# Patient Record
Sex: MALE | Race: ASIAN | Employment: OTHER | URBAN - METROPOLITAN AREA
[De-identification: names, ages, dates, MRNs, and addresses within clinical notes are randomized per-mention and may not be internally consistent; named-entity substitution may affect disease eponyms.]

---

## 2017-03-14 ENCOUNTER — HOSPITAL ENCOUNTER (OUTPATIENT)
Dept: GENERAL RADIOLOGY | Age: 72
Discharge: HOME | End: 2017-03-14
Attending: INTERNAL MEDICINE
Payer: MEDICARE

## 2017-03-14 ENCOUNTER — OFFICE VISIT (OUTPATIENT)
Dept: INTERNAL MEDICINE CLINIC | Facility: CLINIC | Age: 72
End: 2017-03-14

## 2017-03-14 VITALS
WEIGHT: 154 LBS | TEMPERATURE: 98 F | BODY MASS INDEX: 26.29 KG/M2 | OXYGEN SATURATION: 98 % | SYSTOLIC BLOOD PRESSURE: 140 MMHG | RESPIRATION RATE: 16 BRPM | HEIGHT: 64 IN | DIASTOLIC BLOOD PRESSURE: 78 MMHG | HEART RATE: 68 BPM

## 2017-03-14 DIAGNOSIS — R05.9 COUGH: Primary | ICD-10-CM

## 2017-03-14 DIAGNOSIS — R05.9 COUGH: ICD-10-CM

## 2017-03-14 PROCEDURE — 71020 XR CHEST PA + LAT CHEST (CPT=71020): CPT

## 2017-03-14 PROCEDURE — 99213 OFFICE O/P EST LOW 20 MIN: CPT | Performed by: INTERNAL MEDICINE

## 2017-03-14 RX ORDER — PREDNISONE 1 MG/1
TABLET ORAL
Qty: 70 TABLET | Refills: 0 | Status: SHIPPED | OUTPATIENT
Start: 2017-03-14 | End: 2017-03-21

## 2017-03-14 RX ORDER — ALBUTEROL SULFATE 90 UG/1
2 AEROSOL, METERED RESPIRATORY (INHALATION) EVERY 6 HOURS PRN
Qty: 2 INHALER | Refills: 0 | Status: SHIPPED | OUTPATIENT
Start: 2017-03-14 | End: 2017-04-13

## 2017-03-14 NOTE — PROGRESS NOTES
Josiha France  1945 is a 66year old male who presents for upper respiratory symptoms    Patient presents with:  Cough        HPI:   Pt reports  respiratory symptoms for 2 weeks. Did [place]   Patient was in [facility] for the last 2 m SYSTEMS:   GENERAL: feels well otherwise. Denies fever  SKIN: no rashes  EYES:denies eye pain,discharge   HEENT:neg  LUNGS: denies shortness of breath,chest pain,wheezing-nonproductive cough with wheezing noted  CARDIOVASCULAR: denies chest pain on exertion

## 2017-03-21 ENCOUNTER — OFFICE VISIT (OUTPATIENT)
Dept: INTERNAL MEDICINE CLINIC | Facility: CLINIC | Age: 72
End: 2017-03-21

## 2017-03-21 VITALS
WEIGHT: 151.5 LBS | RESPIRATION RATE: 17 BRPM | HEIGHT: 62.75 IN | SYSTOLIC BLOOD PRESSURE: 130 MMHG | TEMPERATURE: 98 F | DIASTOLIC BLOOD PRESSURE: 80 MMHG | BODY MASS INDEX: 27.18 KG/M2 | HEART RATE: 75 BPM | OXYGEN SATURATION: 98 %

## 2017-03-21 DIAGNOSIS — R05.9 COUGH: Primary | ICD-10-CM

## 2017-03-21 PROCEDURE — 99213 OFFICE O/P EST LOW 20 MIN: CPT | Performed by: INTERNAL MEDICINE

## 2017-03-21 RX ORDER — PREDNISONE 1 MG/1
TABLET ORAL
Qty: 70 TABLET | Refills: 0 | COMMUNITY
Start: 2017-03-21 | End: 2017-03-30

## 2017-03-21 NOTE — PROGRESS NOTES
Leatha Ott [facility] 1945 is a 66year old male who presents for upper respiratory symptoms    Patient presents with:   Follow - Up: cough better but still coughing        HPI:   60% better    Current Outpatient Prescriptions:  predniSONE 5 MG Oral Tab Co breath,chest pain,wheezing-nonproductive cough with wheezing noted  CARDIOVASCULAR: denies chest pain on exertion  GI: no nausea or abdominal pain  NEURO: denies headaches    EXAM:   /80 mmHg  Pulse 75  Temp(Src) 97.6 °F (36.4 °C) (Oral)  Resp 17  Ht

## 2017-03-30 ENCOUNTER — TELEPHONE (OUTPATIENT)
Dept: INTERNAL MEDICINE CLINIC | Facility: CLINIC | Age: 72
End: 2017-03-30

## 2017-03-30 ENCOUNTER — OFFICE VISIT (OUTPATIENT)
Dept: INTERNAL MEDICINE CLINIC | Facility: CLINIC | Age: 72
End: 2017-03-30

## 2017-03-30 VITALS
HEIGHT: 62.75 IN | TEMPERATURE: 98 F | BODY MASS INDEX: 27.18 KG/M2 | OXYGEN SATURATION: 98 % | SYSTOLIC BLOOD PRESSURE: 132 MMHG | DIASTOLIC BLOOD PRESSURE: 82 MMHG | HEART RATE: 71 BPM | WEIGHT: 151.5 LBS | RESPIRATION RATE: 17 BRPM

## 2017-03-30 DIAGNOSIS — E11.9 TYPE 2 DIABETES MELLITUS WITHOUT COMPLICATION, WITHOUT LONG-TERM CURRENT USE OF INSULIN (HCC): Chronic | ICD-10-CM

## 2017-03-30 DIAGNOSIS — I10 ESSENTIAL HYPERTENSION: Chronic | ICD-10-CM

## 2017-03-30 DIAGNOSIS — R05.9 COUGH: Primary | ICD-10-CM

## 2017-03-30 PROCEDURE — 99213 OFFICE O/P EST LOW 20 MIN: CPT | Performed by: INTERNAL MEDICINE

## 2017-03-30 RX ORDER — PREDNISONE 1 MG/1
TABLET ORAL
Qty: 70 TABLET | Refills: 0 | Status: SHIPPED | OUTPATIENT
Start: 2017-03-30 | End: 2017-03-30

## 2017-03-30 RX ORDER — PREDNISONE 1 MG/1
TABLET ORAL
Qty: 7 TABLET | Refills: 0 | COMMUNITY
Start: 2017-03-30 | End: 2017-06-29 | Stop reason: ALTCHOICE

## 2017-03-30 NOTE — PROGRESS NOTES
Marlen DanB 1945 is a 66year old male who presents for upper respiratory symptoms    Patient presents with:   Follow - Up        HPI:   Resolved cough     Current Outpatient Prescriptions:  predniSONE 5 MG Oral Tab Continue 5 mg daily -7 days Pasotra Suarez pain,wheezing-nonproductive cough with wheezing noted  CARDIOVASCULAR: denies chest pain on exertion  GI: no nausea or abdominal pain  NEURO: denies headaches    EXAM:   /82 mmHg  Pulse 71  Temp(Src) 97.8 °F (36.6 °C) (Oral)  Resp 17  Ht 62.75\"  Wt

## 2017-03-30 NOTE — TELEPHONE ENCOUNTER
RX for prednisone was sent for #70 tablets but sig states for pt to take for 7 more days. Ekaterina called asking if #70 tablets should be dispensed or #7. I informed Ekaterina that she can dispense #7 tablets. Ekaterina verbalized understanding.

## 2017-05-04 ENCOUNTER — APPOINTMENT (OUTPATIENT)
Dept: LAB | Age: 72
End: 2017-05-04
Attending: INTERNAL MEDICINE
Payer: MEDICARE

## 2017-05-04 DIAGNOSIS — E11.9 TYPE 2 DIABETES MELLITUS WITHOUT COMPLICATION, WITHOUT LONG-TERM CURRENT USE OF INSULIN (HCC): Chronic | ICD-10-CM

## 2017-05-04 DIAGNOSIS — I10 ESSENTIAL HYPERTENSION: Chronic | ICD-10-CM

## 2017-05-04 PROCEDURE — 36415 COLL VENOUS BLD VENIPUNCTURE: CPT | Performed by: INTERNAL MEDICINE

## 2017-06-26 ENCOUNTER — TELEPHONE (OUTPATIENT)
Dept: INTERNAL MEDICINE CLINIC | Facility: CLINIC | Age: 72
End: 2017-06-26

## 2017-06-26 NOTE — TELEPHONE ENCOUNTER
Received a call from [facility] stating that since pt is diabetic he would benefit from statin therapy. Pharmacist stated that she spoke with pt and they are agreeable with therapy. Would you like to add medication? Please advise.  TY

## 2017-06-26 NOTE — TELEPHONE ENCOUNTER
Pt notified and appointment scheduled.     Future Appointments  Date Time Provider Jesu Taveras   6/29/2017 12:30 PM Thu Fox MD [facility] [place]

## 2017-06-29 ENCOUNTER — OFFICE VISIT (OUTPATIENT)
Dept: INTERNAL MEDICINE CLINIC | Facility: CLINIC | Age: 72
End: 2017-06-29

## 2017-06-29 ENCOUNTER — LAB ENCOUNTER (OUTPATIENT)
Dept: LAB | Age: 72
End: 2017-06-29
Attending: INTERNAL MEDICINE
Payer: MEDICARE

## 2017-06-29 VITALS
HEART RATE: 81 BPM | HEIGHT: 62.75 IN | DIASTOLIC BLOOD PRESSURE: 68 MMHG | RESPIRATION RATE: 16 BRPM | WEIGHT: 163.5 LBS | BODY MASS INDEX: 29.33 KG/M2 | TEMPERATURE: 98 F | OXYGEN SATURATION: 99 % | SYSTOLIC BLOOD PRESSURE: 134 MMHG

## 2017-06-29 DIAGNOSIS — R19.7 DIARRHEA, UNSPECIFIED TYPE: ICD-10-CM

## 2017-06-29 DIAGNOSIS — R19.7 DIARRHEA, UNSPECIFIED TYPE: Primary | ICD-10-CM

## 2017-06-29 LAB
ALBUMIN SERPL-MCNC: 3.8 G/DL (ref 3.5–4.8)
ALP LIVER SERPL-CCNC: 59 U/L (ref 45–117)
ALT SERPL-CCNC: 23 U/L (ref 17–63)
AST SERPL-CCNC: 15 U/L (ref 15–41)
BASOPHILS # BLD AUTO: 0.04 X10(3) UL (ref 0–0.1)
BASOPHILS NFR BLD AUTO: 0.7 %
BILIRUB SERPL-MCNC: 0.6 MG/DL (ref 0.1–2)
BUN BLD-MCNC: 13 MG/DL (ref 8–20)
CALCIUM BLD-MCNC: 9.3 MG/DL (ref 8.3–10.3)
CHLORIDE: 106 MMOL/L (ref 101–111)
CO2: 29 MMOL/L (ref 22–32)
CREAT BLD-MCNC: 1.12 MG/DL (ref 0.7–1.3)
EOSINOPHIL # BLD AUTO: 0.26 X10(3) UL (ref 0–0.3)
EOSINOPHIL NFR BLD AUTO: 4.4 %
ERYTHROCYTE [DISTWIDTH] IN BLOOD BY AUTOMATED COUNT: 14.5 % (ref 11.5–16)
GLUCOSE BLD-MCNC: 166 MG/DL (ref 70–99)
HCT VFR BLD AUTO: 42.4 % (ref 37–53)
HGB BLD-MCNC: 14 G/DL (ref 13–17)
IMMATURE GRANULOCYTE COUNT: 0.02 X10(3) UL (ref 0–1)
IMMATURE GRANULOCYTE RATIO %: 0.3 %
LYMPHOCYTES # BLD AUTO: 2.68 X10(3) UL (ref 0.9–4)
LYMPHOCYTES NFR BLD AUTO: 44.9 %
M PROTEIN MFR SERPL ELPH: 7.3 G/DL (ref 6.1–8.3)
MCH RBC QN AUTO: 30.2 PG (ref 27–33.2)
MCHC RBC AUTO-ENTMCNC: 33 G/DL (ref 31–37)
MCV RBC AUTO: 91.6 FL (ref 80–99)
MONOCYTES # BLD AUTO: 0.54 X10(3) UL (ref 0.1–0.6)
MONOCYTES NFR BLD AUTO: 9 %
NEUTROPHIL ABS PRELIM: 2.43 X10 (3) UL (ref 1.3–6.7)
NEUTROPHILS # BLD AUTO: 2.43 X10(3) UL (ref 1.3–6.7)
NEUTROPHILS NFR BLD AUTO: 40.7 %
PLATELET # BLD AUTO: 208 10(3)UL (ref 150–450)
POTASSIUM SERPL-SCNC: 4.7 MMOL/L (ref 3.6–5.1)
RBC # BLD AUTO: 4.63 X10(6)UL (ref 3.8–5.8)
RED CELL DISTRIBUTION WIDTH-SD: 48.3 FL (ref 35.1–46.3)
SODIUM SERPL-SCNC: 141 MMOL/L (ref 136–144)
WBC # BLD AUTO: 6 X10(3) UL (ref 4–13)

## 2017-06-29 PROCEDURE — 99213 OFFICE O/P EST LOW 20 MIN: CPT | Performed by: INTERNAL MEDICINE

## 2017-06-29 PROCEDURE — 36415 COLL VENOUS BLD VENIPUNCTURE: CPT | Performed by: INTERNAL MEDICINE

## 2017-06-29 PROCEDURE — 85025 COMPLETE CBC W/AUTO DIFF WBC: CPT | Performed by: INTERNAL MEDICINE

## 2017-06-29 NOTE — PROGRESS NOTES
Adriana Ronquillo  1945 is a 66year old male. Patient presents with:  Medication Follow-Up  Patient has noted altered bowel movements the last few weeks. His last visit to [facility] in December.   Occasionally feels he has dark color stools no recoll **            2 kids    Retired        Diet: vegetarian    Exercise: nothing formal    Sleep: ok    Stress: low       REVIEW OF SYSTEMS:   Gastrointestinal:   Reflux no. Pain in stomach no. Loss of control of bowels no.  recent travel no. recent anti will await initial tests    The patient indicates understanding of these issues and agrees to the plan. The patient is asked to Return in about 4 weeks (around 7/27/2017) for after seeing consults, result discussion. .   -hold milk and vegetable for a few

## 2017-06-30 DIAGNOSIS — I10 ESSENTIAL HYPERTENSION: ICD-10-CM

## 2017-06-30 RX ORDER — PROPRANOLOL HYDROCHLORIDE 10 MG/1
TABLET ORAL
Qty: 180 TABLET | Refills: 3 | Status: SHIPPED | OUTPATIENT
Start: 2017-06-30 | End: 2017-06-30

## 2017-11-13 ENCOUNTER — OFFICE VISIT (OUTPATIENT)
Dept: INTERNAL MEDICINE CLINIC | Facility: CLINIC | Age: 72
End: 2017-11-13

## 2017-11-13 VITALS
SYSTOLIC BLOOD PRESSURE: 130 MMHG | HEIGHT: 62.75 IN | BODY MASS INDEX: 27.99 KG/M2 | TEMPERATURE: 98 F | HEART RATE: 80 BPM | OXYGEN SATURATION: 98 % | WEIGHT: 156 LBS | RESPIRATION RATE: 13 BRPM | DIASTOLIC BLOOD PRESSURE: 68 MMHG

## 2017-11-13 DIAGNOSIS — Z00.00 ROUTINE GENERAL MEDICAL EXAMINATION AT A HEALTH CARE FACILITY: Primary | ICD-10-CM

## 2017-11-13 DIAGNOSIS — I10 ESSENTIAL HYPERTENSION: Chronic | ICD-10-CM

## 2017-11-13 DIAGNOSIS — J38.00 VOCAL CORD PARALYSIS: Chronic | ICD-10-CM

## 2017-11-13 DIAGNOSIS — E11.9 TYPE 2 DIABETES MELLITUS WITHOUT COMPLICATION, WITHOUT LONG-TERM CURRENT USE OF INSULIN (HCC): Chronic | ICD-10-CM

## 2017-11-13 DIAGNOSIS — J32.9 CHRONIC SINUSITIS, UNSPECIFIED LOCATION: ICD-10-CM

## 2017-11-13 PROCEDURE — 96160 PT-FOCUSED HLTH RISK ASSMT: CPT | Performed by: INTERNAL MEDICINE

## 2017-11-13 PROCEDURE — 93000 ELECTROCARDIOGRAM COMPLETE: CPT | Performed by: INTERNAL MEDICINE

## 2017-11-13 PROCEDURE — 99213 OFFICE O/P EST LOW 20 MIN: CPT | Performed by: INTERNAL MEDICINE

## 2017-11-13 RX ORDER — AMOXICILLIN AND CLAVULANATE POTASSIUM 500; 125 MG/1; MG/1
1 TABLET, FILM COATED ORAL 2 TIMES DAILY
Qty: 20 TABLET | Refills: 0 | Status: SHIPPED | OUTPATIENT
Start: 2017-11-13 | End: 2017-11-20 | Stop reason: ALTCHOICE

## 2017-11-13 NOTE — PATIENT INSTRUCTIONS
Giuseppe Faustin's SCREENING SCHEDULE   Tests on this list are recommended by your physician but may not be covered, or covered at this frequency, by your insurer. Please check with your insurance carrier before scheduling to verify coverage.

## 2017-11-13 NOTE — PROGRESS NOTES
Yoselyn Klein is a 67year old male who presents for a Medicare Annual Wellness visit.    male     Patient Care Team: Patient Care Team:  Sheridan Pandey MD as PCP - General (Internal Medicine)  Sheridan Pandey MD (Internal Medicine)  Sb Dotson, 06/29/2017   ALT 23 05/04/2017   ALT 24 11/14/2016       Lab Results  Component Value Date   TSH 0.009 (L) 01/28/2016   TSH 0.769 03/05/2015   TSH 0.007 (L) 03/20/2014       Lab Results  Component Value Date   BUN 13 06/29/2017   BUN 14 05/04/2017   BUN 12 LAST 2 WEEKS   Little interest or pleasure in doing things (over the last two weeks)?: Not at all    Feeling down, depressed, or hopeless (over the last two weeks)?: Not at all    PHQ-2 SCORE: 0        Advance Directives     Do you have a healthcare power PSA due on 11/14/2018  Update Health Maintenance if applicable   Immunizations      Influenza No orders found for this or any previous visit. Update Immunization Activity if applicable    Pneumococcal No orders found for this or any previous visit.  Update MEDICATIONS:     Current Outpatient Prescriptions:  Amoxicillin-Pot Clavulanate 500-125 MG Oral Tab Take 1 tablet by mouth 2 (two) times daily.  Disp: 20 tablet Rfl: 0   Propranolol HCl 10 MG Oral Tab TAKE 1 TABLET BY MOUTH TWO TIMES A DAY Disp: 180 tablet HAVE EXPERIENCED ANY*      Comment: Procedure: COLONOSCOPY, POSSIBLE BIOPSY,                POSSIBLE POLYPECTOMY 04421;  Surgeon: Toñito Sahni MD;  Location: [address] [place] [address]  10/22/2012: PATIENT Diana Cb none. Orthopnea no. Palpitations none. PND (paroxsymal nocturnal dyspnea) none.    Gastrointestinal:   Patient denies abdominal pain, acid reflux, blood in stool, vomiting, nausea, heartburn denies any wt loss or gain no change in appetite noted no change i Both Eyes Chart Acuity: 20/40   Able To Tolerate Visual Acuity: Yes      GENERAL: well developed, well nourished, in no apparent distress  SKIN: no rashes, no suspicious lesions  HEENT: atraumatic, normocephalic, ears and throat are clear                He none.   Reflexes: normal.   Sensory: all sensory modalities normal.   LYMPHATICS:   Cervical: none. Groin: no adenopathy . Inguinal: no adenopathy. Supraclavicular: none.    DERMATOLOGY:   Rash: no.     ASSESSMENT AND OTHER RELEVANT CHRONIC CONDITIONS

## 2017-11-14 ENCOUNTER — LAB ENCOUNTER (OUTPATIENT)
Dept: LAB | Age: 72
End: 2017-11-14
Attending: INTERNAL MEDICINE
Payer: MEDICARE

## 2017-11-14 DIAGNOSIS — Z00.00 ROUTINE GENERAL MEDICAL EXAMINATION AT A HEALTH CARE FACILITY: ICD-10-CM

## 2017-11-14 PROCEDURE — 82570 ASSAY OF URINE CREATININE: CPT | Performed by: INTERNAL MEDICINE

## 2017-11-14 PROCEDURE — 80061 LIPID PANEL: CPT | Performed by: INTERNAL MEDICINE

## 2017-11-14 PROCEDURE — 36415 COLL VENOUS BLD VENIPUNCTURE: CPT | Performed by: INTERNAL MEDICINE

## 2017-11-14 PROCEDURE — 81001 URINALYSIS AUTO W/SCOPE: CPT | Performed by: INTERNAL MEDICINE

## 2017-11-14 PROCEDURE — 85025 COMPLETE CBC W/AUTO DIFF WBC: CPT | Performed by: INTERNAL MEDICINE

## 2017-11-14 PROCEDURE — 80053 COMPREHEN METABOLIC PANEL: CPT | Performed by: INTERNAL MEDICINE

## 2017-11-14 PROCEDURE — 83036 HEMOGLOBIN GLYCOSYLATED A1C: CPT | Performed by: INTERNAL MEDICINE

## 2017-11-14 PROCEDURE — 84436 ASSAY OF TOTAL THYROXINE: CPT | Performed by: INTERNAL MEDICINE

## 2017-11-14 PROCEDURE — 82043 UR ALBUMIN QUANTITATIVE: CPT | Performed by: INTERNAL MEDICINE

## 2017-11-20 ENCOUNTER — OFFICE VISIT (OUTPATIENT)
Dept: INTERNAL MEDICINE CLINIC | Facility: CLINIC | Age: 72
End: 2017-11-20

## 2017-11-20 VITALS
HEART RATE: 76 BPM | WEIGHT: 154 LBS | TEMPERATURE: 98 F | RESPIRATION RATE: 16 BRPM | OXYGEN SATURATION: 97 % | SYSTOLIC BLOOD PRESSURE: 138 MMHG | BODY MASS INDEX: 28 KG/M2 | DIASTOLIC BLOOD PRESSURE: 70 MMHG

## 2017-11-20 DIAGNOSIS — R31.21 ASYMPTOMATIC MICROSCOPIC HEMATURIA: ICD-10-CM

## 2017-11-20 DIAGNOSIS — R79.89 ABNORMAL THYROID BLOOD TEST: ICD-10-CM

## 2017-11-20 DIAGNOSIS — E11.9 TYPE 2 DIABETES MELLITUS WITHOUT COMPLICATION, WITHOUT LONG-TERM CURRENT USE OF INSULIN (HCC): Primary | Chronic | ICD-10-CM

## 2017-11-20 PROCEDURE — 99213 OFFICE O/P EST LOW 20 MIN: CPT | Performed by: INTERNAL MEDICINE

## 2017-11-20 RX ORDER — PROPRANOLOL HYDROCHLORIDE 10 MG/1
TABLET ORAL
Qty: 180 TABLET | Refills: 3 | Status: SHIPPED | OUTPATIENT
Start: 2017-11-20 | End: 2018-04-02

## 2017-11-20 RX ORDER — ENALAPRIL MALEATE 2.5 MG/1
2.5 TABLET ORAL DAILY
Qty: 90 TABLET | Refills: 3 | Status: SHIPPED | OUTPATIENT
Start: 2017-11-20 | End: 2017-12-23

## 2017-11-20 NOTE — PROGRESS NOTES
Wiley Mchugh [facility] 1945 is a 67year old male. Patient presents with:   Follow - Up: Labs      HPI:   DIABETES MELLITUS:   The diet that the patient has been following is low-carb diet  The frequency of the monitoring schedule is none    The results Alcohol use: No                 REVIEW OF SYSTEMS:     General/Constitutional:   Weight loss no. Ophthalmologic:   Change in vision none. Diminished vision no. Double vision no. Vision loss no.  Eye check scheduled   Endocrine:   Excessive repeat hemoglobin A1c in 3 months     The patient indicates understanding of these issues and agrees to the plan. The patient is asked to Return in about 3 months (around 2/20/2018).   Linda Pabon MD

## 2017-11-30 ENCOUNTER — APPOINTMENT (OUTPATIENT)
Dept: LAB | Age: 72
End: 2017-11-30
Attending: INTERNAL MEDICINE
Payer: MEDICARE

## 2017-11-30 PROCEDURE — 84439 ASSAY OF FREE THYROXINE: CPT | Performed by: INTERNAL MEDICINE

## 2017-11-30 PROCEDURE — 84481 FREE ASSAY (FT-3): CPT | Performed by: INTERNAL MEDICINE

## 2017-11-30 PROCEDURE — 36415 COLL VENOUS BLD VENIPUNCTURE: CPT | Performed by: INTERNAL MEDICINE

## 2017-11-30 PROCEDURE — 84443 ASSAY THYROID STIM HORMONE: CPT | Performed by: INTERNAL MEDICINE

## 2017-12-13 ENCOUNTER — MED REC SCAN ONLY (OUTPATIENT)
Dept: INTERNAL MEDICINE CLINIC | Facility: CLINIC | Age: 72
End: 2017-12-13

## 2017-12-23 ENCOUNTER — OFFICE VISIT (OUTPATIENT)
Dept: INTERNAL MEDICINE CLINIC | Facility: CLINIC | Age: 72
End: 2017-12-23

## 2017-12-23 ENCOUNTER — HOSPITAL ENCOUNTER (OUTPATIENT)
Dept: GENERAL RADIOLOGY | Age: 72
Discharge: HOME | End: 2017-12-23
Attending: INTERNAL MEDICINE
Payer: MEDICARE

## 2017-12-23 VITALS
DIASTOLIC BLOOD PRESSURE: 66 MMHG | BODY MASS INDEX: 26.74 KG/M2 | OXYGEN SATURATION: 97 % | HEIGHT: 62.75 IN | RESPIRATION RATE: 14 BRPM | HEART RATE: 78 BPM | TEMPERATURE: 99 F | WEIGHT: 149 LBS | SYSTOLIC BLOOD PRESSURE: 128 MMHG

## 2017-12-23 DIAGNOSIS — J38.00 VOCAL CORD PARALYSIS: Chronic | ICD-10-CM

## 2017-12-23 DIAGNOSIS — R13.10 DYSPHAGIA, UNSPECIFIED TYPE: ICD-10-CM

## 2017-12-23 DIAGNOSIS — T46.4X5A COUGH DUE TO ACE INHIBITOR: ICD-10-CM

## 2017-12-23 DIAGNOSIS — R05.8 COUGH DUE TO ACE INHIBITOR: Primary | ICD-10-CM

## 2017-12-23 DIAGNOSIS — T46.4X5A COUGH DUE TO ACE INHIBITOR: Primary | ICD-10-CM

## 2017-12-23 DIAGNOSIS — R05.8 COUGH DUE TO ACE INHIBITOR: ICD-10-CM

## 2017-12-23 DIAGNOSIS — J32.9 CHRONIC SINUSITIS, UNSPECIFIED LOCATION: ICD-10-CM

## 2017-12-23 PROCEDURE — 71020 XR CHEST PA + LAT CHEST (CPT=71020): CPT | Performed by: INTERNAL MEDICINE

## 2017-12-23 PROCEDURE — 99213 OFFICE O/P EST LOW 20 MIN: CPT | Performed by: INTERNAL MEDICINE

## 2017-12-23 RX ORDER — PANTOPRAZOLE SODIUM 40 MG/1
40 TABLET, DELAYED RELEASE ORAL
Qty: 30 TABLET | Refills: 11 | Status: SHIPPED | OUTPATIENT
Start: 2017-12-23 | End: 2018-06-14

## 2017-12-23 NOTE — PROGRESS NOTES
Marlen DanB 1945 is a 67year old male who presents for upper respiratory symptoms    Patient presents with:  Cough: Est Pt. cough        HPI:   Complains of tickle in the throat never saw the gastroenterologist as advised  Tingling in the throa cough   CARDIOVASCULAR: denies chest pain on exertion  GI: no nausea or abdominal pain  NEURO: denies headaches    EXAM:   /66   Pulse 78   Temp 98.7 °F (37.1 °C) (Oral)   Resp 14   Ht 62.75\"   Wt 149 lb   SpO2 97%   BMI 26.60 kg/m²   GENERAL: well

## 2017-12-29 ENCOUNTER — TELEPHONE (OUTPATIENT)
Dept: INTERNAL MEDICINE CLINIC | Facility: CLINIC | Age: 72
End: 2017-12-29

## 2017-12-30 ENCOUNTER — OFFICE VISIT (OUTPATIENT)
Dept: INTERNAL MEDICINE CLINIC | Facility: CLINIC | Age: 72
End: 2017-12-30

## 2017-12-30 VITALS
SYSTOLIC BLOOD PRESSURE: 124 MMHG | OXYGEN SATURATION: 97 % | TEMPERATURE: 98 F | HEART RATE: 83 BPM | BODY MASS INDEX: 26.78 KG/M2 | DIASTOLIC BLOOD PRESSURE: 64 MMHG | WEIGHT: 149.25 LBS | RESPIRATION RATE: 16 BRPM | HEIGHT: 62.75 IN

## 2017-12-30 DIAGNOSIS — R05.9 COUGH: Primary | ICD-10-CM

## 2017-12-30 PROCEDURE — 99213 OFFICE O/P EST LOW 20 MIN: CPT | Performed by: INTERNAL MEDICINE

## 2017-12-30 RX ORDER — ZOLPIDEM TARTRATE 5 MG/1
5 TABLET ORAL NIGHTLY PRN
Qty: 30 TABLET | Refills: 0 | Status: SHIPPED | OUTPATIENT
Start: 2017-12-30 | End: 2018-01-29

## 2017-12-30 NOTE — PROGRESS NOTES
Garcia Ennis  1945 is a 67year old male who presents for upper respiratory symptoms    Patient presents with:   Follow - Up: Est Pt. follow up CT scan        HPI:   Complains of tickle in the throat never saw the gastroenterologist as advised Alcohol use:  No                  REVIEW OF SYSTEMS:   No change  EXAM:   /64   Pulse 83   Temp 98.1 °F (36.7 °C) (Oral)   Resp 16   Ht 62.75\"   Wt 149 lb 4 oz   SpO2 97%   BMI 26.65 kg/m²   GENERAL: well developed, well nourished,in no apparent di

## 2018-01-02 ENCOUNTER — TELEPHONE (OUTPATIENT)
Dept: INTERNAL MEDICINE CLINIC | Facility: CLINIC | Age: 73
End: 2018-01-02

## 2018-01-02 ENCOUNTER — HOSPITAL ENCOUNTER (OUTPATIENT)
Dept: CT IMAGING | Age: 73
Discharge: HOME | End: 2018-01-02
Attending: INTERNAL MEDICINE
Payer: MEDICARE

## 2018-01-02 DIAGNOSIS — J32.9 CHRONIC SINUSITIS, UNSPECIFIED LOCATION: ICD-10-CM

## 2018-01-02 DIAGNOSIS — J32.9 CHRONIC SINUSITIS, UNSPECIFIED LOCATION: Primary | ICD-10-CM

## 2018-01-02 PROCEDURE — 70486 CT MAXILLOFACIAL W/O DYE: CPT | Performed by: INTERNAL MEDICINE

## 2018-01-13 ENCOUNTER — HOSPITAL ENCOUNTER (OUTPATIENT)
Dept: GENERAL RADIOLOGY | Facility: HOSPITAL | Age: 73
Discharge: HOME | End: 2018-01-13
Attending: INTERNAL MEDICINE
Payer: MEDICARE

## 2018-01-13 DIAGNOSIS — R13.10 ODYNOPHAGIA: ICD-10-CM

## 2018-01-13 DIAGNOSIS — R13.10 DYSPHAGIA, UNSPECIFIED TYPE: ICD-10-CM

## 2018-01-13 PROCEDURE — 74220 X-RAY XM ESOPHAGUS 1CNTRST: CPT | Performed by: INTERNAL MEDICINE

## 2018-01-23 ENCOUNTER — ANESTHESIA EVENT (OUTPATIENT)
Dept: ENDOSCOPY | Facility: HOSPITAL | Age: 73
End: 2018-01-23
Payer: MEDICARE

## 2018-01-23 ENCOUNTER — ANESTHESIA (OUTPATIENT)
Dept: ENDOSCOPY | Facility: HOSPITAL | Age: 73
End: 2018-01-23
Payer: MEDICARE

## 2018-01-23 ENCOUNTER — HOSPITAL ENCOUNTER (OUTPATIENT)
Facility: HOSPITAL | Age: 73
Setting detail: HOSPITAL OUTPATIENT SURGERY
Discharge: HOME | End: 2018-01-23
Attending: INTERNAL MEDICINE | Admitting: INTERNAL MEDICINE
Payer: MEDICARE

## 2018-01-23 ENCOUNTER — SURGERY (OUTPATIENT)
Age: 73
End: 2018-01-23

## 2018-01-23 VITALS
BODY MASS INDEX: 25.44 KG/M2 | OXYGEN SATURATION: 97 % | HEART RATE: 78 BPM | HEIGHT: 64 IN | TEMPERATURE: 98 F | WEIGHT: 149 LBS | RESPIRATION RATE: 16 BRPM | SYSTOLIC BLOOD PRESSURE: 136 MMHG | DIASTOLIC BLOOD PRESSURE: 65 MMHG

## 2018-01-23 DIAGNOSIS — R13.10 DYSPHAGIA, UNSPECIFIED TYPE: ICD-10-CM

## 2018-01-23 DIAGNOSIS — R13.10 ODYNOPHAGIA: ICD-10-CM

## 2018-01-23 DIAGNOSIS — R49.0 HOARSENESS OF VOICE: ICD-10-CM

## 2018-01-23 PROCEDURE — 0DB78ZX EXCISION OF STOMACH, PYLORUS, VIA NATURAL OR ARTIFICIAL OPENING ENDOSCOPIC, DIAGNOSTIC: ICD-10-PCS | Performed by: INTERNAL MEDICINE

## 2018-01-23 PROCEDURE — 0DB98ZX EXCISION OF DUODENUM, VIA NATURAL OR ARTIFICIAL OPENING ENDOSCOPIC, DIAGNOSTIC: ICD-10-PCS | Performed by: INTERNAL MEDICINE

## 2018-01-23 PROCEDURE — 0D738ZZ DILATION OF LOWER ESOPHAGUS, VIA NATURAL OR ARTIFICIAL OPENING ENDOSCOPIC: ICD-10-PCS | Performed by: INTERNAL MEDICINE

## 2018-01-23 PROCEDURE — 0DB58ZX EXCISION OF ESOPHAGUS, VIA NATURAL OR ARTIFICIAL OPENING ENDOSCOPIC, DIAGNOSTIC: ICD-10-PCS | Performed by: INTERNAL MEDICINE

## 2018-01-23 PROCEDURE — 88305 TISSUE EXAM BY PATHOLOGIST: CPT | Performed by: INTERNAL MEDICINE

## 2018-01-23 RX ORDER — SODIUM CHLORIDE, SODIUM LACTATE, POTASSIUM CHLORIDE, CALCIUM CHLORIDE 600; 310; 30; 20 MG/100ML; MG/100ML; MG/100ML; MG/100ML
INJECTION, SOLUTION INTRAVENOUS CONTINUOUS
Status: DISCONTINUED | OUTPATIENT
Start: 2018-01-23 | End: 2018-01-23

## 2018-01-23 RX ORDER — DEXTROSE MONOHYDRATE 25 G/50ML
50 INJECTION, SOLUTION INTRAVENOUS
Status: DISCONTINUED | OUTPATIENT
Start: 2018-01-23 | End: 2018-01-23

## 2018-01-23 RX ORDER — NALOXONE HYDROCHLORIDE 0.4 MG/ML
80 INJECTION, SOLUTION INTRAMUSCULAR; INTRAVENOUS; SUBCUTANEOUS AS NEEDED
Status: DISCONTINUED | OUTPATIENT
Start: 2018-01-23 | End: 2018-01-23

## 2018-01-23 NOTE — H&P
[address] Patient Status:  Hospital Outpatient Surgery    1945 MRN [id number]   [facility] ENDOSCOPY Attending Juan Sears MD   Hosp Day # 0 PCP Marc Paige MD     CC: Dysphagia    Histo 9/1/15: OTHER SURGICAL HISTORY      Comment: CYSTOSCOPY, TRANS URETHRAL RESECTION OF                PROSTATE  10/22/2012: PATIENT DOCUMENTED NOT TO HAVE EXPERIENCED ANY*      Comment: Procedure: COLONOSCOPY, POSSIBLE BIOPSY,                POSSIBLE POLYP

## 2018-01-23 NOTE — ANESTHESIA POSTPROCEDURE EVALUATION
[address] Patient Status:  Hospital Outpatient Surgery   Age/Gender 67year old male MRN [id number]   Location [facility]. Attending Garima Goodman MD   Hosp Day # 0 PCP Thad Beaver MD       Anesthesia Post-op Not

## 2018-01-24 ENCOUNTER — TELEPHONE (OUTPATIENT)
Dept: INTERNAL MEDICINE CLINIC | Facility: CLINIC | Age: 73
End: 2018-01-24

## 2018-01-24 NOTE — TELEPHONE ENCOUNTER
Cathy from Dr. Gauthier Prudent office ([address] [place] [address]) (T: [phone])    Seeking a recommendation for an ENT specialist, to refer the patient to.

## 2018-01-24 NOTE — TELEPHONE ENCOUNTER
Sejal Doe called back and was informed that we referred pt to see Dr Ayla Sharpe 1/2/18. Sejal Doe verbalized understanding.

## 2018-02-20 DIAGNOSIS — E11.9 TYPE 2 DIABETES MELLITUS WITHOUT COMPLICATION, WITHOUT LONG-TERM CURRENT USE OF INSULIN (HCC): Chronic | ICD-10-CM

## 2018-02-20 RX ORDER — LOSARTAN POTASSIUM 25 MG/1
TABLET ORAL
Qty: 90 TABLET | Refills: 2 | OUTPATIENT
Start: 2018-02-20

## 2018-02-21 ENCOUNTER — APPOINTMENT (OUTPATIENT)
Dept: LAB | Age: 73
End: 2018-02-21
Attending: INTERNAL MEDICINE
Payer: MEDICARE

## 2018-02-21 DIAGNOSIS — A04.8 H. PYLORI INFECTION: ICD-10-CM

## 2018-02-21 PROCEDURE — 87338 HPYLORI STOOL AG IA: CPT

## 2018-02-23 LAB — HELICOBACTER PYLORI AG, FECAL: NEGATIVE

## 2018-04-02 ENCOUNTER — OFFICE VISIT (OUTPATIENT)
Dept: INTERNAL MEDICINE CLINIC | Facility: CLINIC | Age: 73
End: 2018-04-02

## 2018-04-02 VITALS — DIASTOLIC BLOOD PRESSURE: 92 MMHG | HEART RATE: 80 BPM | TEMPERATURE: 99 F | SYSTOLIC BLOOD PRESSURE: 140 MMHG

## 2018-04-02 DIAGNOSIS — I10 ESSENTIAL HYPERTENSION: Chronic | ICD-10-CM

## 2018-04-02 DIAGNOSIS — K22.2 ESOPHAGEAL STRICTURE: ICD-10-CM

## 2018-04-02 DIAGNOSIS — J32.9 CHRONIC SINUSITIS, UNSPECIFIED LOCATION: Primary | ICD-10-CM

## 2018-04-02 PROCEDURE — 99213 OFFICE O/P EST LOW 20 MIN: CPT | Performed by: INTERNAL MEDICINE

## 2018-04-02 RX ORDER — PROPRANOLOL HYDROCHLORIDE 10 MG/1
TABLET ORAL
Qty: 180 TABLET | Refills: 3 | COMMUNITY
Start: 2018-04-02 | End: 2018-06-14

## 2018-04-02 NOTE — PROGRESS NOTES
Stefanie MEANS 1945 is a 67year old male who presents for upper respiratory symptoms    Patient presents with:  Melissa Frost        HPI:   Pt reports  respiratory symptoms for few days. Did see ENT without much relief.   Saw Dr. Marques Lewis now still h Quit date: 6/20/1984  Smokeless tobacco: Never Used                      Alcohol use: No                  REVIEW OF SYSTEMS:   GENERAL: feels well otherwise. Denies fever  SKIN: no rashes  EYES:denies eye pain,discharge   HEENT: Stuffy nose with collectio

## 2018-04-19 ENCOUNTER — HOSPITAL ENCOUNTER (OUTPATIENT)
Dept: CT IMAGING | Facility: HOSPITAL | Age: 73
Discharge: HOME | End: 2018-04-19
Attending: OTOLARYNGOLOGY
Payer: MEDICARE

## 2018-04-19 DIAGNOSIS — J38.01 UNILATERAL VOCAL CORD PARALYSIS: ICD-10-CM

## 2018-04-19 DIAGNOSIS — J32.4 CHRONIC PANSINUSITIS: ICD-10-CM

## 2018-04-19 PROCEDURE — 70486 CT MAXILLOFACIAL W/O DYE: CPT | Performed by: OTOLARYNGOLOGY

## 2018-04-19 PROCEDURE — 82565 ASSAY OF CREATININE: CPT

## 2018-04-19 PROCEDURE — 71260 CT THORAX DX C+: CPT | Performed by: OTOLARYNGOLOGY

## 2018-05-21 DIAGNOSIS — E11.9 TYPE 2 DIABETES MELLITUS WITHOUT COMPLICATION, WITHOUT LONG-TERM CURRENT USE OF INSULIN (HCC): Chronic | ICD-10-CM

## 2018-05-21 RX ORDER — LOSARTAN POTASSIUM 25 MG/1
TABLET ORAL
Qty: 90 TABLET | Refills: 2 | OUTPATIENT
Start: 2018-05-21

## 2018-05-21 NOTE — TELEPHONE ENCOUNTER
Losartan Potassium 25 MG Oral Tab (Discontinued) 90 tablet 3 11/25/2016 11/13/2017    Sig - Route:  Take 1 tablet (25 mg total) by mouth daily. - Oral      Patient not been taking this medication since 11/17

## 2018-06-14 ENCOUNTER — OFFICE VISIT (OUTPATIENT)
Dept: INTERNAL MEDICINE CLINIC | Facility: CLINIC | Age: 73
End: 2018-06-14

## 2018-06-14 VITALS
TEMPERATURE: 99 F | BODY MASS INDEX: 26 KG/M2 | WEIGHT: 150 LBS | DIASTOLIC BLOOD PRESSURE: 72 MMHG | RESPIRATION RATE: 16 BRPM | SYSTOLIC BLOOD PRESSURE: 140 MMHG | OXYGEN SATURATION: 95 % | HEART RATE: 84 BPM

## 2018-06-14 DIAGNOSIS — E11.9 TYPE 2 DIABETES MELLITUS WITHOUT COMPLICATION, WITHOUT LONG-TERM CURRENT USE OF INSULIN (HCC): Primary | Chronic | ICD-10-CM

## 2018-06-14 DIAGNOSIS — I10 ESSENTIAL HYPERTENSION: Chronic | ICD-10-CM

## 2018-06-14 DIAGNOSIS — R13.10 DYSPHAGIA, UNSPECIFIED TYPE: ICD-10-CM

## 2018-06-14 DIAGNOSIS — R79.89 ABNORMAL THYROID BLOOD TEST: ICD-10-CM

## 2018-06-14 DIAGNOSIS — Z12.11 SCREENING FOR COLON CANCER: ICD-10-CM

## 2018-06-14 PROBLEM — R31.21 ASYMPTOMATIC MICROSCOPIC HEMATURIA: Chronic | Status: ACTIVE | Noted: 2017-11-20

## 2018-06-14 PROBLEM — J32.9 CHRONIC SINUSITIS: Chronic | Status: ACTIVE | Noted: 2017-11-13

## 2018-06-14 PROCEDURE — 99213 OFFICE O/P EST LOW 20 MIN: CPT | Performed by: INTERNAL MEDICINE

## 2018-06-14 RX ORDER — ENALAPRIL MALEATE 2.5 MG/1
2.5 TABLET ORAL DAILY
COMMUNITY
End: 2018-06-14

## 2018-06-14 RX ORDER — PANTOPRAZOLE SODIUM 40 MG/1
40 TABLET, DELAYED RELEASE ORAL
Qty: 30 TABLET | Refills: 11 | Status: SHIPPED | OUTPATIENT
Start: 2018-06-14 | End: 2018-10-30 | Stop reason: ALTCHOICE

## 2018-06-14 RX ORDER — PROPRANOLOL HYDROCHLORIDE 10 MG/1
TABLET ORAL
Qty: 180 TABLET | Refills: 3 | Status: SHIPPED | OUTPATIENT
Start: 2018-06-14 | End: 2018-11-06 | Stop reason: ALTCHOICE

## 2018-06-14 RX ORDER — ENALAPRIL MALEATE 2.5 MG/1
2.5 TABLET ORAL DAILY
Qty: 90 TABLET | Refills: 0 | Status: SHIPPED | OUTPATIENT
Start: 2018-06-14 | End: 2019-02-05

## 2018-06-14 NOTE — PROGRESS NOTES
Wiley Mchugh [facility] 1945 is a 67year old male. Patient presents with: Follow - Up: Medication      HPI:   DIABETES MELLITUS:   The diet that the patient has been following is low-carb diet  The frequency of the monitoring schedule is nonely.      Jackie Orozco breath    • Sleep apnea     no cpap   • Sleep disturbance    • Sputum production    • Visual impairment     reading glasses   • Vocal cord paralysis 11/10/2016   • Wheezing       Social History:  Smoking status: Former Smoker Future  -     COMP METABOLIC PANEL (14);  Future    Screening for colon cancer  -     GASTRO - INTERNAL    Essential hypertension  -     Propranolol HCl 10 MG Oral Tab; TAKE 2 TABLET BY MOUTH TWO TIMES A DAY    Dysphagia, unspecified type  -     Pantoprazol

## 2018-06-15 ENCOUNTER — APPOINTMENT (OUTPATIENT)
Dept: LAB | Age: 73
End: 2018-06-15
Attending: INTERNAL MEDICINE
Payer: MEDICARE

## 2018-06-15 DIAGNOSIS — E11.9 TYPE 2 DIABETES MELLITUS WITHOUT COMPLICATION, WITHOUT LONG-TERM CURRENT USE OF INSULIN (HCC): Chronic | ICD-10-CM

## 2018-06-15 DIAGNOSIS — R79.89 ABNORMAL THYROID BLOOD TEST: ICD-10-CM

## 2018-06-15 PROCEDURE — 36415 COLL VENOUS BLD VENIPUNCTURE: CPT

## 2018-06-15 PROCEDURE — 84443 ASSAY THYROID STIM HORMONE: CPT

## 2018-06-15 PROCEDURE — 83036 HEMOGLOBIN GLYCOSYLATED A1C: CPT

## 2018-06-15 PROCEDURE — 80053 COMPREHEN METABOLIC PANEL: CPT

## 2018-06-15 PROCEDURE — 84436 ASSAY OF TOTAL THYROXINE: CPT

## 2018-06-22 ENCOUNTER — OFFICE VISIT (OUTPATIENT)
Dept: INTERNAL MEDICINE CLINIC | Facility: CLINIC | Age: 73
End: 2018-06-22

## 2018-06-22 VITALS
DIASTOLIC BLOOD PRESSURE: 60 MMHG | BODY MASS INDEX: 26 KG/M2 | RESPIRATION RATE: 16 BRPM | OXYGEN SATURATION: 98 % | SYSTOLIC BLOOD PRESSURE: 102 MMHG | WEIGHT: 150 LBS | HEART RATE: 72 BPM

## 2018-06-22 DIAGNOSIS — E11.9 TYPE 2 DIABETES MELLITUS WITHOUT COMPLICATION, WITHOUT LONG-TERM CURRENT USE OF INSULIN (HCC): Chronic | ICD-10-CM

## 2018-06-22 DIAGNOSIS — E05.90 HYPERTHYROIDISM: Primary | ICD-10-CM

## 2018-06-22 PROCEDURE — 99214 OFFICE O/P EST MOD 30 MIN: CPT | Performed by: INTERNAL MEDICINE

## 2018-06-22 RX ORDER — METHIMAZOLE 10 MG/1
10 TABLET ORAL DAILY
Qty: 90 TABLET | Refills: 3 | Status: SHIPPED | OUTPATIENT
Start: 2018-06-22 | End: 2019-03-04

## 2018-06-22 RX ORDER — ZOLPIDEM TARTRATE 5 MG/1
5 TABLET ORAL NIGHTLY PRN
Qty: 30 TABLET | Refills: 0 | Status: SHIPPED | OUTPATIENT
Start: 2018-06-22 | End: 2018-07-22

## 2018-06-22 NOTE — PROGRESS NOTES
Mikey Fabry  1945 is a 67year old male. Patient presents with:   Follow - Up: Labs  Complains of some insomnia    HPI:   DIABETES MELLITUS:   The diet that the patient has been following is low-carb diet  The frequency of the monitoring schedu (postoperative nausea and vomiting)    • Prediabetes     borderline   • Problems with swallowing     trouble with solid   • Shortness of breath    • Sleep apnea     no cpap   • Sleep disturbance    • Sputum production    • Visual impairment     reading gla (CPT=78014); Future    Type 2 diabetes mellitus without complication, without long-term current use of insulin (HCC)               Patient Instructions   Ck sugar # pre and post meals ,compliance with diet/exercise enforce.  Weight counseling discussed   Sp

## 2018-06-28 ENCOUNTER — HOSPITAL ENCOUNTER (OUTPATIENT)
Dept: NUCLEAR MEDICINE | Facility: HOSPITAL | Age: 73
Discharge: HOME | End: 2018-06-28
Attending: INTERNAL MEDICINE
Payer: MEDICARE

## 2018-06-28 DIAGNOSIS — E05.90 HYPERTHYROIDISM: ICD-10-CM

## 2018-06-29 ENCOUNTER — HOSPITAL ENCOUNTER (OUTPATIENT)
Dept: NUCLEAR MEDICINE | Facility: HOSPITAL | Age: 73
Discharge: HOME | End: 2018-06-29
Attending: INTERNAL MEDICINE
Payer: MEDICARE

## 2018-06-29 PROCEDURE — 78014 THYROID IMAGING W/BLOOD FLOW: CPT | Performed by: INTERNAL MEDICINE

## 2018-09-20 DIAGNOSIS — E11.9 TYPE 2 DIABETES MELLITUS WITHOUT COMPLICATION, WITHOUT LONG-TERM CURRENT USE OF INSULIN (HCC): Chronic | ICD-10-CM

## 2018-09-21 RX ORDER — LOSARTAN POTASSIUM 25 MG/1
TABLET ORAL
Qty: 90 TABLET | Refills: 2 | Status: SHIPPED | OUTPATIENT
Start: 2018-09-21 | End: 2018-10-30

## 2018-09-21 NOTE — TELEPHONE ENCOUNTER
Losartan 25 mg 1 tab daily filled 11/13/16 90 with 3 refills     medication is not on his active med list since 11/13/17    Labs 6/15/18    No upcoming apt on file     Please review.

## 2018-10-03 RX ORDER — ZOLPIDEM TARTRATE 5 MG/1
TABLET ORAL
Qty: 30 TABLET | Refills: 0 | Status: SHIPPED | OUTPATIENT
Start: 2018-10-03 | End: 2019-02-18 | Stop reason: ALTCHOICE

## 2018-10-30 ENCOUNTER — OFFICE VISIT (OUTPATIENT)
Dept: INTERNAL MEDICINE CLINIC | Facility: CLINIC | Age: 73
End: 2018-10-30

## 2018-10-30 ENCOUNTER — APPOINTMENT (OUTPATIENT)
Dept: LAB | Age: 73
End: 2018-10-30
Attending: INTERNAL MEDICINE
Payer: MEDICARE

## 2018-10-30 VITALS
WEIGHT: 161.75 LBS | RESPIRATION RATE: 20 BRPM | BODY MASS INDEX: 28 KG/M2 | DIASTOLIC BLOOD PRESSURE: 72 MMHG | SYSTOLIC BLOOD PRESSURE: 160 MMHG | HEART RATE: 76 BPM | OXYGEN SATURATION: 98 % | TEMPERATURE: 98 F

## 2018-10-30 DIAGNOSIS — E05.90 HYPERTHYROIDISM: ICD-10-CM

## 2018-10-30 DIAGNOSIS — E11.9 TYPE 2 DIABETES MELLITUS WITHOUT COMPLICATION, WITHOUT LONG-TERM CURRENT USE OF INSULIN (HCC): Primary | Chronic | ICD-10-CM

## 2018-10-30 PROCEDURE — 84443 ASSAY THYROID STIM HORMONE: CPT

## 2018-10-30 PROCEDURE — 36415 COLL VENOUS BLD VENIPUNCTURE: CPT

## 2018-10-30 PROCEDURE — 84436 ASSAY OF TOTAL THYROXINE: CPT

## 2018-10-30 PROCEDURE — 99213 OFFICE O/P EST LOW 20 MIN: CPT | Performed by: INTERNAL MEDICINE

## 2018-10-30 RX ORDER — METFORMIN HYDROCHLORIDE 750 MG/1
750 TABLET, EXTENDED RELEASE ORAL DAILY
Qty: 90 TABLET | Refills: 1 | Status: SHIPPED | OUTPATIENT
Start: 2018-10-30 | End: 2018-10-30

## 2018-10-30 RX ORDER — MEFLOQUINE HYDROCHLORIDE 250 MG/1
TABLET ORAL
Qty: 17 TABLET | Refills: 0 | Status: SHIPPED | OUTPATIENT
Start: 2018-10-30 | End: 2019-02-18 | Stop reason: ALTCHOICE

## 2018-10-30 RX ORDER — METFORMIN HYDROCHLORIDE 750 MG/1
750 TABLET, EXTENDED RELEASE ORAL DAILY
Qty: 90 TABLET | Refills: 1 | Status: SHIPPED | OUTPATIENT
Start: 2018-10-30 | End: 2019-02-18

## 2018-10-30 NOTE — PROGRESS NOTES
Cherrie Ferguson  1945 is a 75year old male.     Patient presents with:  Medication Follow-Up       HPI:   To [facility] wants  a refill on his medicines    Current Outpatient Medications:  MetFORMIN HCl  MG Oral Tablet 24 Hr Take 1 tablet (750 mg to Sleep disturbance    • Sputum production    • Visual impairment     reading glasses   • Vocal cord paralysis 11/10/2016   • Wheezing       Social History:  Social History    Tobacco Use      Smoking status: Former Smoker        Packs/day: 0.50        Years 250 MG Oral Tab; Start 1 week prior to departure and take 1 tablet every week and continue till 4 weeks on return    Patient has been taking his propranolol 10 mg twice a day.   Advised that he should be taking 20 mg twice a day    Patient Instructions   pl

## 2018-11-02 ENCOUNTER — TELEPHONE (OUTPATIENT)
Dept: INTERNAL MEDICINE CLINIC | Facility: CLINIC | Age: 73
End: 2018-11-02

## 2018-11-02 NOTE — TELEPHONE ENCOUNTER
Please call pt and schedule f/u to discuss lab results and pt need to bring all medications to apt.  If pt has questions tx to Rn     See result note 10/30/18        FW to  to schedule apt

## 2018-11-06 ENCOUNTER — OFFICE VISIT (OUTPATIENT)
Dept: INTERNAL MEDICINE CLINIC | Facility: CLINIC | Age: 73
End: 2018-11-06

## 2018-11-06 VITALS
BODY MASS INDEX: 27 KG/M2 | OXYGEN SATURATION: 95 % | WEIGHT: 159.5 LBS | TEMPERATURE: 98 F | DIASTOLIC BLOOD PRESSURE: 90 MMHG | SYSTOLIC BLOOD PRESSURE: 158 MMHG | RESPIRATION RATE: 20 BRPM | HEART RATE: 79 BPM

## 2018-11-06 DIAGNOSIS — E11.9 TYPE 2 DIABETES MELLITUS WITHOUT COMPLICATION, WITHOUT LONG-TERM CURRENT USE OF INSULIN (HCC): Chronic | ICD-10-CM

## 2018-11-06 DIAGNOSIS — E05.90 HYPERTHYROIDISM: Chronic | ICD-10-CM

## 2018-11-06 DIAGNOSIS — I10 ESSENTIAL HYPERTENSION: Primary | Chronic | ICD-10-CM

## 2018-11-06 PROCEDURE — 99213 OFFICE O/P EST LOW 20 MIN: CPT | Performed by: INTERNAL MEDICINE

## 2018-11-06 RX ORDER — METOPROLOL SUCCINATE 50 MG/1
50 TABLET, EXTENDED RELEASE ORAL DAILY
Qty: 90 TABLET | Refills: 0 | Status: SHIPPED | OUTPATIENT
Start: 2018-11-06 | End: 2019-02-04

## 2018-11-06 NOTE — PROGRESS NOTES
Betzy Poole [facility] 1945 is a 75year old male. Patient presents with:   Follow - Up       HPI:   No complaints here for thyroid test.    Current Outpatient Medications:  Metoprolol Succinate ER 50 MG Oral Tablet 24 Hr Take 1 tablet (50 mg total) by m cpap   • Sleep disturbance    • Sputum production    • Visual impairment     reading glasses   • Vocal cord paralysis 11/10/2016   • Wheezing       Social History:  Social History    Tobacco Use      Smoking status: Former Smoker        Packs/day: 0.50 daily.      BP meds adjusted  Patient Instructions   Will get his blood work done end of January       The patient indicates understanding of these issues and agrees to the plan. The patient is asked to Return in about 2 weeks (around 11/20/2018), or bp. Stevenson Avery

## 2018-12-25 ENCOUNTER — TELEPHONE (OUTPATIENT)
Dept: INTERNAL MEDICINE CLINIC | Facility: CLINIC | Age: 73
End: 2018-12-25

## 2018-12-25 DIAGNOSIS — R13.10 DYSPHAGIA, UNSPECIFIED TYPE: ICD-10-CM

## 2018-12-26 RX ORDER — PROPRANOLOL HYDROCHLORIDE 10 MG/1
TABLET ORAL
Qty: 180 TABLET | Refills: 2 | Status: SHIPPED | OUTPATIENT
Start: 2018-12-26 | End: 2019-02-05

## 2018-12-26 RX ORDER — ENALAPRIL MALEATE 2.5 MG/1
TABLET ORAL
Qty: 90 TABLET | Refills: 2 | Status: SHIPPED | OUTPATIENT
Start: 2018-12-26 | End: 2019-02-12

## 2018-12-26 NOTE — TELEPHONE ENCOUNTER
Refill requested:   Requested Prescriptions     Pending Prescriptions Disp Refills   • PROPRANOLOL HCL 10 MG Oral Tab [Pharmacy Med Name: Propranolol HCl Oral Tablet 10 MG] 180 tablet 2     Sig: TAKE 1 TABLET BY MOUTH TWO TIMES A DAY   • ENALAPRIL MALEATE

## 2018-12-27 RX ORDER — ASPIRIN 81 MG/1
TABLET, DELAYED RELEASE ORAL
Qty: 90 TABLET | Refills: 2 | Status: SHIPPED | OUTPATIENT
Start: 2018-12-27 | End: 2019-02-05

## 2018-12-27 RX ORDER — PANTOPRAZOLE SODIUM 40 MG/1
TABLET, DELAYED RELEASE ORAL
Qty: 30 TABLET | Refills: 10 | Status: SHIPPED | OUTPATIENT
Start: 2018-12-27 | End: 2019-02-05

## 2019-02-05 DIAGNOSIS — R13.10 DYSPHAGIA, UNSPECIFIED TYPE: ICD-10-CM

## 2019-02-05 RX ORDER — ENALAPRIL MALEATE 2.5 MG/1
2.5 TABLET ORAL DAILY
Qty: 90 TABLET | Refills: 2 | Status: SHIPPED | OUTPATIENT
Start: 2019-02-05 | End: 2019-07-15

## 2019-02-05 RX ORDER — PROPRANOLOL HYDROCHLORIDE 10 MG/1
TABLET ORAL
Qty: 180 TABLET | Refills: 2 | Status: SHIPPED | OUTPATIENT
Start: 2019-02-05 | End: 2019-02-12 | Stop reason: ALTCHOICE

## 2019-02-05 NOTE — TELEPHONE ENCOUNTER
Patient requesting all medications to be changed to mail order pharmacy:    [address], [address] [place] [address] [phone], [phone]    -PANTOPRAZOLE SODIUM 40 MG Oral Tab EC  -SM ASPIRIN ADULT LOW STRENGTH 81 MG Oral

## 2019-02-06 RX ORDER — ASPIRIN 81 MG/1
TABLET ORAL
Qty: 90 TABLET | Refills: 2 | Status: SHIPPED | OUTPATIENT
Start: 2019-02-06 | End: 2019-07-15

## 2019-02-06 RX ORDER — PANTOPRAZOLE SODIUM 40 MG/1
TABLET, DELAYED RELEASE ORAL
Qty: 90 TABLET | Refills: 3 | Status: SHIPPED | OUTPATIENT
Start: 2019-02-06 | End: 2019-02-18 | Stop reason: ALTCHOICE

## 2019-02-06 NOTE — TELEPHONE ENCOUNTER
Passed protocol - patient is now requesting Rx's to be sent to [facility] - previous refills sent to [facility].      Medication(s) to Refill:   Requested Prescriptions     Pending Prescriptions Disp Refills   • Pantoprazole Sodium 40 MG Oral Tab EC 30 tablet 10

## 2019-02-09 ENCOUNTER — TELEPHONE (OUTPATIENT)
Dept: INTERNAL MEDICINE CLINIC | Facility: CLINIC | Age: 74
End: 2019-02-09

## 2019-02-11 ENCOUNTER — APPOINTMENT (OUTPATIENT)
Dept: LAB | Age: 74
End: 2019-02-11
Attending: INTERNAL MEDICINE
Payer: MEDICARE

## 2019-02-11 DIAGNOSIS — E11.9 TYPE 2 DIABETES MELLITUS WITHOUT COMPLICATION, WITHOUT LONG-TERM CURRENT USE OF INSULIN (HCC): Chronic | ICD-10-CM

## 2019-02-11 DIAGNOSIS — E05.90 HYPERTHYROIDISM: Chronic | ICD-10-CM

## 2019-02-11 DIAGNOSIS — I10 ESSENTIAL HYPERTENSION: Chronic | ICD-10-CM

## 2019-02-11 LAB
ALBUMIN SERPL-MCNC: 3.9 G/DL (ref 3.1–4.5)
ALBUMIN/GLOB SERPL: 1 {RATIO} (ref 1–2)
ALP LIVER SERPL-CCNC: 75 U/L (ref 45–117)
ALT SERPL-CCNC: 23 U/L (ref 17–63)
ANION GAP SERPL CALC-SCNC: 6 MMOL/L (ref 0–18)
AST SERPL-CCNC: 23 U/L (ref 15–41)
BILIRUB SERPL-MCNC: 0.7 MG/DL (ref 0.1–2)
BUN BLD-MCNC: 16 MG/DL (ref 8–20)
BUN/CREAT SERPL: 13.7 (ref 10–20)
CALCIUM BLD-MCNC: 9.2 MG/DL (ref 8.3–10.3)
CHLORIDE SERPL-SCNC: 109 MMOL/L (ref 101–111)
CHOLEST SMN-MCNC: 146 MG/DL (ref ?–200)
CO2 SERPL-SCNC: 26 MMOL/L (ref 22–32)
CREAT BLD-MCNC: 1.17 MG/DL (ref 0.7–1.3)
EST. AVERAGE GLUCOSE BLD GHB EST-MCNC: 146 MG/DL (ref 68–126)
GLOBULIN PLAS-MCNC: 3.9 G/DL (ref 2.8–4.4)
GLUCOSE BLD-MCNC: 98 MG/DL (ref 70–99)
HBA1C MFR BLD HPLC: 6.7 % (ref ?–5.7)
HDLC SERPL-MCNC: 41 MG/DL (ref 40–59)
LDLC SERPL CALC-MCNC: 79 MG/DL (ref ?–100)
M PROTEIN MFR SERPL ELPH: 7.8 G/DL (ref 6.4–8.2)
NONHDLC SERPL-MCNC: 105 MG/DL (ref ?–130)
OSMOLALITY SERPL CALC.SUM OF ELEC: 293 MOSM/KG (ref 275–295)
POTASSIUM SERPL-SCNC: 4.5 MMOL/L (ref 3.6–5.1)
SODIUM SERPL-SCNC: 141 MMOL/L (ref 136–144)
T4 SERPL-MCNC: 9.7 UG/DL (ref 4.5–10.9)
TRIGL SERPL-MCNC: 129 MG/DL (ref 30–149)
TSI SER-ACNC: 4.5 MIU/ML (ref 0.35–5.5)
VLDLC SERPL CALC-MCNC: 26 MG/DL (ref 0–30)

## 2019-02-11 PROCEDURE — 36415 COLL VENOUS BLD VENIPUNCTURE: CPT

## 2019-02-11 PROCEDURE — 80061 LIPID PANEL: CPT

## 2019-02-11 PROCEDURE — 84436 ASSAY OF TOTAL THYROXINE: CPT

## 2019-02-11 PROCEDURE — 84443 ASSAY THYROID STIM HORMONE: CPT

## 2019-02-11 PROCEDURE — 83036 HEMOGLOBIN GLYCOSYLATED A1C: CPT

## 2019-02-11 PROCEDURE — 80053 COMPREHEN METABOLIC PANEL: CPT

## 2019-02-18 ENCOUNTER — LAB ENCOUNTER (OUTPATIENT)
Dept: LAB | Age: 74
End: 2019-02-18
Attending: INTERNAL MEDICINE
Payer: MEDICARE

## 2019-02-18 ENCOUNTER — OFFICE VISIT (OUTPATIENT)
Dept: INTERNAL MEDICINE CLINIC | Facility: CLINIC | Age: 74
End: 2019-02-18
Payer: MEDICARE

## 2019-02-18 VITALS
WEIGHT: 158 LBS | HEART RATE: 98 BPM | DIASTOLIC BLOOD PRESSURE: 68 MMHG | TEMPERATURE: 98 F | OXYGEN SATURATION: 97 % | BODY MASS INDEX: 26.98 KG/M2 | HEIGHT: 64 IN | RESPIRATION RATE: 16 BRPM | SYSTOLIC BLOOD PRESSURE: 122 MMHG

## 2019-02-18 DIAGNOSIS — E11.9 TYPE 2 DIABETES MELLITUS WITHOUT COMPLICATION, WITHOUT LONG-TERM CURRENT USE OF INSULIN (HCC): Primary | Chronic | ICD-10-CM

## 2019-02-18 DIAGNOSIS — Z00.00 LABORATORY EXAMINATION ORDERED AS PART OF A ROUTINE GENERAL MEDICAL EXAMINATION: ICD-10-CM

## 2019-02-18 DIAGNOSIS — Z12.11 SCREENING FOR COLON CANCER: ICD-10-CM

## 2019-02-18 DIAGNOSIS — R25.1 TREMOR: ICD-10-CM

## 2019-02-18 DIAGNOSIS — E11.9 TYPE 2 DIABETES MELLITUS WITHOUT COMPLICATION, WITHOUT LONG-TERM CURRENT USE OF INSULIN (HCC): Chronic | ICD-10-CM

## 2019-02-18 LAB
BASOPHILS # BLD AUTO: 0.04 X10(3) UL (ref 0–0.2)
BASOPHILS NFR BLD AUTO: 0.6 %
BILIRUB UR QL STRIP.AUTO: NEGATIVE
COLOR UR AUTO: YELLOW
CREAT UR-SCNC: 296 MG/DL
DEPRECATED RDW RBC AUTO: 50.3 FL (ref 35.1–46.3)
EOSINOPHIL # BLD AUTO: 0.16 X10(3) UL (ref 0–0.7)
EOSINOPHIL NFR BLD AUTO: 2.5 %
ERYTHROCYTE [DISTWIDTH] IN BLOOD BY AUTOMATED COUNT: 14.7 % (ref 11–15)
GLUCOSE UR STRIP.AUTO-MCNC: NEGATIVE MG/DL
HCT VFR BLD AUTO: 41.9 % (ref 39–53)
HGB BLD-MCNC: 13.6 G/DL (ref 13–17.5)
HYALINE CASTS #/AREA URNS AUTO: PRESENT /LPF
IMM GRANULOCYTES # BLD AUTO: 0.01 X10(3) UL (ref 0–1)
IMM GRANULOCYTES NFR BLD: 0.2 %
LEUKOCYTE ESTERASE UR QL STRIP.AUTO: NEGATIVE
LYMPHOCYTES # BLD AUTO: 2.18 X10(3) UL (ref 1–4)
LYMPHOCYTES NFR BLD AUTO: 34.6 %
MCH RBC QN AUTO: 30.3 PG (ref 26–34)
MCHC RBC AUTO-ENTMCNC: 32.5 G/DL (ref 31–37)
MCV RBC AUTO: 93.3 FL (ref 80–100)
MICROALBUMIN UR-MCNC: 17.8 MG/DL
MICROALBUMIN/CREAT 24H UR-RTO: 60.1 UG/MG (ref ?–30)
MONOCYTES # BLD AUTO: 0.52 X10(3) UL (ref 0.1–1)
MONOCYTES NFR BLD AUTO: 8.3 %
NEUTROPHILS # BLD AUTO: 3.39 X10 (3) UL (ref 1.5–7.7)
NEUTROPHILS # BLD AUTO: 3.39 X10(3) UL (ref 1.5–7.7)
NEUTROPHILS NFR BLD AUTO: 53.8 %
NITRITE UR QL STRIP.AUTO: NEGATIVE
PH UR STRIP.AUTO: 5 [PH] (ref 4.5–8)
PLATELET # BLD AUTO: 215 10(3)UL (ref 150–450)
PROT UR STRIP.AUTO-MCNC: 30 MG/DL
PSA SERPL-MCNC: 0.35 NG/ML (ref ?–4)
RBC # BLD AUTO: 4.49 X10(6)UL (ref 3.8–5.8)
RBC #/AREA URNS AUTO: >10 /HPF
SP GR UR STRIP.AUTO: 1.02 (ref 1–1.03)
UROBILINOGEN UR STRIP.AUTO-MCNC: 0.2 MG/DL
WBC # BLD AUTO: 6.3 X10(3) UL (ref 4–11)

## 2019-02-18 PROCEDURE — 84153 ASSAY OF PSA TOTAL: CPT

## 2019-02-18 PROCEDURE — 85025 COMPLETE CBC W/AUTO DIFF WBC: CPT

## 2019-02-18 PROCEDURE — 36415 COLL VENOUS BLD VENIPUNCTURE: CPT

## 2019-02-18 PROCEDURE — 82043 UR ALBUMIN QUANTITATIVE: CPT

## 2019-02-18 PROCEDURE — 82570 ASSAY OF URINE CREATININE: CPT

## 2019-02-18 PROCEDURE — 99213 OFFICE O/P EST LOW 20 MIN: CPT | Performed by: INTERNAL MEDICINE

## 2019-02-18 PROCEDURE — 81001 URINALYSIS AUTO W/SCOPE: CPT

## 2019-02-18 RX ORDER — METFORMIN HYDROCHLORIDE 750 MG/1
750 TABLET, EXTENDED RELEASE ORAL DAILY
Qty: 90 TABLET | Refills: 1 | COMMUNITY
Start: 2019-02-18 | End: 2019-07-15

## 2019-02-18 NOTE — PROGRESS NOTES
Rodney MEANS 1945 is a 75year old male. Patient presents with:  Lab Results      HPI:   For lab results.   Also wants referrals as listed below    Current Outpatient Medications:  MetFORMIN HCl  MG Oral Tablet 24 Hr Take 1 tablet (750 mg 1984        Years since quittin.6      Smokeless tobacco: Never Used    Alcohol use: No    Drug use: No       REVIEW OF SYSTEMS:   na      EXAM:   /68   Pulse 98   Temp 97.8 °F (36.6 °C) (Oral)   Resp 16   Ht 64\"   Wt 158 lb   SpO2 97%   B

## 2019-03-21 PROBLEM — K64.1 SECOND DEGREE HEMORRHOIDS: Status: ACTIVE | Noted: 2019-03-21

## 2019-03-21 PROBLEM — Z86.0100 PERSONAL HISTORY OF COLONIC POLYPS: Status: ACTIVE | Noted: 2019-03-21

## 2019-03-21 PROBLEM — Z86.010 PERSONAL HISTORY OF COLONIC POLYPS: Status: ACTIVE | Noted: 2019-03-21

## 2019-07-05 ENCOUNTER — TELEPHONE (OUTPATIENT)
Dept: INTERNAL MEDICINE CLINIC | Facility: CLINIC | Age: 74
End: 2019-07-05

## 2019-07-05 NOTE — TELEPHONE ENCOUNTER
Patient is scheduled with Dr. Sendy Sofia for his MAP Supervisit on 07/18/19. Patient came into the office to reschedule. He would like to be seen on 07/16 or 07/17. He stated that he is leaving for out of town. No available appointments.  Patient stated that i

## 2019-07-08 ENCOUNTER — OFFICE VISIT (OUTPATIENT)
Dept: INTERNAL MEDICINE CLINIC | Facility: CLINIC | Age: 74
End: 2019-07-08
Payer: COMMERCIAL

## 2019-07-08 VITALS
HEIGHT: 62.75 IN | OXYGEN SATURATION: 96 % | HEART RATE: 69 BPM | SYSTOLIC BLOOD PRESSURE: 154 MMHG | WEIGHT: 160.5 LBS | RESPIRATION RATE: 16 BRPM | TEMPERATURE: 98 F | DIASTOLIC BLOOD PRESSURE: 78 MMHG | BODY MASS INDEX: 28.8 KG/M2

## 2019-07-08 DIAGNOSIS — J38.00 VOCAL CORD PARALYSIS: Chronic | ICD-10-CM

## 2019-07-08 DIAGNOSIS — R31.21 ASYMPTOMATIC MICROSCOPIC HEMATURIA: Chronic | ICD-10-CM

## 2019-07-08 DIAGNOSIS — E11.9 TYPE 2 DIABETES MELLITUS WITHOUT COMPLICATION, WITHOUT LONG-TERM CURRENT USE OF INSULIN (HCC): Chronic | ICD-10-CM

## 2019-07-08 DIAGNOSIS — E05.90 HYPERTHYROIDISM: Chronic | ICD-10-CM

## 2019-07-08 DIAGNOSIS — I10 ESSENTIAL HYPERTENSION: Chronic | ICD-10-CM

## 2019-07-08 DIAGNOSIS — Z00.00 ROUTINE GENERAL MEDICAL EXAMINATION AT A HEALTH CARE FACILITY: Primary | ICD-10-CM

## 2019-07-08 DIAGNOSIS — R25.1 TREMOR: ICD-10-CM

## 2019-07-08 PROBLEM — Z86.010 PERSONAL HISTORY OF COLONIC POLYPS: Status: RESOLVED | Noted: 2019-03-21 | Resolved: 2019-07-08

## 2019-07-08 PROBLEM — K64.1 SECOND DEGREE HEMORRHOIDS: Status: RESOLVED | Noted: 2019-03-21 | Resolved: 2019-07-08

## 2019-07-08 PROBLEM — J32.9 CHRONIC SINUSITIS: Chronic | Status: RESOLVED | Noted: 2017-11-13 | Resolved: 2019-07-08

## 2019-07-08 PROBLEM — K22.2 ESOPHAGEAL STRICTURE: Chronic | Status: RESOLVED | Noted: 2018-04-02 | Resolved: 2019-07-08

## 2019-07-08 PROBLEM — Z86.0100 PERSONAL HISTORY OF COLONIC POLYPS: Status: RESOLVED | Noted: 2019-03-21 | Resolved: 2019-07-08

## 2019-07-08 PROCEDURE — G0439 PPPS, SUBSEQ VISIT: HCPCS | Performed by: INTERNAL MEDICINE

## 2019-07-08 PROCEDURE — 93000 ELECTROCARDIOGRAM COMPLETE: CPT | Performed by: INTERNAL MEDICINE

## 2019-07-08 PROCEDURE — 99397 PER PM REEVAL EST PAT 65+ YR: CPT | Performed by: INTERNAL MEDICINE

## 2019-07-08 PROCEDURE — 96160 PT-FOCUSED HLTH RISK ASSMT: CPT | Performed by: INTERNAL MEDICINE

## 2019-07-08 RX ORDER — PROPRANOLOL HYDROCHLORIDE 80 MG/1
80 CAPSULE, EXTENDED RELEASE ORAL DAILY
Qty: 90 CAPSULE | Refills: 0 | Status: SHIPPED | OUTPATIENT
Start: 2019-07-08 | End: 2019-07-15

## 2019-07-08 RX ORDER — PRIMIDONE 50 MG/1
50 TABLET ORAL EVERY 6 HOURS SCHEDULED
Qty: 90 TABLET | Refills: 0 | Status: SHIPPED | OUTPATIENT
Start: 2019-07-08 | End: 2019-07-15

## 2019-07-08 NOTE — TELEPHONE ENCOUNTER
Per VM, he can see pt today. Pts wife contacted and notified of appointment time.      Future Appointments   Date Time Provider Jesu Darcy   7/8/2019  1:30 PM Tanna Jeans, MD [facility] [place]   7/18/2019 12:00 PM Tanna Jeans, MD [facility] [address]

## 2019-07-08 NOTE — PROGRESS NOTES
REASON FOR VISIT:    Suma Bowers is a 75year old male who presents for a Medicare Annual Wellness visit.     Male      Patient Care Team: Patient Care Team:  Juan Gleason MD as PCP - General (Internal Medicine)  Juan Gleason MD (Internal Medicine) 154(H) 147 150 171(H)   HDL 40 - 59 mg/dL 41 36(L) 41(L) 39(L) 31(L) 36(L)   LDL <100 mg/dL 79 45 75 92 91 114(H)     BUN and Cr Latest Ref Rng & Units 2/11/2019 6/15/2018 11/14/2017 6/29/2017 5/4/2017 11/22/2016 11/14/2016   BUN 8 - 20 mg/dL 16 15 13 13 1 medications?: No  Hearing Problems?: No     Functional Status     Hearing Problems?: No  Vision Problems? : No  Difficulty walking?: No  Difficulty dressing or bathing?: No  Problems with daily activities? : No  Memory Problems?: No      Fall/Risk Assessme Immunizations     Zoster (Not covered by Medicare Part B) No orders found for this or any previous visit.      SPECIFIC DISEASE MONITORING Internal Lab or Procedure     Annual Monitoring of Persistent Medications  (ACE/ARB, Digoxin, Diuretics)        Pota • Painful swallowing    • Personal history of colonic polyps 3/21/2019   • PONV (postoperative nausea and vomiting)    • Prediabetes     borderline   • Problems with swallowing     trouble with solid   • Second degree hemorrhoids 3/21/2019   • Shortness Types: Cigarettes        Quit date: 1984        Years since quittin.0      Smokeless tobacco: Never Used    Alcohol use: No    Drug use: No       REVIEW OF SYSTEMS:     General/Constitutional:   General able to do usual activities, good exercise surgery   Musculoskeletal:   Patient denies arthritis , back pain, muscle weakness . Joint pain bilateral knee pain stable. Joint stiffness none. Peripheral Vascular:   General no varicosities, no claudication. Dermatologic:   Rash none.    Neurologic: Tenderness: absent . GENITOURINARY - MALE:   External genitals: unremarkable. YAW: normal .   Penis: unremarkable. Prostate: unremarkable. Testicles: unremarkable. EXTREMITIES:   Clubbing: none. Cyanosis: absent . Edema: none.    Pulses: pre mouth every 6 (six) hours. Asymptomatic microscopic hematuria stable    Hyperthyroidism  -     T4(THYROXINE TOTAL);  Future  -     ASSAY, THYROID STIM HORMONE; Future  EKG shows the patient have a heart rate of 64 HI interval 122 ms rest of the EKG is no

## 2019-07-09 ENCOUNTER — LAB ENCOUNTER (OUTPATIENT)
Dept: LAB | Age: 74
End: 2019-07-09
Attending: INTERNAL MEDICINE
Payer: MEDICARE

## 2019-07-09 DIAGNOSIS — Z00.00 ROUTINE GENERAL MEDICAL EXAMINATION AT A HEALTH CARE FACILITY: ICD-10-CM

## 2019-07-09 DIAGNOSIS — E05.90 HYPERTHYROIDISM: Chronic | ICD-10-CM

## 2019-07-09 DIAGNOSIS — E11.9 TYPE 2 DIABETES MELLITUS WITHOUT COMPLICATION, WITHOUT LONG-TERM CURRENT USE OF INSULIN (HCC): Chronic | ICD-10-CM

## 2019-07-09 LAB
ALBUMIN SERPL-MCNC: 3.5 G/DL (ref 3.4–5)
ALBUMIN/GLOB SERPL: 1 {RATIO} (ref 1–2)
ALP LIVER SERPL-CCNC: 77 U/L (ref 45–117)
ALT SERPL-CCNC: 24 U/L (ref 16–61)
ANION GAP SERPL CALC-SCNC: 4 MMOL/L (ref 0–18)
AST SERPL-CCNC: 17 U/L (ref 15–37)
BASOPHILS # BLD AUTO: 0.03 X10(3) UL (ref 0–0.2)
BASOPHILS NFR BLD AUTO: 0.6 %
BILIRUB SERPL-MCNC: 0.7 MG/DL (ref 0.1–2)
BILIRUB UR QL STRIP.AUTO: NEGATIVE
BUN BLD-MCNC: 10 MG/DL (ref 7–18)
BUN/CREAT SERPL: 9.6 (ref 10–20)
CALCIUM BLD-MCNC: 9.2 MG/DL (ref 8.5–10.1)
CHLORIDE SERPL-SCNC: 108 MMOL/L (ref 98–112)
CHOLEST SMN-MCNC: 128 MG/DL (ref ?–200)
CLARITY UR REFRACT.AUTO: CLEAR
CO2 SERPL-SCNC: 29 MMOL/L (ref 21–32)
COLOR UR AUTO: YELLOW
COMPLEXED PSA SERPL-MCNC: 0.43 NG/ML (ref ?–4)
CREAT BLD-MCNC: 1.04 MG/DL (ref 0.7–1.3)
CREAT UR-SCNC: 191 MG/DL
DEPRECATED RDW RBC AUTO: 46.4 FL (ref 35.1–46.3)
EOSINOPHIL # BLD AUTO: 0.18 X10(3) UL (ref 0–0.7)
EOSINOPHIL NFR BLD AUTO: 3.4 %
ERYTHROCYTE [DISTWIDTH] IN BLOOD BY AUTOMATED COUNT: 13.9 % (ref 11–15)
EST. AVERAGE GLUCOSE BLD GHB EST-MCNC: 171 MG/DL (ref 68–126)
GLOBULIN PLAS-MCNC: 3.5 G/DL (ref 2.8–4.4)
GLUCOSE BLD-MCNC: 122 MG/DL (ref 70–99)
GLUCOSE UR STRIP.AUTO-MCNC: NEGATIVE MG/DL
HBA1C MFR BLD HPLC: 7.6 % (ref ?–5.7)
HCT VFR BLD AUTO: 42.7 % (ref 39–53)
HDLC SERPL-MCNC: 38 MG/DL (ref 40–59)
HGB BLD-MCNC: 13.9 G/DL (ref 13–17.5)
IMM GRANULOCYTES # BLD AUTO: 0.01 X10(3) UL (ref 0–1)
IMM GRANULOCYTES NFR BLD: 0.2 %
KETONES UR STRIP.AUTO-MCNC: NEGATIVE MG/DL
LDLC SERPL CALC-MCNC: 69 MG/DL (ref ?–100)
LEUKOCYTE ESTERASE UR QL STRIP.AUTO: NEGATIVE
LYMPHOCYTES # BLD AUTO: 2.22 X10(3) UL (ref 1–4)
LYMPHOCYTES NFR BLD AUTO: 42.4 %
M PROTEIN MFR SERPL ELPH: 7 G/DL (ref 6.4–8.2)
MCH RBC QN AUTO: 29.6 PG (ref 26–34)
MCHC RBC AUTO-ENTMCNC: 32.6 G/DL (ref 31–37)
MCV RBC AUTO: 90.9 FL (ref 80–100)
MICROALBUMIN UR-MCNC: 5.84 MG/DL
MICROALBUMIN/CREAT 24H UR-RTO: 30.6 UG/MG (ref ?–30)
MONOCYTES # BLD AUTO: 0.47 X10(3) UL (ref 0.1–1)
MONOCYTES NFR BLD AUTO: 9 %
NEUTROPHILS # BLD AUTO: 2.32 X10 (3) UL (ref 1.5–7.7)
NEUTROPHILS # BLD AUTO: 2.32 X10(3) UL (ref 1.5–7.7)
NEUTROPHILS NFR BLD AUTO: 44.4 %
NITRITE UR QL STRIP.AUTO: NEGATIVE
NONHDLC SERPL-MCNC: 90 MG/DL (ref ?–130)
OSMOLALITY SERPL CALC.SUM OF ELEC: 292 MOSM/KG (ref 275–295)
PH UR STRIP.AUTO: 6 [PH] (ref 4.5–8)
PLATELET # BLD AUTO: 195 10(3)UL (ref 150–450)
POTASSIUM SERPL-SCNC: 4.4 MMOL/L (ref 3.5–5.1)
PROT UR STRIP.AUTO-MCNC: NEGATIVE MG/DL
RBC # BLD AUTO: 4.7 X10(6)UL (ref 3.8–5.8)
RBC UR QL AUTO: NEGATIVE
SODIUM SERPL-SCNC: 141 MMOL/L (ref 136–145)
SP GR UR STRIP.AUTO: 1.02 (ref 1–1.03)
T4 SERPL-MCNC: 9.7 UG/DL (ref 4.5–12.1)
TRIGL SERPL-MCNC: 105 MG/DL (ref 30–149)
TSI SER-ACNC: 0.01 MIU/ML (ref 0.36–3.74)
UROBILINOGEN UR STRIP.AUTO-MCNC: <2 MG/DL
VLDLC SERPL CALC-MCNC: 21 MG/DL (ref 0–30)
WBC # BLD AUTO: 5.2 X10(3) UL (ref 4–11)

## 2019-07-09 PROCEDURE — 82570 ASSAY OF URINE CREATININE: CPT

## 2019-07-09 PROCEDURE — 36415 COLL VENOUS BLD VENIPUNCTURE: CPT

## 2019-07-09 PROCEDURE — 81003 URINALYSIS AUTO W/O SCOPE: CPT

## 2019-07-09 PROCEDURE — 80053 COMPREHEN METABOLIC PANEL: CPT

## 2019-07-09 PROCEDURE — 83036 HEMOGLOBIN GLYCOSYLATED A1C: CPT

## 2019-07-09 PROCEDURE — 84436 ASSAY OF TOTAL THYROXINE: CPT

## 2019-07-09 PROCEDURE — 84443 ASSAY THYROID STIM HORMONE: CPT

## 2019-07-09 PROCEDURE — 80061 LIPID PANEL: CPT

## 2019-07-09 PROCEDURE — 85025 COMPLETE CBC W/AUTO DIFF WBC: CPT

## 2019-07-09 PROCEDURE — 82043 UR ALBUMIN QUANTITATIVE: CPT

## 2019-07-15 ENCOUNTER — OFFICE VISIT (OUTPATIENT)
Dept: INTERNAL MEDICINE CLINIC | Facility: CLINIC | Age: 74
End: 2019-07-15
Payer: COMMERCIAL

## 2019-07-15 ENCOUNTER — APPOINTMENT (OUTPATIENT)
Dept: LAB | Age: 74
End: 2019-07-15
Attending: INTERNAL MEDICINE
Payer: MEDICARE

## 2019-07-15 DIAGNOSIS — E05.90 HYPERTHYROIDISM: Primary | Chronic | ICD-10-CM

## 2019-07-15 DIAGNOSIS — R25.1 TREMOR: ICD-10-CM

## 2019-07-15 DIAGNOSIS — E11.65 TYPE 2 DIABETES MELLITUS WITH HYPERGLYCEMIA, WITHOUT LONG-TERM CURRENT USE OF INSULIN (HCC): Chronic | ICD-10-CM

## 2019-07-15 DIAGNOSIS — E05.90 HYPERTHYROIDISM: Chronic | ICD-10-CM

## 2019-07-15 DIAGNOSIS — I10 ESSENTIAL HYPERTENSION: Chronic | ICD-10-CM

## 2019-07-15 LAB
T3FREE SERPL-MCNC: 3.34 PG/ML (ref 2.4–4.2)
T4 FREE SERPL-MCNC: 1.4 NG/DL (ref 0.8–1.7)

## 2019-07-15 PROCEDURE — 84481 FREE ASSAY (FT-3): CPT

## 2019-07-15 PROCEDURE — 99213 OFFICE O/P EST LOW 20 MIN: CPT | Performed by: INTERNAL MEDICINE

## 2019-07-15 PROCEDURE — 36415 COLL VENOUS BLD VENIPUNCTURE: CPT

## 2019-07-15 PROCEDURE — 84439 ASSAY OF FREE THYROXINE: CPT

## 2019-07-15 RX ORDER — ENALAPRIL MALEATE 10 MG/1
10 TABLET ORAL DAILY
Qty: 90 TABLET | Refills: 3 | Status: SHIPPED | OUTPATIENT
Start: 2019-07-15 | End: 2020-02-24

## 2019-07-15 RX ORDER — ASPIRIN 81 MG/1
TABLET ORAL
Qty: 90 TABLET | Refills: 2 | Status: SHIPPED | OUTPATIENT
Start: 2019-07-15 | End: 2020-02-24

## 2019-07-15 RX ORDER — PROPRANOLOL HYDROCHLORIDE 80 MG/1
80 CAPSULE, EXTENDED RELEASE ORAL DAILY
Qty: 90 CAPSULE | Refills: 2 | Status: SHIPPED | OUTPATIENT
Start: 2019-07-15 | End: 2020-02-24

## 2019-07-15 RX ORDER — ENALAPRIL MALEATE 10 MG/1
10 TABLET ORAL DAILY
Qty: 90 TABLET | Refills: 3 | Status: SHIPPED | OUTPATIENT
Start: 2019-07-15 | End: 2019-07-15

## 2019-07-15 RX ORDER — METFORMIN HYDROCHLORIDE 750 MG/1
750 TABLET, EXTENDED RELEASE ORAL 2 TIMES DAILY WITH MEALS
Qty: 180 TABLET | Refills: 3 | Status: SHIPPED | OUTPATIENT
Start: 2019-07-15 | End: 2019-07-15

## 2019-07-15 RX ORDER — METFORMIN HYDROCHLORIDE 750 MG/1
750 TABLET, EXTENDED RELEASE ORAL 2 TIMES DAILY WITH MEALS
Qty: 180 TABLET | Refills: 3 | Status: SHIPPED | OUTPATIENT
Start: 2019-07-15 | End: 2019-11-18

## 2019-07-15 RX ORDER — PRIMIDONE 50 MG/1
50 TABLET ORAL EVERY 6 HOURS SCHEDULED
Qty: 180 TABLET | Refills: 1 | Status: SHIPPED | OUTPATIENT
Start: 2019-07-15 | End: 2019-09-03

## 2019-07-15 NOTE — PROGRESS NOTES
Josesito Haywood [facility] 1945 is a 75year old male. No chief complaint on file. HPI:   DIABETES MELLITUS:   The diet that the patient has been following is low-carb diet  The frequency of the monitoring schedule is none.      The results of the last (hypertension)    • Osteoarthrosis, localized, primary, knee, left 6/7/2015   • Osteoarthrosis, localized, primary, knee, right 6/7/2015   • Osteoarthrosis, unspecified whether generalized or localized, unspecified site    • Pain in joints    • Painful swa EXAM BILATERAL  INSPECTION normal  CIRCULATION normal  MONOFILAMENT normal           ASSESSMENT AND PLAN:   Diagnoses and all orders for this visit:    Hyperthyroidism  -     FREE T3 (TRIIODOTHYRONINE); Future  -     FREE T4 (FREE THYROXINE);  Future    Typ

## 2019-07-15 NOTE — PATIENT INSTRUCTIONS
To send sugar numbers.   Pending thyroid blood test to determine status of hyperthyroidism  Further recommendations once blood work is available

## 2019-09-03 DIAGNOSIS — R25.1 TREMOR: ICD-10-CM

## 2019-09-03 RX ORDER — PRIMIDONE 50 MG/1
TABLET ORAL
Qty: 360 TABLET | Refills: 0 | Status: SHIPPED | OUTPATIENT
Start: 2019-09-03 | End: 2019-09-28

## 2019-09-03 NOTE — TELEPHONE ENCOUNTER
No protocol    Requesting primidone 50 MG Oral Tab  LOV: 7/15/19  RTC: 3 months  Last Relevant Labs: 7/15/19  Filled: 7/15/19 #180 with 1 refills    No future appointments.

## 2019-09-16 ENCOUNTER — TELEPHONE (OUTPATIENT)
Dept: INTERNAL MEDICINE CLINIC | Facility: CLINIC | Age: 74
End: 2019-09-16

## 2019-09-16 NOTE — TELEPHONE ENCOUNTER
Patient walked in stating he needs a refill on all his 4 medications that he's currently taking.  He didn't know the names of them and he would like them sent to his [facility] mailorder

## 2019-09-16 NOTE — TELEPHONE ENCOUNTER
4 medications sent in on 7/15/19 were for 90 day supply (should have enough to last until Oct 15th) and there are refills on file. Notified pt of that and he should contact pharmacy for refill. Pt verbalized understanding.

## 2019-09-23 ENCOUNTER — TELEPHONE (OUTPATIENT)
Dept: INTERNAL MEDICINE CLINIC | Facility: CLINIC | Age: 74
End: 2019-09-23

## 2019-09-28 ENCOUNTER — OFFICE VISIT (OUTPATIENT)
Dept: INTERNAL MEDICINE CLINIC | Facility: CLINIC | Age: 74
End: 2019-09-28
Payer: COMMERCIAL

## 2019-09-28 VITALS
SYSTOLIC BLOOD PRESSURE: 124 MMHG | HEIGHT: 62.75 IN | DIASTOLIC BLOOD PRESSURE: 60 MMHG | TEMPERATURE: 98 F | RESPIRATION RATE: 20 BRPM | HEART RATE: 72 BPM | BODY MASS INDEX: 26.96 KG/M2 | WEIGHT: 150.25 LBS

## 2019-09-28 DIAGNOSIS — K22.2 ESOPHAGEAL STRICTURE: Primary | Chronic | ICD-10-CM

## 2019-09-28 PROCEDURE — 99214 OFFICE O/P EST MOD 30 MIN: CPT | Performed by: INTERNAL MEDICINE

## 2019-09-28 RX ORDER — PRIMIDONE 50 MG/1
50 TABLET ORAL 2 TIMES DAILY
COMMUNITY
End: 2020-05-12

## 2019-09-28 RX ORDER — PANTOPRAZOLE SODIUM 40 MG/1
40 TABLET, DELAYED RELEASE ORAL
Qty: 90 TABLET | Refills: 3 | Status: SHIPPED | OUTPATIENT
Start: 2019-09-28 | End: 2020-02-24

## 2019-09-28 RX ORDER — PANTOPRAZOLE SODIUM 40 MG/1
40 TABLET, DELAYED RELEASE ORAL
Qty: 30 TABLET | Refills: 11 | Status: SHIPPED | OUTPATIENT
Start: 2019-09-28 | End: 2019-09-28

## 2019-09-28 NOTE — PROGRESS NOTES
Boyd  1945 is a 75year old male. Patient presents with:  Difficulty Swallowing  Stomach Pain      HPI:   has difficulty in swallowing foods has a history of esophageal stricture was dilated in 2018.   Patient never followed up s borderline   • Problems with swallowing     trouble with solid   • Second degree hemorrhoids 3/21/2019   • Shortness of breath    • Sleep apnea     no cpap   • Sleep disturbance    • Sputum production    • Visual impairment     reading glasses   • Vocal Hernia: absent. Kidneys: normal.   Liver, Spleen: no hepatosplenomegaly (HSM). Rebound tenderness: absent. ASSESSMENT AND PLAN:   Lucretia Skinner was seen today for difficulty swallowing and stomach pain.     Diagnoses and all orders for this visit:

## 2019-10-04 ENCOUNTER — TELEPHONE (OUTPATIENT)
Dept: INTERNAL MEDICINE CLINIC | Facility: CLINIC | Age: 74
End: 2019-10-04

## 2019-10-04 DIAGNOSIS — K22.2 ESOPHAGEAL STRICTURE: Primary | Chronic | ICD-10-CM

## 2019-10-04 NOTE — TELEPHONE ENCOUNTER
Patient walked in stating he needs a new referral for a GI doctor as the one he was previously referred to did not accept his insurance. Patient called his [facility] insurance to receive name of a new doctor within network.  Patient is requesting a new referra

## 2019-11-18 ENCOUNTER — LAB ENCOUNTER (OUTPATIENT)
Dept: LAB | Age: 74
End: 2019-11-18
Attending: INTERNAL MEDICINE
Payer: MEDICARE

## 2019-11-18 ENCOUNTER — OFFICE VISIT (OUTPATIENT)
Dept: INTERNAL MEDICINE CLINIC | Facility: CLINIC | Age: 74
End: 2019-11-18
Payer: COMMERCIAL

## 2019-11-18 VITALS
TEMPERATURE: 98 F | HEART RATE: 67 BPM | DIASTOLIC BLOOD PRESSURE: 70 MMHG | RESPIRATION RATE: 18 BRPM | OXYGEN SATURATION: 99 % | BODY MASS INDEX: 28.35 KG/M2 | WEIGHT: 158 LBS | HEIGHT: 62.75 IN | SYSTOLIC BLOOD PRESSURE: 140 MMHG

## 2019-11-18 DIAGNOSIS — E11.65 TYPE 2 DIABETES MELLITUS WITH HYPERGLYCEMIA, WITHOUT LONG-TERM CURRENT USE OF INSULIN (HCC): Primary | Chronic | ICD-10-CM

## 2019-11-18 DIAGNOSIS — L30.9 ECZEMA, UNSPECIFIED TYPE: ICD-10-CM

## 2019-11-18 DIAGNOSIS — E11.65 TYPE 2 DIABETES MELLITUS WITH HYPERGLYCEMIA, WITHOUT LONG-TERM CURRENT USE OF INSULIN (HCC): Chronic | ICD-10-CM

## 2019-11-18 PROCEDURE — 80048 BASIC METABOLIC PNL TOTAL CA: CPT

## 2019-11-18 PROCEDURE — 99213 OFFICE O/P EST LOW 20 MIN: CPT | Performed by: INTERNAL MEDICINE

## 2019-11-18 PROCEDURE — G0009 ADMIN PNEUMOCOCCAL VACCINE: HCPCS | Performed by: INTERNAL MEDICINE

## 2019-11-18 PROCEDURE — 83036 HEMOGLOBIN GLYCOSYLATED A1C: CPT

## 2019-11-18 PROCEDURE — 36415 COLL VENOUS BLD VENIPUNCTURE: CPT

## 2019-11-18 PROCEDURE — 90670 PCV13 VACCINE IM: CPT | Performed by: INTERNAL MEDICINE

## 2019-11-18 RX ORDER — MEFLOQUINE HYDROCHLORIDE 250 MG/1
TABLET ORAL
Qty: 9 TABLET | Refills: 0 | Status: SHIPPED | OUTPATIENT
Start: 2019-11-18 | End: 2020-02-24

## 2019-11-18 RX ORDER — MEFLOQUINE HYDROCHLORIDE 250 MG/1
250 TABLET ORAL
Qty: 18 TABLET | Refills: 0 | Status: SHIPPED | OUTPATIENT
Start: 2019-11-18 | End: 2019-11-18

## 2019-11-18 RX ORDER — MOMETASONE FUROATE 1 MG/G
1 CREAM TOPICAL 2 TIMES DAILY PRN
Qty: 50 G | Refills: 3 | Status: SHIPPED | OUTPATIENT
Start: 2019-11-18 | End: 2019-11-19

## 2019-11-18 NOTE — PROGRESS NOTES
Josiah France  1945 is a 75year old male. Patient presents with:  Test Results      HPI:   .  Stanley Rodríguez his diabetes medicines 6 weeks ago saying his numbers were good  Wants  malaria pills.   Does complain of itchy skin interscapular area  Glenn nausea and vomiting)    • Prediabetes     borderline   • Problems with swallowing     trouble with solid   • Second degree hemorrhoids 3/21/2019   • Shortness of breath    • Sleep apnea     no cpap   • Sleep disturbance    • Sputum production    • Visual i

## 2019-11-19 ENCOUNTER — TELEPHONE (OUTPATIENT)
Dept: INTERNAL MEDICINE CLINIC | Facility: CLINIC | Age: 74
End: 2019-11-19

## 2019-11-19 DIAGNOSIS — L30.9 ECZEMA, UNSPECIFIED TYPE: ICD-10-CM

## 2019-11-19 RX ORDER — MOMETASONE FUROATE 1 MG/G
1 CREAM TOPICAL 2 TIMES DAILY PRN
Qty: 50 G | Refills: 3 | Status: SHIPPED | OUTPATIENT
Start: 2019-11-19 | End: 2019-12-19

## 2019-11-19 NOTE — TELEPHONE ENCOUNTER
Mometasone Furoate 0.1 % External Cream    [facility] on [address]/verified  did not receive yesterday; patient walked into office today; can we call in/resend now?  Patient is going over this morning to

## 2019-11-19 NOTE — PROGRESS NOTES
Normal-  Potassium and creatinine  Hemoglobin A1c is within acceptable limits at 6.8 would like it closer to 6.5

## 2020-02-24 ENCOUNTER — OFFICE VISIT (OUTPATIENT)
Dept: INTERNAL MEDICINE CLINIC | Facility: CLINIC | Age: 75
End: 2020-02-24
Payer: MEDICARE

## 2020-02-24 ENCOUNTER — LAB ENCOUNTER (OUTPATIENT)
Dept: LAB | Age: 75
End: 2020-02-24
Attending: INTERNAL MEDICINE
Payer: MEDICARE

## 2020-02-24 VITALS
HEIGHT: 62.75 IN | TEMPERATURE: 98 F | DIASTOLIC BLOOD PRESSURE: 70 MMHG | RESPIRATION RATE: 16 BRPM | HEART RATE: 57 BPM | WEIGHT: 152.5 LBS | OXYGEN SATURATION: 98 % | BODY MASS INDEX: 27.36 KG/M2 | SYSTOLIC BLOOD PRESSURE: 134 MMHG

## 2020-02-24 DIAGNOSIS — K22.2 ESOPHAGEAL STRICTURE: Chronic | ICD-10-CM

## 2020-02-24 DIAGNOSIS — R53.83 FATIGUE, UNSPECIFIED TYPE: Primary | ICD-10-CM

## 2020-02-24 DIAGNOSIS — I10 ESSENTIAL HYPERTENSION: Chronic | ICD-10-CM

## 2020-02-24 DIAGNOSIS — R53.83 FATIGUE, UNSPECIFIED TYPE: ICD-10-CM

## 2020-02-24 LAB
ALBUMIN SERPL-MCNC: 3.5 G/DL (ref 3.4–5)
ALBUMIN/GLOB SERPL: 0.9 {RATIO} (ref 1–2)
ALP LIVER SERPL-CCNC: 73 U/L (ref 45–117)
ALT SERPL-CCNC: 26 U/L (ref 16–61)
ANION GAP SERPL CALC-SCNC: 1 MMOL/L (ref 0–18)
AST SERPL-CCNC: 22 U/L (ref 15–37)
BASOPHILS # BLD AUTO: 0.05 X10(3) UL (ref 0–0.2)
BASOPHILS NFR BLD AUTO: 0.7 %
BILIRUB SERPL-MCNC: 0.4 MG/DL (ref 0.1–2)
BUN BLD-MCNC: 15 MG/DL (ref 7–18)
BUN/CREAT SERPL: 13 (ref 10–20)
CALCIUM BLD-MCNC: 9.4 MG/DL (ref 8.5–10.1)
CHLORIDE SERPL-SCNC: 106 MMOL/L (ref 98–112)
CO2 SERPL-SCNC: 31 MMOL/L (ref 21–32)
CREAT BLD-MCNC: 1.15 MG/DL (ref 0.7–1.3)
CRP SERPL-MCNC: <0.29 MG/DL (ref ?–0.3)
DEPRECATED RDW RBC AUTO: 45.4 FL (ref 35.1–46.3)
EOSINOPHIL # BLD AUTO: 0.15 X10(3) UL (ref 0–0.7)
EOSINOPHIL NFR BLD AUTO: 2.2 %
ERYTHROCYTE [DISTWIDTH] IN BLOOD BY AUTOMATED COUNT: 13.7 % (ref 11–15)
GLOBULIN PLAS-MCNC: 4.1 G/DL (ref 2.8–4.4)
GLUCOSE BLD-MCNC: 112 MG/DL (ref 70–99)
HCT VFR BLD AUTO: 44.7 % (ref 39–53)
HGB BLD-MCNC: 14.7 G/DL (ref 13–17.5)
IMM GRANULOCYTES # BLD AUTO: 0.02 X10(3) UL (ref 0–1)
IMM GRANULOCYTES NFR BLD: 0.3 %
LYMPHOCYTES # BLD AUTO: 2.6 X10(3) UL (ref 1–4)
LYMPHOCYTES NFR BLD AUTO: 37.6 %
M PROTEIN MFR SERPL ELPH: 7.6 G/DL (ref 6.4–8.2)
MCH RBC QN AUTO: 29.7 PG (ref 26–34)
MCHC RBC AUTO-ENTMCNC: 32.9 G/DL (ref 31–37)
MCV RBC AUTO: 90.3 FL (ref 80–100)
MONOCYTES # BLD AUTO: 0.51 X10(3) UL (ref 0.1–1)
MONOCYTES NFR BLD AUTO: 7.4 %
NEUTROPHILS # BLD AUTO: 3.58 X10 (3) UL (ref 1.5–7.7)
NEUTROPHILS # BLD AUTO: 3.58 X10(3) UL (ref 1.5–7.7)
NEUTROPHILS NFR BLD AUTO: 51.8 %
OSMOLALITY SERPL CALC.SUM OF ELEC: 288 MOSM/KG (ref 275–295)
PATIENT FASTING Y/N/NP: NO
PLATELET # BLD AUTO: 265 10(3)UL (ref 150–450)
POTASSIUM SERPL-SCNC: 4.5 MMOL/L (ref 3.5–5.1)
RBC # BLD AUTO: 4.95 X10(6)UL (ref 3.8–5.8)
SED RATE-ML: 10 MM/HR (ref 0–12)
SODIUM SERPL-SCNC: 138 MMOL/L (ref 136–145)
T4 SERPL-MCNC: 11.5 UG/DL (ref 4.5–12.1)
WBC # BLD AUTO: 6.9 X10(3) UL (ref 4–11)

## 2020-02-24 PROCEDURE — 85652 RBC SED RATE AUTOMATED: CPT

## 2020-02-24 PROCEDURE — 36415 COLL VENOUS BLD VENIPUNCTURE: CPT

## 2020-02-24 PROCEDURE — 85025 COMPLETE CBC W/AUTO DIFF WBC: CPT

## 2020-02-24 PROCEDURE — 86140 C-REACTIVE PROTEIN: CPT

## 2020-02-24 PROCEDURE — 80053 COMPREHEN METABOLIC PANEL: CPT

## 2020-02-24 PROCEDURE — 99213 OFFICE O/P EST LOW 20 MIN: CPT | Performed by: INTERNAL MEDICINE

## 2020-02-24 PROCEDURE — 84436 ASSAY OF TOTAL THYROXINE: CPT

## 2020-02-24 RX ORDER — ENALAPRIL MALEATE 10 MG/1
10 TABLET ORAL DAILY
Qty: 90 TABLET | Refills: 3 | Status: SHIPPED | OUTPATIENT
Start: 2020-02-24 | End: 2020-05-13

## 2020-02-24 RX ORDER — TEMAZEPAM 7.5 MG/1
7.5 CAPSULE ORAL NIGHTLY PRN
Qty: 30 CAPSULE | Refills: 0 | Status: SHIPPED | OUTPATIENT
Start: 2020-02-24 | End: 2020-08-10

## 2020-02-24 RX ORDER — PANTOPRAZOLE SODIUM 40 MG/1
40 TABLET, DELAYED RELEASE ORAL
Qty: 90 TABLET | Refills: 3 | Status: SHIPPED | OUTPATIENT
Start: 2020-02-24 | End: 2020-05-13

## 2020-02-24 RX ORDER — PROPRANOLOL HYDROCHLORIDE 80 MG/1
80 CAPSULE, EXTENDED RELEASE ORAL DAILY
Qty: 90 CAPSULE | Refills: 3 | Status: SHIPPED | OUTPATIENT
Start: 2020-02-24 | End: 2020-05-13

## 2020-02-24 NOTE — PROGRESS NOTES
Wiley Mchugh [facility] 1945 is a 75year old male. Patient presents with:  Hypertension  Here for routine check. Patient had 1 bout of vomiting with explosive diarrhea on  while in [facility].   Has been feeling tired since then though his GI sym • Visual impairment     reading glasses   • Vocal cord paralysis 11/10/2016   • Wheezing      Social History    Socioeconomic History      Marital status:       Spouse name: Not on file      Number of children: Not on file      Years of education: Hazards: Not Asked        Sleep Concern: Not Asked        Back Care: Not Asked        Bike Helmet: Not Asked        Self-Exams: Not Asked    Social History Narrative      ** Merged History Encounter **                   2 kids      Retired stricture  -     Pantoprazole Sodium 40 MG Oral Tab EC; Take 1 tablet (40 mg total) by mouth every morning before breakfast.    Essential hypertension  -     Propranolol HCl ER 80 MG Oral Capsule SR 24 Hr; Take 1 capsule (80 mg total) by mouth daily.     Ot

## 2020-05-12 DIAGNOSIS — K22.2 ESOPHAGEAL STRICTURE: Chronic | ICD-10-CM

## 2020-05-12 DIAGNOSIS — I10 ESSENTIAL HYPERTENSION: Chronic | ICD-10-CM

## 2020-05-12 NOTE — TELEPHONE ENCOUNTER
Patient walked in today requesting a refill for Enalapril,pantoprazole,primidone,propranolol sent to [facility] mail order

## 2020-05-13 RX ORDER — PROPRANOLOL HYDROCHLORIDE 80 MG/1
80 CAPSULE, EXTENDED RELEASE ORAL DAILY
Qty: 90 CAPSULE | Refills: 3 | Status: ON HOLD | OUTPATIENT
Start: 2020-05-13 | End: 2020-09-08

## 2020-05-13 RX ORDER — PANTOPRAZOLE SODIUM 40 MG/1
40 TABLET, DELAYED RELEASE ORAL
Qty: 90 TABLET | Refills: 3 | Status: ON HOLD | OUTPATIENT
Start: 2020-05-13 | End: 2020-09-08

## 2020-05-13 RX ORDER — PRIMIDONE 50 MG/1
50 TABLET ORAL 2 TIMES DAILY
Qty: 90 TABLET | Refills: 0 | Status: SHIPPED | OUTPATIENT
Start: 2020-05-13 | End: 2020-06-15

## 2020-05-13 RX ORDER — ENALAPRIL MALEATE 10 MG/1
10 TABLET ORAL DAILY
Qty: 90 TABLET | Refills: 3 | Status: SHIPPED | OUTPATIENT
Start: 2020-05-13 | End: 2020-08-10

## 2020-05-13 NOTE — TELEPHONE ENCOUNTER
Pantoprazole Sodium 40 MG Oral Tab EC    Enalapril Maleate 10 MG Oral Tab  Protocol Passed   Propranolol HCl ER 80 MG Oral Capsule SR 24 Hr  Protocol Passed   primidone 50 MG Oral Tab  Filled: 9/3/19 #360, 0 refills     LOV: 2/24/2020  RTC: 3 months   FOV:

## 2020-06-15 RX ORDER — PRIMIDONE 50 MG/1
50 TABLET ORAL 2 TIMES DAILY
Qty: 90 TABLET | Refills: 0 | Status: SHIPPED | OUTPATIENT
Start: 2020-06-15 | End: 2020-08-10

## 2020-06-15 NOTE — TELEPHONE ENCOUNTER
Primidone 50 mg 1 tab bid filled 5-13-20 90 with 0 refills     LOV 2-24-20   Return in about 3 months (around 5/24/2020). .  No upcoming apt on file   Labs 2-24-20     LvM to call and set up an apt for further refills.

## 2020-07-06 ENCOUNTER — TELEPHONE (OUTPATIENT)
Dept: INTERNAL MEDICINE CLINIC | Facility: CLINIC | Age: 75
End: 2020-07-06

## 2020-08-10 ENCOUNTER — OFFICE VISIT (OUTPATIENT)
Dept: INTERNAL MEDICINE CLINIC | Facility: CLINIC | Age: 75
End: 2020-08-10
Payer: MEDICARE

## 2020-08-10 VITALS
TEMPERATURE: 98 F | DIASTOLIC BLOOD PRESSURE: 74 MMHG | HEIGHT: 62.75 IN | HEART RATE: 73 BPM | RESPIRATION RATE: 18 BRPM | OXYGEN SATURATION: 97 % | WEIGHT: 158.38 LBS | BODY MASS INDEX: 28.42 KG/M2 | SYSTOLIC BLOOD PRESSURE: 156 MMHG

## 2020-08-10 DIAGNOSIS — I10 ESSENTIAL HYPERTENSION: Primary | ICD-10-CM

## 2020-08-10 DIAGNOSIS — K22.2 ESOPHAGEAL STRICTURE: Chronic | ICD-10-CM

## 2020-08-10 DIAGNOSIS — Z00.00 LABORATORY EXAMINATION ORDERED AS PART OF A ROUTINE GENERAL MEDICAL EXAMINATION: ICD-10-CM

## 2020-08-10 DIAGNOSIS — E11.65 TYPE 2 DIABETES MELLITUS WITH HYPERGLYCEMIA, WITHOUT LONG-TERM CURRENT USE OF INSULIN (HCC): ICD-10-CM

## 2020-08-10 PROCEDURE — 3008F BODY MASS INDEX DOCD: CPT | Performed by: INTERNAL MEDICINE

## 2020-08-10 PROCEDURE — 3077F SYST BP >= 140 MM HG: CPT | Performed by: INTERNAL MEDICINE

## 2020-08-10 PROCEDURE — 3078F DIAST BP <80 MM HG: CPT | Performed by: INTERNAL MEDICINE

## 2020-08-10 PROCEDURE — 99213 OFFICE O/P EST LOW 20 MIN: CPT | Performed by: INTERNAL MEDICINE

## 2020-08-10 RX ORDER — PRIMIDONE 50 MG/1
50 TABLET ORAL 2 TIMES DAILY
Qty: 90 TABLET | Refills: 3 | Status: SHIPPED | OUTPATIENT
Start: 2020-08-10 | End: 2021-02-23

## 2020-08-10 RX ORDER — ENALAPRIL MALEATE 20 MG/1
20 TABLET ORAL DAILY
Qty: 90 TABLET | Refills: 3 | Status: SHIPPED | OUTPATIENT
Start: 2020-08-10 | End: 2020-12-21 | Stop reason: DRUGHIGH

## 2020-08-10 NOTE — PROGRESS NOTES
Sadie Gerardo [facility] 1945 is a 75year old male. Patient presents with:   Follow - Up       HPI:   BP check  Current Outpatient Medications   Medication Sig Dispense Refill   • Enalapril Maleate 20 MG Oral Tab Take 1 tablet (20 mg total) by mouth daily Years: 20.00        Pack years: 10        Types: Cigarettes        Quit date: 1984        Years since quittin.1      Smokeless tobacco: Never Used    Alcohol use: No    Drug use: No       REVIEW OF SYSTEMS:   Cardiovascular:   Syncope none.  Rapid stricture  -     GASTRO - INTERNAL    Other orders  -     primidone 50 MG Oral Tab; Take 1 tablet (50 mg total) by mouth 2 (two) times daily.         Patient Instructions   Set up CPX  BP meds adjusted       The patient indicates understanding of these issu

## 2020-08-25 ENCOUNTER — LAB ENCOUNTER (OUTPATIENT)
Dept: LAB | Age: 75
End: 2020-08-25
Attending: INTERNAL MEDICINE
Payer: MEDICARE

## 2020-08-25 DIAGNOSIS — Z00.00 LABORATORY EXAMINATION ORDERED AS PART OF A ROUTINE GENERAL MEDICAL EXAMINATION: ICD-10-CM

## 2020-08-25 LAB
ALBUMIN SERPL-MCNC: 3.5 G/DL (ref 3.4–5)
ALBUMIN/GLOB SERPL: 0.9 {RATIO} (ref 1–2)
ALP LIVER SERPL-CCNC: 66 U/L (ref 45–117)
ALT SERPL-CCNC: 20 U/L (ref 16–61)
ANION GAP SERPL CALC-SCNC: 3 MMOL/L (ref 0–18)
AST SERPL-CCNC: 16 U/L (ref 15–37)
BASOPHILS # BLD AUTO: 0.04 X10(3) UL (ref 0–0.2)
BASOPHILS NFR BLD AUTO: 0.6 %
BILIRUB SERPL-MCNC: 0.7 MG/DL (ref 0.1–2)
BILIRUB UR QL STRIP.AUTO: NEGATIVE
BUN BLD-MCNC: 12 MG/DL (ref 7–18)
BUN/CREAT SERPL: 14 (ref 10–20)
CALCIUM BLD-MCNC: 9.6 MG/DL (ref 8.5–10.1)
CHLORIDE SERPL-SCNC: 108 MMOL/L (ref 98–112)
CHOLEST SMN-MCNC: 128 MG/DL (ref ?–200)
CLARITY UR REFRACT.AUTO: CLEAR
CO2 SERPL-SCNC: 28 MMOL/L (ref 21–32)
CREAT BLD-MCNC: 0.86 MG/DL (ref 0.7–1.3)
CREAT UR-SCNC: 41.8 MG/DL
DEPRECATED RDW RBC AUTO: 41.6 FL (ref 35.1–46.3)
EOSINOPHIL # BLD AUTO: 0.16 X10(3) UL (ref 0–0.7)
EOSINOPHIL NFR BLD AUTO: 2.6 %
ERYTHROCYTE [DISTWIDTH] IN BLOOD BY AUTOMATED COUNT: 12.7 % (ref 11–15)
EST. AVERAGE GLUCOSE BLD GHB EST-MCNC: 151 MG/DL (ref 68–126)
GLOBULIN PLAS-MCNC: 3.8 G/DL (ref 2.8–4.4)
GLUCOSE BLD-MCNC: 107 MG/DL (ref 70–99)
GLUCOSE UR STRIP.AUTO-MCNC: NEGATIVE MG/DL
HBA1C MFR BLD HPLC: 6.9 % (ref ?–5.7)
HCT VFR BLD AUTO: 40.2 % (ref 39–53)
HDLC SERPL-MCNC: 38 MG/DL (ref 40–59)
HGB BLD-MCNC: 13.2 G/DL (ref 13–17.5)
IMM GRANULOCYTES # BLD AUTO: 0.01 X10(3) UL (ref 0–1)
IMM GRANULOCYTES NFR BLD: 0.2 %
KETONES UR STRIP.AUTO-MCNC: NEGATIVE MG/DL
LDLC SERPL CALC-MCNC: 71 MG/DL (ref ?–100)
LEUKOCYTE ESTERASE UR QL STRIP.AUTO: NEGATIVE
LYMPHOCYTES # BLD AUTO: 2.64 X10(3) UL (ref 1–4)
LYMPHOCYTES NFR BLD AUTO: 42.1 %
M PROTEIN MFR SERPL ELPH: 7.3 G/DL (ref 6.4–8.2)
MCH RBC QN AUTO: 29.5 PG (ref 26–34)
MCHC RBC AUTO-ENTMCNC: 32.8 G/DL (ref 31–37)
MCV RBC AUTO: 89.7 FL (ref 80–100)
MICROALBUMIN UR-MCNC: 1.17 MG/DL
MICROALBUMIN/CREAT 24H UR-RTO: 28 UG/MG (ref ?–30)
MONOCYTES # BLD AUTO: 0.58 X10(3) UL (ref 0.1–1)
MONOCYTES NFR BLD AUTO: 9.3 %
NEUTROPHILS # BLD AUTO: 2.84 X10 (3) UL (ref 1.5–7.7)
NEUTROPHILS # BLD AUTO: 2.84 X10(3) UL (ref 1.5–7.7)
NEUTROPHILS NFR BLD AUTO: 45.2 %
NITRITE UR QL STRIP.AUTO: NEGATIVE
NONHDLC SERPL-MCNC: 90 MG/DL (ref ?–130)
OSMOLALITY SERPL CALC.SUM OF ELEC: 288 MOSM/KG (ref 275–295)
PATIENT FASTING Y/N/NP: YES
PATIENT FASTING Y/N/NP: YES
PH UR STRIP.AUTO: 7 [PH] (ref 4.5–8)
PLATELET # BLD AUTO: 223 10(3)UL (ref 150–450)
POTASSIUM SERPL-SCNC: 4.2 MMOL/L (ref 3.5–5.1)
PROT UR STRIP.AUTO-MCNC: NEGATIVE MG/DL
PSA SERPL-MCNC: 0.36 NG/ML (ref ?–4)
RBC # BLD AUTO: 4.48 X10(6)UL (ref 3.8–5.8)
RBC UR QL AUTO: NEGATIVE
SODIUM SERPL-SCNC: 139 MMOL/L (ref 136–145)
SP GR UR STRIP.AUTO: 1.01 (ref 1–1.03)
T4 SERPL-MCNC: 15.1 UG/DL (ref 4.5–12.1)
TRIGL SERPL-MCNC: 95 MG/DL (ref 30–149)
TSI SER-ACNC: <0.005 MIU/ML (ref 0.36–3.74)
UROBILINOGEN UR STRIP.AUTO-MCNC: <2 MG/DL
VLDLC SERPL CALC-MCNC: 19 MG/DL (ref 0–30)
WBC # BLD AUTO: 6.3 X10(3) UL (ref 4–11)

## 2020-08-25 PROCEDURE — 84436 ASSAY OF TOTAL THYROXINE: CPT

## 2020-08-25 PROCEDURE — 82570 ASSAY OF URINE CREATININE: CPT

## 2020-08-25 PROCEDURE — 81003 URINALYSIS AUTO W/O SCOPE: CPT

## 2020-08-25 PROCEDURE — 36415 COLL VENOUS BLD VENIPUNCTURE: CPT

## 2020-08-25 PROCEDURE — 82043 UR ALBUMIN QUANTITATIVE: CPT

## 2020-08-25 PROCEDURE — 84443 ASSAY THYROID STIM HORMONE: CPT

## 2020-08-25 PROCEDURE — 83036 HEMOGLOBIN GLYCOSYLATED A1C: CPT

## 2020-08-25 PROCEDURE — 80053 COMPREHEN METABOLIC PANEL: CPT

## 2020-08-25 PROCEDURE — 84153 ASSAY OF PSA TOTAL: CPT

## 2020-08-25 PROCEDURE — 85025 COMPLETE CBC W/AUTO DIFF WBC: CPT

## 2020-08-25 PROCEDURE — 80061 LIPID PANEL: CPT

## 2020-09-05 ENCOUNTER — APPOINTMENT (OUTPATIENT)
Dept: LAB | Age: 75
End: 2020-09-05
Attending: INTERNAL MEDICINE
Payer: MEDICARE

## 2020-09-05 DIAGNOSIS — Z01.818 PRE-OP TESTING: ICD-10-CM

## 2020-09-07 LAB — SARS-COV-2 RNA RESP QL NAA+PROBE: NOT DETECTED

## 2020-09-08 ENCOUNTER — ANESTHESIA (OUTPATIENT)
Dept: ENDOSCOPY | Facility: HOSPITAL | Age: 75
End: 2020-09-08
Payer: MEDICARE

## 2020-09-08 ENCOUNTER — HOSPITAL ENCOUNTER (OUTPATIENT)
Facility: HOSPITAL | Age: 75
Setting detail: HOSPITAL OUTPATIENT SURGERY
Discharge: HOME | End: 2020-09-08
Attending: INTERNAL MEDICINE | Admitting: INTERNAL MEDICINE
Payer: MEDICARE

## 2020-09-08 ENCOUNTER — ANESTHESIA EVENT (OUTPATIENT)
Dept: ENDOSCOPY | Facility: HOSPITAL | Age: 75
End: 2020-09-08
Payer: MEDICARE

## 2020-09-08 VITALS
HEIGHT: 63 IN | OXYGEN SATURATION: 98 % | TEMPERATURE: 98 F | SYSTOLIC BLOOD PRESSURE: 104 MMHG | BODY MASS INDEX: 28.17 KG/M2 | WEIGHT: 159 LBS | DIASTOLIC BLOOD PRESSURE: 75 MMHG | RESPIRATION RATE: 18 BRPM | HEART RATE: 88 BPM

## 2020-09-08 DIAGNOSIS — Z01.818 PRE-OP TESTING: Primary | ICD-10-CM

## 2020-09-08 DIAGNOSIS — R13.19 ESOPHAGEAL DYSPHAGIA: ICD-10-CM

## 2020-09-08 PROCEDURE — 0DB58ZX EXCISION OF ESOPHAGUS, VIA NATURAL OR ARTIFICIAL OPENING ENDOSCOPIC, DIAGNOSTIC: ICD-10-PCS | Performed by: INTERNAL MEDICINE

## 2020-09-08 PROCEDURE — 0D738ZZ DILATION OF LOWER ESOPHAGUS, VIA NATURAL OR ARTIFICIAL OPENING ENDOSCOPIC: ICD-10-PCS | Performed by: INTERNAL MEDICINE

## 2020-09-08 PROCEDURE — 88305 TISSUE EXAM BY PATHOLOGIST: CPT | Performed by: INTERNAL MEDICINE

## 2020-09-08 PROCEDURE — 0DB98ZX EXCISION OF DUODENUM, VIA NATURAL OR ARTIFICIAL OPENING ENDOSCOPIC, DIAGNOSTIC: ICD-10-PCS | Performed by: INTERNAL MEDICINE

## 2020-09-08 PROCEDURE — 88342 IMHCHEM/IMCYTCHM 1ST ANTB: CPT | Performed by: INTERNAL MEDICINE

## 2020-09-08 PROCEDURE — 0DB68ZX EXCISION OF STOMACH, VIA NATURAL OR ARTIFICIAL OPENING ENDOSCOPIC, DIAGNOSTIC: ICD-10-PCS | Performed by: INTERNAL MEDICINE

## 2020-09-08 RX ORDER — LIDOCAINE HYDROCHLORIDE 10 MG/ML
INJECTION, SOLUTION EPIDURAL; INFILTRATION; INTRACAUDAL; PERINEURAL AS NEEDED
Status: DISCONTINUED | OUTPATIENT
Start: 2020-09-08 | End: 2020-09-08 | Stop reason: SURG

## 2020-09-08 RX ORDER — DEXTROSE MONOHYDRATE 25 G/50ML
50 INJECTION, SOLUTION INTRAVENOUS
Status: DISCONTINUED | OUTPATIENT
Start: 2020-09-08 | End: 2020-09-08

## 2020-09-08 RX ORDER — SODIUM CHLORIDE, SODIUM LACTATE, POTASSIUM CHLORIDE, CALCIUM CHLORIDE 600; 310; 30; 20 MG/100ML; MG/100ML; MG/100ML; MG/100ML
INJECTION, SOLUTION INTRAVENOUS CONTINUOUS
Status: DISCONTINUED | OUTPATIENT
Start: 2020-09-08 | End: 2020-09-08

## 2020-09-08 RX ORDER — NALOXONE HYDROCHLORIDE 0.4 MG/ML
80 INJECTION, SOLUTION INTRAMUSCULAR; INTRAVENOUS; SUBCUTANEOUS AS NEEDED
Status: DISCONTINUED | OUTPATIENT
Start: 2020-09-08 | End: 2020-09-08

## 2020-09-08 RX ORDER — PANTOPRAZOLE SODIUM 40 MG/1
TABLET, DELAYED RELEASE ORAL
Qty: 90 TABLET | Refills: 3 | Status: SHIPPED | OUTPATIENT
Start: 2020-09-08 | End: 2021-09-28

## 2020-09-08 RX ADMIN — LIDOCAINE HYDROCHLORIDE 30 MG: 10 INJECTION, SOLUTION EPIDURAL; INFILTRATION; INTRACAUDAL; PERINEURAL at 09:37:00

## 2020-09-08 RX ADMIN — SODIUM CHLORIDE, SODIUM LACTATE, POTASSIUM CHLORIDE, CALCIUM CHLORIDE: 600; 310; 30; 20 INJECTION, SOLUTION INTRAVENOUS at 09:52:00

## 2020-09-08 NOTE — ANESTHESIA POSTPROCEDURE EVALUATION
[address] Patient Status:  Hospital Outpatient Surgery   Age/Gender 67year old male MRN [id number]   Location [address] [place] [address]. Attending Kathy Terrell MD   Hosp Day # 0 PCP Bill Blankenship MD       Anesthesia Post-op Not

## 2020-09-08 NOTE — OPERATIVE REPORT
Sheryl Simms Patient Status:  Hospital Outpatient Surgery    1945 MRN [id number]   [facility] ENDOSCOPY Attending Tameka Clayton MD   Date 2020 PCP Santana Tejeda MD     PREOPERATIVE DIAGNOSIS/INDICATION: Dysphagia  POSTOPER Gloria  9/8/2020  9:43 AM

## 2020-09-08 NOTE — ANESTHESIA PREPROCEDURE EVALUATION
PRE-OP EVALUATION    Patient Name: Livier Santana    Pre-op Diagnosis: Esophageal dysphagia [R13.10]    Procedure(s):  ESOPHAGOGASTRODUODENOSCOPY with  possible Dilation    Surgeon(s) and Role:     Dolores Melendrez MD - Primary    Pre-op vitals reviewed CYSTOSCOPY TRANSURETHRAL RESECTION PROSTATE N/A 9/1/2015    Performed by Tristen Taylor MD at [facility] MAIN OR   • Yoly  9-1-15    TURP   • ENDOSCOPIC RETROGRADE CHOLANGIOPANCREATOGRAPHY (ERCP) N/A 9/8/2015    Performed by Jerri Aguirre Post-procedure pain management plan discussed with surgeon and patient. Comment: Options, risks and benefits of anesthesia as outlined in the anesthesia consent were reviewed with the patient. The consent was signed without further questions.      Gm

## 2020-09-08 NOTE — H&P
[address] Patient Status:  Hospital Outpatient Surgery    1945 MRN [id number]   [facility] ENDOSCOPY Attending Clinton Parnell MD   Date 2020 PCP Lucita Barber MD     CC: Dysphagia      Hi by Omaira Hernandez MD at [facility] MAIN OR   • ENDOSCOPIC RETROGRADE CHOLANGIOPANCREATOGRAPHY (ERCP) N/A 7/16/2015    Performed by Omaira Hernandez MD at [facility]   • ESOPHAGOGASTRODUODENOSCOPY (EGD) N/A 1/23/2018    Performed by Omaira Hernandez MD at  Gloria  9/8/2020  9:42 AM

## 2020-10-05 ENCOUNTER — APPOINTMENT (OUTPATIENT)
Dept: LAB | Age: 75
End: 2020-10-05
Attending: INTERNAL MEDICINE
Payer: MEDICARE

## 2020-10-05 DIAGNOSIS — K22.2 ESOPHAGEAL STRICTURE: Chronic | ICD-10-CM

## 2020-10-08 ENCOUNTER — ANESTHESIA (OUTPATIENT)
Dept: ENDOSCOPY | Facility: HOSPITAL | Age: 75
End: 2020-10-08
Payer: MEDICARE

## 2020-10-08 ENCOUNTER — HOSPITAL ENCOUNTER (OUTPATIENT)
Facility: HOSPITAL | Age: 75
Setting detail: HOSPITAL OUTPATIENT SURGERY
Discharge: HOME | End: 2020-10-08
Attending: INTERNAL MEDICINE | Admitting: INTERNAL MEDICINE
Payer: MEDICARE

## 2020-10-08 ENCOUNTER — ANESTHESIA EVENT (OUTPATIENT)
Dept: ENDOSCOPY | Facility: HOSPITAL | Age: 75
End: 2020-10-08
Payer: MEDICARE

## 2020-10-08 VITALS
HEIGHT: 63 IN | HEART RATE: 59 BPM | SYSTOLIC BLOOD PRESSURE: 163 MMHG | WEIGHT: 158 LBS | OXYGEN SATURATION: 100 % | RESPIRATION RATE: 16 BRPM | DIASTOLIC BLOOD PRESSURE: 76 MMHG | BODY MASS INDEX: 28 KG/M2

## 2020-10-08 DIAGNOSIS — K22.2 ESOPHAGEAL STRICTURE: Primary | Chronic | ICD-10-CM

## 2020-10-08 DIAGNOSIS — K44.9 HIATAL HERNIA: ICD-10-CM

## 2020-10-08 PROCEDURE — 0D738ZZ DILATION OF LOWER ESOPHAGUS, VIA NATURAL OR ARTIFICIAL OPENING ENDOSCOPIC: ICD-10-PCS | Performed by: INTERNAL MEDICINE

## 2020-10-08 RX ORDER — SODIUM CHLORIDE, SODIUM LACTATE, POTASSIUM CHLORIDE, CALCIUM CHLORIDE 600; 310; 30; 20 MG/100ML; MG/100ML; MG/100ML; MG/100ML
INJECTION, SOLUTION INTRAVENOUS CONTINUOUS
Status: DISCONTINUED | OUTPATIENT
Start: 2020-10-08 | End: 2020-10-08

## 2020-10-08 RX ORDER — DEXTROSE MONOHYDRATE 25 G/50ML
50 INJECTION, SOLUTION INTRAVENOUS
Status: DISCONTINUED | OUTPATIENT
Start: 2020-10-08 | End: 2020-10-08

## 2020-10-08 RX ORDER — NALOXONE HYDROCHLORIDE 0.4 MG/ML
80 INJECTION, SOLUTION INTRAMUSCULAR; INTRAVENOUS; SUBCUTANEOUS AS NEEDED
Status: DISCONTINUED | OUTPATIENT
Start: 2020-10-08 | End: 2020-10-08

## 2020-10-08 RX ORDER — LIDOCAINE HYDROCHLORIDE 10 MG/ML
INJECTION, SOLUTION EPIDURAL; INFILTRATION; INTRACAUDAL; PERINEURAL AS NEEDED
Status: DISCONTINUED | OUTPATIENT
Start: 2020-10-08 | End: 2020-10-08 | Stop reason: SURG

## 2020-10-08 RX ADMIN — LIDOCAINE HYDROCHLORIDE 100 MG: 10 INJECTION, SOLUTION EPIDURAL; INFILTRATION; INTRACAUDAL; PERINEURAL at 14:52:00

## 2020-10-08 NOTE — H&P
[address] Patient Status:  Hospital Outpatient Surgery    1945 MRN [id number]   [facility] ENDOSCOPY Attending Cuong Parkinson MD   Date 10/8/2020 PCP Wendi Dunlap MD     CC: Dysphagia    [facility]   • ENDOSCOPIC RETROGRADE CHOLANGIOPANCREATOGRAPHY (ERCP) N/A 7/16/2015    [facility]formed by Noam Bell [facility] [address]   • ESOPHAGOGASTRODUODENOSCOPY (EGD) N/A 9/8/2020    Performed by Noam Bell MD at [facility] EN mariah Moody  10/8/2020  3:00 PM

## 2020-10-08 NOTE — OPERATIVE REPORT
Layla Obregon Patient Status:  Hospital Outpatient Surgery    1945 MRN [id number]   [facility] ENDOSCOPY Attending Juan Sears MD   Date 10/8/2020 PCP Marc Paige MD     PREOPERATIVE DIAGNOSIS/INDICATION: Dysphagia  POSTOPE PM

## 2020-10-08 NOTE — ANESTHESIA PREPROCEDURE EVALUATION
PRE-OP EVALUATION    Patient Name: Leatha Ott    Pre-op Diagnosis: Esophageal dysphagia [R13.10]    Procedure(s):  ESOPHAGOGASTRODUODENOSCOPY with  possible Dilation    Surgeon(s) and Role:     Keesha Jefferson MD - Primary    Pre-op vitals reviewed Performed by Gemma Roman MD at [facility] ENDOSCOPY   • ESOPHAGOGASTRODUODENOSCOPY (EGD) N/A 1/23/2018    Performed by Gemma Roman MD at [facility] 7/10/2015    Performed by Sarai Okeefe MD at [address] [place] [address] Admission:  **None**

## 2020-10-08 NOTE — ANESTHESIA POSTPROCEDURE EVALUATION
[address] Patient Status:  Hospital Outpatient Surgery   Age/Gender 67year old male MRN [id number]   Location [address] [facility]. Attending Mario Gauthier MD   Hosp Day # 0 PCP Kaden Kumar MD       Anesthesia Post-op Not

## 2020-11-06 ENCOUNTER — OFFICE VISIT (OUTPATIENT)
Dept: INTERNAL MEDICINE CLINIC | Facility: CLINIC | Age: 75
End: 2020-11-06
Payer: MEDICARE

## 2020-11-06 VITALS
DIASTOLIC BLOOD PRESSURE: 62 MMHG | TEMPERATURE: 98 F | RESPIRATION RATE: 16 BRPM | HEART RATE: 70 BPM | HEIGHT: 63 IN | BODY MASS INDEX: 27.29 KG/M2 | SYSTOLIC BLOOD PRESSURE: 144 MMHG | WEIGHT: 154 LBS | OXYGEN SATURATION: 98 %

## 2020-11-06 DIAGNOSIS — R79.89 ABNORMAL THYROID BLOOD TEST: Primary | ICD-10-CM

## 2020-11-06 DIAGNOSIS — E11.65 TYPE 2 DIABETES MELLITUS WITH HYPERGLYCEMIA, WITHOUT LONG-TERM CURRENT USE OF INSULIN (HCC): Chronic | ICD-10-CM

## 2020-11-06 PROCEDURE — 3008F BODY MASS INDEX DOCD: CPT | Performed by: INTERNAL MEDICINE

## 2020-11-06 PROCEDURE — 3078F DIAST BP <80 MM HG: CPT | Performed by: INTERNAL MEDICINE

## 2020-11-06 PROCEDURE — 99213 OFFICE O/P EST LOW 20 MIN: CPT | Performed by: INTERNAL MEDICINE

## 2020-11-06 PROCEDURE — 3077F SYST BP >= 140 MM HG: CPT | Performed by: INTERNAL MEDICINE

## 2020-11-06 NOTE — PROGRESS NOTES
Adriana Ronquillo  1945 is a 67year old male. Patient presents with: Follow - Up      HPI:   DIABETES MELLITUS:   The diet that the patient has been following is none  The frequency of the monitoring schedule is occasionally.      The results of th hemorrhoids 3/21/2019   • Shortness of breath    • Sleep apnea     no cpap   • Sleep disturbance    • Sputum production    • Visual impairment     reading glasses   • Vocal cord paralysis 11/10/2016   • Wheezing       Social History:  Social History    Tob long-term current use of insulin (HCC)  -     OPHTHALMOLOGY - INTERNAL       Labs discussed with patient. Will get a fresh set of thyroid studies prior to any medicine recommendations        There are no Patient Instructions on file for this visit.    The

## 2020-11-07 ENCOUNTER — LAB ENCOUNTER (OUTPATIENT)
Dept: LAB | Age: 75
End: 2020-11-07
Attending: INTERNAL MEDICINE
Payer: MEDICARE

## 2020-11-07 DIAGNOSIS — K22.2 ESOPHAGEAL STRICTURE: Chronic | ICD-10-CM

## 2020-11-07 DIAGNOSIS — R79.89 ABNORMAL THYROID BLOOD TEST: ICD-10-CM

## 2020-11-07 PROCEDURE — 84481 FREE ASSAY (FT-3): CPT

## 2020-11-07 PROCEDURE — 84443 ASSAY THYROID STIM HORMONE: CPT

## 2020-11-07 PROCEDURE — 36415 COLL VENOUS BLD VENIPUNCTURE: CPT

## 2020-11-07 PROCEDURE — 84439 ASSAY OF FREE THYROXINE: CPT

## 2020-11-10 ENCOUNTER — ANESTHESIA (OUTPATIENT)
Dept: ENDOSCOPY | Facility: HOSPITAL | Age: 75
End: 2020-11-10
Payer: MEDICARE

## 2020-11-10 ENCOUNTER — HOSPITAL ENCOUNTER (OUTPATIENT)
Facility: HOSPITAL | Age: 75
Setting detail: HOSPITAL OUTPATIENT SURGERY
Discharge: HOME | End: 2020-11-10
Attending: INTERNAL MEDICINE | Admitting: INTERNAL MEDICINE
Payer: MEDICARE

## 2020-11-10 ENCOUNTER — ANESTHESIA EVENT (OUTPATIENT)
Dept: ENDOSCOPY | Facility: HOSPITAL | Age: 75
End: 2020-11-10
Payer: MEDICARE

## 2020-11-10 VITALS
DIASTOLIC BLOOD PRESSURE: 75 MMHG | OXYGEN SATURATION: 99 % | SYSTOLIC BLOOD PRESSURE: 125 MMHG | BODY MASS INDEX: 28.35 KG/M2 | HEIGHT: 63 IN | TEMPERATURE: 98 F | WEIGHT: 160 LBS | RESPIRATION RATE: 20 BRPM | HEART RATE: 64 BPM

## 2020-11-10 DIAGNOSIS — K22.2 ESOPHAGEAL STRICTURE: Primary | Chronic | ICD-10-CM

## 2020-11-10 DIAGNOSIS — R13.10 PROBLEMS WITH SWALLOWING AND MASTICATION: ICD-10-CM

## 2020-11-10 PROCEDURE — 0D758ZZ DILATION OF ESOPHAGUS, VIA NATURAL OR ARTIFICIAL OPENING ENDOSCOPIC: ICD-10-PCS | Performed by: INTERNAL MEDICINE

## 2020-11-10 RX ORDER — LIDOCAINE HYDROCHLORIDE 10 MG/ML
INJECTION, SOLUTION EPIDURAL; INFILTRATION; INTRACAUDAL; PERINEURAL AS NEEDED
Status: DISCONTINUED | OUTPATIENT
Start: 2020-11-10 | End: 2020-11-10 | Stop reason: SURG

## 2020-11-10 RX ORDER — NALOXONE HYDROCHLORIDE 0.4 MG/ML
80 INJECTION, SOLUTION INTRAMUSCULAR; INTRAVENOUS; SUBCUTANEOUS AS NEEDED
Status: DISCONTINUED | OUTPATIENT
Start: 2020-11-10 | End: 2020-11-10

## 2020-11-10 RX ORDER — SODIUM CHLORIDE, SODIUM LACTATE, POTASSIUM CHLORIDE, CALCIUM CHLORIDE 600; 310; 30; 20 MG/100ML; MG/100ML; MG/100ML; MG/100ML
INJECTION, SOLUTION INTRAVENOUS CONTINUOUS
Status: DISCONTINUED | OUTPATIENT
Start: 2020-11-10 | End: 2020-11-10

## 2020-11-10 RX ORDER — DEXTROSE MONOHYDRATE 25 G/50ML
50 INJECTION, SOLUTION INTRAVENOUS
Status: DISCONTINUED | OUTPATIENT
Start: 2020-11-10 | End: 2020-11-10

## 2020-11-10 RX ADMIN — LIDOCAINE HYDROCHLORIDE 100 MG: 10 INJECTION, SOLUTION EPIDURAL; INFILTRATION; INTRACAUDAL; PERINEURAL at 11:56:00

## 2020-11-10 NOTE — OPERATIVE REPORT
Sloan Lovelace Patient Status:  Hospital Outpatient Surgery    1945 MRN [id number]   [facility] ENDOSCOPY Attending Diandra Beaver MD   Date 11/10/2020 PCP Jagruti Kelsey MD     PREOPERATIVE DIAGNOSIS/INDICATION: Dysphagia PM

## 2020-11-10 NOTE — H&P
[address] [facility] Patient Status:  Hospital Outpatient Surgery    1945 MRN [id number]   [facility] ENDOSCOPY Attending Garima Goodman MD   Date 11/10/2020 PCP Thad Beaver MD     CC: Esophagea 9/8/2015    Performed by Mario Gauthier MD at [facility] MAIN OR   • ENDOSCOPIC RETROGRADE CHOLANGIOPANCREATOGRAPHY (ERCP) N/A 7/16/2015    Performed by Mario Gauthier MD at [facility] MAIN OR   • ESOPHAGOGASTRODUODENOSCOPY (EGD) N/A 10/8/2020    Performed by Delmy Lazcano

## 2020-11-10 NOTE — ANESTHESIA PREPROCEDURE EVALUATION
PRE-OP EVALUATION    Patient Name: Kat Marie    Pre-op Diagnosis: Esophageal dysphagia [R13.10]    Procedure(s):  ESOPHAGOGASTRODUODENOSCOPY with  possible Dilation    Surgeon(s) and Role:     Juice Collier MD - Primary    Pre-op vitals re 10/8/2020    Performed by Cuong Parkinson MD at [facility] ENDOSCOPY   • ESOPHAGOGASTRODUODENOSCOPY (EGD) N/A 9/8/2020    Performed by Cuong Parkinson MD at [facility] ENDOSCOPY   • ESOPHAGOGASTRODUODENOSCOPY (EGD) N/A 1/23/2018    Performed by Cuong Parkinson MD on Admission:  **None**

## 2020-11-10 NOTE — ANESTHESIA POSTPROCEDURE EVALUATION
[address] [facility] Patient Status:  Hospital Outpatient Surgery   Age/Gender 67year old male MRN [id number]   Location [address] [place] [address]. Attending Nabeel Pang MD   Hosp Day # 0 PCP Kristel Moran MD       Anesthesia Post-

## 2020-12-02 DIAGNOSIS — I10 ESSENTIAL HYPERTENSION: Chronic | ICD-10-CM

## 2020-12-02 NOTE — TELEPHONE ENCOUNTER
Refill to [facility] [employer]    Propranolol HCl ER 80 MG Oral Capsule SR 24 Hr (Discontinued)

## 2020-12-02 NOTE — TELEPHONE ENCOUNTER
Medication(s) to Refill:   Requested Prescriptions     Pending Prescriptions Disp Refills   • Propranolol HCl ER 80 MG Oral Capsule SR 24 Hr 90 capsule 3     Sig: Take 1 capsule (80 mg total) by mouth daily.        LOV: 11-6-2020    RTC: 3 months    Last Re

## 2020-12-03 RX ORDER — PROPRANOLOL HYDROCHLORIDE 80 MG/1
80 CAPSULE, EXTENDED RELEASE ORAL DAILY
Qty: 90 CAPSULE | Refills: 3 | Status: SHIPPED | OUTPATIENT
Start: 2020-12-03 | End: 2021-08-13

## 2020-12-08 ENCOUNTER — APPOINTMENT (OUTPATIENT)
Dept: CT IMAGING | Facility: HOSPITAL | Age: 75
End: 2020-12-08
Attending: EMERGENCY MEDICINE
Payer: MEDICARE

## 2020-12-08 ENCOUNTER — HOSPITAL ENCOUNTER (EMERGENCY)
Facility: HOSPITAL | Age: 75
Discharge: HOME | End: 2020-12-08
Attending: EMERGENCY MEDICINE
Payer: MEDICARE

## 2020-12-08 VITALS
SYSTOLIC BLOOD PRESSURE: 147 MMHG | TEMPERATURE: 97 F | WEIGHT: 150 LBS | DIASTOLIC BLOOD PRESSURE: 80 MMHG | HEART RATE: 50 BPM | OXYGEN SATURATION: 100 % | HEIGHT: 63 IN | BODY MASS INDEX: 26.58 KG/M2 | RESPIRATION RATE: 20 BRPM

## 2020-12-08 DIAGNOSIS — H81.10 BENIGN PAROXYSMAL POSITIONAL VERTIGO, UNSPECIFIED LATERALITY: Primary | ICD-10-CM

## 2020-12-08 PROCEDURE — 93005 ELECTROCARDIOGRAM TRACING: CPT

## 2020-12-08 PROCEDURE — 99285 EMERGENCY DEPT VISIT HI MDM: CPT

## 2020-12-08 PROCEDURE — 85025 COMPLETE CBC W/AUTO DIFF WBC: CPT | Performed by: EMERGENCY MEDICINE

## 2020-12-08 PROCEDURE — 99284 EMERGENCY DEPT VISIT MOD MDM: CPT

## 2020-12-08 PROCEDURE — 70450 CT HEAD/BRAIN W/O DYE: CPT | Performed by: EMERGENCY MEDICINE

## 2020-12-08 PROCEDURE — 80053 COMPREHEN METABOLIC PANEL: CPT | Performed by: EMERGENCY MEDICINE

## 2020-12-08 PROCEDURE — 93010 ELECTROCARDIOGRAM REPORT: CPT

## 2020-12-08 PROCEDURE — 36415 COLL VENOUS BLD VENIPUNCTURE: CPT

## 2020-12-08 RX ORDER — MECLIZINE HYDROCHLORIDE 25 MG/1
25 TABLET ORAL 4 TIMES DAILY PRN
Qty: 30 TABLET | Refills: 0 | Status: SHIPPED | OUTPATIENT
Start: 2020-12-08 | End: 2021-02-24

## 2020-12-08 RX ORDER — MECLIZINE HYDROCHLORIDE 25 MG/1
25 TABLET ORAL ONCE
Status: COMPLETED | OUTPATIENT
Start: 2020-12-08 | End: 2020-12-08

## 2020-12-08 NOTE — ED NOTES
Pt aox4. Rn discussed dc instructions, medications, safety instructions, and follow up with pcp with patient. Pt verbalized understanding of information. Pt to ed exit per wheelchair. Pts daughter in law driving patient home.

## 2020-12-08 NOTE — ED PROVIDER NOTES
Patient Seen in: [facility] Emergency Department      History   Patient presents with:  Dizziness  Hypertension    Stated Complaint: dizziness since 3am, elevated blood pressure    HPI    58-year-old male with a history of hypertension presents with Procedure Laterality Date   • CHOLECYSTECTOMY  07/2015   • COLONOSCOPY  10/2012    3 mm adenoma.  repeat 5 yrs, Dr Niraj Villarreal   • COLONOSCOPY, POSSIBLE BIOPSY, POSSIBLE POLYPECTOMY 11630 N/A 10/22/2012    Performed by Kym Benedict MD at [address] Pulse 53   Resp 16   Temp 97.4 °F (36.3 °C)   Temp src Temporal   SpO2 100 %   O2 Device None (Room air)       Current:/80   Pulse 50   Temp 97.4 °F (36.3 °C) (Temporal)   Resp 20   Ht 160 cm (5' 3\")   Wt 68 kg   SpO2 100%   BMI 26.57 kg/m² RAINBOW DRAW GOLD   UA HOLD   CBC W/ DIFFERENTIAL     EKG    Rate, intervals and axes as noted on EKG Report. Rate: 53  Rhythm: Sinus Rhythm  Reading: No ST segment or T wave changes. No old available for comparison.               Ct Brain Or Head (4269 Labs, CT brain ordered and will try meclizine. Update at 12:30 PM.  Labs, CT unremarkable. Patient states he is feeling better after meclizine and is comfortable going home.                   Disposition and Plan     Clinical Impression:  Benign paro

## 2020-12-08 NOTE — ED INITIAL ASSESSMENT (HPI)
Pt aox4. Pt presents to ed from home accompanied by family. Pt states woke up at 0200 am today and had spinning with movement of head. Pt denies nvd.  Pt states blood pressure was high 181/94 at 0200 am.

## 2020-12-14 ENCOUNTER — TELEMEDICINE (OUTPATIENT)
Dept: INTERNAL MEDICINE CLINIC | Facility: CLINIC | Age: 75
End: 2020-12-14
Payer: MEDICARE

## 2020-12-14 DIAGNOSIS — I10 ESSENTIAL HYPERTENSION: Primary | Chronic | ICD-10-CM

## 2020-12-14 PROCEDURE — 99213 OFFICE O/P EST LOW 20 MIN: CPT | Performed by: INTERNAL MEDICINE

## 2020-12-14 NOTE — PROGRESS NOTES
Virtual Telephone Check-In    Leslie Fu verbally consents to a Virtual/Telephone Check-In visit on 12/14/20. Patient has been referred to the [facility] website at [email or web address] to review the yearly Consent to Treat document.     Patient und

## 2020-12-21 ENCOUNTER — OFFICE VISIT (OUTPATIENT)
Dept: INTERNAL MEDICINE CLINIC | Facility: CLINIC | Age: 75
End: 2020-12-21
Payer: MEDICARE

## 2020-12-21 VITALS
SYSTOLIC BLOOD PRESSURE: 150 MMHG | HEIGHT: 63 IN | WEIGHT: 157.38 LBS | HEART RATE: 74 BPM | BODY MASS INDEX: 27.89 KG/M2 | TEMPERATURE: 98 F | DIASTOLIC BLOOD PRESSURE: 74 MMHG | RESPIRATION RATE: 18 BRPM | OXYGEN SATURATION: 97 %

## 2020-12-21 DIAGNOSIS — Z00.00 LABORATORY EXAMINATION ORDERED AS PART OF A ROUTINE GENERAL MEDICAL EXAMINATION: ICD-10-CM

## 2020-12-21 DIAGNOSIS — I10 ESSENTIAL HYPERTENSION: Primary | ICD-10-CM

## 2020-12-21 PROCEDURE — 3077F SYST BP >= 140 MM HG: CPT | Performed by: INTERNAL MEDICINE

## 2020-12-21 PROCEDURE — 99213 OFFICE O/P EST LOW 20 MIN: CPT | Performed by: INTERNAL MEDICINE

## 2020-12-21 PROCEDURE — 3008F BODY MASS INDEX DOCD: CPT | Performed by: INTERNAL MEDICINE

## 2020-12-21 PROCEDURE — 3078F DIAST BP <80 MM HG: CPT | Performed by: INTERNAL MEDICINE

## 2020-12-21 RX ORDER — AMLODIPINE BESYLATE AND BENAZEPRIL HYDROCHLORIDE 5; 20 MG/1; MG/1
1 CAPSULE ORAL DAILY
Qty: 30 CAPSULE | Refills: 2 | Status: SHIPPED | OUTPATIENT
Start: 2020-12-21 | End: 2021-03-21

## 2020-12-21 NOTE — PROGRESS NOTES
Giuseppe Nunez  1945 is a 67year old male. Patient presents with:   Follow - Up       HPI:   BP check  Current Outpatient Medications   Medication Sig Dispense Refill   • amLODIPine Besy-Benazepril HCl (LOTREL) 5-20 MG Oral Cap Take 1 capsu 11/10/2016   • Wheezing       Social History:  Social History    Tobacco Use      Smoking status: Former Smoker        Packs/day: 0.50        Years: 20.00        Pack years: 10        Types: Cigarettes        Quit date: 6/20/1984        Years since [facility] DIAGNOSTIC; Future  -     VITAMIN D, 25-HYDROXY; Future        Patient Instructions   BP technique was less than optimum cuff is a little too large       The patient indicates understanding of these issues and agrees to the plan.   The patient is asked to R

## 2021-01-27 NOTE — ANESTHESIA PREPROCEDURE EVALUATION
01/26/21 9:01 PM     Thank you for your request  Your request has been received, reviewed, and the patient chart updated  The PCP has successfully been removed with a patient attribution note  This message will now be completed      Thank you  Nancy Wagner PRE-OP EVALUATION    Patient Name: Wiley Mchugh    Pre-op Diagnosis: Hoarseness of voice [R49.0]  Odynophagia [R13.10]  Dysphagia, unspecified type [R13.10]    Procedure(s):  ESOPHAGOGASTRODUODENOSCOPY WITH POSSIBLE DILATION    Surgeon(s) and Role:     * Procedure: COLONOSCOPY, POSSIBLE BIOPSY,                POSSIBLE POLYPECTOMY 32658;  Surgeon: Nilo Gupta MD;  Location: [address] [place] [address]  9-1-15: CYSTOSCOPY,INSERT URETERAL STENT      Comment: TURP  7/10/2015: Cardiovascular    Cardiovascular exam normal.  Rhythm: regular  Rate: normal     Dental    No notable dental history. Pulmonary    Pulmonary exam normal.  Breath sounds clear to auscultation bilaterally.                Other findings            ASA:

## 2021-02-01 ENCOUNTER — LAB ENCOUNTER (OUTPATIENT)
Dept: LAB | Age: 76
End: 2021-02-01
Attending: INTERNAL MEDICINE
Payer: MEDICARE

## 2021-02-01 DIAGNOSIS — Z00.00 LABORATORY EXAMINATION ORDERED AS PART OF A ROUTINE GENERAL MEDICAL EXAMINATION: ICD-10-CM

## 2021-02-01 LAB
ALBUMIN SERPL-MCNC: 3.8 G/DL (ref 3.4–5)
ALBUMIN/GLOB SERPL: 1 {RATIO} (ref 1–2)
ALP LIVER SERPL-CCNC: 87 U/L
ALT SERPL-CCNC: 21 U/L
ANION GAP SERPL CALC-SCNC: 2 MMOL/L (ref 0–18)
AST SERPL-CCNC: 16 U/L (ref 15–37)
BASOPHILS # BLD AUTO: 0.04 X10(3) UL (ref 0–0.2)
BASOPHILS NFR BLD AUTO: 0.5 %
BILIRUB SERPL-MCNC: 0.6 MG/DL (ref 0.1–2)
BILIRUB UR QL STRIP.AUTO: NEGATIVE
BUN BLD-MCNC: 17 MG/DL (ref 7–18)
BUN/CREAT SERPL: 11.9 (ref 10–20)
CALCIUM BLD-MCNC: 9.5 MG/DL (ref 8.5–10.1)
CHLORIDE SERPL-SCNC: 104 MMOL/L (ref 98–112)
CHOLEST SMN-MCNC: 187 MG/DL (ref ?–200)
CO2 SERPL-SCNC: 29 MMOL/L (ref 21–32)
COLOR UR AUTO: YELLOW
CREAT BLD-MCNC: 1.43 MG/DL
CREAT UR-SCNC: 190 MG/DL
DEPRECATED RDW RBC AUTO: 46.9 FL (ref 35.1–46.3)
EOSINOPHIL # BLD AUTO: 0.24 X10(3) UL (ref 0–0.7)
EOSINOPHIL NFR BLD AUTO: 3.2 %
ERYTHROCYTE [DISTWIDTH] IN BLOOD BY AUTOMATED COUNT: 13.8 % (ref 11–15)
EST. AVERAGE GLUCOSE BLD GHB EST-MCNC: 197 MG/DL (ref 68–126)
GLOBULIN PLAS-MCNC: 4 G/DL (ref 2.8–4.4)
GLUCOSE BLD-MCNC: 192 MG/DL (ref 70–99)
GLUCOSE UR STRIP.AUTO-MCNC: >=500 MG/DL
HBA1C MFR BLD HPLC: 8.5 % (ref ?–5.7)
HCT VFR BLD AUTO: 43.4 %
HDLC SERPL-MCNC: 42 MG/DL (ref 40–59)
HGB BLD-MCNC: 14.1 G/DL
HYALINE CASTS #/AREA URNS AUTO: PRESENT /LPF
IMM GRANULOCYTES # BLD AUTO: 0.02 X10(3) UL (ref 0–1)
IMM GRANULOCYTES NFR BLD: 0.3 %
KETONES UR STRIP.AUTO-MCNC: NEGATIVE MG/DL
LDLC SERPL CALC-MCNC: 116 MG/DL (ref ?–100)
LEUKOCYTE ESTERASE UR QL STRIP.AUTO: NEGATIVE
LYMPHOCYTES # BLD AUTO: 3.2 X10(3) UL (ref 1–4)
LYMPHOCYTES NFR BLD AUTO: 42.6 %
M PROTEIN MFR SERPL ELPH: 7.8 G/DL (ref 6.4–8.2)
MCH RBC QN AUTO: 29.9 PG (ref 26–34)
MCHC RBC AUTO-ENTMCNC: 32.5 G/DL (ref 31–37)
MCV RBC AUTO: 91.9 FL
MICROALBUMIN UR-MCNC: 8.3 MG/DL
MICROALBUMIN/CREAT 24H UR-RTO: 43.7 UG/MG (ref ?–30)
MONOCYTES # BLD AUTO: 0.58 X10(3) UL (ref 0.1–1)
MONOCYTES NFR BLD AUTO: 7.7 %
NEUTROPHILS # BLD AUTO: 3.43 X10 (3) UL (ref 1.5–7.7)
NEUTROPHILS # BLD AUTO: 3.43 X10(3) UL (ref 1.5–7.7)
NEUTROPHILS NFR BLD AUTO: 45.7 %
NITRITE UR QL STRIP.AUTO: NEGATIVE
NONHDLC SERPL-MCNC: 145 MG/DL (ref ?–130)
OSMOLALITY SERPL CALC.SUM OF ELEC: 287 MOSM/KG (ref 275–295)
PATIENT FASTING Y/N/NP: YES
PATIENT FASTING Y/N/NP: YES
PH UR STRIP.AUTO: 5 [PH] (ref 4.5–8)
PLATELET # BLD AUTO: 227 10(3)UL (ref 150–450)
POTASSIUM SERPL-SCNC: 5 MMOL/L (ref 3.5–5.1)
PROT UR STRIP.AUTO-MCNC: 30 MG/DL
PSA SERPL-MCNC: 0.3 NG/ML (ref ?–4)
RBC # BLD AUTO: 4.72 X10(6)UL
RBC UR QL AUTO: NEGATIVE
SODIUM SERPL-SCNC: 135 MMOL/L (ref 136–145)
SP GR UR STRIP.AUTO: 1.02 (ref 1–1.03)
T4 SERPL-MCNC: 7.4 UG/DL
TRIGL SERPL-MCNC: 143 MG/DL (ref 30–149)
TSI SER-ACNC: 0.52 MIU/ML (ref 0.36–3.74)
UROBILINOGEN UR STRIP.AUTO-MCNC: <2 MG/DL
VIT D+METAB SERPL-MCNC: 47.8 NG/ML (ref 30–100)
VLDLC SERPL CALC-MCNC: 29 MG/DL (ref 0–30)
WBC # BLD AUTO: 7.5 X10(3) UL (ref 4–11)

## 2021-02-01 PROCEDURE — 84153 ASSAY OF PSA TOTAL: CPT

## 2021-02-01 PROCEDURE — 83036 HEMOGLOBIN GLYCOSYLATED A1C: CPT

## 2021-02-01 PROCEDURE — 84443 ASSAY THYROID STIM HORMONE: CPT

## 2021-02-01 PROCEDURE — 80053 COMPREHEN METABOLIC PANEL: CPT

## 2021-02-01 PROCEDURE — 82306 VITAMIN D 25 HYDROXY: CPT

## 2021-02-01 PROCEDURE — 85025 COMPLETE CBC W/AUTO DIFF WBC: CPT

## 2021-02-01 PROCEDURE — 80061 LIPID PANEL: CPT

## 2021-02-01 PROCEDURE — 84436 ASSAY OF TOTAL THYROXINE: CPT

## 2021-02-01 PROCEDURE — 81001 URINALYSIS AUTO W/SCOPE: CPT

## 2021-02-01 PROCEDURE — 82570 ASSAY OF URINE CREATININE: CPT

## 2021-02-01 PROCEDURE — 82043 UR ALBUMIN QUANTITATIVE: CPT

## 2021-02-01 PROCEDURE — 36415 COLL VENOUS BLD VENIPUNCTURE: CPT

## 2021-02-13 DIAGNOSIS — Z23 NEED FOR VACCINATION: ICD-10-CM

## 2021-02-15 ENCOUNTER — LAB ENCOUNTER (OUTPATIENT)
Dept: LAB | Age: 76
End: 2021-02-15
Attending: INTERNAL MEDICINE
Payer: MEDICARE

## 2021-02-15 ENCOUNTER — OFFICE VISIT (OUTPATIENT)
Dept: INTERNAL MEDICINE CLINIC | Facility: CLINIC | Age: 76
End: 2021-02-15
Payer: MEDICARE

## 2021-02-15 VITALS
HEART RATE: 66 BPM | DIASTOLIC BLOOD PRESSURE: 68 MMHG | HEIGHT: 62.5 IN | TEMPERATURE: 98 F | OXYGEN SATURATION: 99 % | RESPIRATION RATE: 20 BRPM | BODY MASS INDEX: 28.74 KG/M2 | SYSTOLIC BLOOD PRESSURE: 134 MMHG | WEIGHT: 160.19 LBS

## 2021-02-15 DIAGNOSIS — I10 ESSENTIAL HYPERTENSION: Chronic | ICD-10-CM

## 2021-02-15 DIAGNOSIS — E05.90 HYPERTHYROIDISM: Chronic | ICD-10-CM

## 2021-02-15 DIAGNOSIS — E11.65 TYPE 2 DIABETES MELLITUS WITH HYPERGLYCEMIA, WITHOUT LONG-TERM CURRENT USE OF INSULIN (HCC): Chronic | ICD-10-CM

## 2021-02-15 DIAGNOSIS — Z00.00 ROUTINE GENERAL MEDICAL EXAMINATION AT A HEALTH CARE FACILITY: Primary | ICD-10-CM

## 2021-02-15 DIAGNOSIS — J38.00 VOCAL CORD PARALYSIS: Chronic | ICD-10-CM

## 2021-02-15 DIAGNOSIS — R31.21 ASYMPTOMATIC MICROSCOPIC HEMATURIA: Chronic | ICD-10-CM

## 2021-02-15 DIAGNOSIS — R25.1 TREMOR: Chronic | ICD-10-CM

## 2021-02-15 DIAGNOSIS — K22.2 ESOPHAGEAL STRICTURE: Chronic | ICD-10-CM

## 2021-02-15 LAB
ANION GAP SERPL CALC-SCNC: 4 MMOL/L (ref 0–18)
BUN BLD-MCNC: 16 MG/DL (ref 7–18)
BUN/CREAT SERPL: 15.1 (ref 10–20)
CALCIUM BLD-MCNC: 9.9 MG/DL (ref 8.5–10.1)
CHLORIDE SERPL-SCNC: 106 MMOL/L (ref 98–112)
CO2 SERPL-SCNC: 29 MMOL/L (ref 21–32)
CREAT BLD-MCNC: 1.06 MG/DL
GLUCOSE BLD-MCNC: 135 MG/DL (ref 70–99)
OSMOLALITY SERPL CALC.SUM OF ELEC: 291 MOSM/KG (ref 275–295)
PATIENT FASTING Y/N/NP: NO
POTASSIUM SERPL-SCNC: 4.8 MMOL/L (ref 3.5–5.1)
SODIUM SERPL-SCNC: 139 MMOL/L (ref 136–145)

## 2021-02-15 PROCEDURE — G0439 PPPS, SUBSEQ VISIT: HCPCS | Performed by: INTERNAL MEDICINE

## 2021-02-15 PROCEDURE — 3075F SYST BP GE 130 - 139MM HG: CPT | Performed by: INTERNAL MEDICINE

## 2021-02-15 PROCEDURE — 36415 COLL VENOUS BLD VENIPUNCTURE: CPT

## 2021-02-15 PROCEDURE — 99397 PER PM REEVAL EST PAT 65+ YR: CPT | Performed by: INTERNAL MEDICINE

## 2021-02-15 PROCEDURE — 3078F DIAST BP <80 MM HG: CPT | Performed by: INTERNAL MEDICINE

## 2021-02-15 PROCEDURE — 96160 PT-FOCUSED HLTH RISK ASSMT: CPT | Performed by: INTERNAL MEDICINE

## 2021-02-15 PROCEDURE — 80048 BASIC METABOLIC PNL TOTAL CA: CPT

## 2021-02-15 PROCEDURE — 3008F BODY MASS INDEX DOCD: CPT | Performed by: INTERNAL MEDICINE

## 2021-02-15 RX ORDER — METHIMAZOLE 5 MG/1
5 TABLET ORAL 3 TIMES DAILY
COMMUNITY
End: 2021-02-15

## 2021-02-15 RX ORDER — ATORVASTATIN CALCIUM 10 MG/1
10 TABLET, FILM COATED ORAL NIGHTLY
Qty: 90 TABLET | Refills: 3 | Status: SHIPPED | OUTPATIENT
Start: 2021-02-15 | End: 2021-08-13

## 2021-02-15 RX ORDER — METHIMAZOLE 5 MG/1
5 TABLET ORAL 3 TIMES DAILY
Qty: 270 TABLET | Refills: 2 | Status: SHIPPED | OUTPATIENT
Start: 2021-02-15 | End: 2021-08-11

## 2021-02-15 RX ORDER — GLIPIZIDE 2.5 MG/1
2.5 TABLET, EXTENDED RELEASE ORAL
Qty: 90 TABLET | Refills: 3 | Status: SHIPPED | OUTPATIENT
Start: 2021-02-15 | End: 2021-08-13

## 2021-02-15 NOTE — PROGRESS NOTES
REASON FOR VISIT:    Magnolia Abebe is a 67year old male who presents for a Medicare Annual Wellness visit.    male     Patient Care Team: Patient Care Team:  Julee Castellanos MD as PCP - General (Internal Medicine)  Julee Castellanos MD (Internal Medic Latest Ref Rng & Units 2/1/2021 8/25/2020 7/9/2019 2/11/2019 11/14/2017 11/14/2016 3/5/2015   Total Cholesterol <200 mg/dL 187 128 128 146 102 147 160   Triglycerides 30 - 149 mg/dL 143 95 105 129 105 154(H) 147   HDL 40 - 59 mg/dL 42 38(L) 38(L) 41 36(L) 09/17/2012 114 (H)     LDL Cholesterol (mg/dL)   Date Value   02/01/2021 116 (H)     LDL CHOLESTROL (mg/dL)   Date Value   03/20/2014 91       EKG - w/ Initial Preventative Physical Exam only, or if medically necessary yes   Colorectal Cancer Screening Osteoarthrosis, unspecified whether generalized or localized, unspecified site    • Pain in joints    • Painful swallowing    • Personal history of colonic polyps 3/21/2019   • PONV (postoperative nausea and vomiting)    • Prediabetes     borderline   • Pr Father    • Hypertension Father      Immunization History      Immunization History  Administered            Date(s) Administered    FLUAD High Dose 72 yr and older (51324)                          10/30/2018      Influenza             09/27/2015  10/16/20 movement noted. No current GI complaints- dysphagia none. Hematology:   Patient denies abnormal bleeding, easy bruising. Enlarged lymph nodes none.    Men Only:   Patient denies difficulty with erection, penile discharge, testicular pain    Genitourinary Rhythm: regular. LUNGS:   Auscultation: clear . Chest Shape: normal .   Percussion: normal.   Rales: no .   Respiratory effort: normal .   Rhonchi: no.   Wheezes: no. ABDOMEN:   Bowel sounds: normal.   General: normal.   Hernia: absent.    Liver, Sp long-term current use of insulin (HCC)  -     atorvastatin 10 MG Oral Tab; Take 1 tablet (10 mg total) by mouth nightly.  -     glipiZIDE ER 2.5 MG Oral Tablet 24 Hr;  Take 1 tablet (2.5 mg total) by mouth daily with breakfast.  -     CARD TREADMILL STRESS,

## 2021-02-17 NOTE — TELEPHONE ENCOUNTER
Received a fax from [facility] requesting clarification on Propranolol and Metoprolol.     Propranolol was last rx'd on 2/5/2019 and Metoprolol Succinate ER 50 mg on 11/6/2018 - However this medication is no longer on pts current med list - it is in history (bu Face Mask

## 2021-02-19 ENCOUNTER — TELEPHONE (OUTPATIENT)
Dept: INTERNAL MEDICINE CLINIC | Facility: CLINIC | Age: 76
End: 2021-02-19

## 2021-02-23 RX ORDER — PRIMIDONE 50 MG/1
TABLET ORAL
Qty: 180 TABLET | Refills: 0 | Status: SHIPPED | OUTPATIENT
Start: 2021-02-23 | End: 2021-05-04

## 2021-02-23 NOTE — TELEPHONE ENCOUNTER
No protocol   Medication(s) to Refill:   Requested Prescriptions     Pending Prescriptions Disp Refills   • PRIMIDONE 50 MG Oral Tab [Pharmacy Med Name: PRIMIDONE 50 MG Tablet] 180 tablet 0     Sig: TAKE 1 TABLET TWICE DAILY       LOV: 2/15/2021   RTC: 1 w

## 2021-02-23 NOTE — TELEPHONE ENCOUNTER
Pt's daughter Harshal Glover is requesting a refill for pt's vertigo, for Meclizine.      [address] Patrick Brown, [address] [facility]  [address] [facility] [address] [phone], [phone]   Larry S[id number]   Phone: [phone] Fax: [phone]

## 2021-02-24 ENCOUNTER — OFFICE VISIT (OUTPATIENT)
Dept: INTERNAL MEDICINE CLINIC | Facility: CLINIC | Age: 76
End: 2021-02-24
Payer: MEDICARE

## 2021-02-24 ENCOUNTER — TELEPHONE (OUTPATIENT)
Dept: INTERNAL MEDICINE CLINIC | Facility: CLINIC | Age: 76
End: 2021-02-24

## 2021-02-24 VITALS
BODY MASS INDEX: 28.64 KG/M2 | WEIGHT: 159.63 LBS | HEIGHT: 62.5 IN | OXYGEN SATURATION: 98 % | HEART RATE: 69 BPM | SYSTOLIC BLOOD PRESSURE: 112 MMHG | DIASTOLIC BLOOD PRESSURE: 50 MMHG | TEMPERATURE: 98 F

## 2021-02-24 DIAGNOSIS — H81.13 BENIGN PAROXYSMAL POSITIONAL VERTIGO DUE TO BILATERAL VESTIBULAR DISORDER: Primary | ICD-10-CM

## 2021-02-24 PROCEDURE — 3008F BODY MASS INDEX DOCD: CPT | Performed by: INTERNAL MEDICINE

## 2021-02-24 PROCEDURE — 99213 OFFICE O/P EST LOW 20 MIN: CPT | Performed by: INTERNAL MEDICINE

## 2021-02-24 PROCEDURE — 3078F DIAST BP <80 MM HG: CPT | Performed by: INTERNAL MEDICINE

## 2021-02-24 PROCEDURE — 3074F SYST BP LT 130 MM HG: CPT | Performed by: INTERNAL MEDICINE

## 2021-02-24 RX ORDER — DIAZEPAM 2 MG/1
2 TABLET ORAL 2 TIMES DAILY PRN
Qty: 10 TABLET | Refills: 0 | Status: SHIPPED | OUTPATIENT
Start: 2021-02-24 | End: 2021-08-03 | Stop reason: ALTCHOICE

## 2021-02-24 RX ORDER — MECLIZINE HYDROCHLORIDE 25 MG/1
25 TABLET ORAL 4 TIMES DAILY PRN
Qty: 30 TABLET | Refills: 0 | Status: SHIPPED | OUTPATIENT
Start: 2021-02-24 | End: 2021-08-03 | Stop reason: ALTCHOICE

## 2021-02-24 NOTE — TELEPHONE ENCOUNTER
Pts wife came in to the office checking on the status of her husbands refill request for Meclizine. Patient only has 2 pills left and needs it refilled by today.      [employer],  [address]   Phone: [phone]  Fax: [phone]

## 2021-02-24 NOTE — TELEPHONE ENCOUNTER
Future Appointments   Date Time Provider Jesu Taveras   2/24/2021  3:15 PM Aaron Balderrama MD [facility] [place]   5/3/2021  1:30 PM Rosio Hearn MD [address] [place] [address]

## 2021-02-24 NOTE — TELEPHONE ENCOUNTER
Patient scheduled an appointment with Dr. Falguni Jay on 03/02 at 5:00pm for vertigo. Patient would like to get in sooner. Please ask Dr. Falguni Jay if he would be willing to overbook to get patient in sooner.

## 2021-02-24 NOTE — PROGRESS NOTES
Mervat Salmon is a 67year old male. HPI:   Patient presents with:  Dizziness    Patient presents with complaint of recurrent vertigo. Going on for past two days.   He had similar symptoms in December - was evaluated at the emergency department at HCl ER 80 MG Oral Capsule SR 24 Hr, Take 1 capsule (80 mg total) by mouth daily. , Disp: 90 capsule, Rfl: 3  •  Pantoprazole Sodium 40 MG Oral Tab EC, Take one tablet (40 mg total) by mouth once daily, 30 minutes prior to breakfast., Disp: 90 tablet, Rfl: 3 father. Social:  reports that he quit smoking about 36 years ago. His smoking use included cigarettes. He has a 10.00 pack-year smoking history. He has never used smokeless tobacco. He reports that he does not drink alcohol or use drugs.   Wt Readings from Medicine)  Jaime Casiano MD (Internal Medicine)  Bernice Zepeda MD as Surgeon ([facility])  Shae Slater MD ([facility] [place])  Jesika Bennett MD ([facility])  Jaya Mukherjee MD as Consulting Physician (NEUROLOGY)  The patient indicate

## 2021-02-24 NOTE — PATIENT INSTRUCTIONS
- Start new medication, diazepam, for your vertigo. Take 1 tablet twice daily. This medication can make you sleepy, so do not drive after taking it.   - If diazepam does not help your symptoms, you can go back to the old medication (meclizine 4 times drea

## 2021-02-26 ENCOUNTER — TELEPHONE (OUTPATIENT)
Dept: PHYSICAL THERAPY | Facility: HOSPITAL | Age: 76
End: 2021-02-26

## 2021-03-01 ENCOUNTER — OFFICE VISIT (OUTPATIENT)
Dept: PHYSICAL THERAPY | Facility: HOSPITAL | Age: 76
End: 2021-03-01
Attending: INTERNAL MEDICINE
Payer: MEDICARE

## 2021-03-01 PROCEDURE — 95992 CANALITH REPOSITIONING PROC: CPT

## 2021-03-01 PROCEDURE — 97162 PT EVAL MOD COMPLEX 30 MIN: CPT

## 2021-03-01 NOTE — PROGRESS NOTES
VESTIBULAR EVALUATION:   Referring Physician: Dr. Nellie Lennon  Diagnosis: Benign paroxysmal positional vertigo due to bilateral vestibular disorder     Date of Service: 3/1/2021     PATIENT SUMMARY   Giuseppe Grewal is a 67year old male who presents to family, drives when he is feeling well and is a community ambulator limited only when feeling poorly due to dizziness  Giuseppe describes prior level of function as IND in household and community level ambulation without dizziness.    Pt goals include to resol bed, and bending over. Signs and symptoms are consistent with diagnosis of L horizontal canal BPPV.  Performed BBQ Roll L x 1 and upon reassessment, nystagmus appeared to be resolved, patient then denied dizziness, but did still report feeling of heavy hea issue with balance with normal movement in session, will assess in future as needed    Functional Mobility:   Gait: Generally unremarkable  Functional Gait Assessment (FGA): NT this date to treat apparent L horizontal canal BPPV, patient did not demonstrat over a 90 day period. Treatment will include: home exercise program development and instruction, patient/family education, balance training, canalith repositioning maneuver, eye/head coordination exercises and sensory organization training.      Education o

## 2021-03-09 ENCOUNTER — TELEPHONE (OUTPATIENT)
Dept: PHYSICAL THERAPY | Facility: HOSPITAL | Age: 76
End: 2021-03-09

## 2021-03-09 NOTE — TELEPHONE ENCOUNTER
Called to f/u with patient at [facility] to PT today. He was confused and thought that his appointment was moved to Thursday from today. Will come on Thursday. Verified for 2pm on Thurs.

## 2021-03-11 ENCOUNTER — OFFICE VISIT (OUTPATIENT)
Dept: PHYSICAL THERAPY | Facility: HOSPITAL | Age: 76
End: 2021-03-11
Attending: INTERNAL MEDICINE
Payer: MEDICARE

## 2021-03-11 PROCEDURE — 97112 NEUROMUSCULAR REEDUCATION: CPT

## 2021-03-11 PROCEDURE — 95992 CANALITH REPOSITIONING PROC: CPT

## 2021-03-11 NOTE — PROGRESS NOTES
Dx: Benign paroxysmal positional vertigo due to bilateral vestibular [employer] (Authorized # of Visits):  4 approved until 4/12           Authorizing Physician: Dr. Tracy Cunningham  Next MD visit: none scheduled  Fall Risk: standard         Pre TX#: 5/ Date:    Tx#: 6/   CRM  - BBQ Roll L x 2  - Semont L x 1       NR  - discussion/education clinical findings, POC, and HEP  - performance of HEP as listed below with cueing as needed       -       -       HEP: no sleeping on L side for next 1

## 2021-03-15 ENCOUNTER — TELEPHONE (OUTPATIENT)
Dept: PHYSICAL THERAPY | Facility: HOSPITAL | Age: 76
End: 2021-03-15

## 2021-03-15 ENCOUNTER — APPOINTMENT (OUTPATIENT)
Dept: PHYSICAL THERAPY | Facility: HOSPITAL | Age: 76
End: 2021-03-15
Attending: INTERNAL MEDICINE
Payer: MEDICARE

## 2021-03-16 NOTE — TELEPHONE ENCOUNTER
Called to f/u with patient to ensure that he had meant to cancel his PT appt via [address] He confirms that yes, he did cancel and will be present for next session on Monday 3/22.

## 2021-03-17 ENCOUNTER — APPOINTMENT (OUTPATIENT)
Dept: PHYSICAL THERAPY | Facility: HOSPITAL | Age: 76
End: 2021-03-17
Attending: INTERNAL MEDICINE
Payer: MEDICARE

## 2021-03-22 ENCOUNTER — APPOINTMENT (OUTPATIENT)
Dept: PHYSICAL THERAPY | Facility: HOSPITAL | Age: 76
End: 2021-03-22
Attending: INTERNAL MEDICINE
Payer: MEDICARE

## 2021-03-22 PROCEDURE — 97112 NEUROMUSCULAR REEDUCATION: CPT

## 2021-03-22 PROCEDURE — 95992 CANALITH REPOSITIONING PROC: CPT

## 2021-03-22 NOTE — PROGRESS NOTES
Discharge Summary  Pt has attended 3 visits in Physical Therapy.      Dx: Benign paroxysmal positional vertigo due to bilateral vestibular [employer] (Authorized # of Visits):  4 approved until 4/12           Authorizing Physician: Dr. Basil Carlos to D/C. Will proceed per plan as listed below. Goals: (To be met in 6 visits)  1. Patient will report 0/10 dizziness with normal daily activity x at least 1 week. - MET   2. Patient will demonstrate ability to perform supine<>sit without dizziness. -

## 2021-03-24 ENCOUNTER — APPOINTMENT (OUTPATIENT)
Dept: PHYSICAL THERAPY | Facility: HOSPITAL | Age: 76
End: 2021-03-24
Attending: INTERNAL MEDICINE
Payer: MEDICARE

## 2021-03-29 ENCOUNTER — APPOINTMENT (OUTPATIENT)
Dept: PHYSICAL THERAPY | Facility: HOSPITAL | Age: 76
End: 2021-03-29
Attending: INTERNAL MEDICINE
Payer: MEDICARE

## 2021-03-31 ENCOUNTER — APPOINTMENT (OUTPATIENT)
Dept: PHYSICAL THERAPY | Facility: HOSPITAL | Age: 76
End: 2021-03-31
Attending: INTERNAL MEDICINE
Payer: MEDICARE

## 2021-04-06 ENCOUNTER — APPOINTMENT (OUTPATIENT)
Dept: PHYSICAL THERAPY | Facility: HOSPITAL | Age: 76
End: 2021-04-06
Attending: INTERNAL MEDICINE
Payer: MEDICARE

## 2021-04-08 ENCOUNTER — APPOINTMENT (OUTPATIENT)
Dept: PHYSICAL THERAPY | Facility: HOSPITAL | Age: 76
End: 2021-04-08
Attending: INTERNAL MEDICINE
Payer: MEDICARE

## 2021-04-12 ENCOUNTER — APPOINTMENT (OUTPATIENT)
Dept: PHYSICAL THERAPY | Facility: HOSPITAL | Age: 76
End: 2021-04-12
Attending: INTERNAL MEDICINE
Payer: MEDICARE

## 2021-05-04 RX ORDER — PRIMIDONE 50 MG/1
TABLET ORAL
Qty: 180 TABLET | Refills: 0 | Status: SHIPPED | OUTPATIENT
Start: 2021-05-04 | End: 2021-08-13

## 2021-05-04 NOTE — TELEPHONE ENCOUNTER
Medication(s) to Refill:   Requested Prescriptions     Pending Prescriptions Disp Refills   • PRIMIDONE 50 MG Oral Tab [Pharmacy Med Name: PRIMIDONE 50 MG Tablet] 180 tablet 0     Sig: TAKE 1 TABLET TWICE DAILY       LOV: 2-    RTC:   3-6 months

## 2021-08-03 ENCOUNTER — OFFICE VISIT (OUTPATIENT)
Dept: INTERNAL MEDICINE CLINIC | Facility: CLINIC | Age: 76
End: 2021-08-03
Payer: MEDICARE

## 2021-08-03 VITALS
SYSTOLIC BLOOD PRESSURE: 128 MMHG | HEIGHT: 62.5 IN | HEART RATE: 70 BPM | RESPIRATION RATE: 16 BRPM | BODY MASS INDEX: 29.96 KG/M2 | DIASTOLIC BLOOD PRESSURE: 70 MMHG | TEMPERATURE: 98 F | OXYGEN SATURATION: 99 % | WEIGHT: 167 LBS

## 2021-08-03 DIAGNOSIS — I10 ESSENTIAL HYPERTENSION: Primary | ICD-10-CM

## 2021-08-03 DIAGNOSIS — E11.65 TYPE 2 DIABETES MELLITUS WITH HYPERGLYCEMIA, WITHOUT LONG-TERM CURRENT USE OF INSULIN (HCC): ICD-10-CM

## 2021-08-03 DIAGNOSIS — E05.90 HYPERTHYROIDISM: ICD-10-CM

## 2021-08-03 PROCEDURE — 3078F DIAST BP <80 MM HG: CPT | Performed by: INTERNAL MEDICINE

## 2021-08-03 PROCEDURE — 3074F SYST BP LT 130 MM HG: CPT | Performed by: INTERNAL MEDICINE

## 2021-08-03 PROCEDURE — 99213 OFFICE O/P EST LOW 20 MIN: CPT | Performed by: INTERNAL MEDICINE

## 2021-08-03 PROCEDURE — 3008F BODY MASS INDEX DOCD: CPT | Performed by: INTERNAL MEDICINE

## 2021-08-03 RX ORDER — ENALAPRIL MALEATE 20 MG/1
20 TABLET ORAL DAILY
COMMUNITY
End: 2021-08-13

## 2021-08-03 NOTE — PROGRESS NOTES
Giuseppe Jeffrey  1945 is a 67year old male. Patient presents with: Follow - Up       HPI:   Follow-up.  Patient never followed up with the endocrinologist.  Current Outpatient Medications   Medication Sig Dispense Refill   • Enalapril [place] hemorrhoids 3/21/2019   • Shortness of breath    • Sleep apnea     no cpap   • Sleep disturbance    • Sputum production    • Visual impairment     reading glasses   • Vocal cord paralysis 11/10/2016   • Wheezing       Social History:  Social History    Tob ([facility]  -     HEMOGLOBIN A1C; Future  -     LIPID PANEL; Future        Patient Instructions   Recommendations once blood work is available       The patient indicates understanding of these issues and agrees to the plan.   The patient is asked to Return in a

## 2021-08-05 ENCOUNTER — LAB ENCOUNTER (OUTPATIENT)
Dept: LAB | Age: 76
End: 2021-08-05
Attending: INTERNAL MEDICINE
Payer: MEDICARE

## 2021-08-05 DIAGNOSIS — I10 ESSENTIAL HYPERTENSION: ICD-10-CM

## 2021-08-05 DIAGNOSIS — E05.90 HYPERTHYROIDISM: ICD-10-CM

## 2021-08-05 DIAGNOSIS — E11.65 TYPE 2 DIABETES MELLITUS WITH HYPERGLYCEMIA, WITHOUT LONG-TERM CURRENT USE OF INSULIN (HCC): ICD-10-CM

## 2021-08-05 LAB
ALBUMIN SERPL-MCNC: 3.6 G/DL (ref 3.4–5)
ALBUMIN/GLOB SERPL: 0.9 {RATIO} (ref 1–2)
ALP LIVER SERPL-CCNC: 76 U/L
ALT SERPL-CCNC: 22 U/L
ANION GAP SERPL CALC-SCNC: 3 MMOL/L (ref 0–18)
AST SERPL-CCNC: 20 U/L (ref 15–37)
BASOPHILS # BLD AUTO: 0.03 X10(3) UL (ref 0–0.2)
BASOPHILS NFR BLD AUTO: 0.5 %
BILIRUB SERPL-MCNC: 0.7 MG/DL (ref 0.1–2)
BUN BLD-MCNC: 13 MG/DL (ref 7–18)
CALCIUM BLD-MCNC: 8.8 MG/DL (ref 8.5–10.1)
CHLORIDE SERPL-SCNC: 104 MMOL/L (ref 98–112)
CHOLEST SMN-MCNC: 110 MG/DL (ref ?–200)
CO2 SERPL-SCNC: 29 MMOL/L (ref 21–32)
CREAT BLD-MCNC: 1.4 MG/DL
EOSINOPHIL # BLD AUTO: 0.28 X10(3) UL (ref 0–0.7)
EOSINOPHIL NFR BLD AUTO: 4.4 %
ERYTHROCYTE [DISTWIDTH] IN BLOOD BY AUTOMATED COUNT: 14.5 %
EST. AVERAGE GLUCOSE BLD GHB EST-MCNC: 220 MG/DL (ref 68–126)
GLOBULIN PLAS-MCNC: 4.1 G/DL (ref 2.8–4.4)
GLUCOSE BLD-MCNC: 149 MG/DL (ref 70–99)
HBA1C MFR BLD HPLC: 9.3 % (ref ?–5.7)
HCT VFR BLD AUTO: 41.4 %
HDLC SERPL-MCNC: 41 MG/DL (ref 40–59)
HGB BLD-MCNC: 13.5 G/DL
IMM GRANULOCYTES # BLD AUTO: 0.02 X10(3) UL (ref 0–1)
IMM GRANULOCYTES NFR BLD: 0.3 %
LDLC SERPL CALC-MCNC: 48 MG/DL (ref ?–100)
LYMPHOCYTES # BLD AUTO: 2.7 X10(3) UL (ref 1–4)
LYMPHOCYTES NFR BLD AUTO: 42.3 %
M PROTEIN MFR SERPL ELPH: 7.7 G/DL (ref 6.4–8.2)
MCH RBC QN AUTO: 29.7 PG (ref 26–34)
MCHC RBC AUTO-ENTMCNC: 32.6 G/DL (ref 31–37)
MCV RBC AUTO: 91.2 FL
MONOCYTES # BLD AUTO: 0.55 X10(3) UL (ref 0.1–1)
MONOCYTES NFR BLD AUTO: 8.6 %
NEUTROPHILS # BLD AUTO: 2.8 X10 (3) UL (ref 1.5–7.7)
NEUTROPHILS # BLD AUTO: 2.8 X10(3) UL (ref 1.5–7.7)
NEUTROPHILS NFR BLD AUTO: 43.9 %
NONHDLC SERPL-MCNC: 69 MG/DL (ref ?–130)
OSMOLALITY SERPL CALC.SUM OF ELEC: 285 MOSM/KG (ref 275–295)
PLATELET # BLD AUTO: 201 10(3)UL (ref 150–450)
POTASSIUM SERPL-SCNC: 4.6 MMOL/L (ref 3.5–5.1)
RBC # BLD AUTO: 4.54 X10(6)UL
SODIUM SERPL-SCNC: 136 MMOL/L (ref 136–145)
T4 SERPL-MCNC: 4.7 UG/DL
TRIGL SERPL-MCNC: 113 MG/DL (ref 30–149)
TSI SER-ACNC: 50.5 MIU/ML (ref 0.36–3.74)
VLDLC SERPL CALC-MCNC: 16 MG/DL (ref 0–30)
WBC # BLD AUTO: 6.4 X10(3) UL (ref 4–11)

## 2021-08-05 PROCEDURE — 84443 ASSAY THYROID STIM HORMONE: CPT

## 2021-08-05 PROCEDURE — 83036 HEMOGLOBIN GLYCOSYLATED A1C: CPT

## 2021-08-05 PROCEDURE — 85025 COMPLETE CBC W/AUTO DIFF WBC: CPT

## 2021-08-05 PROCEDURE — 80053 COMPREHEN METABOLIC PANEL: CPT

## 2021-08-05 PROCEDURE — 84436 ASSAY OF TOTAL THYROXINE: CPT

## 2021-08-05 PROCEDURE — 80061 LIPID PANEL: CPT

## 2021-08-05 PROCEDURE — 36415 COLL VENOUS BLD VENIPUNCTURE: CPT

## 2021-08-11 ENCOUNTER — TELEPHONE (OUTPATIENT)
Dept: INTERNAL MEDICINE CLINIC | Facility: CLINIC | Age: 76
End: 2021-08-11

## 2021-08-13 ENCOUNTER — OFFICE VISIT (OUTPATIENT)
Dept: INTERNAL MEDICINE CLINIC | Facility: CLINIC | Age: 76
End: 2021-08-13
Payer: MEDICARE

## 2021-08-13 VITALS
TEMPERATURE: 99 F | BODY MASS INDEX: 30 KG/M2 | SYSTOLIC BLOOD PRESSURE: 134 MMHG | RESPIRATION RATE: 16 BRPM | HEART RATE: 80 BPM | DIASTOLIC BLOOD PRESSURE: 72 MMHG | WEIGHT: 169 LBS | OXYGEN SATURATION: 98 %

## 2021-08-13 DIAGNOSIS — E11.65 TYPE 2 DIABETES MELLITUS WITH HYPERGLYCEMIA, WITHOUT LONG-TERM CURRENT USE OF INSULIN (HCC): Primary | ICD-10-CM

## 2021-08-13 DIAGNOSIS — I10 ESSENTIAL HYPERTENSION: Chronic | ICD-10-CM

## 2021-08-13 DIAGNOSIS — E05.90 HYPERTHYROIDISM: ICD-10-CM

## 2021-08-13 PROCEDURE — 3075F SYST BP GE 130 - 139MM HG: CPT | Performed by: INTERNAL MEDICINE

## 2021-08-13 PROCEDURE — 99213 OFFICE O/P EST LOW 20 MIN: CPT | Performed by: INTERNAL MEDICINE

## 2021-08-13 PROCEDURE — 3078F DIAST BP <80 MM HG: CPT | Performed by: INTERNAL MEDICINE

## 2021-08-13 RX ORDER — ENALAPRIL MALEATE 20 MG/1
20 TABLET ORAL DAILY
Qty: 90 TABLET | Refills: 3 | Status: SHIPPED | OUTPATIENT
Start: 2021-08-13 | End: 2021-11-11

## 2021-08-13 RX ORDER — GLIPIZIDE 2.5 MG/1
2.5 TABLET, EXTENDED RELEASE ORAL
Qty: 90 TABLET | Refills: 3 | Status: SHIPPED | OUTPATIENT
Start: 2021-08-13 | End: 2022-08-13

## 2021-08-13 RX ORDER — PROPRANOLOL HYDROCHLORIDE 80 MG/1
80 CAPSULE, EXTENDED RELEASE ORAL DAILY
Qty: 90 CAPSULE | Refills: 3 | Status: SHIPPED | OUTPATIENT
Start: 2021-08-13 | End: 2021-09-16

## 2021-08-13 RX ORDER — PRIMIDONE 50 MG/1
50 TABLET ORAL 2 TIMES DAILY
Qty: 180 TABLET | Refills: 0 | Status: SHIPPED | OUTPATIENT
Start: 2021-08-13 | End: 2021-11-12

## 2021-08-13 RX ORDER — ATORVASTATIN CALCIUM 10 MG/1
10 TABLET, FILM COATED ORAL NIGHTLY
Qty: 90 TABLET | Refills: 3 | Status: SHIPPED | OUTPATIENT
Start: 2021-08-13 | End: 2022-08-13

## 2021-09-15 DIAGNOSIS — I10 ESSENTIAL HYPERTENSION: Chronic | ICD-10-CM

## 2021-09-15 NOTE — TELEPHONE ENCOUNTER
Pt walked into the office and requested for Dr Saturnino Muñoz to refill an rx given from Dr Belkys Peralta.      Please advise     Pantoprazole Sodium 40 MG Oral Tab [id number] - [place], [place] - [address] [phone], [phone]

## 2021-09-16 RX ORDER — PROPRANOLOL HYDROCHLORIDE 80 MG/1
80 CAPSULE, EXTENDED RELEASE ORAL DAILY
Qty: 90 CAPSULE | Refills: 3 | Status: SHIPPED | OUTPATIENT
Start: 2021-09-16 | End: 2022-06-13

## 2021-09-16 NOTE — TELEPHONE ENCOUNTER
Protocol passed      Requesting: propanolol 80mg     LOV:8/13/21   RTC: 6 months   Filled: 8/13/21 #90 3 refills   Recent Labs: 8/5/21     Upcoming OV: none scheduled

## 2021-09-27 ENCOUNTER — LAB ENCOUNTER (OUTPATIENT)
Dept: LAB | Age: 76
End: 2021-09-27
Attending: INTERNAL MEDICINE
Payer: MEDICARE

## 2021-09-27 DIAGNOSIS — E11.65 TYPE 2 DIABETES MELLITUS WITH HYPERGLYCEMIA, WITHOUT LONG-TERM CURRENT USE OF INSULIN (HCC): ICD-10-CM

## 2021-09-27 DIAGNOSIS — E05.90 HYPERTHYROIDISM: ICD-10-CM

## 2021-09-27 LAB
ANION GAP SERPL CALC-SCNC: 6 MMOL/L (ref 0–18)
BUN BLD-MCNC: 15 MG/DL (ref 7–18)
CALCIUM BLD-MCNC: 9.8 MG/DL (ref 8.5–10.1)
CHLORIDE SERPL-SCNC: 106 MMOL/L (ref 98–112)
CO2 SERPL-SCNC: 26 MMOL/L (ref 21–32)
CREAT BLD-MCNC: 1.04 MG/DL
GLUCOSE BLD-MCNC: 216 MG/DL (ref 70–99)
OSMOLALITY SERPL CALC.SUM OF ELEC: 293 MOSM/KG (ref 275–295)
POTASSIUM SERPL-SCNC: 4.8 MMOL/L (ref 3.5–5.1)
SODIUM SERPL-SCNC: 138 MMOL/L (ref 136–145)
T4 FREE SERPL-MCNC: 1.6 NG/DL (ref 0.8–1.7)
TSI SER-ACNC: 0.01 MIU/ML (ref 0.36–3.74)

## 2021-09-27 PROCEDURE — 36415 COLL VENOUS BLD VENIPUNCTURE: CPT

## 2021-09-27 PROCEDURE — 80048 BASIC METABOLIC PNL TOTAL CA: CPT

## 2021-09-27 PROCEDURE — 84439 ASSAY OF FREE THYROXINE: CPT

## 2021-09-27 PROCEDURE — 84443 ASSAY THYROID STIM HORMONE: CPT

## 2021-10-01 ENCOUNTER — TELEPHONE (OUTPATIENT)
Dept: INTERNAL MEDICINE CLINIC | Facility: CLINIC | Age: 76
End: 2021-10-01

## 2021-10-01 DIAGNOSIS — E05.90 HYPERTHYROIDISM: Primary | ICD-10-CM

## 2021-10-01 NOTE — TELEPHONE ENCOUNTER
----- Message from Thad Beaver MD sent at 9/28/2021 12:38 PM CDT -----  Reviewed results   Thyroid numbers are within acceptable limits. Would like to repeat free T4 and TSH in 3 months. BMP is unremarkable except for sugar being high.

## 2021-10-18 ENCOUNTER — TELEMEDICINE (OUTPATIENT)
Dept: INTERNAL MEDICINE CLINIC | Facility: CLINIC | Age: 76
End: 2021-10-18
Payer: MEDICARE

## 2021-10-18 DIAGNOSIS — R05.9 COUGH: Primary | ICD-10-CM

## 2021-10-18 PROCEDURE — 99213 OFFICE O/P EST LOW 20 MIN: CPT | Performed by: INTERNAL MEDICINE

## 2021-10-18 RX ORDER — CODEINE PHOSPHATE AND GUAIFENESIN 10; 100 MG/5ML; MG/5ML
5 SOLUTION ORAL EVERY 6 HOURS PRN
Qty: 240 ML | Refills: 0 | Status: SHIPPED | OUTPATIENT
Start: 2021-10-18 | End: 2021-10-28

## 2021-10-18 RX ORDER — AMOXICILLIN AND CLAVULANATE POTASSIUM 500; 125 MG/1; MG/1
1 TABLET, FILM COATED ORAL 2 TIMES DAILY
Qty: 20 TABLET | Refills: 0 | Status: SHIPPED | OUTPATIENT
Start: 2021-10-18 | End: 2021-10-28

## 2021-10-18 NOTE — PROGRESS NOTES
Virtual Telephone Check-In    Tung Martin verbally consents to a Virtual/Telephone Check-In visit on 10/18/21. Patient has been referred to the [facility] website at [email or web address] to review the yearly Consent to Treat document.     Patient und

## 2021-10-20 ENCOUNTER — HOSPITAL ENCOUNTER (OUTPATIENT)
Dept: GENERAL RADIOLOGY | Age: 76
Discharge: HOME | End: 2021-10-20
Attending: INTERNAL MEDICINE
Payer: MEDICARE

## 2021-10-20 DIAGNOSIS — R05.9 COUGH: ICD-10-CM

## 2021-10-20 PROCEDURE — 71046 X-RAY EXAM CHEST 2 VIEWS: CPT | Performed by: INTERNAL MEDICINE

## 2021-11-12 RX ORDER — PRIMIDONE 50 MG/1
TABLET ORAL
Qty: 180 TABLET | Refills: 0 | Status: SHIPPED | OUTPATIENT
Start: 2021-11-12

## 2021-11-12 NOTE — TELEPHONE ENCOUNTER
No Protocol     Requesting: primidone 50mg     LOV: 8/13/21   RTC: 6 months   Filled: 8/13/21   Recent Labs: 9/27/21     Upcoming OV: none scheduled

## 2021-11-15 ENCOUNTER — TELEPHONE (OUTPATIENT)
Dept: INTERNAL MEDICINE CLINIC | Facility: CLINIC | Age: 76
End: 2021-11-15

## 2021-11-15 NOTE — TELEPHONE ENCOUNTER
Please call patient to schedule OV or VV for form completion for [address] Form.     Form placed in  in basket

## 2021-11-15 NOTE — TELEPHONE ENCOUNTER
Future Appointments   Date Time Provider Jesu Taveras   11/22/2021  9:30 AM Diana Kumar MD [facility] [place]

## 2021-11-22 ENCOUNTER — OFFICE VISIT (OUTPATIENT)
Dept: INTERNAL MEDICINE CLINIC | Facility: CLINIC | Age: 76
End: 2021-11-22
Payer: MEDICARE

## 2021-11-22 VITALS
BODY MASS INDEX: 28 KG/M2 | HEART RATE: 64 BPM | RESPIRATION RATE: 18 BRPM | DIASTOLIC BLOOD PRESSURE: 72 MMHG | SYSTOLIC BLOOD PRESSURE: 144 MMHG | WEIGHT: 156 LBS | TEMPERATURE: 99 F | OXYGEN SATURATION: 99 %

## 2021-11-22 DIAGNOSIS — E11.65 TYPE 2 DIABETES MELLITUS WITH HYPERGLYCEMIA, WITHOUT LONG-TERM CURRENT USE OF INSULIN (HCC): Primary | ICD-10-CM

## 2021-11-22 DIAGNOSIS — E05.90 HYPERTHYROIDISM: ICD-10-CM

## 2021-11-22 PROCEDURE — 3077F SYST BP >= 140 MM HG: CPT | Performed by: INTERNAL MEDICINE

## 2021-11-22 PROCEDURE — 99213 OFFICE O/P EST LOW 20 MIN: CPT | Performed by: INTERNAL MEDICINE

## 2021-11-22 PROCEDURE — 3078F DIAST BP <80 MM HG: CPT | Performed by: INTERNAL MEDICINE

## 2021-11-22 RX ORDER — LANCETS
1 EACH MISCELLANEOUS DAILY
Qty: 300 EACH | Refills: 1 | Status: SHIPPED | OUTPATIENT
Start: 2021-11-22 | End: 2022-11-22

## 2021-11-22 RX ORDER — PANTOPRAZOLE SODIUM 40 MG/1
TABLET, DELAYED RELEASE ORAL
Qty: 90 TABLET | Refills: 3 | Status: SHIPPED | OUTPATIENT
Start: 2021-11-22 | End: 2021-11-30

## 2021-11-22 RX ORDER — BLOOD-GLUCOSE METER
1 EACH MISCELLANEOUS DAILY
Qty: 1 KIT | Refills: 1 | Status: SHIPPED | OUTPATIENT
Start: 2021-11-22 | End: 2022-11-22

## 2021-11-22 RX ORDER — BLOOD SUGAR DIAGNOSTIC
STRIP MISCELLANEOUS
Qty: 300 STRIP | Refills: 1 | Status: SHIPPED | OUTPATIENT
Start: 2021-11-22 | End: 2022-11-22

## 2021-11-22 NOTE — PROGRESS NOTES
Giuseppe Dee  1945 is a 67year old male. Patient presents with:  Forms Completion       HPI:   For diabetic shoes. Patient confesses that there was a solicitation for him to get his shoes the patient has no symptoms.   Patient did not go unspecified whether generalized or localized, unspecified site    • Pain in joints    • Painful swallowing    • Personal history of colonic polyps 3/21/2019   • PONV (postoperative nausea and vomiting)    • Prediabetes     borderline   • Problems with swal diabetes mellitus with hyperglycemia, without long-term current use of insulin (HCC)  -     HEMOGLOBIN A1C; Future  -     pantoprazole 40 MG Oral Tab EC; TAKE ONE TABLET ONCE DAILY 30 MINUTES PRIOR TO BREAKFAST.   -     Blood Glucose Monitoring Suppl (ACCU-

## 2021-11-29 ENCOUNTER — LAB ENCOUNTER (OUTPATIENT)
Dept: LAB | Age: 76
End: 2021-11-29
Attending: INTERNAL MEDICINE
Payer: MEDICARE

## 2021-11-29 DIAGNOSIS — E05.90 HYPERTHYROIDISM: ICD-10-CM

## 2021-11-29 DIAGNOSIS — E11.65 TYPE 2 DIABETES MELLITUS WITH HYPERGLYCEMIA, WITHOUT LONG-TERM CURRENT USE OF INSULIN (HCC): ICD-10-CM

## 2021-11-29 PROCEDURE — 84443 ASSAY THYROID STIM HORMONE: CPT

## 2021-11-29 PROCEDURE — 84439 ASSAY OF FREE THYROXINE: CPT

## 2021-11-29 PROCEDURE — 36415 COLL VENOUS BLD VENIPUNCTURE: CPT

## 2021-11-29 PROCEDURE — 83036 HEMOGLOBIN GLYCOSYLATED A1C: CPT

## 2021-11-30 ENCOUNTER — TELEPHONE (OUTPATIENT)
Dept: INTERNAL MEDICINE CLINIC | Facility: CLINIC | Age: 76
End: 2021-11-30

## 2021-11-30 DIAGNOSIS — E05.90 HYPERTHYROIDISM: Primary | ICD-10-CM

## 2021-11-30 DIAGNOSIS — E11.65 TYPE 2 DIABETES MELLITUS WITH HYPERGLYCEMIA, WITHOUT LONG-TERM CURRENT USE OF INSULIN (HCC): ICD-10-CM

## 2021-11-30 DIAGNOSIS — I10 ESSENTIAL HYPERTENSION: Chronic | ICD-10-CM

## 2021-11-30 RX ORDER — METHIMAZOLE 5 MG/1
5 TABLET ORAL 2 TIMES DAILY
Qty: 90 TABLET | Refills: 0 | Status: SHIPPED | OUTPATIENT
Start: 2021-11-30

## 2021-11-30 RX ORDER — PANTOPRAZOLE SODIUM 40 MG/1
TABLET, DELAYED RELEASE ORAL
Qty: 90 TABLET | Refills: 3 | Status: SHIPPED | OUTPATIENT
Start: 2021-11-30 | End: 2022-03-10

## 2021-11-30 NOTE — TELEPHONE ENCOUNTER
----- Message from Kristel Moran MD sent at 11/30/2021 10:54 AM CST -----  Reviewed results patient sugar numbers are high at 7.4 better than earlier 9.3. If the patient is agreeable to make changes he can see me for a follow-up.    As Far as his thyroid

## 2021-12-08 ENCOUNTER — TELEPHONE (OUTPATIENT)
Dept: INTERNAL MEDICINE CLINIC | Facility: CLINIC | Age: 76
End: 2021-12-08

## 2021-12-08 NOTE — TELEPHONE ENCOUNTER
Incoming prescription for therapeutic footwear. Prescription request form placed in  in basket for completion.

## 2021-12-16 ENCOUNTER — OFFICE VISIT (OUTPATIENT)
Dept: INTERNAL MEDICINE CLINIC | Facility: CLINIC | Age: 76
End: 2021-12-16
Payer: MEDICARE

## 2021-12-16 VITALS
RESPIRATION RATE: 16 BRPM | DIASTOLIC BLOOD PRESSURE: 56 MMHG | SYSTOLIC BLOOD PRESSURE: 110 MMHG | BODY MASS INDEX: 27.67 KG/M2 | TEMPERATURE: 98 F | OXYGEN SATURATION: 99 % | WEIGHT: 154.19 LBS | HEIGHT: 62.5 IN | HEART RATE: 68 BPM

## 2021-12-16 DIAGNOSIS — E11.65 TYPE 2 DIABETES MELLITUS WITH HYPERGLYCEMIA, WITHOUT LONG-TERM CURRENT USE OF INSULIN (HCC): Primary | ICD-10-CM

## 2021-12-16 DIAGNOSIS — Z78.9 H/O FOREIGN TRAVEL: ICD-10-CM

## 2021-12-16 PROCEDURE — 3074F SYST BP LT 130 MM HG: CPT | Performed by: INTERNAL MEDICINE

## 2021-12-16 PROCEDURE — 99214 OFFICE O/P EST MOD 30 MIN: CPT | Performed by: INTERNAL MEDICINE

## 2021-12-16 PROCEDURE — 3078F DIAST BP <80 MM HG: CPT | Performed by: INTERNAL MEDICINE

## 2021-12-16 PROCEDURE — 3008F BODY MASS INDEX DOCD: CPT | Performed by: INTERNAL MEDICINE

## 2021-12-16 RX ORDER — METFORMIN HYDROCHLORIDE 750 MG/1
750 TABLET, EXTENDED RELEASE ORAL DAILY
Qty: 90 TABLET | Refills: 1 | Status: SHIPPED | OUTPATIENT
Start: 2021-12-16 | End: 2022-03-16

## 2021-12-16 RX ORDER — METFORMIN HYDROCHLORIDE 750 MG/1
750 TABLET, EXTENDED RELEASE ORAL DAILY
Qty: 30 TABLET | Refills: 0 | Status: SHIPPED | OUTPATIENT
Start: 2021-12-16 | End: 2021-12-16

## 2021-12-16 NOTE — PROGRESS NOTES
Giuseppe Nunez  1945 is a 67year old male. Patient presents with:  Lab Results       HPI:   For lab results. Does confess to no dietary teaching about diabetes. Eats empirically and as much as he wants.   Patient attempted to reach the en prostatic hypertrophy with urinary retention 7/21/2015   • Chronic cough    • Chronic sinusitis 11/13/2017   • Esophageal reflux    • Essential and other specified forms of tremor 7/18/2016   • Gall stones 5/26/2015   • High blood pressure    • Hoarseness, midline. Pharynx: normal.   Sinuses: non-tender. HEART:   Clicks: no.   Distal Pulses Palpable: yes. Edema: none visible . Gallop: no .   Heart sounds: normal S1S2. Murmurs: none. Rhythm: regular. LUNGS:   Airflow: normal air movement.    [facility]

## 2021-12-16 NOTE — PATIENT INSTRUCTIONS
Ck sugar # pre and post meals ,compliance with diet/exercise enforce.  Weight counseling discussed       Spacing of meals -varying exercises discussed with patient   Reasoning of checking sugars pre and 2 hours  PP discussed with pt     Diabetic foot care i

## 2021-12-29 ENCOUNTER — LAB ENCOUNTER (OUTPATIENT)
Dept: LAB | Age: 76
End: 2021-12-29
Attending: INTERNAL MEDICINE
Payer: MEDICARE

## 2021-12-29 DIAGNOSIS — Z78.9 H/O FOREIGN TRAVEL: ICD-10-CM

## 2021-12-31 LAB — SARS-COV-2 RNA RESP QL NAA+PROBE: NOT DETECTED

## 2022-03-03 RX ORDER — METFORMIN HYDROCHLORIDE 750 MG/1
TABLET, EXTENDED RELEASE ORAL
Qty: 30 TABLET | Refills: 0 | Status: SHIPPED | OUTPATIENT
Start: 2022-03-03

## 2022-03-03 RX ORDER — METHIMAZOLE 5 MG/1
TABLET ORAL
Qty: 270 TABLET | Refills: 0 | Status: SHIPPED | OUTPATIENT
Start: 2022-03-03 | End: 2022-03-31

## 2022-03-03 RX ORDER — BLOOD-GLUCOSE METER
EACH MISCELLANEOUS
Qty: 1 KIT | Refills: 1 | Status: SHIPPED | OUTPATIENT
Start: 2022-03-03

## 2022-03-03 RX ORDER — PRIMIDONE 50 MG/1
TABLET ORAL
Qty: 180 TABLET | Refills: 0 | Status: SHIPPED | OUTPATIENT
Start: 2022-03-03 | End: 2022-03-10

## 2022-03-10 ENCOUNTER — OFFICE VISIT (OUTPATIENT)
Dept: INTERNAL MEDICINE CLINIC | Facility: CLINIC | Age: 77
End: 2022-03-10
Payer: MEDICARE

## 2022-03-10 VITALS
OXYGEN SATURATION: 99 % | SYSTOLIC BLOOD PRESSURE: 130 MMHG | BODY MASS INDEX: 28.06 KG/M2 | WEIGHT: 156.38 LBS | DIASTOLIC BLOOD PRESSURE: 74 MMHG | RESPIRATION RATE: 16 BRPM | HEART RATE: 63 BPM | TEMPERATURE: 98 F | HEIGHT: 62.5 IN

## 2022-03-10 DIAGNOSIS — Z00.00 LABORATORY EXAMINATION ORDERED AS PART OF A ROUTINE GENERAL MEDICAL EXAMINATION: ICD-10-CM

## 2022-03-10 DIAGNOSIS — E05.90 HYPERTHYROIDISM: ICD-10-CM

## 2022-03-10 DIAGNOSIS — I10 ESSENTIAL HYPERTENSION: Chronic | ICD-10-CM

## 2022-03-10 DIAGNOSIS — E11.65 TYPE 2 DIABETES MELLITUS WITH HYPERGLYCEMIA, WITHOUT LONG-TERM CURRENT USE OF INSULIN (HCC): ICD-10-CM

## 2022-03-10 DIAGNOSIS — M17.0 PRIMARY OSTEOARTHRITIS OF BOTH KNEES: Primary | ICD-10-CM

## 2022-03-10 PROCEDURE — 3008F BODY MASS INDEX DOCD: CPT | Performed by: INTERNAL MEDICINE

## 2022-03-10 PROCEDURE — 3075F SYST BP GE 130 - 139MM HG: CPT | Performed by: INTERNAL MEDICINE

## 2022-03-10 PROCEDURE — 3078F DIAST BP <80 MM HG: CPT | Performed by: INTERNAL MEDICINE

## 2022-03-10 PROCEDURE — 99213 OFFICE O/P EST LOW 20 MIN: CPT | Performed by: INTERNAL MEDICINE

## 2022-03-10 RX ORDER — ATORVASTATIN CALCIUM 10 MG/1
10 TABLET, FILM COATED ORAL NIGHTLY
Qty: 90 TABLET | Refills: 3 | Status: SHIPPED | OUTPATIENT
Start: 2022-03-10 | End: 2023-03-10

## 2022-03-10 RX ORDER — GLIPIZIDE 2.5 MG/1
2.5 TABLET, EXTENDED RELEASE ORAL
Qty: 90 TABLET | Refills: 3 | Status: SHIPPED | OUTPATIENT
Start: 2022-03-10 | End: 2023-03-10

## 2022-03-10 RX ORDER — MELOXICAM 7.5 MG/1
7.5 TABLET ORAL DAILY
Qty: 30 TABLET | Refills: 1 | Status: SHIPPED | OUTPATIENT
Start: 2022-03-10

## 2022-03-10 RX ORDER — PANTOPRAZOLE SODIUM 40 MG/1
TABLET, DELAYED RELEASE ORAL
Qty: 90 TABLET | Refills: 3 | Status: SHIPPED | OUTPATIENT
Start: 2022-03-10

## 2022-03-10 RX ORDER — PROPRANOLOL HYDROCHLORIDE 80 MG/1
80 CAPSULE, EXTENDED RELEASE ORAL DAILY
Qty: 90 CAPSULE | Refills: 3 | Status: SHIPPED | OUTPATIENT
Start: 2022-03-10 | End: 2022-12-05

## 2022-03-10 RX ORDER — BLOOD SUGAR DIAGNOSTIC
STRIP MISCELLANEOUS
Qty: 300 STRIP | Refills: 1 | Status: SHIPPED | OUTPATIENT
Start: 2022-03-10 | End: 2023-03-10

## 2022-03-10 RX ORDER — PRIMIDONE 50 MG/1
50 TABLET ORAL 2 TIMES DAILY
Qty: 180 TABLET | Refills: 0 | Status: SHIPPED | OUTPATIENT
Start: 2022-03-10

## 2022-03-23 ENCOUNTER — TELEPHONE (OUTPATIENT)
Dept: INTERNAL MEDICINE CLINIC | Facility: CLINIC | Age: 77
End: 2022-03-23

## 2022-03-23 LAB
ABSOLUTE BASOPHILS: 28 CELLS/UL (ref 0–200)
ABSOLUTE EOSINOPHILS: 138 CELLS/UL (ref 15–500)
ABSOLUTE LYMPHOCYTES: 2228 CELLS/UL (ref 850–3900)
ABSOLUTE MONOCYTES: 457 CELLS/UL (ref 200–950)
ABSOLUTE NEUTROPHILS: 2651 CELLS/UL (ref 1500–7800)
ALBUMIN/GLOBULIN RATIO: 1.2 (CALC) (ref 1–2.5)
ALBUMIN: 3.9 G/DL (ref 3.6–5.1)
ALKALINE PHOSPHATASE: 67 U/L (ref 35–144)
ALT: 13 U/L (ref 9–46)
APPEARANCE: CLEAR
AST: 18 U/L (ref 10–35)
BASOPHILS: 0.5 %
BILIRUBIN, TOTAL: 0.6 MG/DL (ref 0.2–1.2)
BILIRUBIN: NEGATIVE
BUN: 18 MG/DL (ref 7–25)
CALCIUM: 9.6 MG/DL (ref 8.6–10.3)
CARBON DIOXIDE: 28 MMOL/L (ref 20–32)
CHLORIDE: 104 MMOL/L (ref 98–110)
CHOL/HDLC RATIO: 2.5 (CALC)
CHOLESTEROL, TOTAL: 114 MG/DL
COLOR: YELLOW
CREATININE, RANDOM URINE: 66 MG/DL (ref 20–320)
CREATININE: 1.03 MG/DL (ref 0.7–1.18)
EGFR IF AFRICN AM: 81 ML/MIN/1.73M2
EGFR IF NONAFRICN AM: 70 ML/MIN/1.73M2
EOSINOPHILS: 2.5 %
GLOBULIN: 3.3 G/DL (CALC) (ref 1.9–3.7)
GLUCOSE: 91 MG/DL (ref 65–99)
HDL CHOLESTEROL: 46 MG/DL
HEMATOCRIT: 40.6 % (ref 38.5–50)
HEMOGLOBIN A1C: 6.8 % OF TOTAL HGB
HEMOGLOBIN: 13.3 G/DL (ref 13.2–17.1)
KETONES: NEGATIVE
LDL-CHOLESTEROL: 51 MG/DL (CALC)
LYMPHOCYTES: 40.5 %
MCH: 29.2 PG (ref 27–33)
MCHC: 32.8 G/DL (ref 32–36)
MCV: 89 FL (ref 80–100)
MICROALBUMIN/CREATININE RATIO, RANDOM URINE: 8 MCG/MG CREAT
MICROALBUMIN: 0.5 MG/DL
MONOCYTES: 8.3 %
MPV: 9.5 FL (ref 7.5–12.5)
NEUTROPHILS: 48.2 %
NITRITE: NEGATIVE
NON-HDL CHOLESTEROL: 68 MG/DL (CALC)
OCCULT BLOOD: NEGATIVE
PH: 6.5 (ref 5–8)
PLATELET COUNT: 210 THOUSAND/UL (ref 140–400)
POTASSIUM: 4.7 MMOL/L (ref 3.5–5.3)
PROTEIN, TOTAL: 7.2 G/DL (ref 6.1–8.1)
PROTEIN: NEGATIVE
RDW: 13 % (ref 11–15)
RED BLOOD CELL COUNT: 4.56 MILLION/UL (ref 4.2–5.8)
SODIUM: 139 MMOL/L (ref 135–146)
SPECIFIC GRAVITY: 1.01 (ref 1–1.03)
T4 (THYROXINE), TOTAL: 12 MCG/DL (ref 4.9–10.5)
TRIGLYCERIDES: 89 MG/DL
TSH: 0.01 MIU/L (ref 0.4–4.5)
URIC ACID: 4.4 MG/DL (ref 4–8)
WHITE BLOOD CELL COUNT: 5.5 THOUSAND/UL (ref 3.8–10.8)

## 2022-03-23 NOTE — TELEPHONE ENCOUNTER
Martin Macias from [employer] called stating yesterday she spoke with nurse Justo Maravilla regarding a PSA order for the pt. She states they were holding onto the sample for 7 days and it has been discarded. She wanted to notify the nurses and she will be faxing over the report that states it has been discarded.

## 2022-03-23 NOTE — TELEPHONE ENCOUNTER
I am not sure what is going on.   The patient refused the PSA test based on what is being told to me so I am not where I come in this picture and I am wondering why this is being sent to me

## 2022-03-25 ENCOUNTER — TELEPHONE (OUTPATIENT)
Dept: INTERNAL MEDICINE CLINIC | Facility: CLINIC | Age: 77
End: 2022-03-25

## 2022-03-31 ENCOUNTER — OFFICE VISIT (OUTPATIENT)
Dept: INTERNAL MEDICINE CLINIC | Facility: CLINIC | Age: 77
End: 2022-03-31
Payer: MEDICARE

## 2022-03-31 VITALS
SYSTOLIC BLOOD PRESSURE: 136 MMHG | RESPIRATION RATE: 16 BRPM | OXYGEN SATURATION: 99 % | TEMPERATURE: 98 F | BODY MASS INDEX: 28.64 KG/M2 | HEART RATE: 58 BPM | DIASTOLIC BLOOD PRESSURE: 80 MMHG | HEIGHT: 62.5 IN | WEIGHT: 159.63 LBS

## 2022-03-31 DIAGNOSIS — Z00.00 ROUTINE GENERAL MEDICAL EXAMINATION AT A HEALTH CARE FACILITY: Primary | ICD-10-CM

## 2022-03-31 DIAGNOSIS — E05.90 HYPERTHYROIDISM: Chronic | ICD-10-CM

## 2022-03-31 DIAGNOSIS — R25.1 TREMOR: Chronic | ICD-10-CM

## 2022-03-31 DIAGNOSIS — R31.21 ASYMPTOMATIC MICROSCOPIC HEMATURIA: Chronic | ICD-10-CM

## 2022-03-31 DIAGNOSIS — I10 PRIMARY HYPERTENSION: Chronic | ICD-10-CM

## 2022-03-31 DIAGNOSIS — E11.65 TYPE 2 DIABETES MELLITUS WITH HYPERGLYCEMIA, WITHOUT LONG-TERM CURRENT USE OF INSULIN (HCC): Chronic | ICD-10-CM

## 2022-03-31 DIAGNOSIS — K22.2 ESOPHAGEAL STRICTURE: Chronic | ICD-10-CM

## 2022-03-31 DIAGNOSIS — J38.00 VOCAL CORD PARALYSIS: Chronic | ICD-10-CM

## 2022-03-31 PROBLEM — J42 CHRONIC BRONCHITIS, UNSPECIFIED CHRONIC BRONCHITIS TYPE (HCC): Status: ACTIVE | Noted: 2022-03-31

## 2022-03-31 PROCEDURE — 3008F BODY MASS INDEX DOCD: CPT | Performed by: INTERNAL MEDICINE

## 2022-03-31 PROCEDURE — 3075F SYST BP GE 130 - 139MM HG: CPT | Performed by: INTERNAL MEDICINE

## 2022-03-31 PROCEDURE — 93000 ELECTROCARDIOGRAM COMPLETE: CPT | Performed by: INTERNAL MEDICINE

## 2022-03-31 PROCEDURE — G0439 PPPS, SUBSEQ VISIT: HCPCS | Performed by: INTERNAL MEDICINE

## 2022-03-31 PROCEDURE — 3079F DIAST BP 80-89 MM HG: CPT | Performed by: INTERNAL MEDICINE

## 2022-03-31 PROCEDURE — 99397 PER PM REEVAL EST PAT 65+ YR: CPT | Performed by: INTERNAL MEDICINE

## 2022-03-31 PROCEDURE — 96160 PT-FOCUSED HLTH RISK ASSMT: CPT | Performed by: INTERNAL MEDICINE

## 2022-03-31 RX ORDER — METHIMAZOLE 5 MG/1
10 TABLET ORAL 2 TIMES DAILY
Qty: 270 TABLET | Refills: 0 | COMMUNITY
Start: 2022-03-31

## 2022-03-31 RX ORDER — ENALAPRIL MALEATE 20 MG/1
20 TABLET ORAL DAILY
COMMUNITY
Start: 2022-03-01

## 2022-04-04 PROBLEM — J42 CHRONIC BRONCHITIS, UNSPECIFIED CHRONIC BRONCHITIS TYPE (HCC): Status: RESOLVED | Noted: 2022-03-31 | Resolved: 2022-04-04

## 2022-04-27 ENCOUNTER — LAB ENCOUNTER (OUTPATIENT)
Dept: LAB | Age: 77
End: 2022-04-27
Attending: INTERNAL MEDICINE
Payer: MEDICARE

## 2022-04-28 RX ORDER — METHIMAZOLE 5 MG/1
TABLET ORAL
Qty: 270 TABLET | Refills: 0 | OUTPATIENT
Start: 2022-04-28

## 2022-04-28 RX ORDER — METFORMIN HYDROCHLORIDE 750 MG/1
TABLET, EXTENDED RELEASE ORAL
Qty: 30 TABLET | Refills: 0 | Status: SHIPPED | OUTPATIENT
Start: 2022-04-28

## 2022-04-28 RX ORDER — MELOXICAM 7.5 MG/1
TABLET ORAL
Qty: 60 TABLET | Refills: 0 | Status: SHIPPED | OUTPATIENT
Start: 2022-04-28

## 2022-04-28 RX ORDER — PRIMIDONE 50 MG/1
TABLET ORAL
Qty: 180 TABLET | Refills: 0 | Status: SHIPPED | OUTPATIENT
Start: 2022-04-28

## 2022-04-28 NOTE — TELEPHONE ENCOUNTER
Protocol passed  Metformin 750mg filled 3/3/22 #30 0 refills     No Protocol   Meloxicam 7.5mg filled 3/10/22 #30 1 refills   Primidone 50mg filled 3/10/22 #180 0 refills     Protocol Failed   methimazole 5mg     Refill not appropriate     rx was changed at visit     LOV: 3/31/22   RTC: 6 months   Recent Labs: 3/15/22     Upcoming OV: none scheduled

## 2022-09-09 ENCOUNTER — OFFICE VISIT (OUTPATIENT)
Dept: INTERNAL MEDICINE CLINIC | Facility: CLINIC | Age: 77
End: 2022-09-09
Payer: MEDICARE

## 2022-09-09 VITALS
DIASTOLIC BLOOD PRESSURE: 78 MMHG | TEMPERATURE: 98 F | WEIGHT: 166.38 LBS | SYSTOLIC BLOOD PRESSURE: 140 MMHG | HEART RATE: 68 BPM | OXYGEN SATURATION: 98 % | BODY MASS INDEX: 29.85 KG/M2 | HEIGHT: 62.5 IN

## 2022-09-09 DIAGNOSIS — E05.90 HYPERTHYROIDISM: ICD-10-CM

## 2022-09-09 DIAGNOSIS — E11.65 TYPE 2 DIABETES MELLITUS WITH HYPERGLYCEMIA, WITHOUT LONG-TERM CURRENT USE OF INSULIN (HCC): ICD-10-CM

## 2022-09-09 DIAGNOSIS — I10 ESSENTIAL HYPERTENSION: Chronic | ICD-10-CM

## 2022-09-09 DIAGNOSIS — R35.1 NOCTURIA ASSOCIATED WITH BENIGN PROSTATIC HYPERPLASIA: ICD-10-CM

## 2022-09-09 DIAGNOSIS — I10 PRIMARY HYPERTENSION: Primary | ICD-10-CM

## 2022-09-09 DIAGNOSIS — N40.1 NOCTURIA ASSOCIATED WITH BENIGN PROSTATIC HYPERPLASIA: ICD-10-CM

## 2022-09-09 PROCEDURE — 3008F BODY MASS INDEX DOCD: CPT | Performed by: INTERNAL MEDICINE

## 2022-09-09 PROCEDURE — 3078F DIAST BP <80 MM HG: CPT | Performed by: INTERNAL MEDICINE

## 2022-09-09 PROCEDURE — 3077F SYST BP >= 140 MM HG: CPT | Performed by: INTERNAL MEDICINE

## 2022-09-09 PROCEDURE — 99213 OFFICE O/P EST LOW 20 MIN: CPT | Performed by: INTERNAL MEDICINE

## 2022-09-09 RX ORDER — NAPROXEN 500 MG/1
500 TABLET ORAL 2 TIMES DAILY WITH MEALS
Qty: 60 TABLET | Refills: 1 | Status: SHIPPED | OUTPATIENT
Start: 2022-09-09 | End: 2022-10-09

## 2022-09-09 RX ORDER — METFORMIN HYDROCHLORIDE 750 MG/1
750 TABLET, EXTENDED RELEASE ORAL DAILY
Qty: 90 TABLET | Refills: 0 | Status: SHIPPED | OUTPATIENT
Start: 2022-09-09

## 2022-09-09 RX ORDER — BLOOD SUGAR DIAGNOSTIC
STRIP MISCELLANEOUS
Qty: 300 STRIP | Refills: 1 | Status: SHIPPED | OUTPATIENT
Start: 2022-09-09 | End: 2023-09-09

## 2022-09-09 RX ORDER — ENALAPRIL MALEATE 20 MG/1
20 TABLET ORAL DAILY
Qty: 90 TABLET | Refills: 0 | Status: SHIPPED | OUTPATIENT
Start: 2022-09-09

## 2022-09-09 RX ORDER — METHIMAZOLE 5 MG/1
10 TABLET ORAL 2 TIMES DAILY
Qty: 270 TABLET | Refills: 0 | Status: SHIPPED | OUTPATIENT
Start: 2022-09-09

## 2022-09-09 RX ORDER — LANCETS
1 EACH MISCELLANEOUS DAILY
Qty: 300 EACH | Refills: 1 | Status: SHIPPED | OUTPATIENT
Start: 2022-09-09 | End: 2023-09-09

## 2022-09-09 RX ORDER — ATORVASTATIN CALCIUM 10 MG/1
10 TABLET, FILM COATED ORAL NIGHTLY
Qty: 90 TABLET | Refills: 3 | Status: SHIPPED | OUTPATIENT
Start: 2022-09-09 | End: 2023-09-09

## 2022-09-09 RX ORDER — PANTOPRAZOLE SODIUM 40 MG/1
TABLET, DELAYED RELEASE ORAL
Qty: 90 TABLET | Refills: 3 | Status: SHIPPED | OUTPATIENT
Start: 2022-09-09

## 2022-09-09 RX ORDER — GLIPIZIDE 2.5 MG/1
2.5 TABLET, EXTENDED RELEASE ORAL
Qty: 90 TABLET | Refills: 3 | Status: SHIPPED | OUTPATIENT
Start: 2022-09-09 | End: 2023-09-09

## 2022-09-09 RX ORDER — PRIMIDONE 50 MG/1
50 TABLET ORAL 2 TIMES DAILY
Qty: 180 TABLET | Refills: 0 | Status: SHIPPED | OUTPATIENT
Start: 2022-09-09

## 2022-09-09 RX ORDER — PROPRANOLOL HYDROCHLORIDE 80 MG/1
80 CAPSULE, EXTENDED RELEASE ORAL DAILY
Qty: 90 CAPSULE | Refills: 3 | Status: SHIPPED | OUTPATIENT
Start: 2022-09-09 | End: 2023-06-06

## 2022-09-09 RX ORDER — BLOOD-GLUCOSE METER
1 EACH MISCELLANEOUS AS DIRECTED
Qty: 1 KIT | Refills: 1 | Status: SHIPPED | OUTPATIENT
Start: 2022-09-09

## 2022-09-11 LAB
ABSOLUTE BASOPHILS: 30 CELLS/UL (ref 0–200)
ABSOLUTE EOSINOPHILS: 180 CELLS/UL (ref 15–500)
ABSOLUTE LYMPHOCYTES: 2508 CELLS/UL (ref 850–3900)
ABSOLUTE MONOCYTES: 552 CELLS/UL (ref 200–950)
ABSOLUTE NEUTROPHILS: 2730 CELLS/UL (ref 1500–7800)
ALBUMIN/GLOBULIN RATIO: 1.5 (CALC) (ref 1–2.5)
ALBUMIN: 4.1 G/DL (ref 3.6–5.1)
ALKALINE PHOSPHATASE: 65 U/L (ref 35–144)
ALT: 12 U/L (ref 9–46)
AST: 18 U/L (ref 10–35)
BASOPHILS: 0.5 %
BILIRUBIN, TOTAL: 0.5 MG/DL (ref 0.2–1.2)
BUN: 18 MG/DL (ref 7–25)
CALCIUM: 9.2 MG/DL (ref 8.6–10.3)
CARBON DIOXIDE: 28 MMOL/L (ref 20–32)
CHLORIDE: 105 MMOL/L (ref 98–110)
CHOL/HDLC RATIO: 2.7 (CALC)
CHOLESTEROL, TOTAL: 118 MG/DL
CREATININE: 1.21 MG/DL (ref 0.7–1.28)
EGFR: 61 ML/MIN/1.73M2
EOSINOPHILS: 3 %
GLOBULIN: 2.8 G/DL (CALC) (ref 1.9–3.7)
GLUCOSE: 122 MG/DL (ref 65–99)
HDL CHOLESTEROL: 44 MG/DL
HEMATOCRIT: 39.3 % (ref 38.5–50)
HEMOGLOBIN A1C: 6.9 % OF TOTAL HGB
HEMOGLOBIN: 13.3 G/DL (ref 13.2–17.1)
LDL-CHOLESTEROL: 56 MG/DL (CALC)
LYMPHOCYTES: 41.8 %
MCH: 31.1 PG (ref 27–33)
MCHC: 33.8 G/DL (ref 32–36)
MCV: 92 FL (ref 80–100)
MONOCYTES: 9.2 %
MPV: 9.9 FL (ref 7.5–12.5)
NEUTROPHILS: 45.5 %
NON-HDL CHOLESTEROL: 74 MG/DL (CALC)
PLATELET COUNT: 180 THOUSAND/UL (ref 140–400)
POTASSIUM: 4.8 MMOL/L (ref 3.5–5.3)
PROTEIN, TOTAL: 6.9 G/DL (ref 6.1–8.1)
PSA, TOTAL: 0.31 NG/ML
RDW: 13.8 % (ref 11–15)
RED BLOOD CELL COUNT: 4.27 MILLION/UL (ref 4.2–5.8)
SODIUM: 140 MMOL/L (ref 135–146)
TRIGLYCERIDES: 92 MG/DL
TSH: 3.57 MIU/L (ref 0.4–4.5)
WHITE BLOOD CELL COUNT: 6 THOUSAND/UL (ref 3.8–10.8)

## 2022-09-14 ENCOUNTER — TELEPHONE (OUTPATIENT)
Dept: INTERNAL MEDICINE CLINIC | Facility: CLINIC | Age: 77
End: 2022-09-14

## 2022-11-29 ENCOUNTER — OFFICE VISIT (OUTPATIENT)
Dept: INTERNAL MEDICINE CLINIC | Facility: CLINIC | Age: 77
End: 2022-11-29
Payer: MEDICARE

## 2022-11-29 VITALS
RESPIRATION RATE: 16 BRPM | WEIGHT: 167.81 LBS | TEMPERATURE: 98 F | HEART RATE: 69 BPM | BODY MASS INDEX: 30.11 KG/M2 | SYSTOLIC BLOOD PRESSURE: 120 MMHG | OXYGEN SATURATION: 100 % | DIASTOLIC BLOOD PRESSURE: 70 MMHG | HEIGHT: 62.5 IN

## 2022-11-29 DIAGNOSIS — M17.0 PRIMARY OSTEOARTHRITIS OF BOTH KNEES: Primary | ICD-10-CM

## 2022-11-29 PROCEDURE — 3008F BODY MASS INDEX DOCD: CPT | Performed by: INTERNAL MEDICINE

## 2022-11-29 PROCEDURE — 99213 OFFICE O/P EST LOW 20 MIN: CPT | Performed by: INTERNAL MEDICINE

## 2022-11-29 PROCEDURE — 3074F SYST BP LT 130 MM HG: CPT | Performed by: INTERNAL MEDICINE

## 2022-11-29 PROCEDURE — 3078F DIAST BP <80 MM HG: CPT | Performed by: INTERNAL MEDICINE

## 2022-11-29 RX ORDER — METFORMIN HYDROCHLORIDE 750 MG/1
750 TABLET, EXTENDED RELEASE ORAL DAILY
Qty: 90 TABLET | Refills: 0 | Status: SHIPPED | OUTPATIENT
Start: 2022-11-29

## 2022-11-29 RX ORDER — NAPROXEN 500 MG/1
500 TABLET ORAL 2 TIMES DAILY WITH MEALS
Qty: 60 TABLET | Refills: 2 | Status: SHIPPED | OUTPATIENT
Start: 2022-11-29 | End: 2022-12-29

## 2022-11-29 RX ORDER — NAPROXEN 500 MG/1
500 TABLET ORAL 2 TIMES DAILY WITH MEALS
COMMUNITY
End: 2022-11-29

## 2022-11-29 RX ORDER — ENALAPRIL MALEATE 20 MG/1
20 TABLET ORAL DAILY
Qty: 90 TABLET | Refills: 0 | Status: SHIPPED | OUTPATIENT
Start: 2022-11-29

## 2022-11-29 RX ORDER — ZOLPIDEM TARTRATE 5 MG/1
5 TABLET ORAL NIGHTLY PRN
Qty: 30 TABLET | Refills: 0 | Status: SHIPPED | OUTPATIENT
Start: 2022-11-29 | End: 2022-12-29

## 2022-11-29 RX ORDER — PRIMIDONE 50 MG/1
50 TABLET ORAL 2 TIMES DAILY
Qty: 180 TABLET | Refills: 0 | Status: SHIPPED | OUTPATIENT
Start: 2022-11-29

## 2022-11-30 ENCOUNTER — HOSPITAL ENCOUNTER (OUTPATIENT)
Dept: GENERAL RADIOLOGY | Age: 77
Discharge: HOME | End: 2022-11-30
Attending: INTERNAL MEDICINE
Payer: MEDICARE

## 2022-11-30 DIAGNOSIS — M17.0 PRIMARY OSTEOARTHRITIS OF BOTH KNEES: ICD-10-CM

## 2022-11-30 DIAGNOSIS — Z00.6 ENCOUNTER FOR EXAMINATION FOR NORMAL COMPARISON AND CONTROL IN CLINICAL RESEARCH PROGRAM: ICD-10-CM

## 2022-11-30 PROCEDURE — 73562 X-RAY EXAM OF KNEE 3: CPT | Performed by: INTERNAL MEDICINE

## 2022-11-30 PROCEDURE — 73564 X-RAY EXAM KNEE 4 OR MORE: CPT | Performed by: INTERNAL MEDICINE

## 2022-12-09 ENCOUNTER — TELEPHONE (OUTPATIENT)
Dept: INTERNAL MEDICINE CLINIC | Facility: CLINIC | Age: 77
End: 2022-12-09

## 2022-12-09 NOTE — TELEPHONE ENCOUNTER
Mine Rodriguez with [address] wanted to get clarification regarding drug interaction. Per Mine Rodriguez interaction between zolpidem and primidone may cause respiratory depression or sedation.  Mine Rodriguez wanted to confirm if okay to continue to dispense    Confirmed [phone] as call back

## 2022-12-09 NOTE — TELEPHONE ENCOUNTER
Consulted in person with Dr. Ovi Woods - he does want pt to have the new prescription of Zolpidem 5 mg. [email or web address] [phone] spoke to Yadiel Owens who took the initial information. Spoke to Kavita Cronin, documented additional information to push prescription through pt's insurance.

## 2022-12-27 DIAGNOSIS — E05.90 HYPERTHYROIDISM: ICD-10-CM

## 2022-12-27 DIAGNOSIS — E11.65 TYPE 2 DIABETES MELLITUS WITH HYPERGLYCEMIA, WITHOUT LONG-TERM CURRENT USE OF INSULIN (HCC): ICD-10-CM

## 2022-12-27 DIAGNOSIS — I10 ESSENTIAL HYPERTENSION: Chronic | ICD-10-CM

## 2022-12-27 RX ORDER — ATORVASTATIN CALCIUM 10 MG/1
10 TABLET, FILM COATED ORAL NIGHTLY
Qty: 90 TABLET | Refills: 3 | Status: SHIPPED | OUTPATIENT
Start: 2022-12-27 | End: 2023-12-27

## 2022-12-27 RX ORDER — METHIMAZOLE 5 MG/1
10 TABLET ORAL 2 TIMES DAILY
Qty: 270 TABLET | Refills: 0 | Status: SHIPPED | OUTPATIENT
Start: 2022-12-27

## 2022-12-27 RX ORDER — PANTOPRAZOLE SODIUM 40 MG/1
TABLET, DELAYED RELEASE ORAL
Qty: 90 TABLET | Refills: 3 | Status: SHIPPED | OUTPATIENT
Start: 2022-12-27

## 2022-12-27 RX ORDER — PROPRANOLOL HYDROCHLORIDE 80 MG/1
80 CAPSULE, EXTENDED RELEASE ORAL DAILY
Qty: 90 CAPSULE | Refills: 3 | Status: SHIPPED | OUTPATIENT
Start: 2022-12-27 | End: 2023-09-23

## 2022-12-27 RX ORDER — GLIPIZIDE 2.5 MG/1
2.5 TABLET, EXTENDED RELEASE ORAL
Qty: 90 TABLET | Refills: 3 | Status: SHIPPED | OUTPATIENT
Start: 2022-12-27 | End: 2023-12-27

## 2022-12-27 NOTE — TELEPHONE ENCOUNTER
Refill request for the following:    -atorvastatin 10 MG Oral Tab.  -glipiZIDE ER 2.5 MG Oral Tablet 24 Hr.  -methIMAzole 5 MG Oral Tab.  -Propranolol HCl ER 80 MG Oral Capsule SR 24 Hr.  -pantoprazole 40 MG Oral Tab EC.       [address] [facility] [place]SE [place], [address] [place] [address] [phone], [address] [place] [address]   Phone: [phone] Fax: [phone]

## 2023-01-09 ENCOUNTER — OFFICE VISIT (OUTPATIENT)
Dept: ORTHOPEDICS CLINIC | Facility: CLINIC | Age: 78
End: 2023-01-09
Payer: MEDICARE

## 2023-01-09 VITALS
WEIGHT: 167 LBS | HEART RATE: 68 BPM | BODY MASS INDEX: 30.73 KG/M2 | HEIGHT: 62 IN | OXYGEN SATURATION: 98 % | RESPIRATION RATE: 18 BRPM

## 2023-01-09 DIAGNOSIS — M17.0 PRIMARY OSTEOARTHRITIS OF BOTH KNEES: Primary | ICD-10-CM

## 2023-01-09 PROCEDURE — 3008F BODY MASS INDEX DOCD: CPT | Performed by: PHYSICIAN ASSISTANT

## 2023-01-09 PROCEDURE — 99203 OFFICE O/P NEW LOW 30 MIN: CPT | Performed by: PHYSICIAN ASSISTANT

## 2023-01-30 RX ORDER — MECLIZINE HYDROCHLORIDE 25 MG/1
25 TABLET ORAL 4 TIMES DAILY PRN
Qty: 30 TABLET | Refills: 0 | Status: SHIPPED | OUTPATIENT
Start: 2023-01-30

## 2023-02-15 ENCOUNTER — OFFICE VISIT (OUTPATIENT)
Dept: INTERNAL MEDICINE CLINIC | Facility: CLINIC | Age: 78
End: 2023-02-15
Payer: MEDICARE

## 2023-02-15 VITALS
WEIGHT: 172.63 LBS | RESPIRATION RATE: 15 BRPM | DIASTOLIC BLOOD PRESSURE: 64 MMHG | HEART RATE: 64 BPM | OXYGEN SATURATION: 100 % | TEMPERATURE: 98 F | SYSTOLIC BLOOD PRESSURE: 128 MMHG | BODY MASS INDEX: 32 KG/M2

## 2023-02-15 DIAGNOSIS — H65.92 LEFT NON-SUPPURATIVE OTITIS MEDIA: Primary | ICD-10-CM

## 2023-02-15 PROCEDURE — 3078F DIAST BP <80 MM HG: CPT | Performed by: INTERNAL MEDICINE

## 2023-02-15 PROCEDURE — 3074F SYST BP LT 130 MM HG: CPT | Performed by: INTERNAL MEDICINE

## 2023-02-15 PROCEDURE — 99213 OFFICE O/P EST LOW 20 MIN: CPT | Performed by: INTERNAL MEDICINE

## 2023-02-15 RX ORDER — AMOXICILLIN AND CLAVULANATE POTASSIUM 875; 125 MG/1; MG/1
1 TABLET, FILM COATED ORAL 2 TIMES DAILY
Qty: 20 TABLET | Refills: 0 | Status: SHIPPED | OUTPATIENT
Start: 2023-02-15 | End: 2023-02-25

## 2023-02-25 ENCOUNTER — OFFICE VISIT (OUTPATIENT)
Dept: INTERNAL MEDICINE CLINIC | Facility: CLINIC | Age: 78
End: 2023-02-25
Payer: MEDICARE

## 2023-02-25 VITALS
HEART RATE: 71 BPM | SYSTOLIC BLOOD PRESSURE: 140 MMHG | BODY MASS INDEX: 32 KG/M2 | TEMPERATURE: 98 F | OXYGEN SATURATION: 99 % | WEIGHT: 174.38 LBS | DIASTOLIC BLOOD PRESSURE: 66 MMHG

## 2023-02-25 DIAGNOSIS — H92.02 LEFT EAR PAIN: ICD-10-CM

## 2023-02-25 DIAGNOSIS — H66.90 EAR INFECTION: Primary | ICD-10-CM

## 2023-02-25 PROCEDURE — 99214 OFFICE O/P EST MOD 30 MIN: CPT | Performed by: INTERNAL MEDICINE

## 2023-02-25 PROCEDURE — 3078F DIAST BP <80 MM HG: CPT | Performed by: INTERNAL MEDICINE

## 2023-02-25 PROCEDURE — 3077F SYST BP >= 140 MM HG: CPT | Performed by: INTERNAL MEDICINE

## 2023-02-25 RX ORDER — CIPROFLOXACIN 500 MG/1
500 TABLET, FILM COATED ORAL 2 TIMES DAILY
Qty: 20 TABLET | Refills: 0 | Status: SHIPPED | OUTPATIENT
Start: 2023-02-25 | End: 2023-03-07

## 2023-02-25 RX ORDER — NEOMYCIN SULFATE, POLYMYXIN B SULFATE AND HYDROCORTISONE 10; 3.5; 1 MG/ML; MG/ML; [USP'U]/ML
3 SUSPENSION/ DROPS AURICULAR (OTIC) 3 TIMES DAILY
Qty: 10 ML | Refills: 0 | Status: SHIPPED | OUTPATIENT
Start: 2023-02-25 | End: 2023-02-26

## 2023-02-25 RX ORDER — CIPROFLOXACIN AND DEXAMETHASONE 3; 1 MG/ML; MG/ML
4 SUSPENSION/ DROPS AURICULAR (OTIC) 2 TIMES DAILY
Qty: 7.5 ML | Refills: 0 | Status: SHIPPED | OUTPATIENT
Start: 2023-02-25 | End: 2023-02-25

## 2023-02-26 RX ORDER — NEOMYCIN SULFATE, POLYMYXIN B SULFATE AND HYDROCORTISONE 10; 3.5; 1 MG/ML; MG/ML; [USP'U]/ML
4 SUSPENSION/ DROPS AURICULAR (OTIC) 4 TIMES DAILY
Qty: 10 ML | Refills: 0 | COMMUNITY
Start: 2023-02-26

## 2023-03-01 ENCOUNTER — OFFICE VISIT (OUTPATIENT)
Dept: INTERNAL MEDICINE CLINIC | Facility: CLINIC | Age: 78
End: 2023-03-01
Payer: MEDICARE

## 2023-03-01 ENCOUNTER — HOSPITAL ENCOUNTER (OUTPATIENT)
Dept: GENERAL RADIOLOGY | Age: 78
Discharge: HOME | End: 2023-03-01
Attending: INTERNAL MEDICINE
Payer: MEDICARE

## 2023-03-01 ENCOUNTER — TELEPHONE (OUTPATIENT)
Dept: INTERNAL MEDICINE CLINIC | Facility: CLINIC | Age: 78
End: 2023-03-01

## 2023-03-01 VITALS
HEART RATE: 62 BPM | OXYGEN SATURATION: 100 % | DIASTOLIC BLOOD PRESSURE: 66 MMHG | RESPIRATION RATE: 15 BRPM | TEMPERATURE: 98 F | BODY MASS INDEX: 32 KG/M2 | SYSTOLIC BLOOD PRESSURE: 124 MMHG | WEIGHT: 173.81 LBS

## 2023-03-01 DIAGNOSIS — I10 ESSENTIAL HYPERTENSION: Chronic | ICD-10-CM

## 2023-03-01 DIAGNOSIS — E11.65 TYPE 2 DIABETES MELLITUS WITH HYPERGLYCEMIA, WITHOUT LONG-TERM CURRENT USE OF INSULIN (HCC): ICD-10-CM

## 2023-03-01 DIAGNOSIS — R05.3 CHRONIC COUGH: ICD-10-CM

## 2023-03-01 DIAGNOSIS — H66.90 EAR INFECTION: ICD-10-CM

## 2023-03-01 DIAGNOSIS — R06.09 DOE (DYSPNEA ON EXERTION): ICD-10-CM

## 2023-03-01 DIAGNOSIS — H91.92 DECREASED HEARING OF LEFT EAR: Primary | ICD-10-CM

## 2023-03-01 PROCEDURE — 3078F DIAST BP <80 MM HG: CPT | Performed by: INTERNAL MEDICINE

## 2023-03-01 PROCEDURE — 71046 X-RAY EXAM CHEST 2 VIEWS: CPT | Performed by: INTERNAL MEDICINE

## 2023-03-01 PROCEDURE — 99214 OFFICE O/P EST MOD 30 MIN: CPT | Performed by: INTERNAL MEDICINE

## 2023-03-01 PROCEDURE — 3074F SYST BP LT 130 MM HG: CPT | Performed by: INTERNAL MEDICINE

## 2023-03-01 RX ORDER — ATORVASTATIN CALCIUM 10 MG/1
10 TABLET, FILM COATED ORAL NIGHTLY
Qty: 90 TABLET | Refills: 0 | Status: SHIPPED | OUTPATIENT
Start: 2023-03-01 | End: 2024-02-29

## 2023-03-01 RX ORDER — PROPRANOLOL HYDROCHLORIDE 80 MG/1
80 CAPSULE, EXTENDED RELEASE ORAL DAILY
Qty: 90 CAPSULE | Refills: 3 | Status: SHIPPED | OUTPATIENT
Start: 2023-03-01 | End: 2023-11-26

## 2023-03-01 RX ORDER — GLIPIZIDE 2.5 MG/1
2.5 TABLET, EXTENDED RELEASE ORAL
Qty: 90 TABLET | Refills: 0 | Status: SHIPPED | OUTPATIENT
Start: 2023-03-01 | End: 2024-02-29

## 2023-03-01 RX ORDER — PRIMIDONE 50 MG/1
50 TABLET ORAL 2 TIMES DAILY
Qty: 180 TABLET | Refills: 0 | Status: SHIPPED | OUTPATIENT
Start: 2023-03-01

## 2023-03-01 RX ORDER — METFORMIN HYDROCHLORIDE 750 MG/1
750 TABLET, EXTENDED RELEASE ORAL DAILY
Qty: 90 TABLET | Refills: 0 | Status: SHIPPED | OUTPATIENT
Start: 2023-03-01

## 2023-03-01 RX ORDER — PANTOPRAZOLE SODIUM 40 MG/1
TABLET, DELAYED RELEASE ORAL
Qty: 90 TABLET | Refills: 0 | Status: SHIPPED | OUTPATIENT
Start: 2023-03-01

## 2023-03-01 RX ORDER — ENALAPRIL MALEATE 20 MG/1
20 TABLET ORAL DAILY
Qty: 90 TABLET | Refills: 0 | Status: SHIPPED | OUTPATIENT
Start: 2023-03-01

## 2023-03-01 NOTE — TELEPHONE ENCOUNTER
DV-    Spoke with Car at Dr. Madrigal [facility] office. States 3/23 is the soonest available appointment with any provider.

## 2023-03-01 NOTE — TELEPHONE ENCOUNTER
Received the following message from DV:    \"please see if you can get pt Yun Fraire [id number] in sooner with ENT- scheduled for 3/23 with Dr. Giovani Chopra but would appreciate sooner appt since his hearing is not improving . pain is improved with abx, but hearing is not. \"    [place] office at [phone], office is closed. Will try calling later today.

## 2023-03-02 NOTE — TELEPHONE ENCOUNTER
Spoke with pt and a female (wife?) in regards to chest xray result note from Dr. Brian Cohen. Informed them that ENT states they are not able to get pt in any sooner.

## 2023-03-10 ENCOUNTER — HOSPITAL ENCOUNTER (OUTPATIENT)
Dept: CV DIAGNOSTICS | Facility: HOSPITAL | Age: 78
Discharge: HOME | End: 2023-03-10
Attending: INTERNAL MEDICINE
Payer: MEDICARE

## 2023-03-10 DIAGNOSIS — R06.09 DOE (DYSPNEA ON EXERTION): ICD-10-CM

## 2023-03-10 PROCEDURE — 93350 STRESS TTE ONLY: CPT | Performed by: INTERNAL MEDICINE

## 2023-03-10 PROCEDURE — 93017 CV STRESS TEST TRACING ONLY: CPT | Performed by: INTERNAL MEDICINE

## 2023-03-10 PROCEDURE — 93018 CV STRESS TEST I&R ONLY: CPT | Performed by: INTERNAL MEDICINE

## 2023-03-10 NOTE — IMAGING NOTE
Patient walked for 7:04 on a Manual Chandrakant protocol, achieving 78% of his APMHR. Test was terminated due to patient's request, fatigue, severe SOB/ and wheezing. (Patient was even severely SOB after walking back to stress echo testing room and starting wheezing trying to take off shirt for exam). Symptoms resolved in recovery. THR was not achieved, likely due to beta-blocker and severe dyspnea. Would not recommend stress echo in the future due to failure to reach THR. Consider vamsi.

## 2023-03-13 DIAGNOSIS — R06.09 DOE (DYSPNEA ON EXERTION): Primary | ICD-10-CM

## 2023-03-13 DIAGNOSIS — R94.39 EQUIVOCAL STRESS TEST: ICD-10-CM

## 2023-03-15 ENCOUNTER — TELEPHONE (OUTPATIENT)
Dept: INTERNAL MEDICINE CLINIC | Facility: CLINIC | Age: 78
End: 2023-03-15

## 2023-03-15 NOTE — TELEPHONE ENCOUNTER
Received notification from referral department that cardiology referral is now authorized. Sent [facility] to the pt with all of the contact information.

## 2023-03-16 NOTE — TELEPHONE ENCOUNTER
Pt has not read the [facility] sent yesterday, but this writer spoke to the pt on the phone for a result note. Pt was given the cardiologist information at that time.

## 2023-03-23 ENCOUNTER — OFFICE VISIT (OUTPATIENT)
Facility: LOCATION | Age: 78
End: 2023-03-23
Payer: MEDICARE

## 2023-03-23 ENCOUNTER — TELEPHONE (OUTPATIENT)
Dept: INTERNAL MEDICINE CLINIC | Facility: CLINIC | Age: 78
End: 2023-03-23

## 2023-03-23 DIAGNOSIS — H65.02 NON-RECURRENT ACUTE SEROUS OTITIS MEDIA OF LEFT EAR: Primary | ICD-10-CM

## 2023-03-23 DIAGNOSIS — R42 DIZZY: ICD-10-CM

## 2023-03-23 DIAGNOSIS — H90.A32 MIXED CONDUCTIVE AND SENSORINEURAL HEARING LOSS OF LEFT EAR WITH RESTRICTED HEARING OF RIGHT EAR: Primary | ICD-10-CM

## 2023-03-23 DIAGNOSIS — T78.40XA ALLERGY, INITIAL ENCOUNTER: Primary | ICD-10-CM

## 2023-03-23 PROCEDURE — 99204 OFFICE O/P NEW MOD 45 MIN: CPT | Performed by: OTOLARYNGOLOGY

## 2023-03-23 PROCEDURE — 92567 TYMPANOMETRY: CPT | Performed by: AUDIOLOGIST

## 2023-03-23 PROCEDURE — 92557 COMPREHENSIVE HEARING TEST: CPT | Performed by: AUDIOLOGIST

## 2023-03-23 RX ORDER — METHYLPREDNISOLONE 4 MG/1
TABLET ORAL
Qty: 21 TABLET | Refills: 0 | Status: SHIPPED | OUTPATIENT
Start: 2023-03-23

## 2023-03-23 RX ORDER — CEFUROXIME AXETIL 250 MG/1
250 TABLET ORAL 2 TIMES DAILY
Qty: 20 TABLET | Refills: 1 | Status: SHIPPED | OUTPATIENT
Start: 2023-03-23

## 2023-03-23 NOTE — PROGRESS NOTES
Giuseppe Garcia was seen for an audiometric evaluation and tympanogram today. Referred back to physician.     Aarti Roy

## 2023-03-23 NOTE — TELEPHONE ENCOUNTER
[address] [facility] [place]  All[facility] Professional  Ph: [phone]  Fax: [phone]    Pt needing referral for Allergist Dr. Ghassan Floyd to \"rule out allergies. Pt is at the doctors office with daughter and the daughter is refusing to leave the office because she says he doesn't need a referral but the office insists the patient does. Pt is still being seen without a referral, will have to pay out of pocket unless the referral can be backdated?

## 2023-03-23 NOTE — TELEPHONE ENCOUNTER
Spoke to Yusuf, [address] name is Annette Dale MD, full office address is:    Oswaldo MARQUEZ/ Amrik Will. [facility], [place] [address] the referral will be placed today, no guarantee what effective date will be once [facility] acts on it. Referral placed, per protocol, is in open status. Will need to be faxed when authorized.

## 2023-04-10 ENCOUNTER — OFFICE VISIT (OUTPATIENT)
Dept: INTERNAL MEDICINE CLINIC | Facility: CLINIC | Age: 78
End: 2023-04-10
Payer: MEDICARE

## 2023-04-10 VITALS
WEIGHT: 171 LBS | OXYGEN SATURATION: 98 % | HEIGHT: 63 IN | TEMPERATURE: 98 F | RESPIRATION RATE: 16 BRPM | DIASTOLIC BLOOD PRESSURE: 64 MMHG | HEART RATE: 62 BPM | SYSTOLIC BLOOD PRESSURE: 130 MMHG | BODY MASS INDEX: 30.3 KG/M2

## 2023-04-10 DIAGNOSIS — R05.3 CHRONIC COUGH: Primary | ICD-10-CM

## 2023-04-10 DIAGNOSIS — E11.65 TYPE 2 DIABETES MELLITUS WITH HYPERGLYCEMIA, WITHOUT LONG-TERM CURRENT USE OF INSULIN (HCC): Chronic | ICD-10-CM

## 2023-04-10 PROCEDURE — 3075F SYST BP GE 130 - 139MM HG: CPT | Performed by: INTERNAL MEDICINE

## 2023-04-10 PROCEDURE — 99213 OFFICE O/P EST LOW 20 MIN: CPT | Performed by: INTERNAL MEDICINE

## 2023-04-10 PROCEDURE — 3078F DIAST BP <80 MM HG: CPT | Performed by: INTERNAL MEDICINE

## 2023-04-10 PROCEDURE — 3008F BODY MASS INDEX DOCD: CPT | Performed by: INTERNAL MEDICINE

## 2023-04-13 ENCOUNTER — OFFICE VISIT (OUTPATIENT)
Facility: LOCATION | Age: 78
End: 2023-04-13
Payer: MEDICARE

## 2023-04-13 DIAGNOSIS — H65.02 NON-RECURRENT ACUTE SEROUS OTITIS MEDIA OF LEFT EAR: Primary | ICD-10-CM

## 2023-04-13 DIAGNOSIS — H90.A32 MIXED CONDUCTIVE AND SENSORINEURAL HEARING LOSS OF LEFT EAR WITH RESTRICTED HEARING OF RIGHT EAR: Primary | ICD-10-CM

## 2023-04-13 DIAGNOSIS — R13.10 DYSPHAGIA, UNSPECIFIED TYPE: ICD-10-CM

## 2023-04-13 PROCEDURE — 99213 OFFICE O/P EST LOW 20 MIN: CPT | Performed by: OTOLARYNGOLOGY

## 2023-04-13 PROCEDURE — 92567 TYMPANOMETRY: CPT | Performed by: AUDIOLOGIST

## 2023-04-13 PROCEDURE — 92553 AUDIOMETRY AIR & BONE: CPT | Performed by: AUDIOLOGIST

## 2023-04-18 ENCOUNTER — OFFICE VISIT (OUTPATIENT)
Facility: CLINIC | Age: 78
End: 2023-04-18
Payer: MEDICARE

## 2023-04-18 VITALS
HEART RATE: 71 BPM | HEIGHT: 63 IN | OXYGEN SATURATION: 96 % | RESPIRATION RATE: 16 BRPM | SYSTOLIC BLOOD PRESSURE: 122 MMHG | BODY MASS INDEX: 30.3 KG/M2 | WEIGHT: 171 LBS | DIASTOLIC BLOOD PRESSURE: 60 MMHG

## 2023-04-18 DIAGNOSIS — R05.3 CHRONIC COUGH: Primary | ICD-10-CM

## 2023-04-18 PROCEDURE — 3074F SYST BP LT 130 MM HG: CPT | Performed by: INTERNAL MEDICINE

## 2023-04-18 PROCEDURE — 3078F DIAST BP <80 MM HG: CPT | Performed by: INTERNAL MEDICINE

## 2023-04-18 PROCEDURE — 3008F BODY MASS INDEX DOCD: CPT | Performed by: INTERNAL MEDICINE

## 2023-04-18 PROCEDURE — 99204 OFFICE O/P NEW MOD 45 MIN: CPT | Performed by: INTERNAL MEDICINE

## 2023-04-18 RX ORDER — PREDNISONE 10 MG/1
TABLET ORAL
Qty: 30 TABLET | Refills: 0 | Status: SHIPPED | OUTPATIENT
Start: 2023-04-18

## 2023-04-18 RX ORDER — FLUTICASONE FUROATE, UMECLIDINIUM BROMIDE AND VILANTEROL TRIFENATATE 200; 62.5; 25 UG/1; UG/1; UG/1
1 POWDER RESPIRATORY (INHALATION) DAILY
Qty: 1 EACH | Refills: 5 | Status: SHIPPED | OUTPATIENT
Start: 2023-04-18

## 2023-04-18 RX ORDER — MONTELUKAST SODIUM 10 MG/1
10 TABLET ORAL NIGHTLY
Qty: 30 TABLET | Refills: 3 | Status: SHIPPED | OUTPATIENT
Start: 2023-04-18

## 2023-04-18 NOTE — PATIENT INSTRUCTIONS
Plan   -to begin trelegy one puff every morning-   - to begin singular every night - stop if dreams   - to begin prednsione   See me in 4 weeks   -     Ben Mane MD  Pulmonary Medicine  4/18/2023

## 2023-05-23 ENCOUNTER — OFFICE VISIT (OUTPATIENT)
Facility: CLINIC | Age: 78
End: 2023-05-23
Payer: MEDICARE

## 2023-05-23 VITALS
WEIGHT: 171 LBS | SYSTOLIC BLOOD PRESSURE: 122 MMHG | BODY MASS INDEX: 30.3 KG/M2 | DIASTOLIC BLOOD PRESSURE: 70 MMHG | OXYGEN SATURATION: 99 % | HEART RATE: 60 BPM | HEIGHT: 63 IN | RESPIRATION RATE: 16 BRPM

## 2023-05-23 DIAGNOSIS — R05.3 CHRONIC COUGH: Primary | ICD-10-CM

## 2023-05-23 PROCEDURE — 3074F SYST BP LT 130 MM HG: CPT | Performed by: INTERNAL MEDICINE

## 2023-05-23 PROCEDURE — 3008F BODY MASS INDEX DOCD: CPT | Performed by: INTERNAL MEDICINE

## 2023-05-23 PROCEDURE — 99214 OFFICE O/P EST MOD 30 MIN: CPT | Performed by: INTERNAL MEDICINE

## 2023-05-23 PROCEDURE — 3078F DIAST BP <80 MM HG: CPT | Performed by: INTERNAL MEDICINE

## 2023-05-23 PROCEDURE — 1159F MED LIST DOCD IN RCRD: CPT | Performed by: INTERNAL MEDICINE

## 2023-05-23 PROCEDURE — 1160F RVW MEDS BY RX/DR IN RCRD: CPT | Performed by: INTERNAL MEDICINE

## 2023-05-23 RX ORDER — FLUTICASONE FUROATE AND VILANTEROL 100; 25 UG/1; UG/1
1 POWDER RESPIRATORY (INHALATION) DAILY
Qty: 1 EACH | Refills: 5 | Status: SHIPPED | OUTPATIENT
Start: 2023-05-23

## 2023-05-23 NOTE — PATIENT INSTRUCTIONS
Plan   -- make appointment to see Dr Hien Mauro for follow up and taste issues   - to begin Breo one puff every morning -for 3 months   - see me in 3 months     -    Dwight El MD  Pulmonary Medicine  5/23/2023

## 2023-06-13 ENCOUNTER — OFFICE VISIT (OUTPATIENT)
Dept: INTERNAL MEDICINE CLINIC | Facility: CLINIC | Age: 78
End: 2023-06-13
Payer: MEDICARE

## 2023-06-13 VITALS
OXYGEN SATURATION: 98 % | WEIGHT: 173.38 LBS | TEMPERATURE: 97 F | SYSTOLIC BLOOD PRESSURE: 142 MMHG | HEIGHT: 63 IN | RESPIRATION RATE: 16 BRPM | DIASTOLIC BLOOD PRESSURE: 90 MMHG | HEART RATE: 78 BPM | BODY MASS INDEX: 30.72 KG/M2

## 2023-06-13 DIAGNOSIS — K22.2 ESOPHAGEAL STRICTURE: Chronic | ICD-10-CM

## 2023-06-13 DIAGNOSIS — J38.00 VOCAL CORD PARALYSIS: Chronic | ICD-10-CM

## 2023-06-13 DIAGNOSIS — R31.21 ASYMPTOMATIC MICROSCOPIC HEMATURIA: Chronic | ICD-10-CM

## 2023-06-13 DIAGNOSIS — R25.1 TREMOR: Chronic | ICD-10-CM

## 2023-06-13 DIAGNOSIS — I10 ESSENTIAL HYPERTENSION: Chronic | ICD-10-CM

## 2023-06-13 DIAGNOSIS — E05.90 HYPERTHYROIDISM: ICD-10-CM

## 2023-06-13 DIAGNOSIS — E11.65 TYPE 2 DIABETES MELLITUS WITH HYPERGLYCEMIA, WITHOUT LONG-TERM CURRENT USE OF INSULIN (HCC): ICD-10-CM

## 2023-06-13 DIAGNOSIS — Z00.00 ROUTINE GENERAL MEDICAL EXAMINATION AT A HEALTH CARE FACILITY: Primary | ICD-10-CM

## 2023-06-13 RX ORDER — ATORVASTATIN CALCIUM 10 MG/1
10 TABLET, FILM COATED ORAL NIGHTLY
Qty: 90 TABLET | Refills: 0 | Status: SHIPPED | OUTPATIENT
Start: 2023-06-13 | End: 2024-06-12

## 2023-06-13 RX ORDER — METFORMIN HYDROCHLORIDE 750 MG/1
750 TABLET, EXTENDED RELEASE ORAL DAILY
Qty: 90 TABLET | Refills: 0 | Status: SHIPPED | OUTPATIENT
Start: 2023-06-13

## 2023-06-13 RX ORDER — PANTOPRAZOLE SODIUM 40 MG/1
TABLET, DELAYED RELEASE ORAL
Qty: 90 TABLET | Refills: 0 | Status: SHIPPED | OUTPATIENT
Start: 2023-06-13

## 2023-06-13 RX ORDER — GLIPIZIDE 2.5 MG/1
2.5 TABLET, EXTENDED RELEASE ORAL
Qty: 90 TABLET | Refills: 0 | Status: SHIPPED | OUTPATIENT
Start: 2023-06-13 | End: 2024-06-12

## 2023-06-13 RX ORDER — ENALAPRIL MALEATE 20 MG/1
20 TABLET ORAL DAILY
Qty: 90 TABLET | Refills: 0 | Status: SHIPPED | OUTPATIENT
Start: 2023-06-13

## 2023-06-13 RX ORDER — METHIMAZOLE 5 MG/1
10 TABLET ORAL 2 TIMES DAILY
Qty: 270 TABLET | Refills: 0 | Status: SHIPPED | OUTPATIENT
Start: 2023-06-13

## 2023-06-13 RX ORDER — PRIMIDONE 50 MG/1
50 TABLET ORAL 2 TIMES DAILY
Qty: 180 TABLET | Refills: 0 | Status: SHIPPED | OUTPATIENT
Start: 2023-06-13

## 2023-06-13 RX ORDER — PROPRANOLOL HYDROCHLORIDE 80 MG/1
80 CAPSULE, EXTENDED RELEASE ORAL DAILY
Qty: 90 CAPSULE | Refills: 3 | Status: SHIPPED | OUTPATIENT
Start: 2023-06-13 | End: 2024-03-09

## 2023-06-14 ENCOUNTER — LAB ENCOUNTER (OUTPATIENT)
Dept: LAB | Age: 78
End: 2023-06-14
Attending: INTERNAL MEDICINE
Payer: MEDICARE

## 2023-06-14 LAB
ALBUMIN SERPL-MCNC: 3.6 G/DL (ref 3.4–5)
ALBUMIN/GLOB SERPL: 1 {RATIO} (ref 1–2)
ALP LIVER SERPL-CCNC: 62 U/L
ALT SERPL-CCNC: 25 U/L
ANION GAP SERPL CALC-SCNC: 6 MMOL/L (ref 0–18)
AST SERPL-CCNC: 22 U/L (ref 15–37)
BASOPHILS # BLD AUTO: 0.05 X10(3) UL (ref 0–0.2)
BASOPHILS NFR BLD AUTO: 0.8 %
BILIRUB SERPL-MCNC: 0.7 MG/DL (ref 0.1–2)
BILIRUB UR QL STRIP.AUTO: NEGATIVE
BUN BLD-MCNC: 12 MG/DL (ref 7–18)
CALCIUM BLD-MCNC: 9 MG/DL (ref 8.5–10.1)
CHLORIDE SERPL-SCNC: 107 MMOL/L (ref 98–112)
CHOLEST SERPL-MCNC: 94 MG/DL (ref ?–200)
CLARITY UR REFRACT.AUTO: CLEAR
CO2 SERPL-SCNC: 25 MMOL/L (ref 21–32)
COLOR UR AUTO: YELLOW
CREAT BLD-MCNC: 1.19 MG/DL
CREAT UR-SCNC: 87.2 MG/DL
EOSINOPHIL # BLD AUTO: 0.18 X10(3) UL (ref 0–0.7)
EOSINOPHIL NFR BLD AUTO: 2.7 %
ERYTHROCYTE [DISTWIDTH] IN BLOOD BY AUTOMATED COUNT: 14.7 %
EST. AVERAGE GLUCOSE BLD GHB EST-MCNC: 171 MG/DL (ref 68–126)
FASTING PATIENT LIPID ANSWER: YES
FASTING STATUS PATIENT QL REPORTED: YES
GFR SERPLBLD BASED ON 1.73 SQ M-ARVRAT: 63 ML/MIN/1.73M2 (ref 60–?)
GLOBULIN PLAS-MCNC: 3.6 G/DL (ref 2.8–4.4)
GLUCOSE BLD-MCNC: 111 MG/DL (ref 70–99)
GLUCOSE UR STRIP.AUTO-MCNC: NEGATIVE MG/DL
HBA1C MFR BLD: 7.6 % (ref ?–5.7)
HCT VFR BLD AUTO: 38.6 %
HDLC SERPL-MCNC: 45 MG/DL (ref 40–59)
HGB BLD-MCNC: 12.4 G/DL
IMM GRANULOCYTES # BLD AUTO: 0.02 X10(3) UL (ref 0–1)
IMM GRANULOCYTES NFR BLD: 0.3 %
KETONES UR STRIP.AUTO-MCNC: NEGATIVE MG/DL
LDLC SERPL CALC-MCNC: 31 MG/DL (ref ?–100)
LEUKOCYTE ESTERASE UR QL STRIP.AUTO: NEGATIVE
LYMPHOCYTES # BLD AUTO: 2.43 X10(3) UL (ref 1–4)
LYMPHOCYTES NFR BLD AUTO: 37 %
MCH RBC QN AUTO: 30.6 PG (ref 26–34)
MCHC RBC AUTO-ENTMCNC: 32.1 G/DL (ref 31–37)
MCV RBC AUTO: 95.3 FL
MICROALBUMIN UR-MCNC: 1.47 MG/DL
MICROALBUMIN/CREAT 24H UR-RTO: 16.9 UG/MG (ref ?–30)
MONOCYTES # BLD AUTO: 0.59 X10(3) UL (ref 0.1–1)
MONOCYTES NFR BLD AUTO: 9 %
NEUTROPHILS # BLD AUTO: 3.3 X10 (3) UL (ref 1.5–7.7)
NEUTROPHILS # BLD AUTO: 3.3 X10(3) UL (ref 1.5–7.7)
NEUTROPHILS NFR BLD AUTO: 50.2 %
NITRITE UR QL STRIP.AUTO: NEGATIVE
NONHDLC SERPL-MCNC: 49 MG/DL (ref ?–130)
OSMOLALITY SERPL CALC.SUM OF ELEC: 286 MOSM/KG (ref 275–295)
PH UR STRIP.AUTO: 6 [PH] (ref 5–8)
PLATELET # BLD AUTO: 209 10(3)UL (ref 150–450)
POTASSIUM SERPL-SCNC: 4.5 MMOL/L (ref 3.5–5.1)
PROT SERPL-MCNC: 7.2 G/DL (ref 6.4–8.2)
PROT UR STRIP.AUTO-MCNC: NEGATIVE MG/DL
PSA SERPL-MCNC: 0.39 NG/ML (ref ?–4)
RBC # BLD AUTO: 4.05 X10(6)UL
RBC UR QL AUTO: NEGATIVE
SODIUM SERPL-SCNC: 138 MMOL/L (ref 136–145)
SP GR UR STRIP.AUTO: 1.01 (ref 1–1.03)
T4 SERPL-MCNC: 9.7 UG/DL
TRIGL SERPL-MCNC: 93 MG/DL (ref 30–149)
TSI SER-ACNC: 11.9 MIU/ML (ref 0.36–3.74)
UROBILINOGEN UR STRIP.AUTO-MCNC: <2 MG/DL
VLDLC SERPL CALC-MCNC: 12 MG/DL (ref 0–30)
WBC # BLD AUTO: 6.6 X10(3) UL (ref 4–11)

## 2023-06-14 PROCEDURE — 84153 ASSAY OF PSA TOTAL: CPT | Performed by: INTERNAL MEDICINE

## 2023-06-14 PROCEDURE — 84436 ASSAY OF TOTAL THYROXINE: CPT | Performed by: INTERNAL MEDICINE

## 2023-06-14 PROCEDURE — 84443 ASSAY THYROID STIM HORMONE: CPT | Performed by: INTERNAL MEDICINE

## 2023-06-14 PROCEDURE — 80053 COMPREHEN METABOLIC PANEL: CPT | Performed by: INTERNAL MEDICINE

## 2023-06-14 PROCEDURE — 83036 HEMOGLOBIN GLYCOSYLATED A1C: CPT | Performed by: INTERNAL MEDICINE

## 2023-06-14 PROCEDURE — 85025 COMPLETE CBC W/AUTO DIFF WBC: CPT | Performed by: INTERNAL MEDICINE

## 2023-06-14 PROCEDURE — 80061 LIPID PANEL: CPT | Performed by: INTERNAL MEDICINE

## 2023-06-14 PROCEDURE — 82043 UR ALBUMIN QUANTITATIVE: CPT | Performed by: INTERNAL MEDICINE

## 2023-06-14 PROCEDURE — 36415 COLL VENOUS BLD VENIPUNCTURE: CPT | Performed by: INTERNAL MEDICINE

## 2023-06-14 PROCEDURE — 81003 URINALYSIS AUTO W/O SCOPE: CPT | Performed by: INTERNAL MEDICINE

## 2023-06-14 PROCEDURE — 82570 ASSAY OF URINE CREATININE: CPT | Performed by: INTERNAL MEDICINE

## 2023-07-10 ENCOUNTER — OFFICE VISIT (OUTPATIENT)
Dept: INTERNAL MEDICINE CLINIC | Facility: CLINIC | Age: 78
End: 2023-07-10
Payer: MEDICARE

## 2023-07-10 VITALS
WEIGHT: 170.81 LBS | HEART RATE: 68 BPM | BODY MASS INDEX: 30.27 KG/M2 | SYSTOLIC BLOOD PRESSURE: 120 MMHG | RESPIRATION RATE: 16 BRPM | DIASTOLIC BLOOD PRESSURE: 60 MMHG | OXYGEN SATURATION: 95 % | HEIGHT: 63 IN | TEMPERATURE: 97 F

## 2023-07-10 DIAGNOSIS — E05.90 HYPERTHYROIDISM: ICD-10-CM

## 2023-07-10 DIAGNOSIS — E11.65 TYPE 2 DIABETES MELLITUS WITH HYPERGLYCEMIA, WITHOUT LONG-TERM CURRENT USE OF INSULIN (HCC): ICD-10-CM

## 2023-07-10 PROBLEM — J44.89 ASTHMA WITH COPD (CHRONIC OBSTRUCTIVE PULMONARY DISEASE): Chronic | Status: ACTIVE | Noted: 2023-07-10

## 2023-07-10 PROBLEM — J44.89 ASTHMA WITH COPD (CHRONIC OBSTRUCTIVE PULMONARY DISEASE) (HCC): Chronic | Status: ACTIVE | Noted: 2023-07-10

## 2023-07-10 PROBLEM — J44.9 ASTHMA WITH COPD (CHRONIC OBSTRUCTIVE PULMONARY DISEASE) (HCC): Chronic | Status: ACTIVE | Noted: 2023-07-10

## 2023-07-10 PROCEDURE — 3008F BODY MASS INDEX DOCD: CPT | Performed by: INTERNAL MEDICINE

## 2023-07-10 PROCEDURE — 3074F SYST BP LT 130 MM HG: CPT | Performed by: INTERNAL MEDICINE

## 2023-07-10 PROCEDURE — 99212 OFFICE O/P EST SF 10 MIN: CPT | Performed by: INTERNAL MEDICINE

## 2023-07-10 PROCEDURE — 3078F DIAST BP <80 MM HG: CPT | Performed by: INTERNAL MEDICINE

## 2023-07-10 RX ORDER — METHIMAZOLE 5 MG/1
5 TABLET ORAL 3 TIMES DAILY
Qty: 270 TABLET | Refills: 0 | COMMUNITY
Start: 2023-07-10

## 2023-07-10 RX ORDER — METFORMIN HYDROCHLORIDE 750 MG/1
750 TABLET, EXTENDED RELEASE ORAL 2 TIMES DAILY WITH MEALS
Qty: 90 TABLET | Refills: 0 | COMMUNITY
Start: 2023-07-10

## 2023-07-10 RX ORDER — MONTELUKAST SODIUM 10 MG/1
10 TABLET ORAL NIGHTLY
COMMUNITY
Start: 2023-06-16 | End: 2023-07-10

## 2023-07-10 RX ORDER — FLUTICASONE FUROATE AND VILANTEROL TRIFENATATE 100; 25 UG/1; UG/1
1 POWDER RESPIRATORY (INHALATION) DAILY
COMMUNITY
Start: 2023-06-15

## 2023-07-13 ENCOUNTER — OFFICE VISIT (OUTPATIENT)
Facility: LOCATION | Age: 78
End: 2023-07-13
Payer: MEDICARE

## 2023-07-13 DIAGNOSIS — H65.02 NON-RECURRENT ACUTE SEROUS OTITIS MEDIA OF LEFT EAR: Primary | ICD-10-CM

## 2023-07-13 DIAGNOSIS — K11.23 CHRONIC SIALOADENITIS: ICD-10-CM

## 2023-07-13 PROCEDURE — 99214 OFFICE O/P EST MOD 30 MIN: CPT | Performed by: OTOLARYNGOLOGY

## 2023-07-13 PROCEDURE — 1160F RVW MEDS BY RX/DR IN RCRD: CPT | Performed by: OTOLARYNGOLOGY

## 2023-07-13 PROCEDURE — 1159F MED LIST DOCD IN RCRD: CPT | Performed by: OTOLARYNGOLOGY

## 2023-07-13 NOTE — PROGRESS NOTES
Josh Monroe is a 65year old male. Patient presents with: Infection: ear    HPI:   Patient is here for recheck of his ear as well and has complains of altered taste. He feels that he can taste hot or spicy items but nothing that is bland. He denies fevers chills nausea or vomiting. I also question whether he notices swelling within his right cheek and really he does not. Patiently complains of dry mouth. Current Outpatient Medications   Medication Sig Dispense Refill    BREO ELLIPTA 100-25 MCG/ACT Inhalation Aerosol Powder, Breath Activated Inhale 1 puff into the lungs daily. methIMAzole 5 MG Oral Tab Take 1 tablet (5 mg total) by mouth 3 (three) times daily. 270 tablet 0    metFORMIN  MG Oral Tablet 24 Hr Take 1 tablet (750 mg total) by mouth 2 (two) times daily with meals. 90 tablet 0    atorvastatin 10 MG Oral Tab Take 1 tablet (10 mg total) by mouth nightly. 90 tablet 0    enalapril 20 MG Oral Tab Take 1 tablet (20 mg total) by mouth daily. 90 tablet 0    glipiZIDE ER 2.5 MG Oral Tablet 24 Hr Take 1 tablet (2.5 mg total) by mouth daily with breakfast. 90 tablet 0    pantoprazole 40 MG Oral Tab EC TAKE ONE TABLET ONCE DAILY 30 MINUTES PRIOR TO BREAKFAST. 90 tablet 0    primidone 50 MG Oral Tab Take 1 tablet (50 mg total) by mouth 2 (two) times daily. 180 tablet 0    Propranolol HCl ER 80 MG Oral Capsule SR 24 Hr Take 1 capsule (80 mg total) by mouth daily. 90 capsule 3    Glucose Blood (ACCU-CHEK KIRBY PLUS) In Vitro Strip Use as directed. 300 strip 1    Accu-Chek FastClix Lancets Does not apply Misc 1 lancet by Finger stick route daily. Use as directed. 300 each 1    Multiple Vitamins-Minerals (TAB-A-JUSTA MAXIMUM) Oral Tab Take 1 tablet by mouth daily.         Past Medical History:   Diagnosis Date    Adenomatous colon polyp 10/31/2012    Arthritis     Benign prostatic hypertrophy with urinary retention 7/21/2015    Chronic cough     Chronic sinusitis 11/13/2017    Esophageal reflux Essential and other specified forms of tremor 2016    Gall stones 2015    High blood pressure     Hoarseness, chronic     HTN (hypertension)     Osteoarthrosis, localized, primary, knee, left 2015    Osteoarthrosis, localized, primary, knee, right 2015    Osteoarthrosis, unspecified whether generalized or localized, unspecified site     Pain in joints     Painful swallowing     Personal history of colonic polyps 3/21/2019    PONV (postoperative nausea and vomiting)     Prediabetes     borderline    Problems with swallowing     trouble with solid    Second degree hemorrhoids 3/21/2019    Shortness of breath     Sleep apnea     no cpap    Sleep disturbance     Sputum production     Visual impairment     reading glasses    Vocal cord paralysis 11/10/2016    Wheezing       Social History:  Social History     Socioeconomic History    Marital status:    Tobacco Use    Smoking status: Former     Packs/day: 0.50     Years: 20.00     Pack years: 10.00     Types: Cigarettes     Quit date: 1984     Years since quittin.0    Smokeless tobacco: Never   Vaping Use    Vaping Use: Never used   Substance and Sexual Activity    Alcohol use: No    Drug use: No   Other Topics Concern    Caffeine Concern Yes     Comment: 1 tea daily    Exercise Yes     Comment: daily gym   Social History Narrative    ** Merged History Encounter **             2 kids    Retired        Diet: vegetarian    Exercise: nothing formal    Sleep: ok    Stress: low      Past Surgical History:   Procedure Laterality Date    CHOLECYSTECTOMY  2015    COLONOSCOPY  10/2012    3 mm adenoma.  repeat 5 yrs, Dr Salinas Due  10/22/2012    Procedure: COLONOSCOPY, POSSIBLE BIOPSY, POSSIBLE POLYPECTOMY 54796;  Surgeon: John Rivero MD;  Location: [address] [place] [address] URETERAL STENT  9-1-15    TURP    LAPAROSCOPIC CHOLECYSTECTOMY N/A 7/10/2015    Procedure: LAPAROSCOPIC CHOLECYSTECTOMY; Surgeon: Bismark Velazquez MD;  Location: [facility] MAIN OR    OTHER SURGICAL HISTORY  9/1/15    CYSTOSCOPY, TRANS URETHRAL RESECTION OF PROSTATE    PATIENT DOCUMENTED NOT TO HAVE EXPERIENCED ANY OF THE FOLLOWING EVENTS  10/22/2012    Procedure: COLONOSCOPY, POSSIBLE BIOPSY, POSSIBLE POLYPECTOMY 84659;  Surgeon: Meghan Cotto MD;  Location: [address] [place] [address]    [facility] ANTIBIOTIC SURGICAL SITE INFECTION PROPHYLAXIS. 10/22/2012    Procedure: COLONOSCOPY, POSSIBLE BIOPSY, POSSIBLE POLYPECTOMY 82902;  Surgeon: Meghan Cotto MD;  Location: [place] [address] GI ENDOSCOPY,EXAM  09/2020         REVIEW OF SYSTEMS:   GENERAL HEALTH: feels well otherwise  GENERAL : denies fever, chills, sweats, weight loss, weight gain  SKIN: denies any unusual skin lesions or rashes  RESPIRATORY: denies shortness of breath with exertion  NEURO: denies headaches    EXAM:   There were no vitals taken for this visit. System Findings Details   Skin Normal Inspection - Normal.   Constitutional Normal Overall appearance - Normal.   Head/Face Normal Facial features - Normal. Eyebrows - Normal. Skull - Normal.   Oral/Oropharynx Normal Lips - Normal, Tonsils - Normal, Tongue - Normal    Nasal Normal External nose - Normal. Nasal septum - Normal, Turbinates - Normal   Neurological Normal Memory - Normal. Cranial nerves - Cranial nerves II through XII grossly intact. Neck Exam Normal Inspection - Normal. Palpation - Normal. Parotid gland -right side is significant more prominent than left without the distinct nodule. No stone was palpable. Thyroid gland - Normal.     Psychiatric Normal Orientation - Oriented to time, place, person & situation. Appropriate mood and affect. Lymph Detail Normal Submental. Submandibular. Anterior cervical. Posterior cervical. Supraclavicular.    Eyes Normal Conjunctiva - Right: Normal, Left: Normal. Pupil - Right: Normal, Left: Normal.    Ears Normal Inspection - Right: Normal, Left: Normal. Canal - Left: Normal. TM - Right: Normal, Left: Dry perforation without infection. ASSESSMENT AND PLAN:   1. Non-recurrent acute serous otitis media of left ear  Perforation appears dry at this point. Would not intervene with anything else at this point. 2. Chronic sialoadenitis  As there appears to be is some asymmetry as well as the disorder of taste, he will undergo lab work as well as CT of neck. - CT SOFT TISSUE OF NECK(CONTRAST ONLY) (CPT=70491); Future  - SALINA BY IFA WITH REFLEX  - AMYLASE; Future  Further recommendations after the above. The patient indicates understanding of these issues and agrees to the plan.       Shahbaz Soto MD  7/13/2023  12:03 PM

## 2023-08-29 ENCOUNTER — LAB ENCOUNTER (OUTPATIENT)
Dept: LAB | Age: 78
End: 2023-08-29
Attending: INTERNAL MEDICINE
Payer: MEDICARE

## 2023-08-29 ENCOUNTER — TELEPHONE (OUTPATIENT)
Dept: INTERNAL MEDICINE CLINIC | Facility: CLINIC | Age: 78
End: 2023-08-29

## 2023-08-29 DIAGNOSIS — Z13.0 SCREENING FOR ENDOCRINE, NUTRITIONAL, METABOLIC AND IMMUNITY DISORDER: Primary | ICD-10-CM

## 2023-08-29 DIAGNOSIS — Z12.5 ENCOUNTER FOR SCREENING FOR MALIGNANT NEOPLASM OF PROSTATE: ICD-10-CM

## 2023-08-29 DIAGNOSIS — E55.9 VITAMIN D DEFICIENCY: ICD-10-CM

## 2023-08-29 DIAGNOSIS — Z13.21 SCREENING FOR ENDOCRINE, NUTRITIONAL, METABOLIC AND IMMUNITY DISORDER: Primary | ICD-10-CM

## 2023-08-29 DIAGNOSIS — Z13.228 SCREENING FOR ENDOCRINE, NUTRITIONAL, METABOLIC AND IMMUNITY DISORDER: Primary | ICD-10-CM

## 2023-08-29 DIAGNOSIS — Z13.29 SCREENING FOR ENDOCRINE, NUTRITIONAL, METABOLIC AND IMMUNITY DISORDER: Primary | ICD-10-CM

## 2023-08-29 LAB
ALBUMIN SERPL-MCNC: 3.6 G/DL (ref 3.4–5)
ALBUMIN/GLOB SERPL: 1 {RATIO} (ref 1–2)
ALP LIVER SERPL-CCNC: 63 U/L
ALT SERPL-CCNC: 20 U/L
ANION GAP SERPL CALC-SCNC: 4 MMOL/L (ref 0–18)
AST SERPL-CCNC: 21 U/L (ref 15–37)
BILIRUB SERPL-MCNC: 0.3 MG/DL (ref 0.1–2)
BUN BLD-MCNC: 12 MG/DL (ref 7–18)
CALCIUM BLD-MCNC: 8.7 MG/DL (ref 8.5–10.1)
CHLORIDE SERPL-SCNC: 108 MMOL/L (ref 98–112)
CHOLEST SERPL-MCNC: 86 MG/DL (ref ?–200)
CO2 SERPL-SCNC: 26 MMOL/L (ref 21–32)
COMPLEXED PSA SERPL-MCNC: 0.33 NG/ML (ref ?–4)
CREAT BLD-MCNC: 1.38 MG/DL
EGFRCR SERPLBLD CKD-EPI 2021: 52 ML/MIN/1.73M2 (ref 60–?)
ERYTHROCYTE [DISTWIDTH] IN BLOOD BY AUTOMATED COUNT: 13.2 %
EST. AVERAGE GLUCOSE BLD GHB EST-MCNC: 169 MG/DL (ref 68–126)
FASTING PATIENT LIPID ANSWER: NO
FASTING STATUS PATIENT QL REPORTED: NO
GLOBULIN PLAS-MCNC: 3.5 G/DL (ref 2.8–4.4)
GLUCOSE BLD-MCNC: 223 MG/DL (ref 70–99)
HBA1C MFR BLD: 7.5 % (ref ?–5.7)
HCT VFR BLD AUTO: 35.1 %
HDLC SERPL-MCNC: 39 MG/DL (ref 40–59)
HGB BLD-MCNC: 11.6 G/DL
LDLC SERPL CALC-MCNC: 24 MG/DL (ref ?–100)
MCH RBC QN AUTO: 30.5 PG (ref 26–34)
MCHC RBC AUTO-ENTMCNC: 33 G/DL (ref 31–37)
MCV RBC AUTO: 92.4 FL
NONHDLC SERPL-MCNC: 47 MG/DL (ref ?–130)
OSMOLALITY SERPL CALC.SUM OF ELEC: 293 MOSM/KG (ref 275–295)
PLATELET # BLD AUTO: 213 10(3)UL (ref 150–450)
POTASSIUM SERPL-SCNC: 4.7 MMOL/L (ref 3.5–5.1)
PROT SERPL-MCNC: 7.1 G/DL (ref 6.4–8.2)
RBC # BLD AUTO: 3.8 X10(6)UL
SODIUM SERPL-SCNC: 138 MMOL/L (ref 136–145)
T4 FREE SERPL-MCNC: 1 NG/DL (ref 0.8–1.7)
TRIGL SERPL-MCNC: 133 MG/DL (ref 30–149)
TSI SER-ACNC: 9.65 MIU/ML (ref 0.36–3.74)
VIT D+METAB SERPL-MCNC: 58.4 NG/ML (ref 30–100)
VLDLC SERPL CALC-MCNC: 17 MG/DL (ref 0–30)
WBC # BLD AUTO: 6.6 X10(3) UL (ref 4–11)

## 2023-08-29 PROCEDURE — 80061 LIPID PANEL: CPT | Performed by: INTERNAL MEDICINE

## 2023-08-29 PROCEDURE — 80053 COMPREHEN METABOLIC PANEL: CPT | Performed by: INTERNAL MEDICINE

## 2023-08-29 PROCEDURE — 84443 ASSAY THYROID STIM HORMONE: CPT | Performed by: INTERNAL MEDICINE

## 2023-08-29 PROCEDURE — 82306 VITAMIN D 25 HYDROXY: CPT | Performed by: INTERNAL MEDICINE

## 2023-08-29 PROCEDURE — 84439 ASSAY OF FREE THYROXINE: CPT | Performed by: INTERNAL MEDICINE

## 2023-08-29 PROCEDURE — 85027 COMPLETE CBC AUTOMATED: CPT | Performed by: INTERNAL MEDICINE

## 2023-08-29 PROCEDURE — 36415 COLL VENOUS BLD VENIPUNCTURE: CPT | Performed by: INTERNAL MEDICINE

## 2023-08-29 PROCEDURE — 83036 HEMOGLOBIN GLYCOSYLATED A1C: CPT | Performed by: INTERNAL MEDICINE

## 2023-08-30 DIAGNOSIS — R05.3 CHRONIC COUGH: Primary | ICD-10-CM

## 2023-08-31 RX ORDER — MONTELUKAST SODIUM 10 MG/1
10 TABLET ORAL NIGHTLY
Qty: 30 TABLET | Refills: 5 | Status: SHIPPED | OUTPATIENT
Start: 2023-08-31

## 2023-09-07 ENCOUNTER — OFFICE VISIT (OUTPATIENT)
Dept: INTERNAL MEDICINE CLINIC | Facility: CLINIC | Age: 78
End: 2023-09-07

## 2023-09-07 VITALS
WEIGHT: 171 LBS | HEART RATE: 71 BPM | BODY MASS INDEX: 30.3 KG/M2 | DIASTOLIC BLOOD PRESSURE: 74 MMHG | HEIGHT: 63 IN | SYSTOLIC BLOOD PRESSURE: 134 MMHG

## 2023-09-07 DIAGNOSIS — J45.901 ASTHMA EXACERBATION IN COPD: Primary | ICD-10-CM

## 2023-09-07 DIAGNOSIS — E11.9 TYPE 2 DIABETES MELLITUS WITHOUT COMPLICATION, WITHOUT LONG-TERM CURRENT USE OF INSULIN (HCC): ICD-10-CM

## 2023-09-07 DIAGNOSIS — E05.90 HYPERTHYROIDISM: ICD-10-CM

## 2023-09-07 DIAGNOSIS — E11.69 HYPERLIPIDEMIA ASSOCIATED WITH TYPE 2 DIABETES MELLITUS: ICD-10-CM

## 2023-09-07 DIAGNOSIS — J44.1 ASTHMA EXACERBATION IN COPD: Primary | ICD-10-CM

## 2023-09-07 DIAGNOSIS — R25.1 TREMOR: Chronic | ICD-10-CM

## 2023-09-07 DIAGNOSIS — E78.5 HYPERLIPIDEMIA ASSOCIATED WITH TYPE 2 DIABETES MELLITUS: ICD-10-CM

## 2023-09-07 DIAGNOSIS — I15.2 HYPERTENSION ASSOCIATED WITH TYPE 2 DIABETES MELLITUS: ICD-10-CM

## 2023-09-07 DIAGNOSIS — E11.59 HYPERTENSION ASSOCIATED WITH TYPE 2 DIABETES MELLITUS: ICD-10-CM

## 2023-09-07 DIAGNOSIS — K21.9 GASTROESOPHAGEAL REFLUX DISEASE WITHOUT ESOPHAGITIS: ICD-10-CM

## 2023-09-07 DIAGNOSIS — N18.31 STAGE 3A CHRONIC KIDNEY DISEASE (HCC): ICD-10-CM

## 2023-09-07 PROBLEM — N18.30 CKD (CHRONIC KIDNEY DISEASE) STAGE 3, GFR 30-59 ML/MIN (HCC): Chronic | Status: ACTIVE | Noted: 2023-09-07

## 2023-09-07 PROBLEM — K22.2 ESOPHAGEAL STRICTURE: Chronic | Status: RESOLVED | Noted: 2018-04-02 | Resolved: 2023-09-07

## 2023-09-07 PROCEDURE — 1160F RVW MEDS BY RX/DR IN RCRD: CPT | Performed by: INTERNAL MEDICINE

## 2023-09-07 PROCEDURE — 3008F BODY MASS INDEX DOCD: CPT | Performed by: INTERNAL MEDICINE

## 2023-09-07 PROCEDURE — 3075F SYST BP GE 130 - 139MM HG: CPT | Performed by: INTERNAL MEDICINE

## 2023-09-07 PROCEDURE — 3078F DIAST BP <80 MM HG: CPT | Performed by: INTERNAL MEDICINE

## 2023-09-07 PROCEDURE — 99204 OFFICE O/P NEW MOD 45 MIN: CPT | Performed by: INTERNAL MEDICINE

## 2023-09-07 PROCEDURE — 1159F MED LIST DOCD IN RCRD: CPT | Performed by: INTERNAL MEDICINE

## 2023-09-07 RX ORDER — NAPROXEN 500 MG/1
500 TABLET ORAL ONCE
COMMUNITY
End: 2023-09-11

## 2023-09-07 RX ORDER — PANTOPRAZOLE SODIUM 40 MG/1
40 TABLET, DELAYED RELEASE ORAL
Qty: 90 TABLET | Refills: 9 | Status: SHIPPED | OUTPATIENT
Start: 2023-09-07 | End: 2023-09-11

## 2023-09-07 RX ORDER — PROPRANOLOL HYDROCHLORIDE 80 MG/1
80 CAPSULE, EXTENDED RELEASE ORAL DAILY
Qty: 90 CAPSULE | Refills: 9 | Status: SHIPPED | OUTPATIENT
Start: 2023-09-07 | End: 2023-09-11

## 2023-09-07 RX ORDER — FLUTICASONE PROPIONATE AND SALMETEROL 250; 50 UG/1; UG/1
1 POWDER RESPIRATORY (INHALATION) EVERY 12 HOURS SCHEDULED
Qty: 3 EACH | Refills: 9 | Status: SHIPPED | OUTPATIENT
Start: 2023-09-07 | End: 2023-09-11

## 2023-09-07 RX ORDER — EMPAGLIFLOZIN 25 MG/1
1 TABLET, FILM COATED ORAL DAILY
Qty: 90 TABLET | Refills: 9 | Status: SHIPPED | OUTPATIENT
Start: 2023-09-07 | End: 2023-09-11

## 2023-09-07 RX ORDER — GLIPIZIDE 2.5 MG/1
2.5 TABLET, EXTENDED RELEASE ORAL
Qty: 90 TABLET | Refills: 1 | Status: SHIPPED | OUTPATIENT
Start: 2023-09-07 | End: 2023-09-11

## 2023-09-07 RX ORDER — PREDNISONE 20 MG/1
20 TABLET ORAL 2 TIMES DAILY WITH MEALS
Qty: 14 TABLET | Refills: 0 | Status: SHIPPED | OUTPATIENT
Start: 2023-09-07 | End: 2023-09-11

## 2023-09-07 RX ORDER — METHIMAZOLE 5 MG/1
5 TABLET ORAL 3 TIMES DAILY
Qty: 270 TABLET | Refills: 9 | Status: SHIPPED | OUTPATIENT
Start: 2023-09-07 | End: 2023-09-11

## 2023-09-07 RX ORDER — ATORVASTATIN CALCIUM 10 MG/1
10 TABLET, FILM COATED ORAL NIGHTLY
Qty: 90 TABLET | Refills: 9 | Status: SHIPPED | OUTPATIENT
Start: 2023-09-07 | End: 2023-09-11

## 2023-09-07 RX ORDER — TELMISARTAN 80 MG/1
80 TABLET ORAL DAILY
Qty: 90 TABLET | Refills: 0 | Status: SHIPPED | OUTPATIENT
Start: 2023-09-07 | End: 2023-09-11

## 2023-09-07 RX ORDER — METFORMIN HYDROCHLORIDE 750 MG/1
750 TABLET, EXTENDED RELEASE ORAL 2 TIMES DAILY WITH MEALS
Qty: 180 TABLET | Refills: 9 | Status: SHIPPED | OUTPATIENT
Start: 2023-09-07 | End: 2023-09-11

## 2023-09-07 RX ORDER — TELMISARTAN 80 MG/1
80 TABLET ORAL DAILY
Qty: 90 TABLET | Refills: 9 | Status: SHIPPED | OUTPATIENT
Start: 2023-09-07 | End: 2023-09-07

## 2023-09-07 RX ORDER — PRIMIDONE 50 MG/1
50 TABLET ORAL 2 TIMES DAILY
Qty: 180 TABLET | Refills: 9 | Status: SHIPPED | OUTPATIENT
Start: 2023-09-07 | End: 2023-09-11

## 2023-09-08 ENCOUNTER — TELEPHONE (OUTPATIENT)
Dept: INTERNAL MEDICINE CLINIC | Facility: CLINIC | Age: 78
End: 2023-09-08

## 2023-09-08 DIAGNOSIS — E11.69 HYPERLIPIDEMIA ASSOCIATED WITH TYPE 2 DIABETES MELLITUS: ICD-10-CM

## 2023-09-08 DIAGNOSIS — E11.59 HYPERTENSION ASSOCIATED WITH TYPE 2 DIABETES MELLITUS: ICD-10-CM

## 2023-09-08 DIAGNOSIS — K21.9 GASTROESOPHAGEAL REFLUX DISEASE WITHOUT ESOPHAGITIS: ICD-10-CM

## 2023-09-08 DIAGNOSIS — J44.1 ASTHMA EXACERBATION IN COPD: ICD-10-CM

## 2023-09-08 DIAGNOSIS — E78.5 HYPERLIPIDEMIA ASSOCIATED WITH TYPE 2 DIABETES MELLITUS: ICD-10-CM

## 2023-09-08 DIAGNOSIS — R25.1 TREMOR: Chronic | ICD-10-CM

## 2023-09-08 DIAGNOSIS — N18.31 STAGE 3A CHRONIC KIDNEY DISEASE (HCC): ICD-10-CM

## 2023-09-08 DIAGNOSIS — E11.9 TYPE 2 DIABETES MELLITUS WITHOUT COMPLICATION, WITHOUT LONG-TERM CURRENT USE OF INSULIN (HCC): ICD-10-CM

## 2023-09-08 DIAGNOSIS — E05.90 HYPERTHYROIDISM: ICD-10-CM

## 2023-09-08 DIAGNOSIS — I15.2 HYPERTENSION ASSOCIATED WITH TYPE 2 DIABETES MELLITUS: ICD-10-CM

## 2023-09-08 DIAGNOSIS — J45.901 ASTHMA EXACERBATION IN COPD: ICD-10-CM

## 2023-09-08 NOTE — TELEPHONE ENCOUNTER
Pt walked into office stating that all medication ordered on 9/7/2023 office visit was sent to incorrect pharmacy. Pt states that medications should be sent to [facility] in [facility] in the [employer]. PSR updated pharmacy in [place] but needs medication rerouted to correct pharmacy. Please advise.

## 2023-09-11 RX ORDER — GLIPIZIDE 2.5 MG/1
2.5 TABLET, EXTENDED RELEASE ORAL
Qty: 90 TABLET | Refills: 3 | Status: SHIPPED | OUTPATIENT
Start: 2023-09-11

## 2023-09-11 RX ORDER — PROPRANOLOL HYDROCHLORIDE 80 MG/1
80 CAPSULE, EXTENDED RELEASE ORAL DAILY
Qty: 90 CAPSULE | Refills: 3 | Status: SHIPPED | OUTPATIENT
Start: 2023-09-11

## 2023-09-11 RX ORDER — FLUTICASONE PROPIONATE AND SALMETEROL 250; 50 UG/1; UG/1
1 POWDER RESPIRATORY (INHALATION) EVERY 12 HOURS SCHEDULED
Qty: 3 EACH | Refills: 3 | Status: SHIPPED | OUTPATIENT
Start: 2023-09-11

## 2023-09-11 RX ORDER — TELMISARTAN 80 MG/1
80 TABLET ORAL DAILY
Qty: 90 TABLET | Refills: 3 | Status: SHIPPED | OUTPATIENT
Start: 2023-09-11

## 2023-09-11 RX ORDER — PRIMIDONE 50 MG/1
50 TABLET ORAL 2 TIMES DAILY
Qty: 180 TABLET | Refills: 3 | Status: SHIPPED | OUTPATIENT
Start: 2023-09-11

## 2023-09-11 RX ORDER — NAPROXEN 500 MG/1
500 TABLET ORAL ONCE
Qty: 1 TABLET | Refills: 0 | Status: SHIPPED | OUTPATIENT
Start: 2023-09-11 | End: 2023-09-11

## 2023-09-11 RX ORDER — PANTOPRAZOLE SODIUM 40 MG/1
40 TABLET, DELAYED RELEASE ORAL
Qty: 90 TABLET | Refills: 3 | Status: SHIPPED | OUTPATIENT
Start: 2023-09-11

## 2023-09-11 RX ORDER — ATORVASTATIN CALCIUM 10 MG/1
10 TABLET, FILM COATED ORAL NIGHTLY
Qty: 90 TABLET | Refills: 3 | Status: SHIPPED | OUTPATIENT
Start: 2023-09-11

## 2023-09-11 RX ORDER — PREDNISONE 20 MG/1
20 TABLET ORAL 2 TIMES DAILY WITH MEALS
Qty: 14 TABLET | Refills: 0 | Status: SHIPPED | OUTPATIENT
Start: 2023-09-11 | End: 2023-09-18

## 2023-09-11 RX ORDER — EMPAGLIFLOZIN 25 MG/1
1 TABLET, FILM COATED ORAL DAILY
Qty: 90 TABLET | Refills: 3 | Status: SHIPPED | OUTPATIENT
Start: 2023-09-11

## 2023-09-11 RX ORDER — METHIMAZOLE 5 MG/1
5 TABLET ORAL 3 TIMES DAILY
Qty: 270 TABLET | Refills: 3 | Status: SHIPPED | OUTPATIENT
Start: 2023-09-11

## 2023-09-11 RX ORDER — METFORMIN HYDROCHLORIDE 750 MG/1
750 TABLET, EXTENDED RELEASE ORAL 2 TIMES DAILY WITH MEALS
Qty: 180 TABLET | Refills: 3 | Status: SHIPPED | OUTPATIENT
Start: 2023-09-11

## 2023-11-14 ENCOUNTER — OFFICE VISIT (OUTPATIENT)
Dept: INTERNAL MEDICINE CLINIC | Facility: CLINIC | Age: 78
End: 2023-11-14

## 2023-11-14 VITALS
WEIGHT: 170 LBS | SYSTOLIC BLOOD PRESSURE: 130 MMHG | HEIGHT: 63 IN | DIASTOLIC BLOOD PRESSURE: 79 MMHG | OXYGEN SATURATION: 98 % | HEART RATE: 99 BPM | BODY MASS INDEX: 30.12 KG/M2

## 2023-11-14 DIAGNOSIS — H25.9 SENILE CATARACT OF RIGHT EYE, UNSPECIFIED AGE-RELATED CATARACT TYPE: ICD-10-CM

## 2023-11-14 DIAGNOSIS — E78.5 HYPERLIPIDEMIA ASSOCIATED WITH TYPE 2 DIABETES MELLITUS: ICD-10-CM

## 2023-11-14 DIAGNOSIS — E05.90 HYPERTHYROIDISM: Chronic | ICD-10-CM

## 2023-11-14 DIAGNOSIS — I15.2 HYPERTENSION ASSOCIATED WITH TYPE 2 DIABETES MELLITUS: ICD-10-CM

## 2023-11-14 DIAGNOSIS — E11.59 HYPERTENSION ASSOCIATED WITH TYPE 2 DIABETES MELLITUS: ICD-10-CM

## 2023-11-14 DIAGNOSIS — E11.9 TYPE 2 DIABETES MELLITUS WITHOUT COMPLICATION, WITHOUT LONG-TERM CURRENT USE OF INSULIN (HCC): ICD-10-CM

## 2023-11-14 DIAGNOSIS — Z01.818 PRE-OPERATIVE EXAMINATION: Primary | ICD-10-CM

## 2023-11-14 DIAGNOSIS — J45.41 MODERATE PERSISTENT ASTHMA WITH ACUTE EXACERBATION: ICD-10-CM

## 2023-11-14 DIAGNOSIS — E11.69 HYPERLIPIDEMIA ASSOCIATED WITH TYPE 2 DIABETES MELLITUS: ICD-10-CM

## 2023-11-14 PROBLEM — J44.89 ASTHMA WITH COPD (CHRONIC OBSTRUCTIVE PULMONARY DISEASE): Chronic | Status: RESOLVED | Noted: 2023-07-10 | Resolved: 2023-11-14

## 2023-11-14 PROBLEM — J44.89 ASTHMA WITH COPD (CHRONIC OBSTRUCTIVE PULMONARY DISEASE) (HCC): Chronic | Status: RESOLVED | Noted: 2023-07-10 | Resolved: 2023-11-14

## 2023-11-14 PROCEDURE — 1160F RVW MEDS BY RX/DR IN RCRD: CPT | Performed by: INTERNAL MEDICINE

## 2023-11-14 PROCEDURE — 1159F MED LIST DOCD IN RCRD: CPT | Performed by: INTERNAL MEDICINE

## 2023-11-14 PROCEDURE — 3008F BODY MASS INDEX DOCD: CPT | Performed by: INTERNAL MEDICINE

## 2023-11-14 PROCEDURE — 3075F SYST BP GE 130 - 139MM HG: CPT | Performed by: INTERNAL MEDICINE

## 2023-11-14 PROCEDURE — 99215 OFFICE O/P EST HI 40 MIN: CPT | Performed by: INTERNAL MEDICINE

## 2023-11-14 PROCEDURE — 3078F DIAST BP <80 MM HG: CPT | Performed by: INTERNAL MEDICINE

## 2023-11-14 RX ORDER — FLUTICASONE PROPIONATE AND SALMETEROL 250; 50 UG/1; UG/1
1 POWDER RESPIRATORY (INHALATION) EVERY 12 HOURS SCHEDULED
Qty: 3 EACH | Refills: 3 | Status: SHIPPED | OUTPATIENT
Start: 2023-11-14

## 2023-11-14 RX ORDER — PREDNISONE 20 MG/1
20 TABLET ORAL 2 TIMES DAILY WITH MEALS
Qty: 10 TABLET | Refills: 0 | Status: SHIPPED | OUTPATIENT
Start: 2023-11-14 | End: 2023-11-19

## 2023-12-11 ENCOUNTER — LAB ENCOUNTER (OUTPATIENT)
Dept: LAB | Age: 78
End: 2023-12-11
Attending: INTERNAL MEDICINE
Payer: MEDICARE

## 2023-12-11 DIAGNOSIS — E78.5 HYPERLIPIDEMIA ASSOCIATED WITH TYPE 2 DIABETES MELLITUS  (HCC): ICD-10-CM

## 2023-12-11 DIAGNOSIS — E11.9 TYPE 2 DIABETES MELLITUS WITHOUT COMPLICATION, WITHOUT LONG-TERM CURRENT USE OF INSULIN (HCC): ICD-10-CM

## 2023-12-11 DIAGNOSIS — E11.69 HYPERLIPIDEMIA ASSOCIATED WITH TYPE 2 DIABETES MELLITUS  (HCC): ICD-10-CM

## 2023-12-11 DIAGNOSIS — E05.90 HYPERTHYROIDISM: ICD-10-CM

## 2023-12-11 DIAGNOSIS — Z00.00 ENCOUNTER FOR MEDICARE ANNUAL WELLNESS EXAM: ICD-10-CM

## 2023-12-11 LAB
ALBUMIN SERPL-MCNC: 4.3 G/DL (ref 3.2–4.8)
ALBUMIN/GLOB SERPL: 1.4 {RATIO} (ref 1–2)
ALP LIVER SERPL-CCNC: 56 U/L
ALT SERPL-CCNC: 10 U/L
ANION GAP SERPL CALC-SCNC: 5 MMOL/L (ref 0–18)
AST SERPL-CCNC: 22 U/L (ref ?–34)
BILIRUB SERPL-MCNC: 0.6 MG/DL (ref 0.2–1.1)
BUN BLD-MCNC: 13 MG/DL (ref 9–23)
BUN/CREAT SERPL: 9.9 (ref 10–20)
CALCIUM BLD-MCNC: 9.6 MG/DL (ref 8.7–10.4)
CHLORIDE SERPL-SCNC: 104 MMOL/L (ref 98–112)
CO2 SERPL-SCNC: 28 MMOL/L (ref 21–32)
CREAT BLD-MCNC: 1.31 MG/DL
CREAT UR-SCNC: 87 MG/DL
EGFRCR SERPLBLD CKD-EPI 2021: 55 ML/MIN/1.73M2 (ref 60–?)
EST. AVERAGE GLUCOSE BLD GHB EST-MCNC: 166 MG/DL (ref 68–126)
FASTING STATUS PATIENT QL REPORTED: YES
GLOBULIN PLAS-MCNC: 3.1 G/DL (ref 2.8–4.4)
GLUCOSE BLD-MCNC: 102 MG/DL (ref 70–99)
HBA1C MFR BLD: 7.4 % (ref ?–5.7)
MICROALBUMIN UR-MCNC: 1.6 MG/DL
MICROALBUMIN/CREAT 24H UR-RTO: 18.4 UG/MG (ref ?–30)
OSMOLALITY SERPL CALC.SUM OF ELEC: 284 MOSM/KG (ref 275–295)
POTASSIUM SERPL-SCNC: 5.1 MMOL/L (ref 3.5–5.1)
PROT SERPL-MCNC: 7.4 G/DL (ref 5.7–8.2)
SODIUM SERPL-SCNC: 137 MMOL/L (ref 136–145)

## 2023-12-11 PROCEDURE — 80053 COMPREHEN METABOLIC PANEL: CPT

## 2023-12-11 PROCEDURE — 84439 ASSAY OF FREE THYROXINE: CPT

## 2023-12-11 PROCEDURE — 84443 ASSAY THYROID STIM HORMONE: CPT

## 2023-12-11 PROCEDURE — 82043 UR ALBUMIN QUANTITATIVE: CPT

## 2023-12-11 PROCEDURE — 36415 COLL VENOUS BLD VENIPUNCTURE: CPT

## 2023-12-11 PROCEDURE — 82570 ASSAY OF URINE CREATININE: CPT

## 2023-12-11 PROCEDURE — 83036 HEMOGLOBIN GLYCOSYLATED A1C: CPT

## 2023-12-14 ENCOUNTER — OFFICE VISIT (OUTPATIENT)
Dept: INTERNAL MEDICINE CLINIC | Facility: CLINIC | Age: 78
End: 2023-12-14

## 2023-12-14 VITALS — HEART RATE: 63 BPM | TEMPERATURE: 97 F | DIASTOLIC BLOOD PRESSURE: 84 MMHG | SYSTOLIC BLOOD PRESSURE: 150 MMHG

## 2023-12-14 DIAGNOSIS — N18.31 STAGE 3A CHRONIC KIDNEY DISEASE (HCC): ICD-10-CM

## 2023-12-14 DIAGNOSIS — I15.2 HYPERTENSION ASSOCIATED WITH TYPE 2 DIABETES MELLITUS  (HCC): ICD-10-CM

## 2023-12-14 DIAGNOSIS — E55.9 VITAMIN D DEFICIENCY: ICD-10-CM

## 2023-12-14 DIAGNOSIS — E11.59 HYPERTENSION ASSOCIATED WITH TYPE 2 DIABETES MELLITUS  (HCC): ICD-10-CM

## 2023-12-14 DIAGNOSIS — R35.1 NOCTURIA ASSOCIATED WITH BENIGN PROSTATIC HYPERPLASIA: ICD-10-CM

## 2023-12-14 DIAGNOSIS — E05.90 HYPERTHYROIDISM: ICD-10-CM

## 2023-12-14 DIAGNOSIS — K21.9 GASTROESOPHAGEAL REFLUX DISEASE WITHOUT ESOPHAGITIS: ICD-10-CM

## 2023-12-14 DIAGNOSIS — E11.9 TYPE 2 DIABETES MELLITUS WITHOUT COMPLICATION, WITHOUT LONG-TERM CURRENT USE OF INSULIN (HCC): ICD-10-CM

## 2023-12-14 DIAGNOSIS — J45.41 MODERATE PERSISTENT ASTHMA WITH ACUTE EXACERBATION: ICD-10-CM

## 2023-12-14 DIAGNOSIS — E78.5 HYPERLIPIDEMIA ASSOCIATED WITH TYPE 2 DIABETES MELLITUS  (HCC): ICD-10-CM

## 2023-12-14 DIAGNOSIS — Z00.00 ENCOUNTER FOR MEDICARE ANNUAL WELLNESS EXAM: Primary | ICD-10-CM

## 2023-12-14 DIAGNOSIS — N40.1 NOCTURIA ASSOCIATED WITH BENIGN PROSTATIC HYPERPLASIA: ICD-10-CM

## 2023-12-14 DIAGNOSIS — E11.65 TYPE 2 DIABETES MELLITUS WITH HYPERGLYCEMIA, WITHOUT LONG-TERM CURRENT USE OF INSULIN (HCC): ICD-10-CM

## 2023-12-14 DIAGNOSIS — E11.69 HYPERLIPIDEMIA ASSOCIATED WITH TYPE 2 DIABETES MELLITUS  (HCC): ICD-10-CM

## 2023-12-14 DIAGNOSIS — M17.0 PRIMARY OSTEOARTHRITIS OF BOTH KNEES: ICD-10-CM

## 2023-12-14 LAB
T4 FREE SERPL-MCNC: 0.7 NG/DL (ref 0.8–1.7)
TSI SER-ACNC: 84.67 MIU/ML (ref 0.55–4.78)

## 2023-12-14 RX ORDER — BLOOD-GLUCOSE METER
1 EACH MISCELLANEOUS
Qty: 1 KIT | Refills: 0 | Status: SHIPPED | OUTPATIENT
Start: 2023-12-14

## 2023-12-14 RX ORDER — METFORMIN HYDROCHLORIDE 750 MG/1
750 TABLET, EXTENDED RELEASE ORAL 2 TIMES DAILY WITH MEALS
Qty: 180 TABLET | Refills: 3 | Status: SHIPPED | OUTPATIENT
Start: 2023-12-14

## 2023-12-14 RX ORDER — BLOOD SUGAR DIAGNOSTIC
1 STRIP MISCELLANEOUS
Qty: 400 STRIP | Refills: 1 | Status: SHIPPED | OUTPATIENT
Start: 2023-12-14

## 2023-12-15 DIAGNOSIS — E05.90 HYPERTHYROIDISM: Primary | ICD-10-CM

## 2023-12-15 NOTE — PROGRESS NOTES
Please relay to patient:   Avnifrederick Funez, I reviewed your recent labs    Your TSH is significantly elevated 84.675 (very high) and your Free T4 is low 0.7, which is very perplexing in pt with hyperthyroidism. I would like to take methimazole 5mg twice daily (once in morning and once at night). I would like you to follow up with Endocrinologist sooner Please call central scheduling at [phone] to schedule an appointment with the next available [facility] physician. -and I have ordered thyroid function blood work and repeat thyroid test to recheck in 6 weeks and follow up with myself/Dr. Deborah Funez or Endocrine within that time.

## 2023-12-18 ENCOUNTER — TELEPHONE (OUTPATIENT)
Dept: INTERNAL MEDICINE CLINIC | Facility: CLINIC | Age: 78
End: 2023-12-18

## 2023-12-18 DIAGNOSIS — E05.90 HYPERTHYROIDISM: ICD-10-CM

## 2023-12-18 RX ORDER — METHIMAZOLE 5 MG/1
5 TABLET ORAL 2 TIMES DAILY
Qty: 60 TABLET | Refills: 0 | Status: SHIPPED
Start: 2023-12-18

## 2023-12-18 NOTE — TELEPHONE ENCOUNTER
Per patient, he has an appointment to see the endocrinology on 1/20/24. Per chart review, patient is scheduled on 2/21/2024 and NOT 1/2024. Per wife, we can re schedule it anytime and any day . RN called endocrinology department and spoke with Gill Kwan, appointment re scheduled. Wife requested to call the patient back for the endocrinology appointment   update after an hour,. Patient is currently  outside . RN called the  patient back at 244 pm, per wife (NOT JESUS ) , the patient was still outside . Instructed to tell the patient to call us back as he must know the new endocrinology appointment , office number provided   [phone] for the nurse until 430 pm .        RN=ONCE THE PATIENT CALLS BACK, PLEASE PROVIDE THE NEW ENDOCRINOLOGY APPOINTMENT DATE AND ADDRESS BELOW. Future Appointments   Date Time Provider Jesu Taveras   12/21/2023  8:00 AM Sparkle Araiza MD [facility]   3/21/2024  2:20 PM Mariza Mims MD [facility] ADO       Dr Albertine Barthel quqzial==[address] [place] [address]. LABS 12/14/23; Lynn Saha MD  12/15/2023  2:53 PM CST   Your TSH is significantly elevated 84.675 (very high) and your Free T4 is low 0.7, which is very perplexing in pt with hyperthyroidism. I would like to take methimazole 5mg twice daily (once in morning and once at night). I would like you to follow up with Endocrinologist sooner Please call central scheduling at [phone] to schedule an appointment with the next available [facility] physician. -and I have ordered thyroid function blood work and repeat thyroid test to recheck in 6 weeks and follow up with myself/Dr. Camacho Kidney or Endocrine within that time.        Result Notes       1 Patient Communication      Component  Ref Range & Units 12/11/23 10:02 AM   TSH  0.550 - 4.780 mIU/mL 84.675 High

## 2023-12-18 NOTE — PROGRESS NOTES
Just wanted to confirm  1_ Methimazole is 5mg twice daily should be his new regiment, and repeat thyroid function in 6 weeks after adjusting medications and follow up with Endocrine.

## 2023-12-19 NOTE — TELEPHONE ENCOUNTER
Spoke with the patient for the endocrinology appointment confirmation, patient gave a  phone consent to talk to his wife (NOT JESUS). Wife and patient confirmed the appointment for 12/21/23=name of the specialist and Freya Bynum address provided, including their phone number [phone] for any changes in schedule.        Future Appointments   Date Time Provider Jesu Taveras   12/21/2023  8:00 AM Taran Booker MD [facility] DALILA FORD

## 2023-12-21 ENCOUNTER — OFFICE VISIT (OUTPATIENT)
Facility: LOCATION | Age: 78
End: 2023-12-21

## 2023-12-21 ENCOUNTER — LAB ENCOUNTER (OUTPATIENT)
Dept: LAB | Age: 78
End: 2023-12-21
Attending: INTERNAL MEDICINE
Payer: MEDICARE

## 2023-12-21 VITALS — BODY MASS INDEX: 30 KG/M2 | DIASTOLIC BLOOD PRESSURE: 66 MMHG | SYSTOLIC BLOOD PRESSURE: 120 MMHG | WEIGHT: 171 LBS

## 2023-12-21 DIAGNOSIS — E78.5 HYPERLIPIDEMIA ASSOCIATED WITH TYPE 2 DIABETES MELLITUS  (HCC): Primary | ICD-10-CM

## 2023-12-21 DIAGNOSIS — K21.9 GASTROESOPHAGEAL REFLUX DISEASE WITHOUT ESOPHAGITIS: ICD-10-CM

## 2023-12-21 DIAGNOSIS — R25.1 TREMOR: Chronic | ICD-10-CM

## 2023-12-21 DIAGNOSIS — E11.9 TYPE 2 DIABETES MELLITUS WITHOUT COMPLICATION, WITHOUT LONG-TERM CURRENT USE OF INSULIN (HCC): ICD-10-CM

## 2023-12-21 DIAGNOSIS — R25.1 TREMOR: ICD-10-CM

## 2023-12-21 DIAGNOSIS — E78.5 HYPERLIPIDEMIA ASSOCIATED WITH TYPE 2 DIABETES MELLITUS  (HCC): ICD-10-CM

## 2023-12-21 DIAGNOSIS — K59.00 CONSTIPATION, UNSPECIFIED CONSTIPATION TYPE: ICD-10-CM

## 2023-12-21 DIAGNOSIS — E07.9 THYROID DISEASE: ICD-10-CM

## 2023-12-21 DIAGNOSIS — I15.2 HYPERTENSION ASSOCIATED WITH TYPE 2 DIABETES MELLITUS  (HCC): ICD-10-CM

## 2023-12-21 DIAGNOSIS — E05.90 HYPERTHYROIDISM: Primary | Chronic | ICD-10-CM

## 2023-12-21 DIAGNOSIS — E05.90 HYPERTHYROIDISM: ICD-10-CM

## 2023-12-21 DIAGNOSIS — E11.59 HYPERTENSION ASSOCIATED WITH TYPE 2 DIABETES MELLITUS  (HCC): ICD-10-CM

## 2023-12-21 DIAGNOSIS — E11.69 HYPERLIPIDEMIA ASSOCIATED WITH TYPE 2 DIABETES MELLITUS  (HCC): Primary | ICD-10-CM

## 2023-12-21 DIAGNOSIS — E03.2 IATROGENIC HYPOTHYROIDISM: ICD-10-CM

## 2023-12-21 DIAGNOSIS — E11.69 HYPERLIPIDEMIA ASSOCIATED WITH TYPE 2 DIABETES MELLITUS  (HCC): ICD-10-CM

## 2023-12-21 LAB
T4 FREE SERPL-MCNC: 0.6 NG/DL (ref 0.8–1.7)
TSI SER-ACNC: 91.29 MIU/ML (ref 0.55–4.78)

## 2023-12-21 PROCEDURE — 99205 OFFICE O/P NEW HI 60 MIN: CPT | Performed by: INTERNAL MEDICINE

## 2023-12-21 PROCEDURE — 84439 ASSAY OF FREE THYROXINE: CPT

## 2023-12-21 PROCEDURE — 84445 ASSAY OF TSI GLOBULIN: CPT

## 2023-12-21 PROCEDURE — 3074F SYST BP LT 130 MM HG: CPT | Performed by: INTERNAL MEDICINE

## 2023-12-21 PROCEDURE — 84443 ASSAY THYROID STIM HORMONE: CPT

## 2023-12-21 PROCEDURE — 36415 COLL VENOUS BLD VENIPUNCTURE: CPT

## 2023-12-21 PROCEDURE — 1160F RVW MEDS BY RX/DR IN RCRD: CPT | Performed by: INTERNAL MEDICINE

## 2023-12-21 PROCEDURE — 1159F MED LIST DOCD IN RCRD: CPT | Performed by: INTERNAL MEDICINE

## 2023-12-21 PROCEDURE — 3078F DIAST BP <80 MM HG: CPT | Performed by: INTERNAL MEDICINE

## 2023-12-21 PROCEDURE — 83520 IMMUNOASSAY QUANT NOS NONAB: CPT

## 2023-12-21 NOTE — PATIENT INSTRUCTIONS
Labs today tsh/freet4,TSI, TRab  Stop methimazole   Follow up in 1 week to go over labs and plan - might need to resume methimazole but lower doses. Will hold methimazole if in remission. Will give printout to read     Discussed with patient possible dx, treatment options, BORGES vs surgery vs meds. Discussed thyroid symptoms, wt gain, eye disease, and SE of meds and BORGES. Methimazole may cause significant bone marrow depression; the most severe manifestation is agranulocytosis. May also cause Hepatotoxicity (including acute liver failure) Symptoms suggestive of hepatic dysfunction (eg, anorexia, pruritus, right upper quadrant pain) should prompt evaluation. If you have nausea, vomiting, abdominal pain, jaundice- yellow skin or eye color-, dark urine, light stool color, fever, sore throat or infection, call us or the PCP. Remission rate of ~ 40% with use of antithyroid drugs for 1-1.5 years, their side effects, monitoring, and follow-up issues, specifically agranulocytosis, liver toxicity, anaphylaxis, rash, lupus like syndrome, metallic taste in the mouth, and joint aches. We discussed that patient should discontinue methimazole at the earliest sign of a fever, sore throat, or other infection, should call our office and have WBC count with differential done. The drug should be held until the result is available.

## 2023-12-21 NOTE — PROGRESS NOTES
New Patient Evaluation - History and Physical    CONSULT - Reason for Visit:  hyperthyroid. Requesting Physician: Rico Anders MD  Int ID [id number]    CHIEF COMPLAINT:    Chief Complaint   Patient presents with    Consult     Consult for Thyroid   Luis Castro        HISTORY OF PRESENT ILLNESS:   Mine Winslow is a 77year old male who presents with hyperthyroid    He was dx with hyperthyroid in 2017   He had nuc med thyroid uptake and scan in 2018 which was normal per report. US thyroid in 2104 4 mm right lobe nodule. No TSI or TRAb. He has been on on MMI 5 mg bid for 3 mo. He was started on MMI ~ 4 yrs ago. He saw endocrinologist but cannot recall who. He cannot recall if was dx with Graves, no previous  thyroid surgery or BORGES ablation. He has constipation now and dry skin,  Denied  hair changes, hoarseness or edema,     Denied eye changes - had cataract surgery     No family history of thyroid  or autoimmune disease   No biotin/tumeric/iodine/ OTC supplements       DM  Per chart he is on metformin, glipiZIDE, and Jardiance but he is not sure what he is taking so asked to bring a list from home to verify     Lab Results   Component Value Date    A1C 7.4 (H) 12/11/2023    A1C 7.5 (H) 08/29/2023    A1C 7.6 (H) 06/14/2023    A1C 6.9 (H) 09/10/2022    A1C 6.8 (H) 03/15/2022              ASSESSMENT AND PLAN:  78 yo man with uncontrolled thyroid levels. He was dx with hyperthyroid in ~ 2017. Has been on MMI for ~ 4 yrs. Recent MMI dose was 5 mg tid per pt. Clinically and biochemically, hypothyroid w/ TSH is 84. He had thyroid uptake and scan which was read normal. He does not have TSI/TRAb in his chart available for us to review. He denied family hx of autoimmune diseases. Most likely Graves disease. Still can be a hot nodule but US thyroid in 2014 showed only 4 mm nodule so unlikely. D/w pt in length today. Graves can go into remission so will get more w/u and reassess.        DM  Per chart he is on metformin, HEART, and Jardiance but he is not sure what he is taking so asked to bring a list from home to verify. I recommend stopping glipiZIDE - if he is on it- to avoid hypoglycemia       Plan  Labs today tsh/freet4,TSI, TRab  Stop methimazole   Follow up in ~1 week to go over labs and plan - might need to resume methimazole but lower doses. Will hold methimazole if in remission. Will give printout to read     Discussed with patient possible dx, treatment options, BORGES vs surgery vs meds. Discussed thyroid symptoms, wt gain, eye disease, and SE of meds and BORGES. Methimazole may cause significant bone marrow depression; the most severe manifestation is agranulocytosis. May also cause Hepatotoxicity (including acute liver failure) Symptoms suggestive of hepatic dysfunction (eg, anorexia, pruritus, right upper quadrant pain) should prompt evaluation. If you have nausea, vomiting, abdominal pain, jaundice- yellow skin or eye color-, dark urine, light stool color, fever, sore throat or infection, call us or the PCP. Remission rate of ~ 40% with use of antithyroid drugs for 1-1.5 years, their side effects, monitoring, and follow-up issues, specifically agranulocytosis, liver toxicity, anaphylaxis, rash, lupus like syndrome, metallic taste in the mouth, and joint aches. We discussed that patient should discontinue methimazole at the earliest sign of a fever, sore throat, or other infection, should call our office and have WBC count with differential done. The drug should be held until the result is available.            PAST MEDICAL HISTORY:   Past Medical History:   Diagnosis Date    Adenomatous colon polyp 10/31/2012    Arthritis     Benign prostatic hypertrophy with urinary retention 07/21/2015    Chronic cough     Chronic sinusitis 11/13/2017    Esophageal reflux     Essential and other specified forms of tremor 07/18/2016    Essential hypertension     Gall stones 05/26/2015    High blood pressure     Hoarseness, chronic     HTN (hypertension)     Osteoarthrosis, localized, primary, knee, left 06/07/2015    Osteoarthrosis, localized, primary, knee, right 06/07/2015    Osteoarthrosis, unspecified whether generalized or localized, unspecified site     Pain in joints     Painful swallowing     Personal history of colonic polyps 03/21/2019    PONV (postoperative nausea and vomiting)     Prediabetes     borderline    Problems with swallowing     trouble with solid    Second degree hemorrhoids 03/21/2019    Shortness of breath     Sleep apnea     no cpap    Sleep disturbance     Sputum production     Visual impairment     reading glasses    Vocal cord paralysis 11/10/2016    Wheezing        PAST SURGICAL HISTORY:   Past Surgical History:   Procedure Laterality Date    CHOLECYSTECTOMY  07/2015    COLONOSCOPY  10/2012    3 mm adenoma. repeat 5 yrs, Dr Rupa Chiu  10/22/2012    Procedure: COLONOSCOPY, POSSIBLE BIOPSY, POSSIBLE POLYPECTOMY 69036;  Surgeon: John Mills MD;  Location: [address] [place] [address] URETERAL STENT  9-1-15    TURP    LAPAROSCOPIC CHOLECYSTECTOMY N/A 7/10/2015    Procedure: LAPAROSCOPIC CHOLECYSTECTOMY;  Surgeon: Veronica Del Cid MD;  Location: [facility] MAIN OR    OTHER SURGICAL HISTORY  9/1/15    CYSTOSCOPY, TRANS URETHRAL RESECTION OF PROSTATE    PATIENT DOCUMENTED NOT TO HAVE EXPERIENCED ANY OF THE FOLLOWING EVENTS  10/22/2012    Procedure: COLONOSCOPY, POSSIBLE BIOPSY, POSSIBLE POLYPECTOMY 74742;  Surgeon: John Mills MD;  Location: [address] [place] [address] IV ANTIBIOTIC SURGICAL SITE INFECTION PROPHYLAXIS.   10/22/2012    Procedure: COLONOSCOPY, POSSIBLE BIOPSY, POSSIBLE POLYPECTOMY 27078;  Surgeon: John Mills MD;  Location: [place] [address] GI ENDOSCOPY,EXAM  09/2020       CURRENT MEDICATIONS:    Current Outpatient Medications   Medication Sig Dispense Refill    methIMAzole 5 MG Oral Tab Take 1 tablet (5 mg total) by mouth in the morning and 1 tablet (5 mg total) before bedtime. FOR THYROID. Stuart Reveal 60 tablet 0    Glucose Blood (ONETOUCH VERIO) In Vitro Strip 1 each 4 (four) times daily before meals and nightly. Test before meals and nightly 400 strip 1    Blood Glucose Monitoring Suppl (ONETOUCH VERIO) w/Device Does not apply Kit 1 each 4 (four) times daily before meals and nightly. 1 kit 0    metFORMIN  MG Oral Tablet 24 Hr Take 1 tablet (750 mg total) by mouth 2 (two) times daily with meals. FOR DIABETES. 180 tablet 3    fluticasone-salmeterol (ADVAIR DISKUS) 250-50 MCG/ACT Inhalation Aerosol Powder, Breath Activated Inhale 1 puff into the lungs every 12 (twelve) hours. (Patient not taking: Reported on 12/14/2023) 3 each 3    telmisartan 80 MG Oral Tab Take 1 tablet (80 mg total) by mouth daily. FOR BLOOD PRESSURE. STOP ENALAPRIL DUE TO COUGH. 90 tablet 3    Propranolol HCl ER 80 MG Oral Capsule SR 24 Hr Take 1 capsule (80 mg total) by mouth daily. FOR BLOOD PRESSURE. 90 capsule 3    primidone 50 MG Oral Tab Take 1 tablet (50 mg total) by mouth 2 (two) times daily. FOR TREMORS. 180 tablet 3    pantoprazole 40 MG Oral Tab EC Take 1 tablet (40 mg total) by mouth every morning before breakfast. FOR ACID REFLUX. 90 tablet 3    glipiZIDE ER 2.5 MG Oral Tablet 24 Hr Take 1 tablet (2.5 mg total) by mouth daily with breakfast. FOR DIABETES. DO NOT TAKE IF FASTING SUGAR IS LESS THAN 100. 90 tablet 3    Empagliflozin (JARDIANCE) 25 MG Oral Tab Take 1 tablet by mouth daily. FOR DIABETES. 90 tablet 3    atorvastatin 10 MG Oral Tab Take 1 tablet (10 mg total) by mouth nightly. FOR CHOLESTEROL.  90 tablet 3       ALLERGIES:  No Known Allergies    SOCIAL HISTORY:    Social History     Socioeconomic History    Marital status:    Tobacco Use    Smoking status: Former     Packs/day: 0.50     Years: 20.00     Additional pack years: 0.00     Total pack years: 10.00     Types: Cigarettes     Quit date: 6/20/1984 Years since quittin.5     Passive exposure: Past    Smokeless tobacco: Never   Vaping Use    Vaping Use: Never used   Substance and Sexual Activity    Alcohol use: No    Drug use: No   Other Topics Concern    Caffeine Concern Yes     Comment: 1 tea daily    Exercise Yes     Comment: daily gym       FAMILY HISTORY:   Family History   Problem Relation Age of Onset    Heart Attack Father     Hypertension Father           REVIEW OF SYSTEMS:  All negative other than HPI      PHYSICAL EXAM:   Height: --  Weight: 171 lb (77.6 kg) (747)  BSA (Calculated - sq m): --  Pulse: --  BP: 120/66 (747)  Temp: --  Do Not Use - Resp Rate: --  SpO2: --      No goiter   No lidlag or lid traction   No GO    CONSTITUTIONAL:  Awake and alert. Age appropriate, good hygiene not in acute distress. Well nourished and well developed. no acute distress   PSYCH:   Orientated to time, place, person & situation, Normal mood and affect, memory intact, normal insight and judgment, cooperative  Neuro: speech is clear. Awake, alert, no aphasia, no facial asymmetry, no nuchal rigidity  EYES:  No proptosis, no ptosis, conjunctiva normal  ENT:  Normocephalic, atraumatic  Eye: EOMI, normal lids, no discharge, no conjunctival erythema. No exophthalmos/proptosis, Ptosis negative   No rhinorrhea, moist oral mucosa  Neck: full range of motion  Neck/Thyroid: neck inspection: normal, No scar, No goiter   LUNGS:  No acute respiratory distress, non-labored respiration. Speaking full sentences  CARDIOVASCULAR:  regular rate   ABDOMEN:  No abdominal pain. Obese   Nontender   SKIN:  no bruising or bleeding, no rashes and no lesions, Skin is dry, no obvious rashes or lesions  EXTREMITIES: no gross abnormality   MSK: Moves extremities spontaneously.  full range of motion in all major joints      DATA:     Pertinent data reviewed    2017 FT4 2.6 H, TSH <0.005   TSH 50  2021 TSH 0.01  2023 TSH 84     2018   FINDINGS:    IMAGES: Uniform activity is identified distributed through a normal size thyroid gland. 6 HOUR UPTAKE:   14.2 %  (normal = 7-25%)    24 HOUR UPTAKE:  14.5 %  (normal = 10-35%)      Impression   CONCLUSION:  Normal thyroid uptake. No abnormality demonstrated in thyroid scan. 2016 ct  IMPRESSION:     1. Left-sided vocal cord paralysis. No mediastinal or cervical mass is seen. 2. Small calculus at the hilum of the left submandibular gland. 3. 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 2014 US thyroid  Narrative   PROCEDURE:  ULTRASOUND OF THE THYROID     COMPARISON:  None. INDICATIONS:  784.42 Dysphonia     TECHNIQUE:  High-resolution ultrasound of the thyroid gland was performed. FINDINGS:    MEASUREMENTS:  RIGHT LOBE:  4.1 x 1.8 x 2.2 cm  LEFT LOBE:  3.4 x 1.5 x 1.5 cm  ISTHMUS:  0.14 cm     NODULES:  4 x 2 x 3 mm right lobe nodule. Otherwise normal thyroid gland. OTHER:  None. CONCLUSION:  4 mm right lobe nodule. Otherwise normal thyroid gland. CT 2016  Thyroid gland: Normal       No results for input(s): \"TSH\", \"T4F\", \"T3F\", \"THYP\" in the last 72 hours. No results found.         Orders Placed This Encounter   Procedures    Thyroid Stimulating Immunoglob    TSH RECEPTOR ANTIBODY (TBII)    Assay, Thyroid Stim Hormone    Free T4, (Free Thyroxine)     Orders Placed This Encounter    Thyroid Stimulating Immunoglob     Standing Status:   Future     Standing Expiration Date:   12/21/2024     Order Specific Question:   Release to patient     Answer:   Immediate    TSH RECEPTOR ANTIBODY (TBII)     Standing Status:   Future     Standing Expiration Date:   12/21/2024     Order Specific Question:   Release to patient     Answer:   Immediate    Assay, Thyroid Stim Hormone     Standing Status:   Future     Standing Expiration Date:   12/21/2024    Free T4, (Free Thyroxine)     Standing Status:   Future     Standing Expiration Date:   12/21/2024 12/21/2023  Shantel Teague MD

## 2023-12-23 LAB
THY STIM IMMUNO: 5.66 IU/L
THYROTROPIN REC AB: 5.37 IU/L

## 2024-01-02 ENCOUNTER — LAB ENCOUNTER (OUTPATIENT)
Dept: LAB | Age: 79
End: 2024-01-02
Attending: INTERNAL MEDICINE
Payer: MEDICARE

## 2024-01-02 ENCOUNTER — OFFICE VISIT (OUTPATIENT)
Facility: LOCATION | Age: 79
End: 2024-01-02

## 2024-01-02 VITALS
SYSTOLIC BLOOD PRESSURE: 124 MMHG | OXYGEN SATURATION: 99 % | WEIGHT: 171 LBS | HEART RATE: 75 BPM | BODY MASS INDEX: 30 KG/M2 | DIASTOLIC BLOOD PRESSURE: 80 MMHG

## 2024-01-02 DIAGNOSIS — E11.59 HYPERTENSION ASSOCIATED WITH TYPE 2 DIABETES MELLITUS  (HCC): ICD-10-CM

## 2024-01-02 DIAGNOSIS — E78.5 HYPERLIPIDEMIA ASSOCIATED WITH TYPE 2 DIABETES MELLITUS  (HCC): ICD-10-CM

## 2024-01-02 DIAGNOSIS — E07.9 THYROID DISEASE: ICD-10-CM

## 2024-01-02 DIAGNOSIS — E11.9 TYPE 2 DIABETES MELLITUS WITHOUT COMPLICATION, WITHOUT LONG-TERM CURRENT USE OF INSULIN (HCC): Primary | ICD-10-CM

## 2024-01-02 DIAGNOSIS — I15.2 HYPERTENSION ASSOCIATED WITH TYPE 2 DIABETES MELLITUS  (HCC): ICD-10-CM

## 2024-01-02 DIAGNOSIS — K59.00 CONSTIPATION, UNSPECIFIED CONSTIPATION TYPE: ICD-10-CM

## 2024-01-02 DIAGNOSIS — E11.69 HYPERLIPIDEMIA ASSOCIATED WITH TYPE 2 DIABETES MELLITUS  (HCC): ICD-10-CM

## 2024-01-02 DIAGNOSIS — K21.9 GASTROESOPHAGEAL REFLUX DISEASE WITHOUT ESOPHAGITIS: ICD-10-CM

## 2024-01-02 DIAGNOSIS — E05.00 GRAVES DISEASE: ICD-10-CM

## 2024-01-02 DIAGNOSIS — E03.2 IATROGENIC HYPOTHYROIDISM: ICD-10-CM

## 2024-01-02 DIAGNOSIS — E05.90 HYPERTHYROIDISM: Chronic | ICD-10-CM

## 2024-01-02 LAB
T4 FREE SERPL-MCNC: 0.9 NG/DL (ref 0.8–1.7)
TSI SER-ACNC: 6.47 MIU/ML (ref 0.55–4.78)

## 2024-01-02 PROCEDURE — 84439 ASSAY OF FREE THYROXINE: CPT

## 2024-01-02 PROCEDURE — 1160F RVW MEDS BY RX/DR IN RCRD: CPT | Performed by: INTERNAL MEDICINE

## 2024-01-02 PROCEDURE — 36415 COLL VENOUS BLD VENIPUNCTURE: CPT

## 2024-01-02 PROCEDURE — 3079F DIAST BP 80-89 MM HG: CPT | Performed by: INTERNAL MEDICINE

## 2024-01-02 PROCEDURE — 1159F MED LIST DOCD IN RCRD: CPT | Performed by: INTERNAL MEDICINE

## 2024-01-02 PROCEDURE — 84443 ASSAY THYROID STIM HORMONE: CPT

## 2024-01-02 PROCEDURE — 3074F SYST BP LT 130 MM HG: CPT | Performed by: INTERNAL MEDICINE

## 2024-01-02 PROCEDURE — 95251 CONT GLUC MNTR ANALYSIS I&R: CPT | Performed by: INTERNAL MEDICINE

## 2024-01-02 PROCEDURE — 99214 OFFICE O/P EST MOD 30 MIN: CPT | Performed by: INTERNAL MEDICINE

## 2024-01-02 RX ORDER — METHIMAZOLE 5 MG/1
5 TABLET ORAL DAILY
Qty: 30 TABLET | Refills: 2 | Status: SHIPPED | OUTPATIENT
Start: 2024-01-02 | End: 2024-04-01

## 2024-01-02 RX ORDER — DOCUSATE SODIUM 100 MG/1
100 CAPSULE, LIQUID FILLED ORAL 2 TIMES DAILY
Qty: 180 CAPSULE | Refills: 0 | Status: SHIPPED | OUTPATIENT
Start: 2024-01-02 | End: 2024-04-01

## 2024-01-02 NOTE — PROGRESS NOTES
Reason for Visit:  hyperthyroid/graves, DM, iatrogenic hypothyroid.    Requesting Physician:   ..Joshua Faustin MD    Did not want interpreted.     Patient presents with    Consult     Consult for Thyroid   Luis Simental      HISTORY OF PRESENT ILLNESS:   Giuseppe Faustin is a 78 year old male who presents for follow up for hyperthyroid, graves, DM.      Giuseppe Faustin is a 78 year old male who presents with   He was dx with hyperthyroid in 2017   He had nuc med thyroid uptake and scan in 2018 which was normal per report. US thyroid in 2014 4 mm Rt thyroid nodule.   12/2023 high TSI or TRAb.    He was started on MMI ~ 4 yrs ago. He has been on on MMI 5 mg bid for 3 mo.   He saw endocrinologist but cannot recall who. He cannot recall if was dx with Graves, no previous  thyroid surgery or BORGES ablation.   No family history of thyroid  or autoimmune disease   No biotin/tumeric/iodine/ OTC supplements     Last visit we stopped MMI and did more w/u.   12/21/2023 Labs showed high TRAb and TSI . TSH 91 w/ FT4 0.6    DM  Last A1c 7.4% 12/2023  Per chart he is on metformin, glipiZIDE, and Jardiance  Brought his list from home and he is on HEART So will stop     ASSESSMENT AND PLAN:  77 yo man with Graves, uncontrolled thyroid levels/iatrogenic hypothyroidism.   Recent MMI dose was 5 mg tid per pt. We stopped MMI levels last visit.     DM: on metformin, HEART, and Jardiance    I recommend stopping glipiZIDE - if he is on it- to avoid hypoglycemia     Plan  Labs today tsh/freet4/FT3   resume methimazole 5 mg daily now   Labs and follow up in ~1 mo to adjust methimazole doses  Call us if gets hyperthyroid symptoms- Will give printout to read   Stop glipizide   Reviewed hyperthyroid symptoms and educated the patient how to check HR at home.     Discussed with patient his dx and tx SE. Discussed thyroid, wt gain, bowel movements, eye disease, and SE of meds. Methimazole may cause significant bone marrow depression; the most severe  manifestation is agranulocytosis. May also cause Hepatotoxicity (including acute liver failure) Symptoms suggestive of hepatic dysfunction (eg, anorexia, pruritus, right upper quadrant pain) should prompt evaluation. If you have nausea, vomiting, abdominal pain, jaundice- yellow skin or eye color-, dark urine, light stool color, fever, sore throat or infection, call us or the PCP.  Remission rate in Graves is ~ 40% with use of antithyroid drugs for 1-1.5 years, their side effects, monitoring, and follow-up issues, specifically agranulocytosis, liver toxicity, anaphylaxis, rash, lupus like syndrome, metallic taste in the mouth, and joint aches. We discussed that patient should discontinue methimazole at the earliest sign of a fever, sore throat, or other infection, should call our office and have WBC count with differential done. The drug should be held until the result is available.      Reviewed CGM data today     CURRENT MEDICATIONS:    Current Outpatient Medications   Medication Sig Dispense Refill    docusate sodium 100 MG Oral Cap Take 1 capsule (100 mg total) by mouth 2 (two) times daily. 180 capsule 0    methIMAzole 5 MG Oral Tab Take 1 tablet (5 mg total) by mouth daily. 30 tablet 2    Glucose Blood (ONETOUCH VERIO) In Vitro Strip 1 each 4 (four) times daily before meals and nightly. Test before meals and nightly 400 strip 1    Blood Glucose Monitoring Suppl (ONETOUCH VERIO) w/Device Does not apply Kit 1 each 4 (four) times daily before meals and nightly. 1 kit 0    metFORMIN  MG Oral Tablet 24 Hr Take 1 tablet (750 mg total) by mouth 2 (two) times daily with meals. FOR DIABETES. 180 tablet 3    fluticasone-salmeterol (ADVAIR DISKUS) 250-50 MCG/ACT Inhalation Aerosol Powder, Breath Activated Inhale 1 puff into the lungs every 12 (twelve) hours. (Patient not taking: Reported on 12/14/2023) 3 each 3    telmisartan 80 MG Oral Tab Take 1 tablet (80 mg total) by mouth daily. FOR BLOOD PRESSURE. STOP  ENALAPRIL DUE TO COUGH. 90 tablet 3    Propranolol HCl ER 80 MG Oral Capsule SR 24 Hr Take 1 capsule (80 mg total) by mouth daily. FOR BLOOD PRESSURE. 90 capsule 3    primidone 50 MG Oral Tab Take 1 tablet (50 mg total) by mouth 2 (two) times daily. FOR TREMORS. 180 tablet 3    pantoprazole 40 MG Oral Tab EC Take 1 tablet (40 mg total) by mouth every morning before breakfast. FOR ACID REFLUX. 90 tablet 3    Empagliflozin (JARDIANCE) 25 MG Oral Tab Take 1 tablet by mouth daily. FOR DIABETES. 90 tablet 3    atorvastatin 10 MG Oral Tab Take 1 tablet (10 mg total) by mouth nightly. FOR CHOLESTEROL. 90 tablet 3       PAST MEDICAL, SOCIAL AND FAMILY HISTORY:  See past medical history marked as reviewed.  See past surgical history marked as reviewed.  See past family history marked as reviewed.  See past social history marked as reviewed.          PHYSICAL EXAM:   Vitals:    01/02/24 1249   BP: 124/80   Pulse: 75   SpO2: 99%   Weight: 171 lb (77.6 kg)     BMI: Body mass index is 30.29 kg/m².     No tremors   No goiter   Hoarseness     DATA:     Pertinent data reviewed       11/2017 FT4 2.6 H, TSH <0.005  8/20121 TSH 50  9/2021 TSH 0.01  12/2023 TSH 84   Latest Reference Range & Units 12/21/23 08:46   Thy Stim Immuno 0.00 - 0.55 IU/L 5.66 (H)   Thyrotropin Rec Ab 0.00 - 1.75 IU/L 5.37 (H)         6/2018   FINDINGS:    IMAGES:  Uniform activity is identified distributed through a normal size thyroid gland.  6 HOUR UPTAKE:   14.2 %  (normal = 7-25%)    24 HOUR UPTAKE:  14.5 %  (normal = 10-35%)      Impression   CONCLUSION:  Normal thyroid uptake.  No abnormality demonstrated in thyroid scan.        2016 ct  IMPRESSION:     1. Left-sided vocal cord paralysis. No mediastinal or cervical mass is seen.   2. Small calculus at the hilum of the left submandibular gland.   3. 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      2014 US thyroid  Narrative   PROCEDURE:  ULTRASOUND OF THE THYROID      COMPARISON:  None.     INDICATIONS:  784.42 Dysphonia     TECHNIQUE:  High-resolution ultrasound of the thyroid gland was performed.     FINDINGS:    MEASUREMENTS:  RIGHT LOBE:  4.1 x 1.8 x 2.2 cm  LEFT LOBE:  3.4 x 1.5 x 1.5 cm  ISTHMUS:  0.14 cm     NODULES:  4 x 2 x 3 mm right lobe nodule. Otherwise normal thyroid gland.     OTHER:  None.     CONCLUSION:  4 mm right lobe nodule. Otherwise normal thyroid gland.       CT 2016  Thyroid gland: Normal .    Orders Placed This Encounter   Procedures    TSH and Free T4    TSH and Free T4         1/2/2024  Dheeraj Gonzalez MD

## 2024-01-02 NOTE — PATIENT INSTRUCTIONS
Labs today   Labs and f/u in 1 mo   Start methimazole 5 mg daily   Stop glipizide   Reviewed hyperthyroid symptoms and educated the patient how to check HR at home.     Discussed with patient his dx and tx SE. Discussed thyroid, wt gain, bowel movements, eye disease, and SE of meds. Methimazole may cause significant bone marrow depression; the most severe manifestation is agranulocytosis. May also cause Hepatotoxicity (including acute liver failure) Symptoms suggestive of hepatic dysfunction (eg, anorexia, pruritus, right upper quadrant pain) should prompt evaluation. If you have nausea, vomiting, abdominal pain, jaundice- yellow skin or eye color-, dark urine, light stool color, fever, sore throat or infection, call us or the PCP.  Remission rate in Graves is ~ 40% with use of antithyroid drugs for 1-1.5 years, their side effects, monitoring, and follow-up issues, specifically agranulocytosis, liver toxicity, anaphylaxis, rash, lupus like syndrome, metallic taste in the mouth, and joint aches. We discussed that patient should discontinue methimazole at the earliest sign of a fever, sore throat, or other infection, should call our office and have WBC count with differential done. The drug should be held until the result is available.     Will give Rx for stool softeners

## 2024-02-01 ENCOUNTER — OFFICE VISIT (OUTPATIENT)
Facility: LOCATION | Age: 79
End: 2024-02-01

## 2024-02-01 VITALS
HEIGHT: 63 IN | SYSTOLIC BLOOD PRESSURE: 122 MMHG | DIASTOLIC BLOOD PRESSURE: 58 MMHG | WEIGHT: 169 LBS | OXYGEN SATURATION: 100 % | HEART RATE: 79 BPM | BODY MASS INDEX: 29.95 KG/M2

## 2024-02-01 DIAGNOSIS — E78.5 HYPERLIPIDEMIA ASSOCIATED WITH TYPE 2 DIABETES MELLITUS  (HCC): ICD-10-CM

## 2024-02-01 DIAGNOSIS — R25.1 TREMOR: ICD-10-CM

## 2024-02-01 DIAGNOSIS — G47.00 INSOMNIA, UNSPECIFIED TYPE: ICD-10-CM

## 2024-02-01 DIAGNOSIS — E11.9 TYPE 2 DIABETES MELLITUS WITHOUT COMPLICATION, WITHOUT LONG-TERM CURRENT USE OF INSULIN (HCC): ICD-10-CM

## 2024-02-01 DIAGNOSIS — K21.9 GASTROESOPHAGEAL REFLUX DISEASE WITHOUT ESOPHAGITIS: ICD-10-CM

## 2024-02-01 DIAGNOSIS — E11.59 HYPERTENSION ASSOCIATED WITH TYPE 2 DIABETES MELLITUS  (HCC): ICD-10-CM

## 2024-02-01 DIAGNOSIS — E05.00 GRAVES DISEASE: Primary | ICD-10-CM

## 2024-02-01 DIAGNOSIS — E07.9 THYROID DISEASE: ICD-10-CM

## 2024-02-01 DIAGNOSIS — E05.90 HYPERTHYROIDISM: ICD-10-CM

## 2024-02-01 DIAGNOSIS — E11.69 HYPERLIPIDEMIA ASSOCIATED WITH TYPE 2 DIABETES MELLITUS  (HCC): ICD-10-CM

## 2024-02-01 DIAGNOSIS — I15.2 HYPERTENSION ASSOCIATED WITH TYPE 2 DIABETES MELLITUS  (HCC): ICD-10-CM

## 2024-02-01 PROCEDURE — 99214 OFFICE O/P EST MOD 30 MIN: CPT | Performed by: INTERNAL MEDICINE

## 2024-02-01 PROCEDURE — 1159F MED LIST DOCD IN RCRD: CPT | Performed by: INTERNAL MEDICINE

## 2024-02-01 PROCEDURE — 3074F SYST BP LT 130 MM HG: CPT | Performed by: INTERNAL MEDICINE

## 2024-02-01 PROCEDURE — 3008F BODY MASS INDEX DOCD: CPT | Performed by: INTERNAL MEDICINE

## 2024-02-01 PROCEDURE — 1160F RVW MEDS BY RX/DR IN RCRD: CPT | Performed by: INTERNAL MEDICINE

## 2024-02-01 PROCEDURE — 3078F DIAST BP <80 MM HG: CPT | Performed by: INTERNAL MEDICINE

## 2024-02-01 RX ORDER — PHENOL 1.4 %
10 AEROSOL, SPRAY (ML) MUCOUS MEMBRANE NIGHTLY
Qty: 90 TABLET | Refills: 1 | Status: SHIPPED | OUTPATIENT
Start: 2024-02-01 | End: 2024-07-30

## 2024-02-01 NOTE — PATIENT INSTRUCTIONS
Labs today   Labs and f/u after 3/11   methimazole 5 mg daily   Continue metformin, Jardiance, atorvastatin and telmisartan   Stop glipizide   Take melatonin every night to work . It is not as needed medication       Discussed with patient his dx and tx SE. Discussed thyroid, wt gain, bowel movements, eye disease, and SE of meds. Methimazole may cause significant bone marrow depression; the most severe manifestation is agranulocytosis. May also cause Hepatotoxicity (including acute liver failure) Symptoms suggestive of hepatic dysfunction (eg, anorexia, pruritus, right upper quadrant pain) should prompt evaluation. If you have nausea, vomiting, abdominal pain, jaundice- yellow skin or eye color-, dark urine, light stool color, fever, sore throat or infection, call us or the PCP.  Remission rate in Graves is ~ 40% with use of antithyroid drugs for 1-1.5 years, their side effects, monitoring, and follow-up issues, specifically agranulocytosis, liver toxicity, anaphylaxis, rash, lupus like syndrome, metallic taste in the mouth, and joint aches. We discussed that patient should discontinue methimazole at the earliest sign of a fever, sore throat, or other infection, should call our office and have WBC count with differential done. The drug should be held until the result is available.

## 2024-02-01 NOTE — PROGRESS NOTES
Reason for Visit:  hyperthyroid/graves, DM, iatrogenic hypothyroid.    Requesting Physician:   ..Joshua Faustin MD    Did not want interpreted.     Patient presents with    Consult     Consult for Thyroid   Luis Simental      HISTORY OF PRESENT ILLNESS:   Giuseppe Faustin is a 78 year old male who presents for follow up for hyperthyroid, graves, DM.      Giuseppe Faustin is a 78 year old male who presents with   He was dx with hyperthyroid in 2017   He had nuc med thyroid uptake and scan in 2018 which was normal per report. US thyroid in 2014 4 mm Rt thyroid nodule.     12/2023 high TSI or TRAb.    He was started on MMI ~ 4 yrs ago. He has been on on MMI 5 mg bid for 3 mo.   He saw endocrinologist but cannot recall name. He cannot recall if was dx with Graves, no previous  thyroid surgery or BORGES ablation.   No family history of thyroid  or autoimmune disease   No biotin/tumeric/iodine/ OTC supplements     12/21/2023 Labs showed high TRAb and TSI   Now on MMI 5 mg daily     DM  Last A1c 7.4% 12/2023  He is on metformin and Jardiance    HTN on ARBS     DLP on lipitor     ASSESSMENT AND PLAN:  79 yo man with Graves, uncontrolled thyroid levels/iatrogenic hypothyroidism.   Clinically doing better now   Still gets insomnia - we discussed w/ pt how to take melatonin.   Tremors and constipation inmproved   Clinically euthyroid   Will get labs today   Stopped HEART   On statin and ARBs     DM: on metformin,   and Jardiance        Plan    Labs today   Labs and f/u after 3/11   methimazole 5 mg daily   Continue metformin, Jardiance, atorvastatin and telmisartan   Stop glipizide   Take melatonin every night to work . It is not as needed medication       Discussed with patient his dx and tx SE. Discussed thyroid, wt gain, bowel movements, eye disease, and SE of meds. Methimazole may cause significant bone marrow depression; the most severe manifestation is agranulocytosis. May also cause Hepatotoxicity (including acute liver  [phone]) Symptoms suggestive of hepatic dysfunction (eg, anorexia, pruritus, right upper quadrant pain) should prompt evaluation. If you have nausea, vomiting, abdominal pain, jaundice- yellow skin or eye color-, dark urine, light stool color, fever, sore throat or infection, call us or the PCP.  Remission rate in Graves is ~ 40% with use of antithyroid drugs for 1-1.5 years, their side effects, monitoring, and follow-up issues, specifically agranulocytosis, liver toxicity, anaphylaxis, rash, lupus like syndrome, metallic taste in the mouth, and joint aches. We discussed that patient should discontinue methimazole at the earliest sign of a fever, sore throat, or other infection, should call our office and have WBC count with differential done. The drug should be held until the result is available.      CURRENT MEDICATIONS:    Current Outpatient Medications   Medication Sig Dispense Refill    Melatonin 10 MG Oral Tab Take 10 mg by mouth at bedtime. 90 tablet 1    docusate sodium 100 MG Oral Cap Take 1 capsule (100 mg total) by mouth 2 (two) times daily. 180 capsule 0    methIMAzole 5 MG Oral Tab Take 1 tablet (5 mg total) by mouth daily. 30 tablet 2    Glucose Blood (ONETOUCH VERIO) In Vitro Strip 1 each 4 (four) times daily before meals and nightly. Test before meals and nightly 400 strip 1    Blood Glucose Monitoring Suppl (ONETOUCH VERIO) w/Device Does not apply Kit 1 each 4 (four) times daily before meals and nightly. 1 kit 0    metFORMIN  MG Oral Tablet 24 Hr Take 1 tablet (750 mg total) by mouth 2 (two) times daily with meals. FOR DIABETES. 180 tablet 3    fluticasone-salmeterol (ADVAIR DISKUS) 250-50 MCG/ACT Inhalation Aerosol Powder, Breath Activated Inhale 1 puff into the lungs every 12 (twelve) hours. (Patient not taking: Reported on 12/14/2023) 3 each 3    telmisartan 80 MG Oral Tab Take 1 tablet (80 mg total) by mouth daily. FOR BLOOD PRESSURE. STOP ENALAPRIL DUE TO COUGH. 90 tablet 3    Propranolol  HCl ER 80 MG Oral Capsule SR 24 Hr Take 1 capsule (80 mg total) by mouth daily. FOR BLOOD PRESSURE. 90 capsule 3    primidone 50 MG Oral Tab Take 1 tablet (50 mg total) by mouth 2 (two) times daily. FOR TREMORS. 180 tablet 3    pantoprazole 40 MG Oral Tab EC Take 1 tablet (40 mg total) by mouth every morning before breakfast. FOR ACID REFLUX. 90 tablet 3    Empagliflozin (JARDIANCE) 25 MG Oral Tab Take 1 tablet by mouth daily. FOR DIABETES. 90 tablet 3    atorvastatin 10 MG Oral Tab Take 1 tablet (10 mg total) by mouth nightly. FOR CHOLESTEROL. 90 tablet 3       PAST MEDICAL, SOCIAL AND FAMILY HISTORY:  See past medical history marked as reviewed.  See past surgical history marked as reviewed.  See past family history marked as reviewed.  See past social history marked as reviewed.          PHYSICAL EXAM:   Vitals:    02/01/24 1315   BP: 122/58   BP Location: Right arm   Patient Position: Sitting   Cuff Size: adult   Pulse: 79   SpO2: 100%   Weight: 169 lb (76.7 kg)   Height: 5' 3\" (1.6 m)       BMI: Body mass index is 29.94 kg/m².     No tremors   No goiter   Hoarseness     DATA:     Pertinent data reviewed   Latest Reference Range & Units 01/02/24 13:07   T4,Free (Direct) 0.8 - 1.7 ng/dL 0.9   TSH 0.550 - 4.780 mIU/mL 6.475 (H)   (H): Data is abnormally high     11/2017 FT4 2.6 H, TSH <0.005  8/20121 TSH 50  9/2021 TSH 0.01  12/2023 TSH 84   Latest Reference Range & Units 12/21/23 08:46   Thy Stim Immuno 0.00 - 0.55 IU/L 5.66 (H)   Thyrotropin Rec Ab 0.00 - 1.75 IU/L 5.37 (H)         6/2018   FINDINGS:    IMAGES:  Uniform activity is identified distributed through a normal size thyroid gland.  6 HOUR UPTAKE:   14.2 %  (normal = 7-25%)    24 HOUR UPTAKE:  14.5 %  (normal = 10-35%)      Impression   CONCLUSION:  Normal thyroid uptake.  No abnormality demonstrated in thyroid scan.        2016 ct  IMPRESSION:     1. Left-sided vocal cord paralysis. No mediastinal or cervical mass is seen.   2. Small calculus at the  hilum of the left submandibular gland.   3. 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      2014 US thyroid  Narrative   PROCEDURE:  ULTRASOUND OF THE THYROID     COMPARISON:  None.     INDICATIONS:  784.42 Dysphonia     TECHNIQUE:  High-resolution ultrasound of the thyroid gland was performed.     FINDINGS:    MEASUREMENTS:  RIGHT LOBE:  4.1 x 1.8 x 2.2 cm  LEFT LOBE:  3.4 x 1.5 x 1.5 cm  ISTHMUS:  0.14 cm     NODULES:  4 x 2 x 3 mm right lobe nodule. Otherwise normal thyroid gland.     OTHER:  None.     CONCLUSION:  4 mm right lobe nodule. Otherwise normal thyroid gland.       CT 2016  Thyroid gland: Normal .    Orders Placed This Encounter   Procedures    TSH and Free T4    TSH and Free T4    Hemoglobin A1C [E]           Dheeraj Gonzalez MD

## 2024-03-04 ENCOUNTER — OFFICE VISIT (OUTPATIENT)
Facility: LOCATION | Age: 79
End: 2024-03-04

## 2024-03-04 ENCOUNTER — LAB ENCOUNTER (OUTPATIENT)
Dept: LAB | Facility: REFERENCE LAB | Age: 79
End: 2024-03-04
Attending: INTERNAL MEDICINE
Payer: MEDICARE

## 2024-03-04 VITALS
SYSTOLIC BLOOD PRESSURE: 130 MMHG | WEIGHT: 170 LBS | BODY MASS INDEX: 30.12 KG/M2 | OXYGEN SATURATION: 98 % | HEIGHT: 63 IN | HEART RATE: 70 BPM | DIASTOLIC BLOOD PRESSURE: 80 MMHG

## 2024-03-04 DIAGNOSIS — E11.22 TYPE 2 DIABETES MELLITUS WITH STAGE 3A CHRONIC KIDNEY DISEASE, WITHOUT LONG-TERM CURRENT USE OF INSULIN (HCC): ICD-10-CM

## 2024-03-04 DIAGNOSIS — Z13.6 SCREENING, ISCHEMIC HEART DISEASE: ICD-10-CM

## 2024-03-04 DIAGNOSIS — Z00.00 ANNUAL PHYSICAL EXAM: ICD-10-CM

## 2024-03-04 DIAGNOSIS — K21.9 GASTROESOPHAGEAL REFLUX DISEASE WITHOUT ESOPHAGITIS: ICD-10-CM

## 2024-03-04 DIAGNOSIS — Z12.5 ENCOUNTER FOR SCREENING FOR MALIGNANT NEOPLASM OF PROSTATE: ICD-10-CM

## 2024-03-04 DIAGNOSIS — J38.00 VOCAL CORD PARALYSIS: Chronic | ICD-10-CM

## 2024-03-04 DIAGNOSIS — I15.2 HYPERTENSION ASSOCIATED WITH TYPE 2 DIABETES MELLITUS (HCC): ICD-10-CM

## 2024-03-04 DIAGNOSIS — N18.31 TYPE 2 DIABETES MELLITUS WITH STAGE 3A CHRONIC KIDNEY DISEASE, WITHOUT LONG-TERM CURRENT USE OF INSULIN (HCC): ICD-10-CM

## 2024-03-04 DIAGNOSIS — J45.40 MODERATE PERSISTENT ASTHMA WITHOUT COMPLICATION (HCC): ICD-10-CM

## 2024-03-04 DIAGNOSIS — R25.1 TREMORS OF NERVOUS SYSTEM: ICD-10-CM

## 2024-03-04 DIAGNOSIS — Z00.00 ENCOUNTER FOR MEDICARE ANNUAL WELLNESS EXAM: Primary | ICD-10-CM

## 2024-03-04 DIAGNOSIS — H25.9 SENILE CATARACT OF RIGHT EYE, UNSPECIFIED AGE-RELATED CATARACT TYPE: ICD-10-CM

## 2024-03-04 DIAGNOSIS — Z12.11 COLON CANCER SCREENING: ICD-10-CM

## 2024-03-04 DIAGNOSIS — E05.90 HYPERTHYROIDISM: Chronic | ICD-10-CM

## 2024-03-04 DIAGNOSIS — E11.69 HYPERLIPIDEMIA ASSOCIATED WITH TYPE 2 DIABETES MELLITUS (HCC): ICD-10-CM

## 2024-03-04 DIAGNOSIS — E78.5 HYPERLIPIDEMIA ASSOCIATED WITH TYPE 2 DIABETES MELLITUS (HCC): ICD-10-CM

## 2024-03-04 DIAGNOSIS — E55.9 VITAMIN D DEFICIENCY: ICD-10-CM

## 2024-03-04 DIAGNOSIS — E11.59 HYPERTENSION ASSOCIATED WITH TYPE 2 DIABETES MELLITUS (HCC): ICD-10-CM

## 2024-03-04 PROBLEM — J45.901 ASTHMA EXACERBATION IN COPD (HCC): Status: RESOLVED | Noted: 2024-03-04 | Resolved: 2024-03-04

## 2024-03-04 PROBLEM — J44.1 ASTHMA EXACERBATION IN COPD (HCC): Status: RESOLVED | Noted: 2024-03-04 | Resolved: 2024-03-04

## 2024-03-04 PROBLEM — R31.21 ASYMPTOMATIC MICROSCOPIC HEMATURIA: Chronic | Status: RESOLVED | Noted: 2017-11-20 | Resolved: 2024-03-04

## 2024-03-04 PROBLEM — J45.901 ASTHMA EXACERBATION IN COPD (HCC): Status: ACTIVE | Noted: 2024-03-04

## 2024-03-04 PROBLEM — J44.1 ASTHMA EXACERBATION IN COPD (HCC): Status: ACTIVE | Noted: 2024-03-04

## 2024-03-04 PROBLEM — E03.2 IATROGENIC HYPOTHYROIDISM: Status: RESOLVED | Noted: 2023-12-21 | Resolved: 2024-03-04

## 2024-03-04 LAB
ALBUMIN SERPL-MCNC: 4.3 G/DL (ref 3.2–4.8)
ALBUMIN/GLOB SERPL: 1.5 {RATIO} (ref 1–2)
ALP LIVER SERPL-CCNC: 73 U/L
ALT SERPL-CCNC: 16 U/L
ANION GAP SERPL CALC-SCNC: 6 MMOL/L (ref 0–18)
AST SERPL-CCNC: 27 U/L (ref ?–34)
BILIRUB SERPL-MCNC: 0.5 MG/DL (ref 0.2–1.1)
BUN BLD-MCNC: 18 MG/DL (ref 9–23)
BUN/CREAT SERPL: 14.9 (ref 10–20)
CALCIUM BLD-MCNC: 9.3 MG/DL (ref 8.7–10.4)
CHLORIDE SERPL-SCNC: 107 MMOL/L (ref 98–112)
CHOLEST SERPL-MCNC: 104 MG/DL (ref ?–200)
CO2 SERPL-SCNC: 26 MMOL/L (ref 21–32)
COMPLEXED PSA SERPL-MCNC: 0.23 NG/ML (ref ?–4)
CREAT BLD-MCNC: 1.21 MG/DL
DEPRECATED RDW RBC AUTO: 49.8 FL (ref 35.1–46.3)
EGFRCR SERPLBLD CKD-EPI 2021: 61 ML/MIN/1.73M2 (ref 60–?)
ERYTHROCYTE [DISTWIDTH] IN BLOOD BY AUTOMATED COUNT: 16.3 % (ref 11–15)
EST. AVERAGE GLUCOSE BLD GHB EST-MCNC: 157 MG/DL (ref 68–126)
FASTING PATIENT LIPID ANSWER: NO
FASTING STATUS PATIENT QL REPORTED: NO
GLOBULIN PLAS-MCNC: 2.9 G/DL (ref 2.8–4.4)
GLUCOSE BLD-MCNC: 174 MG/DL (ref 70–99)
HBA1C MFR BLD: 7.1 % (ref ?–5.7)
HCT VFR BLD AUTO: 35.5 %
HDLC SERPL-MCNC: 33 MG/DL (ref 40–59)
HGB BLD-MCNC: 11.8 G/DL
LDLC SERPL CALC-MCNC: 35 MG/DL (ref ?–100)
MCH RBC QN AUTO: 28 PG (ref 26–34)
MCHC RBC AUTO-ENTMCNC: 33.2 G/DL (ref 31–37)
MCV RBC AUTO: 84.1 FL
NONHDLC SERPL-MCNC: 71 MG/DL (ref ?–130)
OSMOLALITY SERPL CALC.SUM OF ELEC: 294 MOSM/KG (ref 275–295)
PLATELET # BLD AUTO: 219 10(3)UL (ref 150–450)
POTASSIUM SERPL-SCNC: 4.7 MMOL/L (ref 3.5–5.1)
PROT SERPL-MCNC: 7.2 G/DL (ref 5.7–8.2)
RBC # BLD AUTO: 4.22 X10(6)UL
SODIUM SERPL-SCNC: 139 MMOL/L (ref 136–145)
T4 FREE SERPL-MCNC: 1 NG/DL (ref 0.8–1.7)
TRIGL SERPL-MCNC: 227 MG/DL (ref 30–149)
TSI SER-ACNC: 12.93 MIU/ML (ref 0.55–4.78)
VIT D+METAB SERPL-MCNC: 40.4 NG/ML (ref 30–100)
VLDLC SERPL CALC-MCNC: 31 MG/DL (ref 0–30)
WBC # BLD AUTO: 6.2 X10(3) UL (ref 4–11)

## 2024-03-04 PROCEDURE — 84443 ASSAY THYROID STIM HORMONE: CPT | Performed by: INTERNAL MEDICINE

## 2024-03-04 PROCEDURE — 82306 VITAMIN D 25 HYDROXY: CPT | Performed by: INTERNAL MEDICINE

## 2024-03-04 PROCEDURE — 83036 HEMOGLOBIN GLYCOSYLATED A1C: CPT | Performed by: INTERNAL MEDICINE

## 2024-03-04 PROCEDURE — 85027 COMPLETE CBC AUTOMATED: CPT | Performed by: INTERNAL MEDICINE

## 2024-03-04 PROCEDURE — 36415 COLL VENOUS BLD VENIPUNCTURE: CPT | Performed by: INTERNAL MEDICINE

## 2024-03-04 PROCEDURE — 80061 LIPID PANEL: CPT | Performed by: INTERNAL MEDICINE

## 2024-03-04 PROCEDURE — 84439 ASSAY OF FREE THYROXINE: CPT | Performed by: INTERNAL MEDICINE

## 2024-03-04 PROCEDURE — 80053 COMPREHEN METABOLIC PANEL: CPT | Performed by: INTERNAL MEDICINE

## 2024-03-04 RX ORDER — TELMISARTAN 80 MG/1
80 TABLET ORAL DAILY
Qty: 90 TABLET | Refills: 9 | Status: SHIPPED | OUTPATIENT
Start: 2024-03-04

## 2024-03-04 RX ORDER — ATORVASTATIN CALCIUM 10 MG/1
10 TABLET, FILM COATED ORAL NIGHTLY
Qty: 90 TABLET | Refills: 9 | Status: SHIPPED | OUTPATIENT
Start: 2024-03-04

## 2024-03-04 RX ORDER — PANTOPRAZOLE SODIUM 40 MG/1
40 TABLET, DELAYED RELEASE ORAL
Qty: 90 TABLET | Refills: 9 | Status: SHIPPED | OUTPATIENT
Start: 2024-03-04

## 2024-03-04 RX ORDER — PRIMIDONE 50 MG/1
50 TABLET ORAL 2 TIMES DAILY
Qty: 180 TABLET | Refills: 9 | Status: SHIPPED | OUTPATIENT
Start: 2024-03-04

## 2024-03-04 RX ORDER — PROPRANOLOL HYDROCHLORIDE 80 MG/1
80 CAPSULE, EXTENDED RELEASE ORAL DAILY
Qty: 90 CAPSULE | Refills: 9 | Status: SHIPPED | OUTPATIENT
Start: 2024-03-04

## 2024-03-04 RX ORDER — METFORMIN HYDROCHLORIDE 750 MG/1
750 TABLET, EXTENDED RELEASE ORAL 2 TIMES DAILY WITH MEALS
Qty: 180 TABLET | Refills: 9 | Status: SHIPPED | OUTPATIENT
Start: 2024-03-04

## 2024-03-04 RX ORDER — FLUTICASONE PROPIONATE AND SALMETEROL 250; 50 UG/1; UG/1
1 POWDER RESPIRATORY (INHALATION) EVERY 12 HOURS SCHEDULED
Qty: 3 EACH | Refills: 9 | Status: SHIPPED | OUTPATIENT
Start: 2024-03-04

## 2024-03-04 RX ORDER — EMPAGLIFLOZIN 25 MG/1
1 TABLET, FILM COATED ORAL DAILY
Qty: 90 TABLET | Refills: 9 | Status: SHIPPED | OUTPATIENT
Start: 2024-03-04

## 2024-03-04 NOTE — PATIENT INSTRUCTIONS
Giuseppe Faustin's SCREENING SCHEDULE   Tests on this list are recommended by your physician but may not be covered, or covered at this frequency, by your insurer.   Please check with your insurance carrier before scheduling to verify coverage.   PREVENTATIVE SERVICES FREQUENCY &  COVERAGE DETAILS LAST COMPLETION DATE   Diabetes Screening    Fasting Blood Sugar / Glucose    One screening every 12 months if never tested or if previously tested but not diagnosed with pre-diabetes   One screening every 6 months if diagnosed with pre-diabetes Lab Results   Component Value Date    GLUCOSE 113 (H) 09/17/2012     (H) 12/11/2023        Cardiovascular Disease Screening    Lipid Panel  Cholesterol  Lipoprotein (HDL)  Triglycerides Covered every 5 years for all Medicare beneficiaries without apparent signs or symptoms of cardiovascular disease Lab Results   Component Value Date    CHOLEST 86 08/29/2023    HDL 39 (L) 08/29/2023    LDL 24 08/29/2023    TRIG 133 08/29/2023         Electrocardiogram (EKG)   Covered if needed at Welcome to Medicare, and non-screening if indicated for medical reasons 04/04/2022      Ultrasound Screening for Abdominal Aortic Aneurysm (AAA) Covered once in a lifetime for one of the following risk factors   • Men who are 65-75 years old and have ever smoked   • Anyone with a family history -     Colorectal Cancer Screening  Covered for ages 50-85; only need ONE of the following:    Colonoscopy   Covered every 10 years    Covered every 2 years if patient is at high risk or previous colonoscopy was abnormal 03/21/2019    No recommendations at this time    Flexible Sigmoidoscopy   Covered every 4 years -    Fecal Occult Blood Test Covered annually -   Prostate Cancer Screening    Prostate-Specific Antigen (PSA) Annually Lab Results   Component Value Date    PSA 0.39 06/14/2023     There are no preventive care reminders to display for this patient.   Immunizations    Influenza Covered once per  flu season  Please get every year 10/26/2023  No recommendations at this time    Pneumococcal Each vaccine ([id number] & [id number]) covered once after 65 Prevnar 13: 11/18/2019    [id number]: 09/11/2013     No recommendations at this time    Hepatitis B One screening covered for patients with certain risk factors   -  No recommendations at this time    Tetanus Toxoid Not covered by Medicare Part B unless medically necessary (cut with metal); may be covered with your pharmacy prescription benefits 10/11/2013    Tetanus, Diptheria and Pertusis TD and TDaP Not covered by Medicare Part B -  No recommendations at this time    Zoster Not covered by Medicare Part B; may be covered with your pharmacy  prescription benefits -  No recommendations at this time     Diabetes      Hemoglobin A1C Annually; if last result is elevated, may be asked to retest more frequently.    Medicare covers every 3 months Lab Results   Component Value Date     (H) 12/11/2023    A1C 7.4 (H) 12/11/2023       No recommendations at this time    Creat/alb ratio Annually Lab Results   Component Value Date    MICROALBCREA 18.4 12/11/2023       LDL Annually Lab Results   Component Value Date    LDL 24 08/29/2023       Dilated Eye Exam Annually Last Diabetic Eye Exam:  No data recorded  No data recorded       Annual Monitoring of Persistent Medications (ACE/ARB, digoxin diuretics, anticonvulsants)    Potassium Annually Lab Results   Component Value Date    K 5.1 12/11/2023         Creatinine   Annually Lab Results   Component Value Date    CREATSERUM 1.31 (H) 12/11/2023         BUN Annually Lab Results   Component Value Date    BUN 13 12/11/2023       Drug Serum Conc Annually No results found for: \"DIGOXIN\", \"DIG\", \"VALP\"

## 2024-03-04 NOTE — PROGRESS NOTES
Subjective:   Giuseppe Faustin is a 78 year old male who presents for a MA (Medicare Advantage) Supervisit (Once per calendar year)   1.  He has type 2 diabetes currently on Jardiance and metformin, glipizide was stopped by endocrine at last visit in favor of Jardiance which I am in agreement with.  He is due for repeat labs.  2.  He is hypertension associate with type 2 diabetes, blood pressure is fairly well-controlled on telmisartan and propranolol, no complaints.  3.  He has dyslipidemia currently on atorvastatin 10 mg, LDL well-controlled, no complaints.  4.  He has CKD 3 which is stable.  4.  He has hypothyroidism, he is currently following with endocrine, he is on methimazole.  His tremors have actually improved since being on methimazole.    History/Other:   Fall Risk Assessment:   He has been screened for Falls and is low risk.      Cognitive Assessment:   He had a completely normal cognitive assessment - see flowsheet entries     Functional Ability/Status:   Giuseppe Faustin has some abnormal functions as listed below:  He has difficulties Affording Meds based on screening of functional status.       Depression Screening (PHQ-2/PHQ-9): PHQ-2 SCORE: 0  , done 3/4/2024   If you checked off any problems, how difficult have these problems made it for you to do your work, take care of things at home, or get along with other people?: Not difficult at all    Last [place] Suicide Screening on 3/4/2024 was No Risk.          Advanced Directives:   He does NOT have a Living Will. [Do you have a living will?: No]  He does NOT have a Power of  for Health Care. [Do you have a healthcare power of ?: No]  Discussed Advance Care Planning with patient (and family/surrogate if present). Standard forms made available to patient in After Visit Summary.      Patient Active Problem List   Diagnosis    Vocal cord paralysis    Hyperthyroidism    Type 2 diabetes mellitus with stage 3a chronic kidney disease,  without long-term current use of insulin (HCC)    CKD (chronic kidney disease) stage 3, GFR 30-59 ml/min (HCC)    Gastroesophageal reflux disease without esophagitis    Hyperlipidemia associated with type 2 diabetes mellitus (HCC)    Hypertension associated with type 2 diabetes mellitus (HCC)    Age-related cataract of right eye    Moderate persistent asthma without complication (HCC)     Allergies:  He has No Known Allergies.    Current Medications:  Outpatient Medications Marked as Taking for the 3/4/24 encounter (Office Visit) with Joshua Faustin MD   Medication Sig    atorvastatin 10 MG Oral Tab Take 1 tablet (10 mg total) by mouth nightly. FOR CHOLESTEROL.    Empagliflozin (JARDIANCE) 25 MG Oral Tab Take 1 tablet by mouth daily. FOR DIABETES.    fluticasone-salmeterol (ADVAIR DISKUS) 250-50 MCG/ACT Inhalation Aerosol Powder, Breath Activated Inhale 1 puff into the lungs every 12 (twelve) hours.    metFORMIN  MG Oral Tablet 24 Hr Take 1 tablet (750 mg total) by mouth 2 (two) times daily with meals. FOR DIABETES.    pantoprazole 40 MG Oral Tab EC Take 1 tablet (40 mg total) by mouth every morning before breakfast. FOR ACID REFLUX.    primidone 50 MG Oral Tab Take 1 tablet (50 mg total) by mouth 2 (two) times daily. FOR TREMORS.    Propranolol HCl ER 80 MG Oral Capsule SR 24 Hr Take 1 capsule (80 mg total) by mouth daily. FOR BLOOD PRESSURE.    telmisartan 80 MG Oral Tab Take 1 tablet (80 mg total) by mouth daily. FOR BLOOD PRESSURE. STOP ENALAPRIL DUE TO COUGH.    Melatonin 10 MG Oral Tab Take 10 mg by mouth at bedtime.    docusate sodium 100 MG Oral Cap Take 1 capsule (100 mg total) by mouth 2 (two) times daily.    methIMAzole 5 MG Oral Tab Take 1 tablet (5 mg total) by mouth daily.    Glucose Blood (ONETOUCH VERIO) In Vitro Strip 1 each 4 (four) times daily before meals and nightly. Test before meals and nightly    Blood Glucose Monitoring Suppl (ONETOUCH VERIO) w/Device Does not apply Kit 1 each 4 (four)  times daily before meals and nightly.       Medical History:  He  has a past medical history of Adenomatous colon polyp (10/31/2012), Arthritis, Benign prostatic hypertrophy with urinary retention (07/21/2015), Chronic cough, Chronic sinusitis (11/13/2017), Esophageal reflux, Essential and other specified forms of tremor (07/18/2016), Essential hypertension, Gall stones (05/26/2015), High blood pressure, Hoarseness, chronic, HTN (hypertension), Osteoarthrosis, localized, primary, knee, left (06/07/2015), Osteoarthrosis, localized, primary, knee, right (06/07/2015), Osteoarthrosis, unspecified whether generalized or localized, unspecified site, Pain in joints, Painful swallowing, Personal history of colonic polyps (03/21/2019), PONV (postoperative nausea and vomiting), Prediabetes, Problems with swallowing, Second degree hemorrhoids (03/21/2019), Shortness of breath, Sleep apnea, Sleep disturbance, Sputum production, Visual impairment, Vocal cord paralysis (11/10/2016), and Wheezing.  Surgical History:  He  has a past surgical history that includes colonoscopy (10/2012); colonoscopy,biopsy (10/22/2012); patient withough preoperative order for iv antibiotic surgical site infection prophylaxis. (10/22/2012); patient documented not to have experienced any of the following events (10/22/2012); Laparoscopic cholecystectomy (N/A, 7/10/2015); cystoscopy,insert ureteral stent (9-1-15); other surgical history (9/1/15); upper gi endoscopy,exam (09/2020); and cholecystectomy (07/2015).   Family History:  His family history includes Heart Attack in his father; Hypertension in his father.  Social History:  He  reports that he quit smoking about 39 years ago. His smoking use included cigarettes. He has a 10 pack-year smoking history. He has been exposed to tobacco smoke. He has never used smokeless tobacco. He reports that he does not drink alcohol and does not use drugs.    Tobacco:  He smoked tobacco in the past but quit greater  than 12 months ago.  Social History    Tobacco Use      Smoking status: Former        Packs/day: 0.50        Years: 20.00        Additional pack years: 0.00        Total pack years: 10.00        Types: Cigarettes        Quit date: 1984        Years since quittin.7        Passive exposure: Past      Smokeless tobacco: Never       CAGE Alcohol Screen:   CAGE screening score of 0 on 3/4/2024, showing low risk of alcohol abuse.      Patient Care Team:  Joshua Faustin MD as PCP - General (Internal Medicine)  Eugene Hilton MD (Internal Medicine)  Tania Schmidt MD as Surgeon (SURGERY, GENERAL)  Berto Trivedi MD (UROLOGY)  Joe Sarabia MD (GASTROENTEROLOGY)  Farida Davis MD as Consulting Physician (NEUROLOGY)  Chantelle Campos PT as Physical Therapist    Review of Systems   Constitutional:  Negative for unexpected weight change.   Eyes:  Negative for visual disturbance.   Respiratory:  Negative for shortness of breath.    Cardiovascular:  Negative for chest pain, palpitations and leg swelling.   Neurological:  Negative for dizziness, syncope, weakness, light-headedness and headaches.   Psychiatric/Behavioral: Negative.           Objective:   Physical Exam  Vitals and nursing note reviewed.   Constitutional:       General: He is not in acute distress.     Appearance: Normal appearance.   HENT:      Head: Normocephalic.      Right Ear: External ear normal.      Left Ear: External ear normal.   Eyes:      Extraocular Movements: Extraocular movements intact.      Conjunctiva/sclera: Conjunctivae normal.   Cardiovascular:      Rate and Rhythm: Normal rate and regular rhythm.      Pulses: Normal pulses.      Heart sounds: Normal heart sounds.   Pulmonary:      Effort: Pulmonary effort is normal. No respiratory distress.      Breath sounds: Normal breath sounds. No wheezing.   Abdominal:      General: Abdomen is flat. Bowel sounds are normal.      Tenderness: There is no abdominal tenderness.    Musculoskeletal:         General: Normal range of motion.      Cervical back: Normal range of motion and neck supple.   Skin:     Coloration: Skin is not jaundiced.   Neurological:      General: No focal deficit present.      Mental Status: He is alert and oriented to person, place, and time. Mental status is at baseline.   Psychiatric:         Mood and Affect: Mood normal.         Behavior: Behavior normal.         /80   Pulse 70   Ht 5' 3\" (1.6 m)   Wt 170 lb (77.1 kg)   SpO2 98%   BMI 30.11 kg/m²  Estimated body mass index is 30.11 kg/m² as calculated from the following:    Height as of this encounter: 5' 3\" (1.6 m).    Weight as of this encounter: 170 lb (77.1 kg).    Medicare Hearing Assessment:   Hearing Screening    Time taken: 3/4/2024  1:51 PM  Screening Method: Finger Rub  Finger Rub Result: Pass               Visual Acuity:   Right Eye Visual Acuity: Corrected Right Eye Chart Acuity: 20/25   Left Eye Visual Acuity: Corrected Left Eye Chart Acuity: 20/25   Both Eyes Visual Acuity: Corrected Both Eyes Chart Acuity: 20/25   Able To Tolerate Visual Acuity: Yes        Assessment & Plan:   Giuseppe Faustin is a 78 year old male who presents for a Medicare Assessment.     1. Encounter for Medicare annual wellness exam (Primary)  Plan  Doing well today.     2. Hyperlipidemia associated with type 2 diabetes mellitus (HCC)  -     Atorvastatin Calcium; Take 1 tablet (10 mg total) by mouth nightly. FOR CHOLESTEROL.  Dispense: 90 tablet; Refill: 9  Plan  Chronic, well controlled on current medications as noted in medications, denies major adverse effects, continue with present management, continue to monitor labs.     3. Hyperthyroidism  -     Propranolol HCl ER; Take 1 capsule (80 mg total) by mouth daily. FOR BLOOD PRESSURE.  Dispense: 90 capsule; Refill: 9  -     Free T4, (Free Thyroxine); Future; Expected date: 03/04/2024  Plan  Stable, improving, management per endocrine.     4. Hypertension  associated with type 2 diabetes mellitus (HCC)  -     Telmisartan; Take 1 tablet (80 mg total) by mouth daily. FOR BLOOD PRESSURE. STOP ENALAPRIL DUE TO COUGH.  Dispense: 90 tablet; Refill: 9  Plan  Chronic, well controlled on current medications as noted in medications, denies major adverse effects, continue with present management, continue to monitor labs.     5. Gastroesophageal reflux disease without esophagitis  -     Pantoprazole Sodium; Take 1 tablet (40 mg total) by mouth every morning before breakfast. FOR ACID REFLUX.  Dispense: 90 tablet; Refill: 9  Plan  Chronic, well controlled on current medications as noted in medications, denies major adverse effects, continue with present management, continue to monitor labs.     6. Moderate persistent asthma without complication (HCC)  -     Fluticasone-Salmeterol; Inhale 1 puff into the lungs every 12 (twelve) hours.  Dispense: 3 each; Refill: 9  Plan  Chronic, well controlled on current medications as noted in medications, denies major adverse effects, continue with present management, continue to monitor labs.     7. Vocal cord paralysis  Plan  Stable, no complaints.     8. Senile cataract of right eye, unspecified age-related cataract type  Plan  Stable, no complaints    9. Tremors of nervous system  -     Primidone; Take 1 tablet (50 mg total) by mouth 2 (two) times daily. FOR TREMORS.  Dispense: 180 tablet; Refill: 9  -     Propranolol HCl ER; Take 1 capsule (80 mg total) by mouth daily. FOR BLOOD PRESSURE.  Dispense: 90 capsule; Refill: 9  Plan  Chronic, well controlled on current medications as noted in medications, denies major adverse effects, continue with present management, continue to monitor labs. Secondary to thyroid?     10. Annual physical exam  -     Comp Metabolic Panel (14)  -     Hemoglobin A1C  -     CBC, Platelet; No Differential  -     Lipid Panel  -     TSH W Reflex To Free T4  Plan  As above    11. Vitamin D deficiency  -     Vitamin  D  Plan  As above    12. Encounter for screening for malignant neoplasm of prostate  -     PSA Total, Screen  Plan  As above    13. Colon cancer screening  -     Count includes the [facility] GI Telephone Colon Screen  -     GASTRO - INTERNAL  Plan  As above    14. Screening, ischemic heart disease  -     CT CALCIUM SCORING; Future; Expected date: 03/04/2024  Plan  As above    15. Type 2 diabetes mellitus with stage 3a chronic kidney disease, without long-term current use of insulin (HCC)  -     Atorvastatin Calcium; Take 1 tablet (10 mg total) by mouth nightly. FOR CHOLESTEROL.  Dispense: 90 tablet; Refill: 9  -     Jardiance; Take 1 tablet by mouth daily. FOR DIABETES.  Dispense: 90 tablet; Refill: 9  -     metFORMIN HCl ER; Take 1 tablet (750 mg total) by mouth 2 (two) times daily with meals. FOR DIABETES.  Dispense: 180 tablet; Refill: 9  -     Telmisartan; Take 1 tablet (80 mg total) by mouth daily. FOR BLOOD PRESSURE. STOP ENALAPRIL DUE TO COUGH.  Dispense: 90 tablet; Refill: 9  -     Hemoglobin A1C  Plan  Chronic, stable, continue with current pending labs. Reached out to endocrine to see if there are any coontraindications to GLP1.   The patient indicates understanding of these issues and agrees to the plan.  Continue with current treatment plan.  Further testing ordered.  Imaging studies ordered.  Lab work ordered.  Patient reassured.  Reinforced healthy diet, lifestyle, and exercise.      Return for DEPENDING ON RESULTS.     Joshua Faustin MD, 3/4/2024     Supplementary Documentation:   General Health:  In the past six months, have you lost more than 10 pounds without trying?: 2 - No  Has your appetite been poor?: No  Type of Diet: Balanced  How does the patient maintain a good energy level?: Appropriate Exercise  How would you describe your daily physical activity?: Moderate  How would you describe your current health state?: Fair  How do you maintain positive mental well-being?: Social Interaction;Visiting Friends  On a scale of 0  to 10, with 0 being no pain and 10 being severe pain, what is your pain level?: 0 - (None)  In the past six months, have you experienced urine leakage?: 0-No  At any time do you feel concerned for the safety/well-being of yourself and/or your children, in your home or elsewhere?: No  Have you had any immunizations at another office such as Influenza, Hepatitis B, Tetanus, or Pneumococcal?: No        Giuseppe Faustin's SCREENING SCHEDULE   Tests on this list are recommended by your physician but may not be covered, or covered at this frequency, by your insurer.   Please check with your insurance carrier before scheduling to verify coverage.   PREVENTATIVE SERVICES FREQUENCY &  COVERAGE DETAILS LAST COMPLETION DATE   Diabetes Screening    Fasting Blood Sugar / Glucose    One screening every 12 months if never tested or if previously tested but not diagnosed with pre-diabetes   One screening every 6 months if diagnosed with pre-diabetes Lab Results   Component Value Date    GLUCOSE 113 (H) 09/17/2012     (H) 12/11/2023        Cardiovascular Disease Screening    Lipid Panel  Cholesterol  Lipoprotein (HDL)  Triglycerides Covered every 5 years for all Medicare beneficiaries without apparent signs or symptoms of cardiovascular disease Lab Results   Component Value Date    CHOLEST 86 08/29/2023    HDL 39 (L) 08/29/2023    LDL 24 08/29/2023    TRIG 133 08/29/2023         Electrocardiogram (EKG)   Covered if needed at Welcome to Medicare, and non-screening if indicated for medical reasons 04/04/2022      Ultrasound Screening for Abdominal Aortic Aneurysm (AAA) Covered once in a lifetime for one of the following risk factors    Men who are 65-75 years old and have ever smoked    Anyone with a family history -     Colorectal Cancer Screening  Covered for ages 50-85; only need ONE of the following:    Colonoscopy   Covered every 10 years    Covered every 2 years if patient is at high risk or previous colonoscopy was  abnormal 03/21/2019    No recommendations at this time    Flexible Sigmoidoscopy   Covered every 4 years -    Fecal Occult Blood Test Covered annually -   Prostate Cancer Screening    Prostate-Specific Antigen (PSA) Annually Lab Results   Component Value Date    PSA 0.39 06/14/2023     There are no preventive care reminders to display for this patient.   Immunizations    Influenza Covered once per flu season  Please get every year 10/26/2023  No recommendations at this time    Pneumococcal Each vaccine ([id number] & [id number]) covered once after 65 Prevnar 13: 11/18/2019    Jstrinfww22: 09/11/2013     No recommendations at this time    Hepatitis B One screening covered for patients with certain risk factors   -  No recommendations at this time    Tetanus Toxoid Not covered by Medicare Part B unless medically necessary (cut with metal); may be covered with your pharmacy prescription benefits 10/11/2013    Tetanus, Diptheria and Pertusis TD and TDaP Not covered by Medicare Part B -  No recommendations at this time    Zoster Not covered by Medicare Part B; may be covered with your pharmacy  prescription benefits -  No recommendations at this time     Diabetes      Hemoglobin A1C Annually; if last result is elevated, may be asked to retest more frequently.    Medicare covers every 3 months Lab Results   Component Value Date     (H) 12/11/2023    A1C 7.4 (H) 12/11/2023       No recommendations at this time    Creat/alb ratio Annually Lab Results   Component Value Date    MICROALBCREA 18.4 12/11/2023       LDL Annually Lab Results   Component Value Date    LDL 24 08/29/2023       Dilated Eye Exam Annually Last Diabetic Eye Exam:  No data recorded  No data recorded       Annual Monitoring of Persistent Medications (ACE/ARB, digoxin diuretics, anticonvulsants)    Potassium Annually Lab Results   Component Value Date    K 5.1 12/11/2023         Creatinine   Annually Lab Results   Component Value Date    CREATSERUM  1.31 (H) 12/11/2023         BUN Annually Lab Results   Component Value Date    BUN 13 12/11/2023       Drug Serum Conc Annually No results found for: \"DIGOXIN\", \"DIG\", \"VALP\"

## 2024-03-12 ENCOUNTER — OFFICE VISIT (OUTPATIENT)
Facility: LOCATION | Age: 79
End: 2024-03-12

## 2024-03-12 VITALS
DIASTOLIC BLOOD PRESSURE: 64 MMHG | OXYGEN SATURATION: 99 % | HEART RATE: 75 BPM | BODY MASS INDEX: 30.12 KG/M2 | WEIGHT: 170 LBS | HEIGHT: 63 IN | SYSTOLIC BLOOD PRESSURE: 138 MMHG

## 2024-03-12 DIAGNOSIS — E11.9 TYPE 2 DIABETES MELLITUS WITHOUT COMPLICATION, WITHOUT LONG-TERM CURRENT USE OF INSULIN (HCC): ICD-10-CM

## 2024-03-12 DIAGNOSIS — I15.2 HYPERTENSION ASSOCIATED WITH TYPE 2 DIABETES MELLITUS (HCC): ICD-10-CM

## 2024-03-12 DIAGNOSIS — E05.00 GRAVES DISEASE: Primary | ICD-10-CM

## 2024-03-12 DIAGNOSIS — E07.9 THYROID DISEASE: ICD-10-CM

## 2024-03-12 DIAGNOSIS — E05.90 HYPERTHYROIDISM: ICD-10-CM

## 2024-03-12 DIAGNOSIS — E78.5 HYPERLIPIDEMIA ASSOCIATED WITH TYPE 2 DIABETES MELLITUS (HCC): ICD-10-CM

## 2024-03-12 DIAGNOSIS — K21.9 GASTROESOPHAGEAL REFLUX DISEASE WITHOUT ESOPHAGITIS: ICD-10-CM

## 2024-03-12 DIAGNOSIS — E11.69 HYPERLIPIDEMIA ASSOCIATED WITH TYPE 2 DIABETES MELLITUS (HCC): ICD-10-CM

## 2024-03-12 DIAGNOSIS — E11.59 HYPERTENSION ASSOCIATED WITH TYPE 2 DIABETES MELLITUS (HCC): ICD-10-CM

## 2024-03-12 DIAGNOSIS — E03.2 IATROGENIC HYPOTHYROIDISM: ICD-10-CM

## 2024-03-12 DIAGNOSIS — G47.00 INSOMNIA, UNSPECIFIED TYPE: ICD-10-CM

## 2024-03-12 DIAGNOSIS — K59.00 CONSTIPATION, UNSPECIFIED CONSTIPATION TYPE: ICD-10-CM

## 2024-03-12 PROCEDURE — 3008F BODY MASS INDEX DOCD: CPT | Performed by: INTERNAL MEDICINE

## 2024-03-12 PROCEDURE — 1160F RVW MEDS BY RX/DR IN RCRD: CPT | Performed by: INTERNAL MEDICINE

## 2024-03-12 PROCEDURE — 3078F DIAST BP <80 MM HG: CPT | Performed by: INTERNAL MEDICINE

## 2024-03-12 PROCEDURE — 99214 OFFICE O/P EST MOD 30 MIN: CPT | Performed by: INTERNAL MEDICINE

## 2024-03-12 PROCEDURE — 1159F MED LIST DOCD IN RCRD: CPT | Performed by: INTERNAL MEDICINE

## 2024-03-12 PROCEDURE — 3075F SYST BP GE 130 - 139MM HG: CPT | Performed by: INTERNAL MEDICINE

## 2024-03-12 RX ORDER — METHIMAZOLE 5 MG/1
5 TABLET ORAL DAILY
Qty: 90 TABLET | Refills: 0 | Status: SHIPPED | OUTPATIENT
Start: 2024-03-12 | End: 2024-06-10

## 2024-03-12 NOTE — PATIENT INSTRUCTIONS
Labs and f/u in 3 mo   methimazole 5 mg daily   Continue metformin, Jardiance, atorvastatin and telmisartan   Take melatonin every night to work . It is not as needed medication

## 2024-03-12 NOTE — PROGRESS NOTES
Reason for Visit:  hyperthyroid/graves, DM,   Requesting Physician:   ..Joshua Faustin MD    Did not want interpreted.     Patient presents with    Consult     Consult for Thyroid   Luis Simental      HISTORY OF PRESENT ILLNESS:   Giuseppe Faustin is a 78 year old male who presents for follow up for hyperthyroid, graves, DM.      He has been on MMI 5 mg daily for few months   Recent labs showed TSH 12.9 with FT4 1.0  I d/w Dr Faustin     Clinically denied hypo or hyperthyroid sx   Trying to lose wt but cannot walk d/t knee pain. Discussed w/u in chair.     He was dx with hyperthyroid in 2017   He had nuc med thyroid uptake and scan in 2018 which was normal per report. US thyroid in 2014 4 mm Rt thyroid nodule.     12/2023 high TSI or TRAb.    No family history of thyroid  or autoimmune disease   No biotin/tumeric/iodine/ OTC supplements     12/21/2023 Labs showed high TRAb and TSI     DM  Last A1c value was 7.1% done 3/4/2024.    He is on metformin and Jardiance    HTN on ARBS     DLP on lipitor  LDL at goal     ASSESSMENT AND PLAN:  77 yo man with Graves, uncontrolled thyroid levels/iatrogenic hypothyroidism.   Clinically euthyroid  Biochemically TSH 13 w/ FT4 1.0   Insomnia -  improved   On statin and ARBs   DM: on metformin,   and Jardiance      Discussed with pt his labs and doses. Discussed lowering MMI doses vs keeping the same dose. In his age group and comorbidities, he will benefit from keeping TSH in higher range. Targeting normal TSH might put him at higher risk from tachycardia. Will monitor and if needed, will adjust Methimazole doses.      Plan  Labs and f/u in 3 mo   methimazole 5 mg daily   Continue metformin, Jardiance, atorvastatin and telmisartan   Take melatonin every night to work . It is not as needed medication       Discussed with patient his dx and tx SE. Discussed thyroid, wt gain, bowel movements, eye disease, and SE of meds. Methimazole may cause significant bone marrow depression; the  most severe manifestation is agranulocytosis. May also cause Hepatotoxicity (including acute liver failure) Symptoms suggestive of hepatic dysfunction (eg, anorexia, pruritus, right upper quadrant pain) should prompt evaluation. If you have nausea, vomiting, abdominal pain, jaundice- yellow skin or eye color-, dark urine, light stool color, fever, sore throat or infection, call us or the PCP.  Remission rate in Graves is ~ 40% with use of antithyroid drugs for 1-1.5 years, their side effects, monitoring, and follow-up issues, specifically agranulocytosis, liver toxicity, anaphylaxis, rash, lupus like syndrome, metallic taste in the mouth, and joint aches. We discussed that patient should discontinue methimazole at the earliest sign of a fever, sore throat, or other infection, should call our office and have WBC count with differential done. The drug should be held until the result is available.      CURRENT MEDICATIONS:    Current Outpatient Medications   Medication Sig Dispense Refill    methIMAzole 5 MG Oral Tab Take 1 tablet (5 mg total) by mouth daily. 90 tablet 0    atorvastatin 10 MG Oral Tab Take 1 tablet (10 mg total) by mouth nightly. FOR CHOLESTEROL. 90 tablet 9    Empagliflozin (JARDIANCE) 25 MG Oral Tab Take 1 tablet by mouth daily. FOR DIABETES. 90 tablet 9    fluticasone-salmeterol (ADVAIR DISKUS) 250-50 MCG/ACT Inhalation Aerosol Powder, Breath Activated Inhale 1 puff into the lungs every 12 (twelve) hours. 3 each 9    metFORMIN  MG Oral Tablet 24 Hr Take 1 tablet (750 mg total) by mouth 2 (two) times daily with meals. FOR DIABETES. 180 tablet 9    pantoprazole 40 MG Oral Tab EC Take 1 tablet (40 mg total) by mouth every morning before breakfast. FOR ACID REFLUX. 90 tablet 9    primidone 50 MG Oral Tab Take 1 tablet (50 mg total) by mouth 2 (two) times daily. FOR TREMORS. 180 tablet 9    Propranolol HCl ER 80 MG Oral Capsule SR 24 Hr Take 1 capsule (80 mg total) by mouth daily. FOR BLOOD  PRESSURE. 90 capsule 9    telmisartan 80 MG Oral Tab Take 1 tablet (80 mg total) by mouth daily. FOR BLOOD PRESSURE. STOP ENALAPRIL DUE TO COUGH. 90 tablet 9    Melatonin 10 MG Oral Tab Take 10 mg by mouth at bedtime. 90 tablet 1    docusate sodium 100 MG Oral Cap Take 1 capsule (100 mg total) by mouth 2 (two) times daily. 180 capsule 0    Glucose Blood (ONETOUCH VERIO) In Vitro Strip 1 each 4 (four) times daily before meals and nightly. Test before meals and nightly 400 strip 1    Blood Glucose Monitoring Suppl (ONETOUCH VERIO) w/Device Does not apply Kit 1 each 4 (four) times daily before meals and nightly. 1 kit 0       PAST MEDICAL, SOCIAL AND FAMILY HISTORY:  See past medical history marked as reviewed.  See past surgical history marked as reviewed.  See past family history marked as reviewed.  See past social history marked as reviewed.          PHYSICAL EXAM:   Vitals:    03/12/24 1253   BP: 138/64   Pulse: 75   SpO2: 99%   Weight: 170 lb (77.1 kg)   Height: 5' 3\" (1.6 m)       BMI: Body mass index is 30.11 kg/m².     No tremors   No goiter   Hoarseness     DATA:     Pertinent data reviewed     Latest Reference Range & Units 03/04/24 14:37   ALKALINE PHOSPHATASE 45 - 117 U/L 73   AST (SGOT) <=34 U/L 27   ALT (SGPT) 10 - 49 U/L 16   Total Bilirubin 0.2 - 1.1 mg/dL 0.5   Globulin 2.8 - 4.4 g/dL 2.9   Cholesterol, Total <200 mg/dL 104   Triglycerides 30 - 149 mg/dL 227 (H)   HDL Cholesterol 40 - 59 mg/dL 33 (L)   VLDL 0 - 30 mg/dL 31 (H)   NON-HDL CHOLESTEROL <130 mg/dL 71   LDL Cholesterol Calc <100 mg/dL 35   PSA Screen <=4.00  0.23   A/G Ratio 1.0 - 2.0  1.5   PROTEIN, TOTAL 5.7 - 8.2 g/dL 7.2   Albumin 3.2 - 4.8 g/dL 4.3   VITAMIN D, 25-OH, TOTAL 30.0 - 100.0 ng/mL 40.4   Patient Fasting for CMP?  No   Patient Fasting for Lipid?  No   T4,Free (Direct) 0.8 - 1.7 ng/dL 1.0   TSH 0.550 - 4.780 mIU/mL 12.929 (H)        11/2017 FT4 2.6 H, TSH <0.005  8/20121 TSH 50  9/2021 TSH 0.01  12/2023 TSH 84   Latest  Reference Range & Units 12/21/23 08:46   Thy Stim Immuno 0.00 - 0.55 IU/L 5.66 (H)   Thyrotropin Rec Ab 0.00 - 1.75 IU/L 5.37 (H)         6/2018   FINDINGS:    IMAGES:  Uniform activity is identified distributed through a normal size thyroid gland.  6 HOUR UPTAKE:   14.2 %  (normal = 7-25%)    24 HOUR UPTAKE:  14.5 %  (normal = 10-35%)      Impression   CONCLUSION:  Normal thyroid uptake.  No abnormality demonstrated in thyroid scan.        2016 ct  IMPRESSION:     1. Left-sided vocal cord paralysis. No mediastinal or cervical mass is seen.   2. Small calculus at the hilum of the left submandibular gland.   3. 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      2014 US thyroid  Narrative   PROCEDURE:  ULTRASOUND OF THE THYROID     COMPARISON:  None.     INDICATIONS:  784.42 Dysphonia     TECHNIQUE:  High-resolution ultrasound of the thyroid gland was performed.     FINDINGS:    MEASUREMENTS:  RIGHT LOBE:  4.1 x 1.8 x 2.2 cm  LEFT LOBE:  3.4 x 1.5 x 1.5 cm  ISTHMUS:  0.14 cm     NODULES:  4 x 2 x 3 mm right lobe nodule. Otherwise normal thyroid gland.     OTHER:  None.     CONCLUSION:  4 mm right lobe nodule. Otherwise normal thyroid gland.       CT 2016  Thyroid gland: Normal .    Orders Placed This Encounter   Procedures    TSH and Free T4           Dheeraj Gonzalez MD

## 2024-04-14 ENCOUNTER — HOSPITAL ENCOUNTER (OUTPATIENT)
Dept: CT IMAGING | Age: 79
Discharge: HOME | End: 2024-04-14
Attending: INTERNAL MEDICINE

## 2024-04-14 DIAGNOSIS — Z13.6 SCREENING, ISCHEMIC HEART DISEASE: ICD-10-CM

## 2024-04-22 PROBLEM — I25.10 CORONARY ARTERY DISEASE INVOLVING NATIVE CORONARY ARTERY OF NATIVE HEART WITHOUT ANGINA PECTORIS: Status: ACTIVE | Noted: 2024-04-22

## 2024-04-24 ENCOUNTER — TELEPHONE (OUTPATIENT)
Facility: LOCATION | Age: 79
End: 2024-04-24

## 2024-04-24 NOTE — TELEPHONE ENCOUNTER
We have tried leaving multiple messages for patient in regards to his findings- per prior note:     Please have patient follow-up with cardiology right away.  His total calcium score is 1200 primarily in RCA and LAD, no strenuous activity, ideally atorvastatin should go up to 40 but given his LDL is 35 we will continue with atorvastatin 10 mg pending cardiology follow-up, he should add an aspirin 81 mg, please have them directed to [facility] cardiology for scheduling for \"calcium score 1200, south  descent\". If patient has any chest pain/SOB go to ER.

## 2024-04-24 NOTE — TELEPHONE ENCOUNTER
Patient's son Oskar calling for CT calcium scoring results.  Son is on a previous JESUS form.  Report is in computer, but not seeing Dr Faustin's review/instructions.  Dr Faustin, please advise on results/follow up.

## 2024-04-25 NOTE — TELEPHONE ENCOUNTER
Oskar, patient's son contacted and all phone numbers updated    Please refer to CT calcium score result note    No further action needed at this time    Closing this encounter

## 2024-04-26 ENCOUNTER — TELEPHONE (OUTPATIENT)
Dept: CASE MANAGEMENT | Age: 79
End: 2024-04-26

## 2024-04-26 DIAGNOSIS — R93.1 ELEVATED CORONARY ARTERY CALCIUM SCORE: ICD-10-CM

## 2024-04-26 DIAGNOSIS — I25.10 CORONARY ARTERY DISEASE INVOLVING NATIVE CORONARY ARTERY OF NATIVE HEART WITHOUT ANGINA PECTORIS: Primary | ICD-10-CM

## 2024-04-26 NOTE — TELEPHONE ENCOUNTER
Dr. Faustin,     Please provide the name of the specialist you want patient to see on referral [id number].     One name is required to obtain authorization with [employer].     Thank you

## 2024-05-02 DIAGNOSIS — K21.9 GASTROESOPHAGEAL REFLUX DISEASE WITHOUT ESOPHAGITIS: ICD-10-CM

## 2024-05-05 RX ORDER — PANTOPRAZOLE SODIUM 40 MG/1
40 TABLET, DELAYED RELEASE ORAL
Qty: 90 TABLET | Refills: 3 | Status: SHIPPED | OUTPATIENT
Start: 2024-05-05

## 2024-05-08 ENCOUNTER — LAB ENCOUNTER (OUTPATIENT)
Dept: LAB | Age: 79
End: 2024-05-08
Attending: INTERNAL MEDICINE
Payer: MEDICARE

## 2024-05-08 DIAGNOSIS — E11.59 HYPERTENSION ASSOCIATED WITH TYPE 2 DIABETES MELLITUS (HCC): ICD-10-CM

## 2024-05-08 DIAGNOSIS — E11.69 HYPERLIPIDEMIA ASSOCIATED WITH TYPE 2 DIABETES MELLITUS (HCC): ICD-10-CM

## 2024-05-08 DIAGNOSIS — E11.9 TYPE 2 DIABETES MELLITUS WITHOUT COMPLICATION, WITHOUT LONG-TERM CURRENT USE OF INSULIN (HCC): ICD-10-CM

## 2024-05-08 DIAGNOSIS — E05.90 HYPERTHYROIDISM: ICD-10-CM

## 2024-05-08 DIAGNOSIS — E05.00 GRAVES DISEASE: ICD-10-CM

## 2024-05-08 DIAGNOSIS — I15.2 HYPERTENSION ASSOCIATED WITH TYPE 2 DIABETES MELLITUS (HCC): ICD-10-CM

## 2024-05-08 DIAGNOSIS — K21.9 GASTROESOPHAGEAL REFLUX DISEASE WITHOUT ESOPHAGITIS: ICD-10-CM

## 2024-05-08 DIAGNOSIS — E78.5 HYPERLIPIDEMIA ASSOCIATED WITH TYPE 2 DIABETES MELLITUS (HCC): ICD-10-CM

## 2024-05-08 DIAGNOSIS — G47.00 INSOMNIA, UNSPECIFIED TYPE: ICD-10-CM

## 2024-05-08 LAB
T4 FREE SERPL-MCNC: 1.2 NG/DL (ref 0.8–1.7)
TSI SER-ACNC: 9.21 MIU/ML (ref 0.55–4.78)

## 2024-05-08 PROCEDURE — 36415 COLL VENOUS BLD VENIPUNCTURE: CPT

## 2024-05-08 PROCEDURE — 84443 ASSAY THYROID STIM HORMONE: CPT

## 2024-05-08 PROCEDURE — 84439 ASSAY OF FREE THYROXINE: CPT

## 2024-06-11 ENCOUNTER — LAB ENCOUNTER (OUTPATIENT)
Dept: LAB | Age: 79
End: 2024-06-11
Attending: INTERNAL MEDICINE
Payer: MEDICARE

## 2024-06-11 DIAGNOSIS — E05.90 HYPERTHYROIDISM: Chronic | ICD-10-CM

## 2024-06-11 LAB — T4 FREE SERPL-MCNC: 1.1 NG/DL (ref 0.8–1.7)

## 2024-06-11 PROCEDURE — 84439 ASSAY OF FREE THYROXINE: CPT

## 2024-06-11 PROCEDURE — 36415 COLL VENOUS BLD VENIPUNCTURE: CPT

## 2024-06-18 ENCOUNTER — OFFICE VISIT (OUTPATIENT)
Facility: LOCATION | Age: 79
End: 2024-06-18

## 2024-06-18 VITALS
DIASTOLIC BLOOD PRESSURE: 74 MMHG | SYSTOLIC BLOOD PRESSURE: 124 MMHG | BODY MASS INDEX: 29.23 KG/M2 | HEIGHT: 63 IN | HEART RATE: 67 BPM | OXYGEN SATURATION: 98 % | WEIGHT: 165 LBS

## 2024-06-18 DIAGNOSIS — R93.1 ELEVATED CORONARY ARTERY CALCIUM SCORE: ICD-10-CM

## 2024-06-18 DIAGNOSIS — E11.69 HYPERLIPIDEMIA ASSOCIATED WITH TYPE 2 DIABETES MELLITUS (HCC): ICD-10-CM

## 2024-06-18 DIAGNOSIS — E11.22 TYPE 2 DIABETES MELLITUS WITH STAGE 3A CHRONIC KIDNEY DISEASE, WITHOUT LONG-TERM CURRENT USE OF INSULIN (HCC): ICD-10-CM

## 2024-06-18 DIAGNOSIS — H91.93 BILATERAL HEARING LOSS, UNSPECIFIED HEARING LOSS TYPE: ICD-10-CM

## 2024-06-18 DIAGNOSIS — E11.59 HYPERTENSION ASSOCIATED WITH TYPE 2 DIABETES MELLITUS (HCC): Primary | ICD-10-CM

## 2024-06-18 DIAGNOSIS — E05.90 HYPERTHYROIDISM: Chronic | ICD-10-CM

## 2024-06-18 DIAGNOSIS — E78.5 HYPERLIPIDEMIA ASSOCIATED WITH TYPE 2 DIABETES MELLITUS (HCC): ICD-10-CM

## 2024-06-18 DIAGNOSIS — R25.1 TREMORS OF NERVOUS SYSTEM: ICD-10-CM

## 2024-06-18 DIAGNOSIS — N18.31 TYPE 2 DIABETES MELLITUS WITH STAGE 3A CHRONIC KIDNEY DISEASE, WITHOUT LONG-TERM CURRENT USE OF INSULIN (HCC): ICD-10-CM

## 2024-06-18 DIAGNOSIS — I25.10 CORONARY ARTERY DISEASE INVOLVING NATIVE CORONARY ARTERY OF NATIVE HEART WITHOUT ANGINA PECTORIS: ICD-10-CM

## 2024-06-18 DIAGNOSIS — I15.2 HYPERTENSION ASSOCIATED WITH TYPE 2 DIABETES MELLITUS (HCC): Primary | ICD-10-CM

## 2024-06-18 DIAGNOSIS — M17.0 PRIMARY OSTEOARTHRITIS OF BOTH KNEES: ICD-10-CM

## 2024-06-18 LAB — HEMOGLOBIN A1C: 6.8 % (ref 4.3–5.6)

## 2024-06-18 PROCEDURE — 99214 OFFICE O/P EST MOD 30 MIN: CPT | Performed by: INTERNAL MEDICINE

## 2024-06-18 PROCEDURE — 3008F BODY MASS INDEX DOCD: CPT | Performed by: INTERNAL MEDICINE

## 2024-06-18 PROCEDURE — 1159F MED LIST DOCD IN RCRD: CPT | Performed by: INTERNAL MEDICINE

## 2024-06-18 PROCEDURE — 1160F RVW MEDS BY RX/DR IN RCRD: CPT | Performed by: INTERNAL MEDICINE

## 2024-06-18 PROCEDURE — 3078F DIAST BP <80 MM HG: CPT | Performed by: INTERNAL MEDICINE

## 2024-06-18 PROCEDURE — 83036 HEMOGLOBIN GLYCOSYLATED A1C: CPT | Performed by: INTERNAL MEDICINE

## 2024-06-18 PROCEDURE — 3074F SYST BP LT 130 MM HG: CPT | Performed by: INTERNAL MEDICINE

## 2024-06-18 RX ORDER — EMPAGLIFLOZIN 25 MG/1
1 TABLET, FILM COATED ORAL DAILY
Qty: 90 TABLET | Refills: 9 | Status: SHIPPED | OUTPATIENT
Start: 2024-06-18

## 2024-06-18 RX ORDER — METFORMIN HYDROCHLORIDE 750 MG/1
750 TABLET, EXTENDED RELEASE ORAL 2 TIMES DAILY WITH MEALS
Qty: 180 TABLET | Refills: 9 | Status: SHIPPED | OUTPATIENT
Start: 2024-06-18

## 2024-06-18 RX ORDER — ASPIRIN 81 MG/1
81 TABLET ORAL DAILY
Qty: 90 TABLET | Refills: 9 | Status: SHIPPED | OUTPATIENT
Start: 2024-06-18

## 2024-06-18 RX ORDER — PROPRANOLOL HYDROCHLORIDE 80 MG/1
80 CAPSULE, EXTENDED RELEASE ORAL DAILY
Qty: 90 CAPSULE | Refills: 9 | Status: SHIPPED | OUTPATIENT
Start: 2024-06-18

## 2024-06-18 RX ORDER — ATORVASTATIN CALCIUM 10 MG/1
10 TABLET, FILM COATED ORAL NIGHTLY
Qty: 90 TABLET | Refills: 9 | Status: SHIPPED | OUTPATIENT
Start: 2024-06-18

## 2024-06-18 RX ORDER — TELMISARTAN 80 MG/1
80 TABLET ORAL DAILY
Qty: 90 TABLET | Refills: 9 | Status: SHIPPED | OUTPATIENT
Start: 2024-06-18

## 2024-06-18 NOTE — PROGRESS NOTES
INTERNAL MEDICINE OFFICE NOTE     Patient ID: Giuseppe Faustin is a 78 year old male.  Today's Date: 06/18/24  Chief Complaint: Follow - Up (Patient here for a F/U )    HPI  1.  He has type 2 diabetes, is currently on Jardiance and metformin, his A1c has improved down to 6.8, no complaints on these meds.  There is associated CKD 3.  2.  He is dyslipidemia associate with CAD and elevated calcium score, is currently on atorvastatin 10 mg, LDL very well-controlled, is on aspirin 81, we reviewed the PET scan and echo from cardiology which overall looks unremarkable however they do have a follow-up tomorrow with cardiology to discuss further in detail.  3.  He is hypertension associate with type 2 diabetes, blood pressure is well-controlled on telmisartan and propranolol.  4.  He has hyperthyroidism and tremors, his tremors have actually improved, hard to tell if this is due to the propranolol primidone or if this is due to improvement in the hyperthyroid, we will continue with current medications and I would like him to follow-up with neurology to establish care and to see if there is anything else that can be done for the tremors.  5.  He has bilateral knee osteoarthritis, left worse than right, he would like to follow-up with any specialist.  His x-rays previously do show arthritis.    Vitals:    06/18/24 1255   BP: 124/74   Pulse: 67   SpO2: 98%   Weight: 165 lb (74.8 kg)   Height: 5' 3\" (1.6 m)     body mass index is 29.23 kg/m².  BP Readings from Last 3 Encounters:   06/18/24 124/74   03/12/24 138/64   03/04/24 130/80     The ASCVD Risk score (Rubin AYERS, et al., 2019) failed to calculate for the following reasons:    The valid total cholesterol range is 130 to 320 mg/dL  Medications reviewed:  Current Outpatient Medications   Medication Sig Dispense Refill    Propranolol HCl ER 80 MG Oral Capsule SR 24 Hr Take 1 capsule (80 mg total) by mouth daily. FOR BLOOD PRESSURE. 90 capsule 9    telmisartan 80  MG Oral Tab Take 1 tablet (80 mg total) by mouth daily. FOR BLOOD PRESSURE. STOP ENALAPRIL DUE TO COUGH. 90 tablet 9    atorvastatin 10 MG Oral Tab Take 1 tablet (10 mg total) by mouth nightly. FOR CHOLESTEROL. 90 tablet 9    aspirin (KP ASPIRIN) 81 MG Oral Tab EC Take 1 tablet (81 mg total) by mouth daily. 90 tablet 9    Empagliflozin (JARDIANCE) 25 MG Oral Tab Take 1 tablet by mouth daily. FOR DIABETES. 90 tablet 9    metFORMIN  MG Oral Tablet 24 Hr Take 1 tablet (750 mg total) by mouth 2 (two) times daily with meals. FOR DIABETES. 180 tablet 9    pantoprazole 40 MG Oral Tab EC Take 1 tablet (40 mg total) by mouth before breakfast. 90 tablet 3    fluticasone-salmeterol (ADVAIR DISKUS) 250-50 MCG/ACT Inhalation Aerosol Powder, Breath Activated Inhale 1 puff into the lungs every 12 (twelve) hours. 3 each 9    primidone 50 MG Oral Tab Take 1 tablet (50 mg total) by mouth 2 (two) times daily. FOR TREMORS. 180 tablet 9    Melatonin 10 MG Oral Tab Take 10 mg by mouth at bedtime. 90 tablet 1    Glucose Blood (ONETOUCH VERIO) In Vitro Strip 1 each 4 (four) times daily before meals and nightly. Test before meals and nightly 400 strip 1    Blood Glucose Monitoring Suppl (ONETOUCH VERIO) w/Device Does not apply Kit 1 each 4 (four) times daily before meals and nightly. 1 kit 0    methIMAzole 5 MG Oral Tab Take 1 tablet (5 mg total) by mouth daily. 90 tablet 0         Assessment & Plan    1. Hypertension associated with type 2 diabetes mellitus (HCC) (Primary)  -     Telmisartan; Take 1 tablet (80 mg total) by mouth daily. FOR BLOOD PRESSURE. STOP ENALAPRIL DUE TO COUGH.  Dispense: 90 tablet; Refill: 9  2. Type 2 diabetes mellitus with stage 3a chronic kidney disease, without long-term current use of insulin (HCC)  -     Telmisartan; Take 1 tablet (80 mg total) by mouth daily. FOR BLOOD PRESSURE. STOP ENALAPRIL DUE TO COUGH.  Dispense: 90 tablet; Refill: 9  -     Atorvastatin Calcium; Take 1 tablet (10 mg total) by mouth  nightly. FOR CHOLESTEROL.  Dispense: 90 tablet; Refill: 9  -     Jardiance; Take 1 tablet by mouth daily. FOR DIABETES.  Dispense: 90 tablet; Refill: 9  -     metFORMIN HCl ER; Take 1 tablet (750 mg total) by mouth 2 (two) times daily with meals. FOR DIABETES.  Dispense: 180 tablet; Refill: 9  -     POC Glycohemoglobin [61982]  3. Hyperlipidemia associated with type 2 diabetes mellitus (HCC)  -     Atorvastatin Calcium; Take 1 tablet (10 mg total) by mouth nightly. FOR CHOLESTEROL.  Dispense: 90 tablet; Refill: 9  4. Coronary artery disease involving native coronary artery of native heart without angina pectoris  -     Aspirin; Take 1 tablet (81 mg total) by mouth daily.  Dispense: 90 tablet; Refill: 9  5. Elevated coronary artery calcium score  -     Aspirin; Take 1 tablet (81 mg total) by mouth daily.  Dispense: 90 tablet; Refill: 9  6. Primary osteoarthritis of both knees  -     Ortho Referral - In Network  7. Bilateral hearing loss, unspecified hearing loss type  -     ENT Referral - In Network  -     Audiology Referral - In Network  8. Hyperthyroidism  -     Propranolol HCl ER; Take 1 capsule (80 mg total) by mouth daily. FOR BLOOD PRESSURE.  Dispense: 90 capsule; Refill: 9  9. Tremors of nervous system  -     Propranolol HCl ER; Take 1 capsule (80 mg total) by mouth daily. FOR BLOOD PRESSURE.  Dispense: 90 capsule; Refill: 9  Plan  Overall he is doing well today.  His chronic conditions are well-controlled on current medications and we will continue with the same dose as above.  Plan to repeat A1c in 3 months.  Asked to follow-up with neuro to discuss the tremors but I suspect this is due to his hyperthyroid given that they have improved with improvement in the thyroid, appreciate endocrine recommendations.  In terms of the hearing loss follow-up with ENT along with audiology, this may be causing some of the memory issues.  Further recommendations to follow.    Follow Up: Return in about 3 months (around  2024) for DIABETES, POC A1C IN OFFICE, ARRIVE 15 MIN EARLY..         Objective: Results:   Physical Exam   Reviewed:    Patient Active Problem List    Diagnosis    Primary osteoarthritis of both knees    Coronary artery disease involving native coronary artery of native heart without angina pectoris    Moderate persistent asthma without complication (HCC)    Age-related cataract of right eye    CKD (chronic kidney disease) stage 3, GFR 30-59 ml/min (HCC)    Gastroesophageal reflux disease without esophagitis    Hyperlipidemia associated with type 2 diabetes mellitus (HCC)    Hypertension associated with type 2 diabetes mellitus (HCC)    Type 2 diabetes mellitus with stage 3a chronic kidney disease, without long-term current use of insulin (HCC)    Hyperthyroidism    Vocal cord paralysis      No Known Allergies     Social History     Socioeconomic History    Marital status:    Tobacco Use    Smoking status: Former     Current packs/day: 0.00     Average packs/day: 0.5 packs/day for 20.0 years (10.0 ttl pk-yrs)     Types: Cigarettes     Start date: 1964     Quit date: 1984     Years since quittin.0     Passive exposure: Past    Smokeless tobacco: Never   Vaping Use    Vaping status: Never Used   Substance and Sexual Activity    Alcohol use: No    Drug use: No   Other Topics Concern    Caffeine Concern Yes     Comment: 1 tea daily    Exercise Yes     Comment: daily gym   Social History Narrative    ** Merged History Encounter **             2 kids    Retired        Diet: vegetarian    Exercise: nothing formal    Sleep: ok    Stress: low      Review of Systems  All other systems negative unless otherwise stated in ROS or HPI above.       Joshua Faustin MD  Internal Medicine       Call office with any questions or seek emergency care if necessary.   Patient understands and agrees to follow directions and advice.      ----------------------------------------- PATIENT  INSTRUCTIONS-----------------------------------------   .    Patient Instructions   Please start over-the-counter super B complex daily and in addition for 3 months do B12 1000 mcg daily.  Heraing problem.

## 2024-06-18 NOTE — PATIENT INSTRUCTIONS
Please start over-the-counter super B complex daily and in addition for 3 months do B12 1000 mcg daily.  Heraing problem.

## 2024-06-24 ENCOUNTER — HOSPITAL ENCOUNTER (OUTPATIENT)
Dept: GENERAL RADIOLOGY | Age: 79
Discharge: HOME | End: 2024-06-24
Attending: INTERNAL MEDICINE

## 2024-06-24 ENCOUNTER — LAB ENCOUNTER (OUTPATIENT)
Dept: LAB | Age: 79
End: 2024-06-24
Attending: INTERNAL MEDICINE

## 2024-06-24 DIAGNOSIS — Z01.818 PREOP TESTING: Primary | ICD-10-CM

## 2024-06-24 DIAGNOSIS — Z13.6 SCREENING, ISCHEMIC HEART DISEASE: ICD-10-CM

## 2024-06-24 DIAGNOSIS — Z01.818 PRE-OP TESTING: ICD-10-CM

## 2024-06-24 LAB
ANION GAP SERPL CALC-SCNC: 7 MMOL/L (ref 0–18)
BASOPHILS # BLD AUTO: 0.04 X10(3) UL (ref 0–0.2)
BASOPHILS NFR BLD AUTO: 0.6 %
BUN BLD-MCNC: 11 MG/DL (ref 9–23)
BUN/CREAT SERPL: 8.8 (ref 10–20)
CALCIUM BLD-MCNC: 9.5 MG/DL (ref 8.7–10.4)
CHLORIDE SERPL-SCNC: 106 MMOL/L (ref 98–112)
CK SERPL-CCNC: 76 U/L
CO2 SERPL-SCNC: 27 MMOL/L (ref 21–32)
CREAT BLD-MCNC: 1.25 MG/DL
DEPRECATED RDW RBC AUTO: 52.5 FL (ref 35.1–46.3)
EGFRCR SERPLBLD CKD-EPI 2021: 59 ML/MIN/1.73M2 (ref 60–?)
EOSINOPHIL # BLD AUTO: 0.3 X10(3) UL (ref 0–0.7)
EOSINOPHIL NFR BLD AUTO: 4.6 %
ERYTHROCYTE [DISTWIDTH] IN BLOOD BY AUTOMATED COUNT: 16.9 % (ref 11–15)
ERYTHROCYTE [SEDIMENTATION RATE] IN BLOOD: 23 MM/HR
FASTING STATUS PATIENT QL REPORTED: YES
GLUCOSE BLD-MCNC: 95 MG/DL (ref 70–99)
HCT VFR BLD AUTO: 37 %
HGB BLD-MCNC: 11.9 G/DL
IMM GRANULOCYTES # BLD AUTO: 0.01 X10(3) UL (ref 0–1)
IMM GRANULOCYTES NFR BLD: 0.2 %
LYMPHOCYTES # BLD AUTO: 2.24 X10(3) UL (ref 1–4)
LYMPHOCYTES NFR BLD AUTO: 34.1 %
MCH RBC QN AUTO: 27.7 PG (ref 26–34)
MCHC RBC AUTO-ENTMCNC: 32.2 G/DL (ref 31–37)
MCV RBC AUTO: 86 FL
MONOCYTES # BLD AUTO: 0.66 X10(3) UL (ref 0.1–1)
MONOCYTES NFR BLD AUTO: 10 %
NEUTROPHILS # BLD AUTO: 3.32 X10 (3) UL (ref 1.5–7.7)
NEUTROPHILS # BLD AUTO: 3.32 X10(3) UL (ref 1.5–7.7)
NEUTROPHILS NFR BLD AUTO: 50.5 %
OSMOLALITY SERPL CALC.SUM OF ELEC: 289 MOSM/KG (ref 275–295)
PLATELET # BLD AUTO: 236 10(3)UL (ref 150–450)
POTASSIUM SERPL-SCNC: 4.4 MMOL/L (ref 3.5–5.1)
RBC # BLD AUTO: 4.3 X10(6)UL
SODIUM SERPL-SCNC: 140 MMOL/L (ref 136–145)
WBC # BLD AUTO: 6.6 X10(3) UL (ref 4–11)

## 2024-06-24 PROCEDURE — 82550 ASSAY OF CK (CPK): CPT

## 2024-06-24 PROCEDURE — 71046 X-RAY EXAM CHEST 2 VIEWS: CPT | Performed by: INTERNAL MEDICINE

## 2024-06-24 PROCEDURE — 80048 BASIC METABOLIC PNL TOTAL CA: CPT

## 2024-06-24 PROCEDURE — 36415 COLL VENOUS BLD VENIPUNCTURE: CPT

## 2024-06-24 PROCEDURE — 85025 COMPLETE CBC W/AUTO DIFF WBC: CPT

## 2024-06-24 PROCEDURE — 85652 RBC SED RATE AUTOMATED: CPT

## 2024-06-25 ENCOUNTER — OFFICE VISIT (OUTPATIENT)
Facility: LOCATION | Age: 79
End: 2024-06-25

## 2024-06-25 DIAGNOSIS — H72.92 PERFORATION OF LEFT TYMPANIC MEMBRANE: ICD-10-CM

## 2024-06-25 DIAGNOSIS — H91.90 HEARING LOSS, UNSPECIFIED HEARING LOSS TYPE, UNSPECIFIED LATERALITY: Primary | ICD-10-CM

## 2024-06-25 PROCEDURE — 92504 EAR MICROSCOPY EXAMINATION: CPT | Performed by: STUDENT IN AN ORGANIZED HEALTH CARE EDUCATION/TRAINING PROGRAM

## 2024-06-25 PROCEDURE — 1160F RVW MEDS BY RX/DR IN RCRD: CPT | Performed by: STUDENT IN AN ORGANIZED HEALTH CARE EDUCATION/TRAINING PROGRAM

## 2024-06-25 PROCEDURE — 99203 OFFICE O/P NEW LOW 30 MIN: CPT | Performed by: STUDENT IN AN ORGANIZED HEALTH CARE EDUCATION/TRAINING PROGRAM

## 2024-06-25 PROCEDURE — 1159F MED LIST DOCD IN RCRD: CPT | Performed by: STUDENT IN AN ORGANIZED HEALTH CARE EDUCATION/TRAINING PROGRAM

## 2024-06-25 NOTE — PROGRESS NOTES
[place]  OTOLARYNGOLOGY - HEAD & NECK SURGERY    6/25/2024     Reason for Consultation:   Bilateral hearing loss    History of Present Illness:   Patient is a pleasant 78 year old male who is being seen for bilateral hearing loss which she states has slightly worsened over the last year.  He states last year he was having some episodes of vertigo and then felt a large pop bilaterally in his ears and then stated the vertigo had went away.  Since that he feels that his hearing has gotten worse.  He denies any significant pain or pressure within the ears bilaterally.  He denies any history of recurrent ear infections or ear surgery.  He does note that he has had difficulty hearing the television.  He denies any otorrhea.  He is here for evaluation of his hearing.    Past Medical History  Past Medical History:    Adenomatous colon polyp    Arthritis    Benign prostatic hypertrophy with urinary retention    Chronic cough    Chronic sinusitis    Esophageal reflux    Essential and other specified forms of tremor    Essential hypertension    Gall stones    High blood pressure    Hoarseness, chronic    HTN (hypertension)    Osteoarthrosis, localized, primary, knee, left    Osteoarthrosis, localized, primary, knee, right    Osteoarthrosis, unspecified whether generalized or localized, unspecified site    Pain in joints    Painful swallowing    Personal history of colonic polyps    PONV (postoperative nausea and vomiting)    Prediabetes    borderline    Problems with swallowing    trouble with solid    Second degree hemorrhoids    Shortness of breath    Sleep apnea    no cpap    Sleep disturbance    Sputum production    Visual impairment    reading glasses    Vocal cord paralysis    Wheezing       Past Surgical History  Past Surgical History:   Procedure Laterality Date    Cholecystectomy  07/2015    Colonoscopy  10/2012    3 mm adenoma. repeat 5 yrs, Dr Sarabia    Colonoscopy,biopsy  10/22/2012    Procedure: COLONOSCOPY,  POSSIBLE BIOPSY, POSSIBLE POLYPECTOMY 18865;  Surgeon: Joe Sarabia MD;  Location: [facility]    Cystoscopy,insert ureteral stent  9-1-15    TURP    Laparoscopic cholecystectomy N/A 7/10/2015    Procedure: LAPAROSCOPIC CHOLECYSTECTOMY;  Surgeon: Tania Schmidt MD;  Location:  MAIN OR    Other surgical history  9/1/15    CYSTOSCOPY, TRANS URETHRAL RESECTION OF PROSTATE    Patient documented not to have experienced any of the following events  10/22/2012    Procedure: COLONOSCOPY, POSSIBLE BIOPSY, POSSIBLE POLYPECTOMY 00641;  Surgeon: Joe Sarabia MD;  Location: [facility]    Patient withough preoperative order for iv antibiotic surgical site infection prophylaxis.  10/22/2012    Procedure: COLONOSCOPY, POSSIBLE BIOPSY, POSSIBLE POLYPECTOMY 62315;  Surgeon: Joe Sarabia MD;  Location: [facility]    Upper gi endoscopy,exam  09/2020       Family History  Family History   Problem Relation Age of Onset    Heart Attack Father     Hypertension Father        Social History  Pediatric History   Patient Parents    Not on file     Other Topics Concern    Caffeine Concern Yes     Comment: 1 tea daily    Exercise Yes     Comment: daily gym    Seat Belt Not Asked    Special Diet Not Asked    Stress Concern Not Asked    Weight Concern Not Asked     Service Not Asked    Blood Transfusions Not Asked    Occupational Exposure Not Asked    Hobby Hazards Not Asked    Sleep Concern Not Asked    Back Care Not Asked    Bike Helmet Not Asked    Self-Exams Not Asked   Social History Narrative    ** Merged History Encounter **             2 kids    Retired        Diet: vegetarian    Exercise: nothing formal    Sleep: ok    Stress: low           Current Medications:  Current Outpatient Medications   Medication Sig Dispense Refill    Propranolol HCl ER 80 MG Oral Capsule SR 24 Hr Take 1 capsule (80 mg total) by mouth daily. FOR BLOOD PRESSURE. 90 capsule 9     telmisartan 80 MG Oral Tab Take 1 tablet (80 mg total) by mouth daily. FOR BLOOD PRESSURE. STOP ENALAPRIL DUE TO COUGH. 90 tablet 9    atorvastatin 10 MG Oral Tab Take 1 tablet (10 mg total) by mouth nightly. FOR CHOLESTEROL. 90 tablet 9    aspirin (KP ASPIRIN) 81 MG Oral Tab EC Take 1 tablet (81 mg total) by mouth daily. 90 tablet 9    Empagliflozin (JARDIANCE) 25 MG Oral Tab Take 1 tablet by mouth daily. FOR DIABETES. 90 tablet 9    metFORMIN  MG Oral Tablet 24 Hr Take 1 tablet (750 mg total) by mouth 2 (two) times daily with meals. FOR DIABETES. 180 tablet 9    pantoprazole 40 MG Oral Tab EC Take 1 tablet (40 mg total) by mouth before breakfast. 90 tablet 3    fluticasone-salmeterol (ADVAIR DISKUS) 250-50 MCG/ACT Inhalation Aerosol Powder, Breath Activated Inhale 1 puff into the lungs every 12 (twelve) hours. 3 each 9    primidone 50 MG Oral Tab Take 1 tablet (50 mg total) by mouth 2 (two) times daily. FOR TREMORS. 180 tablet 9    Melatonin 10 MG Oral Tab Take 10 mg by mouth at bedtime. 90 tablet 1    Glucose Blood (ONETOUCH VERIO) In Vitro Strip 1 each 4 (four) times daily before meals and nightly. Test before meals and nightly 400 strip 1    Blood Glucose Monitoring Suppl (ONETOUCH VERIO) w/Device Does not apply Kit 1 each 4 (four) times daily before meals and nightly. 1 kit 0    methIMAzole 5 MG Oral Tab Take 1 tablet (5 mg total) by mouth daily. 90 tablet 0       Allergies  No Known Allergies    Review of Systems:   A comprehensive 10 point review of systems was completed.  Pertinent positives and negatives noted in the the HPI.    Physical Exam:   There were no vitals taken for this visit.    GENERAL: No acute distress, Comfortable appearing  FACE: HB 1/6, Normal Animation  HEAD: Normocephalic  EYES: EOMI, pupils equil  EARS: Bilateral Auricles Symmetric   NOSE: Nares patent bilaterally  ORAL CAVITY: Tongue mobile, Oropharynx clear, Floor of mouth clear, Posterior oropharynx normal  NECK: No palpable  lymphadenopathy, thyroid not palpable, nontender    Canals:  Right: Clear  Left: Clear    Tympanic Membranes:  Right: Tympanic membrane with some slight retraction and tympanosclerosis  Left: Tympanic membrane with a large anterior-inferior perforation, approximately 30%, middle ear space clear, no drainage noted    TM Visualized Method:   Right TM examined via otomicroscopy.   Left TM examined via otomicroscopy.        Results:     Laboratory Data:  Lab Results   Component Value Date    WBC 6.6 06/24/2024    HGB 11.9 (L) 06/24/2024    HCT 37.0 (L) 06/24/2024    .0 06/24/2024    CREATSERUM 1.25 06/24/2024    BUN 11 06/24/2024     06/24/2024    K 4.4 06/24/2024     06/24/2024    CO2 27.0 06/24/2024    GLU 95 06/24/2024    CA 9.5 06/24/2024    ALB 4.3 03/04/2024    ALKPHO 73 03/04/2024    TP 7.2 03/04/2024    AST 27 03/04/2024    ALT 16 03/04/2024    INR 1.1 09/08/2015    T4F 1.1 06/11/2024    TSH 9.211 (H) 05/08/2024     08/11/2015    PSA 0.39 06/14/2023    ESRML 23 (H) 06/24/2024    CRP <0.29 02/24/2020    MG 2.1 07/15/2015    CK 76 06/24/2024         Imaging:  XR CHEST PA + LAT CHEST (CPT=71046)    Result Date: 6/24/2024  PROCEDURE: XR CHEST PA + LAT CHEST (CPT=71046)  COMPARISON: None.  INDICATIONS: Preoperative test. Essential hypertension diabetes and asthma.  TECHNIQUE:   Two views.   FINDINGS: CARDIAC/VASC: Heart size and pulmonary vascularity normal. Atherosclerotic calcification aorta.  MEDIAST/MANDI:   No visible mass or adenopathy. LUNGS/PLEURA: No consolidation or pleural effusion. BONES: No fracture or visible bony lesion. Degenerative changes in spine. OTHER: Post cholecystectomy.         CONCLUSION:  1. No acute cardiopulmonary finding.    Dictated by (CST): Reece Manriquez MD on 6/24/2024 at 9:59 AM     Finalized by (CST): Reece Manriquez MD on 6/24/2024 at 9:59 AM          Impression:   Hearing loss, bilateral  Left tympanic membrane perforation    Recommendations:  I would  like him to obtain an audiogram, and I think he may benefit from bilateral hearing aids.  He would not be a good surgical candidate for tympanoplasty.    Thank you for allowing me to participate in the care of your patient.    Harry Sandoval,    Otolaryngology/Rhinology, Sinus, and Endoscopic Skull Base Surgery  [address] [address]  Phone [phone]  Fax [phone]  6/25/2024  3:46 PM  6/25/2024

## 2024-07-02 ENCOUNTER — APPOINTMENT (OUTPATIENT)
Dept: ULTRASOUND IMAGING | Facility: HOSPITAL | Age: 79
End: 2024-07-02
Attending: CLINICAL NURSE SPECIALIST
Payer: MEDICARE

## 2024-07-02 ENCOUNTER — HOSPITAL ENCOUNTER (INPATIENT)
Dept: INTERVENTIONAL RADIOLOGY/VASCULAR | Facility: HOSPITAL | Age: 79
LOS: 8 days | Discharge: HOME HEALTH | End: 2024-07-11
Attending: INTERNAL MEDICINE | Admitting: INTERNAL MEDICINE
Payer: MEDICARE

## 2024-07-02 DIAGNOSIS — R06.02 SOB (SHORTNESS OF BREATH): ICD-10-CM

## 2024-07-02 DIAGNOSIS — R93.1 ELEVATED CORONARY ARTERY CALCIUM SCORE: ICD-10-CM

## 2024-07-02 DIAGNOSIS — Z95.1 S/P CABG (CORONARY ARTERY BYPASS GRAFT): Primary | ICD-10-CM

## 2024-07-02 DIAGNOSIS — K21.9 GASTROESOPHAGEAL REFLUX DISEASE WITHOUT ESOPHAGITIS: ICD-10-CM

## 2024-07-02 DIAGNOSIS — R94.39 ABNORMAL STRESS TEST: ICD-10-CM

## 2024-07-02 LAB
ALBUMIN SERPL-MCNC: 4.2 G/DL (ref 3.2–4.8)
ALBUMIN/GLOB SERPL: 1.6 {RATIO} (ref 1–2)
ALP LIVER SERPL-CCNC: 53 U/L
ALT SERPL-CCNC: 16 U/L
ANION GAP SERPL CALC-SCNC: 5 MMOL/L (ref 0–18)
ANTIBODY SCREEN: NEGATIVE
APTT PPP: 28.1 SECONDS (ref 23–36)
AST SERPL-CCNC: 24 U/L (ref ?–34)
BILIRUB SERPL-MCNC: 0.6 MG/DL (ref 0.2–1.1)
BILIRUB UR QL: NEGATIVE
BUN BLD-MCNC: 11 MG/DL (ref 9–23)
BUN/CREAT SERPL: 9.6 (ref 10–20)
CALCIUM BLD-MCNC: 9 MG/DL (ref 8.7–10.4)
CHLORIDE SERPL-SCNC: 112 MMOL/L (ref 98–112)
CLARITY UR: CLEAR
CO2 SERPL-SCNC: 26 MMOL/L (ref 21–32)
CREAT BLD-MCNC: 1.15 MG/DL
DEPRECATED RDW RBC AUTO: 53.5 FL (ref 35.1–46.3)
EGFRCR SERPLBLD CKD-EPI 2021: 65 ML/MIN/1.73M2 (ref 60–?)
ERYTHROCYTE [DISTWIDTH] IN BLOOD BY AUTOMATED COUNT: 17.3 % (ref 11–15)
GLOBULIN PLAS-MCNC: 2.7 G/DL (ref 2–3.5)
GLUCOSE BLD-MCNC: 138 MG/DL (ref 70–99)
GLUCOSE BLDC GLUCOMTR-MCNC: 110 MG/DL (ref 70–99)
GLUCOSE BLDC GLUCOMTR-MCNC: 121 MG/DL (ref 70–99)
GLUCOSE BLDC GLUCOMTR-MCNC: 94 MG/DL (ref 70–99)
GLUCOSE UR-MCNC: >1000 MG/DL
HCT VFR BLD AUTO: 35 %
HGB BLD-MCNC: 11.3 G/DL
HGB UR QL STRIP.AUTO: NEGATIVE
INR BLD: 1.03 (ref 0.8–1.2)
KETONES UR-MCNC: NEGATIVE MG/DL
LEUKOCYTE ESTERASE UR QL STRIP.AUTO: NEGATIVE
MAGNESIUM SERPL-MCNC: 2.3 MG/DL (ref 1.6–2.6)
MCH RBC QN AUTO: 27.4 PG (ref 26–34)
MCHC RBC AUTO-ENTMCNC: 32.3 G/DL (ref 31–37)
MCV RBC AUTO: 84.7 FL
NITRITE UR QL STRIP.AUTO: NEGATIVE
OSMOLALITY SERPL CALC.SUM OF ELEC: 298 MOSM/KG (ref 275–295)
PH UR: 6.5 [PH] (ref 5–8)
PLATELET # BLD AUTO: 207 10(3)UL (ref 150–450)
POTASSIUM SERPL-SCNC: 4.4 MMOL/L (ref 3.5–5.1)
PROT SERPL-MCNC: 6.9 G/DL (ref 5.7–8.2)
PROT UR-MCNC: NEGATIVE MG/DL
PROTHROMBIN TIME: 14.1 SECONDS (ref 11.6–14.8)
RBC # BLD AUTO: 4.13 X10(6)UL
RH BLOOD TYPE: POSITIVE
RH BLOOD TYPE: POSITIVE
SODIUM SERPL-SCNC: 143 MMOL/L (ref 136–145)
SP GR UR STRIP: 1.02 (ref 1–1.03)
UROBILINOGEN UR STRIP-ACNC: NORMAL
WBC # BLD AUTO: 6 X10(3) UL (ref 4–11)

## 2024-07-02 PROCEDURE — B2151ZZ FLUOROSCOPY OF LEFT HEART USING LOW OSMOLAR CONTRAST: ICD-10-PCS | Performed by: INTERNAL MEDICINE

## 2024-07-02 PROCEDURE — 99222 1ST HOSP IP/OBS MODERATE 55: CPT | Performed by: HOSPITALIST

## 2024-07-02 PROCEDURE — 0DJ08ZZ INSPECTION OF UPPER INTESTINAL TRACT, VIA NATURAL OR ARTIFICIAL OPENING ENDOSCOPIC: ICD-10-PCS | Performed by: INTERNAL MEDICINE

## 2024-07-02 PROCEDURE — 93880 EXTRACRANIAL BILAT STUDY: CPT | Performed by: CLINICAL NURSE SPECIALIST

## 2024-07-02 PROCEDURE — B2111ZZ FLUOROSCOPY OF MULTIPLE CORONARY ARTERIES USING LOW OSMOLAR CONTRAST: ICD-10-PCS | Performed by: INTERNAL MEDICINE

## 2024-07-02 PROCEDURE — 4A023N7 MEASUREMENT OF CARDIAC SAMPLING AND PRESSURE, LEFT HEART, PERCUTANEOUS APPROACH: ICD-10-PCS | Performed by: INTERNAL MEDICINE

## 2024-07-02 RX ORDER — ATORVASTATIN CALCIUM 10 MG/1
10 TABLET, FILM COATED ORAL NIGHTLY
Status: DISCONTINUED | OUTPATIENT
Start: 2024-07-02 | End: 2024-07-08

## 2024-07-02 RX ORDER — SODIUM CHLORIDE 9 MG/ML
3 INJECTION, SOLUTION INTRAVENOUS
Status: COMPLETED | OUTPATIENT
Start: 2024-07-02 | End: 2024-07-02

## 2024-07-02 RX ORDER — NITROGLYCERIN 20 MG/100ML
INJECTION INTRAVENOUS
Status: COMPLETED
Start: 2024-07-02 | End: 2024-07-02

## 2024-07-02 RX ORDER — LIDOCAINE HYDROCHLORIDE 20 MG/ML
INJECTION, SOLUTION EPIDURAL; INFILTRATION; INTRACAUDAL; PERINEURAL
Status: COMPLETED
Start: 2024-07-02 | End: 2024-07-02

## 2024-07-02 RX ORDER — VERAPAMIL HYDROCHLORIDE 2.5 MG/ML
INJECTION, SOLUTION INTRAVENOUS
Status: COMPLETED
Start: 2024-07-02 | End: 2024-07-02

## 2024-07-02 RX ORDER — MELATONIN
3 ONCE AS NEEDED
Status: DISCONTINUED | OUTPATIENT
Start: 2024-07-02 | End: 2024-07-02

## 2024-07-02 RX ORDER — ASPIRIN 81 MG/1
324 TABLET, CHEWABLE ORAL ONCE
Status: DISCONTINUED | OUTPATIENT
Start: 2024-07-02 | End: 2024-07-02

## 2024-07-02 RX ORDER — MIDAZOLAM HYDROCHLORIDE 1 MG/ML
INJECTION INTRAMUSCULAR; INTRAVENOUS
Status: COMPLETED
Start: 2024-07-02 | End: 2024-07-02

## 2024-07-02 RX ORDER — ASCORBIC ACID 500 MG
1000 TABLET ORAL
Status: COMPLETED | OUTPATIENT
Start: 2024-07-02 | End: 2024-07-02

## 2024-07-02 RX ORDER — MIDAZOLAM HYDROCHLORIDE 1 MG/ML
INJECTION INTRAMUSCULAR; INTRAVENOUS
Status: DISCONTINUED
Start: 2024-07-02 | End: 2024-07-02 | Stop reason: WASHOUT

## 2024-07-02 RX ORDER — HEPARIN SODIUM 1000 [USP'U]/ML
INJECTION, SOLUTION INTRAVENOUS; SUBCUTANEOUS
Status: COMPLETED
Start: 2024-07-02 | End: 2024-07-02

## 2024-07-02 RX ORDER — PRIMIDONE 50 MG/1
50 TABLET ORAL 2 TIMES DAILY
Status: DISCONTINUED | OUTPATIENT
Start: 2024-07-02 | End: 2024-07-08

## 2024-07-02 RX ADMIN — Medication 10 MG: at 21:09:00

## 2024-07-02 RX ADMIN — PRIMIDONE 50 MG: 50 TABLET ORAL at 21:09:00

## 2024-07-02 RX ADMIN — ASCORBIC ACID 1000 MG: 500 MG TABLET ORAL at 21:09:00

## 2024-07-02 RX ADMIN — SODIUM CHLORIDE 3 ML/KG/HR: 9 INJECTION, SOLUTION INTRAVENOUS at 08:00:00

## 2024-07-02 RX ADMIN — ATORVASTATIN CALCIUM 10 MG: 10 TABLET, FILM COATED ORAL at 21:09:00

## 2024-07-02 NOTE — IVS NOTE
Bedside handoff to Patricia RN  Pt denies any pain or discomfort  Procedural site dry and intact, no bleeding or swelling noted  Activity restriction and bedrest status reviewed  Pt transferred with family and belongings

## 2024-07-02 NOTE — PROGRESS NOTES
Misc. Note    Pt scheduled for CABG tomorrow w/Dr. Castle. Written and verbal info provided to pt, his wife and daughter, who are at bedside, regarding lauren-op expectations. Consents obtained. Orders entered/testing in progress. All questions answered.

## 2024-07-02 NOTE — INTERVAL H&P NOTE
Pre-op Diagnosis: * No pre-op diagnosis entered *    The above referenced H&P was reviewed by David Hyman MD on 7/2/2024, the patient was examined and no significant changes have occurred in the patient's condition since the H&P was performed.  I discussed with the patient and/or legal representative the potential benefits, risks and side effects of this procedure; the likelihood of the patient achieving goals; and potential problems that might occur during recuperation.  I discussed reasonable alternatives to the procedure, including risks, benefits and side effects related to the alternatives and risks related to not receiving this procedure.  We will proceed with procedure as planned.

## 2024-07-02 NOTE — DISCHARGE INSTRUCTIONS
CARDIAC SURGERY DISCHARGE INSTRUCTIONS  Shower daily and clean your surgical incisions with soap and water then swab with Betadine 2 x day until your follow up office visit.  Do not drive for one month  Do not lift/push/pull greater than 10 lbs for 3 months   Do not soak (submerge) your incision in water such as tub/pool/etc for one month  Continue Apirin 81mg daily, do not take Advil, Ibuprofen, Motrin, NSAIDs due to recent gastritis/bleed    Sometimes managing your health at home requires assistance.  The [facility] team has recognized your preference to use [facility].  They can be reached by phone at [phone].  The fax number for your reference is [phone].  A representative from the home health agency will contact you or your family to schedule your first visit.

## 2024-07-02 NOTE — PLAN OF CARE
Received patient from Cath Lab. Patient A&O x4. On RA, saturating well. All scheduled meds given per MAR. Right radial dressing: dry, clean and intact. Patient scheduled for procedure tomorrow, NPO after midnight. Consents in pt;s chart. Family at bedside updated on POC. Patient up to toilet w/ stand by assist. All safety precautions maintained.     Problem: Patient Centered Care  Goal: Patient preferences are identified and integrated in the patient's plan of care  Description: Interventions:  - What would you like us to know as we care for you? From home w/ spouse   - Provide timely, complete, and accurate information to patient/family  - Incorporate patient and family knowledge, values, beliefs, and cultural backgrounds into the planning and delivery of care  - Encourage patient/family to participate in care and decision-making at the level they choose  - Honor patient and family perspectives and choices  Outcome: Progressing     Problem: PAIN - ADULT  Goal: Verbalizes/displays adequate comfort level or patient's stated pain goal  Description: INTERVENTIONS:  - Encourage pt to monitor pain and request assistance  - Assess pain using appropriate pain scale  - Administer analgesics based on type and severity of pain and evaluate response  - Implement non-pharmacological measures as appropriate and evaluate response  - Consider cultural and social influences on pain and pain management  - Manage/alleviate anxiety  - Utilize distraction and/or relaxation techniques  - Monitor for opioid side effects  - Notify MD/LIP if interventions unsuccessful or patient reports new pain  - Anticipate increased pain with activity and pre-medicate as appropriate  Outcome: Progressing     Problem: CARDIOVASCULAR - ADULT  Goal: Maintains optimal cardiac output and hemodynamic stability  Description: INTERVENTIONS:  - Monitor vital signs, rhythm, and trends  - Monitor for bleeding, hypotension and signs of decreased cardiac output  -  Evaluate effectiveness of vasoactive medications to optimize hemodynamic stability  - Monitor arterial and/or venous puncture sites for bleeding and/or hematoma  - Assess quality of pulses, skin color and temperature  - Assess for signs of decreased coronary artery perfusion - ex. Angina  - Evaluate fluid balance, assess for edema, trend weights  Outcome: Progressing     Problem: RESPIRATORY - ADULT  Goal: Achieves optimal ventilation and oxygenation  Description: INTERVENTIONS:  - Assess for changes in respiratory status  - Assess for changes in mentation and behavior  - Position to facilitate oxygenation and minimize respiratory effort  - Oxygen supplementation based on oxygen saturation or ABGs  - Provide Smoking Cessation handout, if applicable  - Encourage broncho-pulmonary hygiene including cough, deep breathe, Incentive Spirometry  - Assess the need for suctioning and perform as needed  - Assess and instruct to report SOB or any respiratory difficulty  - Respiratory Therapy support as indicated  - Manage/alleviate anxiety  - Monitor for signs/symptoms of CO2 retention  Outcome: Progressing

## 2024-07-02 NOTE — PROCEDURES
[facility]  part of [facility]    Cardiac Cath Procedure Note  Giuseppe Sellerssarah Faustin Patient Status:  Outpatient    1945 MRN [id number]   Location [facility] INTERVENTIONAL SUITES Attending David Hyman MD   Hosp Day # 0 PCP Joshua Faustin MD       Cardiologist: David Hyman MD  Primary Proceduralist: David Hyman MD  Procedure Performed: LHC, COR, and LV  Date of Procedure: 2024   Indication: Abnormal stress test    Summary of procedure:    Distal left main stenosis into the LAD and the circumflex, distal left main plaque extending into the ostium of the circumflex.  At the bifurcation there are multiple branches including the LAD, circumflex, diagonal and marginal branches.    Diffusely diseased RCA, 60-70 mid vessel stenosis supplying large distal territory.    CV surgery consult      Left Ventriculography and hemodynamics:   LV EF not done  LV EDP 12 mmHg  No gradient across aortic valve        Coronary Angiography  RCA:  Dominant with mid 70% diffuse irregular stenosis, supplies large PDA and PL territory which is nonobstructive    Left main: Distal left main stenosis 95% into the bifurcation where there is high takeoff of diagonal and marginal branch resulting in 4 vessels close to the bifurcation of the left main    Left anterior descending: Distal left main stenosis likely extending to the ostium of the LAD and the LAD otherwise free of obstructive disease, supplies multiple diagonals which are non-obstructive    Circumflex: Distal left main stenosis extends into the ostium and the proximal circumflex, otherwise circumflex is free of obstructive disease, supplies multiple OM branches which are patent        Summary of Case: After written informed consent was obtained from the patient, patient was brought to the cardiac catheterization laboratory.  Patient was prepped and draped in the usual sterile fashion. Lidocaine 1% was used to infiltrate the right radial artery for  local anesthesia and a 6 Scottish introducer sheath was inserted into the right radial artery.      Selective coronary angiography performed with TIG catheter for RCA and TIG catheter for LCA.  Angiography performed in standard projections.      6 Syriac JR4 catheter placed in LV for hemodynamics.    Specimen sent to: No specimen collected  Estimated blood loss: 10 cc  Closure:  TR band      IV was maintained by RN and moderate conscious sedation of versed and fentanyl was given.  Patient was assessed and monitoring of oxygen, heart rate and blood pressure by nurse and myself during the exam 35 minutes.      David Hyman MD  07/02/24

## 2024-07-02 NOTE — CONSULTS
[facility]  part of [facility]  [facility]  Report of Consultation    Giuseppe Faustin Patient Status:  Outpatient    1945 MRN [id number]   Location [facility] INTERVENTIONAL SUITES Attending David Hyman MD   Hosp Day # 0 PCP Joshua Faustin MD     Date of Admission:  2024  Date of Consult:  2024    Reason for Consultation:  Multivessel coronary artery disease    History of Present Illness:  Giuseppe Faustin is a a(n) 78 year old male. Has been experiencing worsening dyspnea on exertion over the last 6 months, now can only walk 1-2  blocks before becoming dyspneic.  No chest pain, no chest pressure, no discomfort, nausea or emesis.  Underwent calcium CT and abnormal at 1200.  Stress test abnormal and angiography recommended, performed today, 95% left main stenosis and 70% mid RCA stenosis.  We are asked to evaluate for surgical coronary revascularization options.  Wife and daughter are present at bedside.    Takes ASA, no other anticoagulation and he did not receive any anticoagulation during angiogram today.  No history of varicose veins or varicose vein procedures.     History:  Past Medical History:    Adenomatous colon polyp    Arthritis    Benign prostatic hypertrophy with urinary retention    Chronic cough    Chronic sinusitis    Esophageal reflux    Essential and other specified forms of tremor    Essential hypertension    Gall stones    High blood pressure    Hoarseness, chronic    HTN (hypertension)    Osteoarthrosis, localized, primary, knee, left    Osteoarthrosis, localized, primary, knee, right    Osteoarthrosis, unspecified whether generalized or localized, unspecified site    Pain in joints    Painful swallowing    Personal history of colonic polyps    PONV (postoperative nausea and vomiting)    Prediabetes    borderline    Problems with swallowing    trouble with solid    Second degree hemorrhoids    Shortness of breath    Sleep apnea     no cpap    Sleep disturbance    Sputum production    Visual impairment    reading glasses    Vocal cord paralysis    Wheezing     Past Surgical History:   Procedure Laterality Date    Cholecystectomy  07/2015    Colonoscopy  10/2012    3 mm adenoma. repeat 5 yrs, Dr Sarabia    Colonoscopy,biopsy  10/22/2012    Procedure: COLONOSCOPY, POSSIBLE BIOPSY, POSSIBLE POLYPECTOMY 26642;  Surgeon: Joe Sarabia MD;  Location: [facility]    Cystoscopy,insert ureteral stent  9-1-15    TURP    Laparoscopic cholecystectomy N/A 7/10/2015    Procedure: LAPAROSCOPIC CHOLECYSTECTOMY;  Surgeon: Tania Schmidt MD;  Location:  MAIN OR    Other surgical history  9/1/15    CYSTOSCOPY, TRANS URETHRAL RESECTION OF PROSTATE    Patient documented not to have experienced any of the following events  10/22/2012    Procedure: COLONOSCOPY, POSSIBLE BIOPSY, POSSIBLE POLYPECTOMY 97966;  Surgeon: Joe Sarabia MD;  Location: [facility]    Patient withough preoperative order for iv antibiotic surgical site infection prophylaxis.  10/22/2012    Procedure: COLONOSCOPY, POSSIBLE BIOPSY, POSSIBLE POLYPECTOMY 26310;  Surgeon: Joe Sarabia MD;  Location: [facility]    Upper gi endoscopy,exam  09/2020     Family History   Problem Relation Age of Onset    Heart Attack Father     Hypertension Father       reports that he quit smoking about 40 years ago. His smoking use included cigarettes. He started smoking about 60 years ago. He has a 10 pack-year smoking history. He has been exposed to tobacco smoke. He has never used smokeless tobacco. He reports that he does not drink alcohol and does not use drugs.    Allergies:  No Known Allergies    Medications:    Current Facility-Administered Medications:     aspirin chewable tab 324 mg, 324 mg, Oral, Once  ASA, propranolol, telmisartan, jardiance, advair, glipezide, metformin, methimazole, PPI, pimidone    Review of Systems:  Pertinent items are  noted in HPI.    Physical Exam:  Blood pressure 152/69, pulse 57, temperature 97.6 °F (36.4 °C), temperature source Temporal, resp. rate 18, height 5' 3\" (1.6 m), weight 165 lb (74.8 kg), SpO2 99%.    GENERAL: No acute distress.  VITAL SIGNS: See above.  HEENT: Trachea midline.  No jugular venous distention.  No carotid bruit.  CHEST: Chest wall is nontender.  HEART: Regular rate and rhythm without murmurs.  LUNGS: Clear to auscultation bilaterally.  ABDOMEN: Soft, nontender nondistended.  VASCULAR: Palpable radial artery pulses 2+ bilateral.  SKIN: No rash, no excessive bruising, petechiae, or purpura.  NEUROLOGIC: Cranial nerves II-XII intact without motor/sensory deficit.      Laboratory Data:  WBC 6.6, hematocrit 37, Plt 236, Cr 1.25      Cardiologist: David Hyman MD  Primary Proceduralist: David Hyman MD  Procedure Performed: LHC, COR, and LV  Date of Procedure: 7/2/2024   Indication: Abnormal stress test    Left Ventriculography and hemodynamics:   LV EF not done  LV EDP 12 mmHg  No gradient across aortic valve      Coronary Angiography  RCA:  Dominant with mid 70% diffuse irregular stenosis, supplies large PDA and PL territory which is nonobstructive     Left main: Distal left main stenosis 95% into the bifurcation where there is high takeoff of diagonal and marginal branch resulting in 4 vessels close to the bifurcation of the left main     Left anterior descending: Distal left main stenosis likely extending to the ostium of the LAD and the LAD otherwise free of obstructive disease, supplies multiple diagonals which are non-obstructive     Circumflex: Distal left main stenosis extends into the ostium and the proximal circumflex, otherwise circumflex is free of obstructive disease, supplies multiple OM branches which are patent    ECHO 6/11/24 - EF 63%, trace AI, trivial MR, trivial TR, mild LVH, no LV wall motion abnormalities      Impression and Plan:  Patient Active Problem List   Diagnosis    Vocal cord  paralysis    Hyperthyroidism    Type 2 diabetes mellitus with stage 3a chronic kidney disease, without long-term current use of insulin (HCC)    CKD (chronic kidney disease) stage 3, GFR 30-59 ml/min (HCC)    Gastroesophageal reflux disease without esophagitis    Hyperlipidemia associated with type 2 diabetes mellitus (HCC)    Hypertension associated with type 2 diabetes mellitus (HCC)    Age-related cataract of right eye    Moderate persistent asthma without complication (HCC)    Coronary artery disease involving native coronary artery of native heart without angina pectoris    Primary osteoarthritis of both knees       78 year old male abnormal stress test, multivessel coronary artery disease, left main stenosis    Patient will  benefit from surgical revascularization.  I had a long discussion with the patient, wife, and daughter.  Using pictures, I explained the nature his heart disease.  I explained his current disease.  I explained treatement options of medical management, high risk PCI and surgical revascularization.  I explained the operation, median sternotomy, use of cardiopulmonary bypass machine, and cardiac arrest.  I explained the bypass operation, conduit selection, long term patency rates of mammary artery and reverse saphenous vein.  We discussed benefits. We discussed risks including but not limited to: bleeding, coagulopathy, transfusion (to which he agrees to accept after discussing risks of transfusion), infection, sternal dehiscence, prolonged ventilation, myocardial infarction, stroke, arrhythmia, atrial fibrillation, kidney injury, dialysis, multisystem organ dysfunction, imponderables and death.      Given left main stenosis 95%, will admit today and plan for CABG tomorrow 700AM.  Will check carotid duplex to complete preop workup.  Discussed with Dr. Hyman.  Case has been scheduled.      Patient and family questions answered to their satisfaction.    Brayan Castle MD  Cardiothoracic  Surgery  [facility]     Time spent on counseling/coordination of care:  51139- 80 min    Total time spent with patient:  1 Hour    Brayan Castle MD  7/2/2024  3:05 PM

## 2024-07-03 ENCOUNTER — APPOINTMENT (OUTPATIENT)
Dept: GENERAL RADIOLOGY | Facility: HOSPITAL | Age: 79
End: 2024-07-03
Attending: CLINICAL NURSE SPECIALIST
Payer: MEDICARE

## 2024-07-03 ENCOUNTER — ANESTHESIA EVENT (OUTPATIENT)
Dept: SURGERY | Facility: HOSPITAL | Age: 79
End: 2024-07-03
Payer: MEDICARE

## 2024-07-03 ENCOUNTER — ANESTHESIA (OUTPATIENT)
Dept: SURGERY | Facility: HOSPITAL | Age: 79
End: 2024-07-03
Payer: MEDICARE

## 2024-07-03 ENCOUNTER — TELEPHONE (OUTPATIENT)
Dept: SURGERY | Facility: CLINIC | Age: 79
End: 2024-07-03

## 2024-07-03 PROBLEM — R94.39 ABNORMAL STRESS TEST: Status: ACTIVE | Noted: 2024-07-03

## 2024-07-03 PROBLEM — R06.02 SOB (SHORTNESS OF BREATH): Status: ACTIVE | Noted: 2024-07-03

## 2024-07-03 PROBLEM — R93.1 ELEVATED CORONARY ARTERY CALCIUM SCORE: Status: ACTIVE | Noted: 2024-07-03

## 2024-07-03 LAB
ANION GAP SERPL CALC-SCNC: 7 MMOL/L (ref 0–18)
APTT PPP: 31.4 SECONDS (ref 23–36)
ATRIAL RATE: 57 BPM
ATRIAL RATE: 92 BPM
BASE EXCESS BLD CALC-SCNC: -3.4 MMOL/L (ref ?–2)
BASE EXCESS BLD CALC-SCNC: -3.5 MMOL/L (ref ?–2)
BASE EXCESS BLD CALC-SCNC: -3.5 MMOL/L (ref ?–2)
BASE EXCESS BLDA CALC-SCNC: -3 MMOL/L (ref ?–30)
BASE EXCESS BLDA CALC-SCNC: -4 MMOL/L (ref ?–30)
BASE EXCESS BLDA CALC-SCNC: -4 MMOL/L (ref ?–30)
BASE EXCESS BLDA CALC-SCNC: -6 MMOL/L (ref ?–30)
BLOOD TYPE BARCODE: 6200
BUN BLD-MCNC: 11 MG/DL (ref 9–23)
BUN/CREAT SERPL: 12.4 (ref 10–20)
CA-I BLD-SCNC: 1.03 MMOL/L (ref 0.95–1.32)
CA-I BLDA-SCNC: 1 MMOL/L (ref 1.12–1.32)
CA-I BLDA-SCNC: 1.04 MMOL/L (ref 1.12–1.32)
CA-I BLDA-SCNC: 1.05 MMOL/L (ref 1.12–1.32)
CA-I BLDA-SCNC: 1.18 MMOL/L (ref 1.12–1.32)
CALCIUM BLD-MCNC: 7.1 MG/DL (ref 8.7–10.4)
CHLORIDE SERPL-SCNC: 113 MMOL/L (ref 98–112)
CO2 BLDA-SCNC: 21 MMOL/L (ref 22–32)
CO2 BLDA-SCNC: 23 MMOL/L (ref 22–32)
CO2 SERPL-SCNC: 22 MMOL/L (ref 21–32)
COHGB MFR BLD: 1.5 % (ref 0–3)
CREAT BLD-MCNC: 0.89 MG/DL
DEPRECATED RDW RBC AUTO: 52.3 FL (ref 35.1–46.3)
EGFRCR SERPLBLD CKD-EPI 2021: 87 ML/MIN/1.73M2 (ref 60–?)
ERYTHROCYTE [DISTWIDTH] IN BLOOD BY AUTOMATED COUNT: 17.2 % (ref 11–15)
FIBRINOGEN PPP-MCNC: 205 MG/DL (ref 200–480)
GLUCOSE BLD-MCNC: 133 MG/DL (ref 70–99)
GLUCOSE BLDA-MCNC: 149 MG/DL (ref 70–99)
GLUCOSE BLDA-MCNC: 159 MG/DL (ref 70–99)
GLUCOSE BLDA-MCNC: 175 MG/DL (ref 70–99)
GLUCOSE BLDA-MCNC: 97 MG/DL (ref 70–99)
GLUCOSE BLDC GLUCOMTR-MCNC: 130 MG/DL (ref 70–99)
GLUCOSE BLDC GLUCOMTR-MCNC: 132 MG/DL (ref 70–99)
GLUCOSE BLDC GLUCOMTR-MCNC: 133 MG/DL (ref 70–99)
GLUCOSE BLDC GLUCOMTR-MCNC: 134 MG/DL (ref 70–99)
GLUCOSE BLDC GLUCOMTR-MCNC: 135 MG/DL (ref 70–99)
GLUCOSE BLDC GLUCOMTR-MCNC: 138 MG/DL (ref 70–99)
GLUCOSE BLDC GLUCOMTR-MCNC: 141 MG/DL (ref 70–99)
GLUCOSE BLDC GLUCOMTR-MCNC: 141 MG/DL (ref 70–99)
GLUCOSE BLDC GLUCOMTR-MCNC: 143 MG/DL (ref 70–99)
GLUCOSE BLDC GLUCOMTR-MCNC: 148 MG/DL (ref 70–99)
GLUCOSE BLDC GLUCOMTR-MCNC: 163 MG/DL (ref 70–99)
HCO3 BLDA-SCNC: 19.6 MEQ/L (ref 22–26)
HCO3 BLDA-SCNC: 21.6 MEQ/L (ref 22–26)
HCO3 BLDA-SCNC: 21.6 MEQ/L (ref 22–26)
HCO3 BLDA-SCNC: 21.8 MEQ/L (ref 22–26)
HCO3 BLDA-SCNC: 22.2 MEQ/L (ref 21–27)
HCO3 BLDA-SCNC: 22.3 MEQ/L (ref 21–27)
HCO3 BLDV-SCNC: 21.3 MEQ/L (ref 22–26)
HCT VFR BLD AUTO: 31.2 %
HCT VFR BLDA CALC: 20 %
HCT VFR BLDA CALC: 23 %
HCT VFR BLDA CALC: 24 %
HCT VFR BLDA CALC: 33 %
HGB BLD-MCNC: 10.5 G/DL
HGB BLD-MCNC: 11.1 G/DL
INR BLD: 1.45 (ref 0.8–1.2)
ISTAT ACTIVATED CLOTTING TIME: 128 SECONDS (ref 125–137)
ISTAT ACTIVATED CLOTTING TIME: 134 SECONDS (ref 125–137)
ISTAT ACTIVATED CLOTTING TIME: 452 SECONDS (ref 125–137)
ISTAT ACTIVATED CLOTTING TIME: 550 SECONDS (ref 125–137)
ISTAT ACTIVATED CLOTTING TIME: 629 SECONDS (ref 125–137)
ISTAT ACTIVATED CLOTTING TIME: <125 SECONDS (ref 125–137)
LACTATE BLD-SCNC: 1 MMOL/L (ref 0.5–2)
MAGNESIUM SERPL-MCNC: 2.3 MG/DL (ref 1.6–2.6)
MCH RBC QN AUTO: 28.1 PG (ref 26–34)
MCHC RBC AUTO-ENTMCNC: 33.7 G/DL (ref 31–37)
MCV RBC AUTO: 83.4 FL
METHGB MFR BLD: 1.1 % SAT (ref 0.4–1.5)
MRSA DNA SPEC QL NAA+PROBE: NEGATIVE
O2 CT BLD-SCNC: 10.5 VOL% (ref 15–23)
O2 CT BLD-SCNC: 15.5 VOL% (ref 15–23)
O2/TOTAL GAS SETTING VFR VENT: 40 %
O2/TOTAL GAS SETTING VFR VENT: 80 %
O2/TOTAL GAS SETTING VFR VENT: 80 %
OSMOLALITY SERPL CALC.SUM OF ELEC: 295 MOSM/KG (ref 275–295)
P AXIS: 25 DEGREES
P AXIS: 36 DEGREES
P-R INTERVAL: 134 MS
P-R INTERVAL: 136 MS
PCO2 BLDA: 35 MM HG (ref 35–45)
PCO2 BLDA: 35.7 MMHG (ref 35–45)
PCO2 BLDA: 36.4 MMHG (ref 35–45)
PCO2 BLDA: 37 MM HG (ref 35–45)
PCO2 BLDA: 37.8 MMHG (ref 35–45)
PCO2 BLDA: 38.3 MMHG (ref 35–45)
PCO2 BLDV: 44 MM HG (ref 38–50)
PEEP SETTING VENT: 5 CM H2O
PH BLDA: 7.34 [PH] (ref 7.35–7.45)
PH BLDA: 7.36 [PH] (ref 7.35–7.45)
PH BLDA: 7.37 [PH] (ref 7.35–7.45)
PH BLDA: 7.37 [PH] (ref 7.35–7.45)
PH BLDA: 7.39 [PH] (ref 7.35–7.45)
PH BLDA: 7.39 [PH] (ref 7.35–7.45)
PH BLDV: 7.32 [PH] (ref 7.32–7.43)
PLATELET # BLD AUTO: 119 10(3)UL (ref 150–450)
PO2 BLDA: 121 MM HG (ref 80–100)
PO2 BLDA: 214 MM HG (ref 80–100)
PO2 BLDA: 242 MMHG (ref 80–105)
PO2 BLDA: 284 MMHG (ref 80–105)
PO2 BLDA: 354 MMHG (ref 80–105)
PO2 BLDA: >400 MMHG (ref 80–105)
PO2 BLDV: 36 MM HG (ref 35–40)
POTASSIUM BLD-SCNC: 4.5 MMOL/L (ref 3.6–5.1)
POTASSIUM SERPL-SCNC: 4.6 MMOL/L (ref 3.5–5.1)
PRESSURE SUPPORT SETTING VENT: 10 CM H2O
PRESSURE SUPPORT SETTING VENT: 10 CM H2O
PRESSURE SUPPORT SETTING VENT: 5 CM H2O
PROTHROMBIN TIME: 18.5 SECONDS (ref 11.6–14.8)
PUNCTURE CHARGE: NO
Q-T INTERVAL: 346 MS
Q-T INTERVAL: 366 MS
QRS DURATION: 74 MS
QRS DURATION: 84 MS
QTC CALCULATION (BEZET): 356 MS
QTC CALCULATION (BEZET): 427 MS
R AXIS: 25 DEGREES
R AXIS: 34 DEGREES
RBC # BLD AUTO: 3.74 X10(6)UL
RESP RATE: 10 BPM
RESP RATE: 10 BPM
SAO2 % BLDA: 100 % (ref 92–100)
SAO2 % BLDA: 98.4 % (ref 94–100)
SAO2 % BLDA: 98.8 % (ref 94–100)
SAO2 % BLDV: 71.4 % (ref 60–85)
SODIUM BLD-SCNC: 133 MMOL/L (ref 135–145)
SODIUM BLDA-SCNC: 133 MMOL/L (ref 136–145)
SODIUM BLDA-SCNC: 136 MMOL/L (ref 136–145)
SODIUM BLDA-SCNC: 139 MMOL/L (ref 136–145)
SODIUM BLDA-SCNC: 140 MMOL/L (ref 136–145)
SODIUM BLDA-SCNC: 4.1 MMOL/L (ref 3.6–5.1)
SODIUM BLDA-SCNC: 5 MMOL/L (ref 3.6–5.1)
SODIUM BLDA-SCNC: 6 MMOL/L (ref 3.6–5.1)
SODIUM BLDA-SCNC: 6.4 MMOL/L (ref 3.6–5.1)
SODIUM SERPL-SCNC: 142 MMOL/L (ref 136–145)
SPECIMEN VOL 24H UR: 500 ML
SPECIMEN VOL 24H UR: 500 ML
T AXIS: 48 DEGREES
T AXIS: 63 DEGREES
UNIT VOLUME: 264 ML
VENTRICULAR RATE: 57 BPM
VENTRICULAR RATE: 92 BPM
WBC # BLD AUTO: 13.5 X10(3) UL (ref 4–11)

## 2024-07-03 PROCEDURE — 0T9B80Z DRAINAGE OF BLADDER WITH DRAINAGE DEVICE, VIA NATURAL OR ARTIFICIAL OPENING ENDOSCOPIC: ICD-10-PCS | Performed by: UROLOGY

## 2024-07-03 PROCEDURE — 02L70CK OCCLUSION OF LEFT ATRIAL APPENDAGE WITH EXTRALUMINAL DEVICE, OPEN APPROACH: ICD-10-PCS | Performed by: THORACIC SURGERY (CARDIOTHORACIC VASCULAR SURGERY)

## 2024-07-03 PROCEDURE — 06BQ4ZZ EXCISION OF LEFT SAPHENOUS VEIN, PERCUTANEOUS ENDOSCOPIC APPROACH: ICD-10-PCS | Performed by: THORACIC SURGERY (CARDIOTHORACIC VASCULAR SURGERY)

## 2024-07-03 PROCEDURE — B246ZZ4 ULTRASONOGRAPHY OF RIGHT AND LEFT HEART, TRANSESOPHAGEAL: ICD-10-PCS | Performed by: THORACIC SURGERY (CARDIOTHORACIC VASCULAR SURGERY)

## 2024-07-03 PROCEDURE — 5A1221Z PERFORMANCE OF CARDIAC OUTPUT, CONTINUOUS: ICD-10-PCS | Performed by: THORACIC SURGERY (CARDIOTHORACIC VASCULAR SURGERY)

## 2024-07-03 PROCEDURE — 99233 SBSQ HOSP IP/OBS HIGH 50: CPT | Performed by: HOSPITALIST

## 2024-07-03 PROCEDURE — 5A1935Z RESPIRATORY VENTILATION, LESS THAN 24 CONSECUTIVE HOURS: ICD-10-PCS | Performed by: ANESTHESIOLOGY

## 2024-07-03 PROCEDURE — 0T7C8ZZ DILATION OF BLADDER NECK, VIA NATURAL OR ARTIFICIAL OPENING ENDOSCOPIC: ICD-10-PCS | Performed by: UROLOGY

## 2024-07-03 PROCEDURE — 02100Z9 BYPASS CORONARY ARTERY, ONE ARTERY FROM LEFT INTERNAL MAMMARY, OPEN APPROACH: ICD-10-PCS | Performed by: THORACIC SURGERY (CARDIOTHORACIC VASCULAR SURGERY)

## 2024-07-03 PROCEDURE — 93312 ECHO TRANSESOPHAGEAL: CPT | Performed by: ANESTHESIOLOGY

## 2024-07-03 PROCEDURE — 99223 1ST HOSP IP/OBS HIGH 75: CPT | Performed by: INTERNAL MEDICINE

## 2024-07-03 PROCEDURE — 71045 X-RAY EXAM CHEST 1 VIEW: CPT | Performed by: CLINICAL NURSE SPECIALIST

## 2024-07-03 PROCEDURE — 021109W BYPASS CORONARY ARTERY, TWO ARTERIES FROM AORTA WITH AUTOLOGOUS VENOUS TISSUE, OPEN APPROACH: ICD-10-PCS | Performed by: THORACIC SURGERY (CARDIOTHORACIC VASCULAR SURGERY)

## 2024-07-03 PROCEDURE — 52281 CYSTOSCOPY AND TREATMENT: CPT | Performed by: UROLOGY

## 2024-07-03 DEVICE — PLATE BNE 8 H BLU X PRI CLSR SYS STERNALOCK: Type: IMPLANTABLE DEVICE | Site: CHEST | Status: FUNCTIONAL

## 2024-07-03 DEVICE — DEVICE ATRICLIP FLEXV 40 SMALL: Type: IMPLANTABLE DEVICE | Site: HEART | Status: FUNCTIONAL

## 2024-07-03 DEVICE — SCREW BNE 2.4X10MM G CANC TI SLF DRL: Type: IMPLANTABLE DEVICE | Site: CHEST | Status: FUNCTIONAL

## 2024-07-03 DEVICE — PLATE BNE 8 H JL SHP STERNALOCK BLU PRI CLSR: Type: IMPLANTABLE DEVICE | Site: CHEST | Status: FUNCTIONAL

## 2024-07-03 DEVICE — SCREW BNE 2.4X12MM G CANC TI SLF DRL: Type: IMPLANTABLE DEVICE | Site: CHEST | Status: FUNCTIONAL

## 2024-07-03 RX ORDER — ACETAMINOPHEN 325 MG/1
650 TABLET ORAL EVERY 4 HOURS PRN
Status: DISCONTINUED | OUTPATIENT
Start: 2024-07-03 | End: 2024-07-11

## 2024-07-03 RX ORDER — HEPARIN SODIUM 1000 [USP'U]/ML
INJECTION, SOLUTION INTRAVENOUS; SUBCUTANEOUS AS NEEDED
Status: DISCONTINUED | OUTPATIENT
Start: 2024-07-03 | End: 2024-07-03 | Stop reason: SURG

## 2024-07-03 RX ORDER — MAGNESIUM SULFATE 1 G/100ML
1 INJECTION INTRAVENOUS AS NEEDED
Status: DISCONTINUED | OUTPATIENT
Start: 2024-07-03 | End: 2024-07-11

## 2024-07-03 RX ORDER — CHLORHEXIDINE GLUCONATE ORAL RINSE 1.2 MG/ML
15 SOLUTION DENTAL 2 TIMES DAILY
Status: DISCONTINUED | OUTPATIENT
Start: 2024-07-03 | End: 2024-07-10

## 2024-07-03 RX ORDER — POLYETHYLENE GLYCOL 3350 17 G/17G
17 POWDER, FOR SOLUTION ORAL DAILY PRN
Status: DISCONTINUED | OUTPATIENT
Start: 2024-07-03 | End: 2024-07-11

## 2024-07-03 RX ORDER — SODIUM CHLORIDE 9 MG/ML
INJECTION, SOLUTION INTRAVENOUS CONTINUOUS PRN
Status: DISCONTINUED | OUTPATIENT
Start: 2024-07-03 | End: 2024-07-03 | Stop reason: SURG

## 2024-07-03 RX ORDER — ALBUMIN, HUMAN INJ 5% 5 %
12.5 SOLUTION INTRAVENOUS ONCE
Status: COMPLETED | OUTPATIENT
Start: 2024-07-03 | End: 2024-07-03

## 2024-07-03 RX ORDER — ALBUMIN, HUMAN INJ 5% 5 %
12.5 SOLUTION INTRAVENOUS ONCE AS NEEDED
Status: DISPENSED | OUTPATIENT
Start: 2024-07-03 | End: 2024-07-04

## 2024-07-03 RX ORDER — HYDROCODONE BITARTRATE AND ACETAMINOPHEN 5; 325 MG/1; MG/1
1 TABLET ORAL EVERY 4 HOURS PRN
Status: DISCONTINUED | OUTPATIENT
Start: 2024-07-03 | End: 2024-07-11

## 2024-07-03 RX ORDER — DEXMEDETOMIDINE HYDROCHLORIDE 4 UG/ML
INJECTION, SOLUTION INTRAVENOUS CONTINUOUS
Status: DISCONTINUED | OUTPATIENT
Start: 2024-07-03 | End: 2024-07-07

## 2024-07-03 RX ORDER — LIDOCAINE HYDROCHLORIDE 20 MG/ML
JELLY TOPICAL AS NEEDED
Status: DISCONTINUED | OUTPATIENT
Start: 2024-07-03 | End: 2024-07-03 | Stop reason: HOSPADM

## 2024-07-03 RX ORDER — FAMOTIDINE 10 MG/ML
20 INJECTION, SOLUTION INTRAVENOUS DAILY
Status: DISCONTINUED | OUTPATIENT
Start: 2024-07-03 | End: 2024-07-06

## 2024-07-03 RX ORDER — MORPHINE SULFATE 4 MG/ML
4 INJECTION, SOLUTION INTRAMUSCULAR; INTRAVENOUS EVERY 2 HOUR PRN
Status: DISCONTINUED | OUTPATIENT
Start: 2024-07-03 | End: 2024-07-08

## 2024-07-03 RX ORDER — MIDAZOLAM HYDROCHLORIDE 1 MG/ML
INJECTION INTRAMUSCULAR; INTRAVENOUS AS NEEDED
Status: DISCONTINUED | OUTPATIENT
Start: 2024-07-03 | End: 2024-07-03 | Stop reason: SURG

## 2024-07-03 RX ORDER — METOCLOPRAMIDE HYDROCHLORIDE 5 MG/ML
10 INJECTION INTRAMUSCULAR; INTRAVENOUS EVERY 6 HOURS
Status: DISCONTINUED | OUTPATIENT
Start: 2024-07-03 | End: 2024-07-09

## 2024-07-03 RX ORDER — ASPIRIN 81 MG/1
81 TABLET ORAL DAILY
Status: DISCONTINUED | OUTPATIENT
Start: 2024-07-04 | End: 2024-07-11

## 2024-07-03 RX ORDER — HYDROCODONE BITARTRATE AND ACETAMINOPHEN 5; 325 MG/1; MG/1
2 TABLET ORAL EVERY 4 HOURS PRN
Status: DISCONTINUED | OUTPATIENT
Start: 2024-07-03 | End: 2024-07-11

## 2024-07-03 RX ORDER — CLOPIDOGREL BISULFATE 75 MG/1
75 TABLET ORAL DAILY
Status: DISCONTINUED | OUTPATIENT
Start: 2024-07-04 | End: 2024-07-07

## 2024-07-03 RX ORDER — PHENYLEPHRINE HCL 10 MG/ML
VIAL (ML) INJECTION AS NEEDED
Status: DISCONTINUED | OUTPATIENT
Start: 2024-07-03 | End: 2024-07-03 | Stop reason: SURG

## 2024-07-03 RX ORDER — SODIUM CHLORIDE 9 MG/ML
INJECTION, SOLUTION INTRAVENOUS CONTINUOUS
Status: DISCONTINUED | OUTPATIENT
Start: 2024-07-03 | End: 2024-07-08

## 2024-07-03 RX ORDER — DEXTROSE MONOHYDRATE AND SODIUM CHLORIDE 5; .45 G/100ML; G/100ML
INJECTION, SOLUTION INTRAVENOUS CONTINUOUS
Status: DISCONTINUED | OUTPATIENT
Start: 2024-07-03 | End: 2024-07-07

## 2024-07-03 RX ORDER — NICOTINE POLACRILEX 4 MG
15 LOZENGE BUCCAL
Status: DISCONTINUED | OUTPATIENT
Start: 2024-07-03 | End: 2024-07-11

## 2024-07-03 RX ORDER — MELATONIN
3 NIGHTLY PRN
Status: DISCONTINUED | OUTPATIENT
Start: 2024-07-03 | End: 2024-07-11

## 2024-07-03 RX ORDER — ASCORBIC ACID 500 MG
500 TABLET ORAL 3 TIMES DAILY
Status: DISCONTINUED | OUTPATIENT
Start: 2024-07-04 | End: 2024-07-08

## 2024-07-03 RX ORDER — VANCOMYCIN HYDROCHLORIDE 1 G/20ML
INJECTION, POWDER, LYOPHILIZED, FOR SOLUTION INTRAVENOUS AS NEEDED
Status: DISCONTINUED | OUTPATIENT
Start: 2024-07-03 | End: 2024-07-03 | Stop reason: HOSPADM

## 2024-07-03 RX ORDER — NICOTINE POLACRILEX 4 MG
30 LOZENGE BUCCAL
Status: DISCONTINUED | OUTPATIENT
Start: 2024-07-03 | End: 2024-07-11

## 2024-07-03 RX ORDER — ONDANSETRON 2 MG/ML
4 INJECTION INTRAMUSCULAR; INTRAVENOUS EVERY 6 HOURS PRN
Status: DISCONTINUED | OUTPATIENT
Start: 2024-07-03 | End: 2024-07-08

## 2024-07-03 RX ORDER — DEXTROSE MONOHYDRATE 25 G/50ML
50 INJECTION, SOLUTION INTRAVENOUS
Status: DISCONTINUED | OUTPATIENT
Start: 2024-07-03 | End: 2024-07-11

## 2024-07-03 RX ORDER — DOBUTAMINE HYDROCHLORIDE 200 MG/100ML
INJECTION INTRAVENOUS CONTINUOUS PRN
Status: DISCONTINUED | OUTPATIENT
Start: 2024-07-03 | End: 2024-07-03 | Stop reason: HOSPADM

## 2024-07-03 RX ORDER — NITROGLYCERIN 20 MG/100ML
INJECTION INTRAVENOUS AS NEEDED
Status: DISCONTINUED | OUTPATIENT
Start: 2024-07-03 | End: 2024-07-03 | Stop reason: SURG

## 2024-07-03 RX ORDER — LIDOCAINE HYDROCHLORIDE 10 MG/ML
INJECTION, SOLUTION EPIDURAL; INFILTRATION; INTRACAUDAL; PERINEURAL AS NEEDED
Status: DISCONTINUED | OUTPATIENT
Start: 2024-07-03 | End: 2024-07-03

## 2024-07-03 RX ORDER — POTASSIUM CHLORIDE 29.8 MG/ML
40 INJECTION INTRAVENOUS AS NEEDED
Status: DISCONTINUED | OUTPATIENT
Start: 2024-07-03 | End: 2024-07-11

## 2024-07-03 RX ORDER — EPHEDRINE SULFATE 50 MG/ML
INJECTION INTRAVENOUS AS NEEDED
Status: DISCONTINUED | OUTPATIENT
Start: 2024-07-03 | End: 2024-07-03 | Stop reason: SURG

## 2024-07-03 RX ORDER — SENNOSIDES 8.6 MG
8.6 TABLET ORAL 2 TIMES DAILY
Status: DISCONTINUED | OUTPATIENT
Start: 2024-07-04 | End: 2024-07-11

## 2024-07-03 RX ORDER — NITROGLYCERIN 20 MG/100ML
INJECTION INTRAVENOUS CONTINUOUS PRN
Status: DISCONTINUED | OUTPATIENT
Start: 2024-07-03 | End: 2024-07-03 | Stop reason: SURG

## 2024-07-03 RX ORDER — ACETAMINOPHEN 10 MG/ML
1000 INJECTION, SOLUTION INTRAVENOUS EVERY 8 HOURS
Status: COMPLETED | OUTPATIENT
Start: 2024-07-03 | End: 2024-07-04

## 2024-07-03 RX ORDER — DEXMEDETOMIDINE HYDROCHLORIDE 4 UG/ML
INJECTION, SOLUTION INTRAVENOUS CONTINUOUS PRN
Status: DISCONTINUED | OUTPATIENT
Start: 2024-07-03 | End: 2024-07-03 | Stop reason: HOSPADM

## 2024-07-03 RX ORDER — POTASSIUM CHLORIDE 14.9 MG/ML
20 INJECTION INTRAVENOUS AS NEEDED
Status: DISCONTINUED | OUTPATIENT
Start: 2024-07-03 | End: 2024-07-11

## 2024-07-03 RX ORDER — CEFAZOLIN SODIUM 1 G/3ML
INJECTION, POWDER, FOR SOLUTION INTRAMUSCULAR; INTRAVENOUS AS NEEDED
Status: DISCONTINUED | OUTPATIENT
Start: 2024-07-03 | End: 2024-07-03 | Stop reason: HOSPADM

## 2024-07-03 RX ORDER — PROTAMINE SULFATE 10 MG/ML
INJECTION, SOLUTION INTRAVENOUS AS NEEDED
Status: DISCONTINUED | OUTPATIENT
Start: 2024-07-03 | End: 2024-07-03 | Stop reason: SURG

## 2024-07-03 RX ORDER — VECURONIUM BROMIDE 1 MG/ML
INJECTION, POWDER, LYOPHILIZED, FOR SOLUTION INTRAVENOUS AS NEEDED
Status: DISCONTINUED | OUTPATIENT
Start: 2024-07-03 | End: 2024-07-03 | Stop reason: SURG

## 2024-07-03 RX ORDER — MORPHINE SULFATE 4 MG/ML
2 INJECTION, SOLUTION INTRAMUSCULAR; INTRAVENOUS EVERY 2 HOUR PRN
Status: DISCONTINUED | OUTPATIENT
Start: 2024-07-03 | End: 2024-07-08

## 2024-07-03 RX ORDER — ENOXAPARIN SODIUM 100 MG/ML
40 INJECTION SUBCUTANEOUS DAILY
Status: DISCONTINUED | OUTPATIENT
Start: 2024-07-04 | End: 2024-07-07

## 2024-07-03 RX ORDER — MAGNESIUM SULFATE HEPTAHYDRATE 40 MG/ML
2 INJECTION, SOLUTION INTRAVENOUS AS NEEDED
Status: DISCONTINUED | OUTPATIENT
Start: 2024-07-03 | End: 2024-07-11

## 2024-07-03 RX ORDER — NITROGLYCERIN 20 MG/100ML
INJECTION INTRAVENOUS CONTINUOUS PRN
Status: DISCONTINUED | OUTPATIENT
Start: 2024-07-03 | End: 2024-07-08

## 2024-07-03 RX ORDER — FAMOTIDINE 20 MG/1
20 TABLET, FILM COATED ORAL DAILY
Status: DISCONTINUED | OUTPATIENT
Start: 2024-07-03 | End: 2024-07-06

## 2024-07-03 RX ORDER — DOXEPIN HYDROCHLORIDE 50 MG/1
1 CAPSULE ORAL DAILY
Status: DISCONTINUED | OUTPATIENT
Start: 2024-07-04 | End: 2024-07-11

## 2024-07-03 RX ORDER — DOBUTAMINE HYDROCHLORIDE 200 MG/100ML
INJECTION INTRAVENOUS CONTINUOUS PRN
Status: DISCONTINUED | OUTPATIENT
Start: 2024-07-03 | End: 2024-07-08

## 2024-07-03 RX ORDER — DOCUSATE SODIUM 100 MG/1
100 CAPSULE, LIQUID FILLED ORAL 2 TIMES DAILY
Status: DISCONTINUED | OUTPATIENT
Start: 2024-07-04 | End: 2024-07-11

## 2024-07-03 RX ORDER — BISACODYL 10 MG
10 SUPPOSITORY, RECTAL RECTAL
Status: DISCONTINUED | OUTPATIENT
Start: 2024-07-03 | End: 2024-07-11

## 2024-07-03 RX ADMIN — NITROGLYCERIN 5 MCG/MIN: 20 INJECTION INTRAVENOUS at 16:05:00

## 2024-07-03 RX ADMIN — DEXMEDETOMIDINE HYDROCHLORIDE 0.2 MCG/KG/HR: 4 INJECTION, SOLUTION INTRAVENOUS at 15:37:00

## 2024-07-03 RX ADMIN — NITROGLYCERIN 10 MCG/MIN: 20 INJECTION INTRAVENOUS at 14:36:00

## 2024-07-03 RX ADMIN — NITROGLYCERIN 0.02 MG: 20 INJECTION INTRAVENOUS at 09:37:00

## 2024-07-03 RX ADMIN — MIDAZOLAM HYDROCHLORIDE 1 MG: 1 INJECTION INTRAMUSCULAR; INTRAVENOUS at 07:02:00

## 2024-07-03 RX ADMIN — PROTAMINE SULFATE 300 MG: 10 INJECTION, SOLUTION INTRAVENOUS at 11:36:00

## 2024-07-03 RX ADMIN — EPHEDRINE SULFATE 5 MG: 50 INJECTION INTRAVENOUS at 07:21:00

## 2024-07-03 RX ADMIN — NITROGLYCERIN 0.01 MG: 20 INJECTION INTRAVENOUS at 11:27:00

## 2024-07-03 RX ADMIN — PHENYLEPHRINE HCL 50 MCG: 10 MG/ML VIAL (ML) INJECTION at 11:46:00

## 2024-07-03 RX ADMIN — DOBUTAMINE HYDROCHLORIDE 3 MCG/KG/MIN: 200 INJECTION INTRAVENOUS at 21:00:00

## 2024-07-03 RX ADMIN — MORPHINE SULFATE 4 MG: 4 INJECTION, SOLUTION INTRAMUSCULAR; INTRAVENOUS at 16:00:00

## 2024-07-03 RX ADMIN — DEXMEDETOMIDINE HYDROCHLORIDE 0.7 MCG/KG/HR: 4 INJECTION, SOLUTION INTRAVENOUS at 13:12:00

## 2024-07-03 RX ADMIN — METOCLOPRAMIDE HYDROCHLORIDE 10 MG: 5 INJECTION INTRAMUSCULAR; INTRAVENOUS at 18:39:00

## 2024-07-03 RX ADMIN — NITROGLYCERIN 0.02 MG: 20 INJECTION INTRAVENOUS at 11:37:00

## 2024-07-03 RX ADMIN — NITROGLYCERIN 5 MCG/MIN: 20 INJECTION INTRAVENOUS at 11:47:00

## 2024-07-03 RX ADMIN — METOCLOPRAMIDE HYDROCHLORIDE 10 MG: 5 INJECTION INTRAMUSCULAR; INTRAVENOUS at 13:54:00

## 2024-07-03 RX ADMIN — VECURONIUM BROMIDE 3 MG: 1 INJECTION, POWDER, LYOPHILIZED, FOR SOLUTION INTRAVENOUS at 09:36:00

## 2024-07-03 RX ADMIN — DEXMEDETOMIDINE HYDROCHLORIDE 0.7 MCG/KG/HR: 4 INJECTION, SOLUTION INTRAVENOUS at 11:26:00

## 2024-07-03 RX ADMIN — DOBUTAMINE HYDROCHLORIDE 2 MCG/KG/MIN: 200 INJECTION INTRAVENOUS at 08:15:00

## 2024-07-03 RX ADMIN — SODIUM CHLORIDE: 9 INJECTION, SOLUTION INTRAVENOUS at 09:15:00

## 2024-07-03 RX ADMIN — PHENYLEPHRINE HCL 50 MCG: 10 MG/ML VIAL (ML) INJECTION at 09:41:00

## 2024-07-03 RX ADMIN — DEXMEDETOMIDINE HYDROCHLORIDE 0.3 MCG/KG/HR: 4 INJECTION, SOLUTION INTRAVENOUS at 08:13:00

## 2024-07-03 RX ADMIN — DOBUTAMINE HYDROCHLORIDE 2 MCG/KG/MIN: 200 INJECTION INTRAVENOUS at 18:05:00

## 2024-07-03 RX ADMIN — EPHEDRINE SULFATE 5 MG: 50 INJECTION INTRAVENOUS at 09:29:00

## 2024-07-03 RX ADMIN — NITROGLYCERIN 0.01 MG: 20 INJECTION INTRAVENOUS at 09:38:00

## 2024-07-03 RX ADMIN — SODIUM CHLORIDE: 9 INJECTION, SOLUTION INTRAVENOUS at 14:06:00

## 2024-07-03 RX ADMIN — NITROGLYCERIN 0.01 MG: 20 INJECTION INTRAVENOUS at 08:23:00

## 2024-07-03 RX ADMIN — ALBUMIN, HUMAN INJ 5% 12.5 G: 5 SOLUTION INTRAVENOUS at 17:06:00

## 2024-07-03 RX ADMIN — DEXMEDETOMIDINE HYDROCHLORIDE 0.4 MCG/KG/HR: 4 INJECTION, SOLUTION INTRAVENOUS at 14:57:00

## 2024-07-03 RX ADMIN — NITROGLYCERIN 5 MCG/MIN: 20 INJECTION INTRAVENOUS at 11:55:00

## 2024-07-03 RX ADMIN — DOBUTAMINE HYDROCHLORIDE 1 MCG/KG/MIN: 200 INJECTION INTRAVENOUS at 09:15:00

## 2024-07-03 RX ADMIN — ACETAMINOPHEN 1000 MG: 10 INJECTION, SOLUTION INTRAVENOUS at 21:22:00

## 2024-07-03 RX ADMIN — SODIUM CHLORIDE: 9 INJECTION, SOLUTION INTRAVENOUS at 07:02:00

## 2024-07-03 RX ADMIN — SODIUM CHLORIDE: 9 INJECTION, SOLUTION INTRAVENOUS at 10:45:00

## 2024-07-03 RX ADMIN — NITROGLYCERIN 0.01 MG: 20 INJECTION INTRAVENOUS at 09:35:00

## 2024-07-03 RX ADMIN — VECURONIUM BROMIDE 4 MG: 1 INJECTION, POWDER, LYOPHILIZED, FOR SOLUTION INTRAVENOUS at 11:17:00

## 2024-07-03 RX ADMIN — SODIUM CHLORIDE: 9 INJECTION, SOLUTION INTRAVENOUS at 12:30:00

## 2024-07-03 RX ADMIN — DOBUTAMINE HYDROCHLORIDE 1.5 MCG/KG/MIN: 200 INJECTION INTRAVENOUS at 08:44:00

## 2024-07-03 RX ADMIN — DOBUTAMINE HYDROCHLORIDE 0.5 MCG/KG/MIN: 200 INJECTION INTRAVENOUS at 09:31:00

## 2024-07-03 RX ADMIN — ACETAMINOPHEN 1000 MG: 10 INJECTION, SOLUTION INTRAVENOUS at 13:54:00

## 2024-07-03 RX ADMIN — NITROGLYCERIN 0.01 MG: 20 INJECTION INTRAVENOUS at 11:33:00

## 2024-07-03 RX ADMIN — VECURONIUM BROMIDE 3 MG: 1 INJECTION, POWDER, LYOPHILIZED, FOR SOLUTION INTRAVENOUS at 09:08:00

## 2024-07-03 RX ADMIN — NITROGLYCERIN 10 MCG/MIN: 20 INJECTION INTRAVENOUS at 12:04:00

## 2024-07-03 RX ADMIN — PHENYLEPHRINE HCL 100 MCG: 10 MG/ML VIAL (ML) INJECTION at 09:47:00

## 2024-07-03 RX ADMIN — DOBUTAMINE HYDROCHLORIDE 0.5 MCG/KG/MIN: 200 INJECTION INTRAVENOUS at 11:10:00

## 2024-07-03 RX ADMIN — NITROGLYCERIN 20 MCG/MIN: 20 INJECTION INTRAVENOUS at 17:56:00

## 2024-07-03 RX ADMIN — NITROGLYCERIN 0.02 MG: 20 INJECTION INTRAVENOUS at 11:31:00

## 2024-07-03 RX ADMIN — DOBUTAMINE HYDROCHLORIDE 4 MCG/KG/MIN: 200 INJECTION INTRAVENOUS at 17:08:00

## 2024-07-03 RX ADMIN — DEXMEDETOMIDINE HYDROCHLORIDE 0.5 MCG/KG/HR: 4 INJECTION, SOLUTION INTRAVENOUS at 11:09:00

## 2024-07-03 RX ADMIN — HEPARIN SODIUM 30000 UNITS: 1000 INJECTION, SOLUTION INTRAVENOUS; SUBCUTANEOUS at 09:19:00

## 2024-07-03 RX ADMIN — NITROGLYCERIN 0.01 MG: 20 INJECTION INTRAVENOUS at 09:25:00

## 2024-07-03 RX ADMIN — NITROGLYCERIN 0.01 MG: 20 INJECTION INTRAVENOUS at 12:01:00

## 2024-07-03 RX ADMIN — NITROGLYCERIN 20 MCG/MIN: 20 INJECTION INTRAVENOUS at 16:22:00

## 2024-07-03 RX ADMIN — EPHEDRINE SULFATE 5 MG: 50 INJECTION INTRAVENOUS at 07:56:00

## 2024-07-03 RX ADMIN — PHENYLEPHRINE HCL 100 MCG: 10 MG/ML VIAL (ML) INJECTION at 09:50:00

## 2024-07-03 RX ADMIN — VECURONIUM BROMIDE 7 MG: 1 INJECTION, POWDER, LYOPHILIZED, FOR SOLUTION INTRAVENOUS at 07:10:00

## 2024-07-03 RX ADMIN — NITROGLYCERIN 0.01 MG: 20 INJECTION INTRAVENOUS at 11:56:00

## 2024-07-03 RX ADMIN — DOBUTAMINE HYDROCHLORIDE 0.5 MCG/KG/MIN: 200 INJECTION INTRAVENOUS at 12:29:00

## 2024-07-03 RX ADMIN — DOBUTAMINE HYDROCHLORIDE 3 MCG/KG/MIN: 200 INJECTION INTRAVENOUS at 17:26:00

## 2024-07-03 RX ADMIN — DOBUTAMINE HYDROCHLORIDE 2 MCG/KG/MIN: 200 INJECTION INTRAVENOUS at 13:08:00

## 2024-07-03 RX ADMIN — PHENYLEPHRINE HCL 100 MCG: 10 MG/ML VIAL (ML) INJECTION at 09:51:00

## 2024-07-03 RX ADMIN — DOBUTAMINE HYDROCHLORIDE 5 MCG/KG/MIN: 200 INJECTION INTRAVENOUS at 14:05:00

## 2024-07-03 RX ADMIN — SODIUM CHLORIDE: 9 INJECTION, SOLUTION INTRAVENOUS at 13:07:00

## 2024-07-03 RX ADMIN — VECURONIUM BROMIDE 3 MG: 1 INJECTION, POWDER, LYOPHILIZED, FOR SOLUTION INTRAVENOUS at 08:18:00

## 2024-07-03 RX ADMIN — ALBUMIN, HUMAN INJ 5% 12.5 G: 5 SOLUTION INTRAVENOUS at 15:52:00

## 2024-07-03 RX ADMIN — PHENYLEPHRINE HCL 100 MCG: 10 MG/ML VIAL (ML) INJECTION at 12:21:00

## 2024-07-03 RX ADMIN — DOBUTAMINE HYDROCHLORIDE 1 MCG/KG/MIN: 200 INJECTION INTRAVENOUS at 09:28:00

## 2024-07-03 RX ADMIN — PHENYLEPHRINE HCL 100 MCG: 10 MG/ML VIAL (ML) INJECTION at 12:55:00

## 2024-07-03 RX ADMIN — DEXTROSE MONOHYDRATE AND SODIUM CHLORIDE 40 ML/HR: 5; .45 INJECTION, SOLUTION INTRAVENOUS at 13:12:00

## 2024-07-03 RX ADMIN — NITROGLYCERIN 0.01 MG: 20 INJECTION INTRAVENOUS at 11:34:00

## 2024-07-03 RX ADMIN — SODIUM CHLORIDE: 9 INJECTION, SOLUTION INTRAVENOUS at 12:31:00

## 2024-07-03 RX ADMIN — PHENYLEPHRINE HCL 50 MCG: 10 MG/ML VIAL (ML) INJECTION at 11:44:00

## 2024-07-03 RX ADMIN — DOBUTAMINE HYDROCHLORIDE 1 MCG/KG/MIN: 200 INJECTION INTRAVENOUS at 08:26:00

## 2024-07-03 RX ADMIN — FAMOTIDINE 20 MG: 10 INJECTION, SOLUTION INTRAVENOUS at 13:54:00

## 2024-07-03 RX ADMIN — NITROGLYCERIN 0.01 MG: 20 INJECTION INTRAVENOUS at 09:33:00

## 2024-07-03 NOTE — TELEPHONE ENCOUNTER
This patient had a 12 Turkmen Delong catheter placed through the cystoscope and the operating room today.  He is having a CABG done.  I recommended keeping the catheter in until the time of discharge and have him follow-up shortly after his discharge from the hospital for outpatient follow-up with us and a voiding trial/feeling D catheter).  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

## 2024-07-03 NOTE — ANESTHESIA PROCEDURE NOTES
Central Line    Date/Time: 7/3/2024 7:25 AM    Performed by: Mallory Reed MD  Authorized by: Mallory Reed MD    General Information and Staff    Procedure Start:  7/3/2024 7:25 AM  Procedure End:  7/3/2024 7:33 AM  Anesthesiologist:  Mallory Reed MD  Performed by:  Anesthesiologist  Patient Location:  OR  Indication: central venous access and CVP monitoring    Site Identification: real time ultrasound guided, surface landmarks and image stored and retrievable    Preanesthetic Checklist: 2 patient identifiers, IV checked, risks and benefits discussed, monitors and equipment checked, pre-op evaluation, timeout performed, anesthesia consent and sterile technique used    Procedure Detail    Patient Position:  Trendelenburg  Laterality:  Right  Site:  Internal jugular  Prep:  Chloraprep  Catheter Size:  9 Fr  Catheter Length (cm):  10  Catheter Type:  MAC introducer  Number of Lumens:  Double lumen  Oximetric Catheter?: Yes    Procedure Detail: target vein identified, needle advanced into vein and blood aspirated and guidewire advanced into vein    Seldinger Technique?: Yes    Intravenous Verification: verified by ultrasound and venous blood return    Post Insertion: all ports aspirated, all ports flushed easily, guidewire was removed intact, line was sutured in place and dressing was applied      Assessment    Events: patient tolerated procedure well with no complications      PA Catheter Placement    PA Catheter Placed?: Yes    PA Catheter Type:  Oximetric  PA Catheter Size:  8  Laterality:  Right  Site:  Internal jugular  Placement Confirmation: pressure tracing changes and verified by SHILPA    PA Catheter Depth (cm):  50  Events: patient tolerated procedure well with no complications      Additional Comments     PA catheter floated easily without resistance, not wedged

## 2024-07-03 NOTE — RESPIRATORY THERAPY NOTE
Patient is awake and following commands. Patient placed on SBT 5/5 40%. Patient is tolerating well at this time. Will continue to monitor.        07/03/24 1810   Vent Information   Vent Mode (S)  CPAP;PS   Settings   FiO2 (%) 40 %   PEEP/CPAP (cm H2O) 5 cm H20   Press Support CWP 5   Alarms   High RR 40   Insp Pressure High (cm H2O) 50 cm H2O   Insp Pressure Low (cm H2O) 8 cm H2O   MV High (L/min) 20 L/min   MV Low (L/min) 3 L/min   Apnea Interval (sec) 20 seconds   Spontaneous Breathing Trial   Spontaneous Breathing Trial Complete Y   Is the FiO2 <= 0.5? (titrated for sats 92-94%) Y   Is the PEEP <= 5? Y   Is the RSBI <=104 Y   Is the patient off pressor and narcotic / sedation drips? Y   Is the patient free of ventricular arrhythmias in the past 24 hours? Y   Is the patient's cough adequate? Y   Is the patient alert (neuro), able to follow commands? Y   Daily Screen Meets All Criteria Yes   Spontaneous Parameters   Sedation holiday (date) 07/03/24   Sedation holiday (time) 1810   Spontaneous RR Rate 12   Spontaneous Minute Volume 5.2   Average Spontaneous Tidal Volume 520   $ Spontaneous Vital Capacity 0.8   Negative Inspiratory Force -19   Total RSBI 21

## 2024-07-03 NOTE — ANESTHESIA POSTPROCEDURE EVALUATION
Patient: Giuseppe Faustin    Procedure Summary       Date: 07/03/24 Room / Location: [facility] MAIN OR  / [facility] MAIN OR    Anesthesia Start: 0658 Anesthesia Stop: 1315    Procedures:       URGENT CORONARY ARTERY BYPASS GRAFT X 3; UTILIZING THE LEFT MAMMARY ARTERY TO THE LAD; SVG TO THE DIAG; SVG TO THE PDA;  LEFT LEG ENDOSCOPIC VEIN HARVEST; LEFT ATRIAL APPENDAGE CLIP SIZE 40; INTRAOPERATIVE TRANSESOPHAGEAL ECHOCARDIOGRAM; INSERTION OF TEMPORARY VENTRICUR PACING WIRE; STERNAL PLATING      CYSTOSCOPY, BLADDER NECK CONTRACTURE DILATION AND ESCOBAR PLACEMENT (Urethra) Diagnosis:       Atherosclerosis of native coronary artery of native heart with other form of angina pectoris (HCC)      (Atherosclerosis of native coronary artery of native heart with other form of angina pectoris (HCC) [I25.118])    Surgeons: Brayan Castle MD; Kevin Blanchard MD Anesthesiologist: Mallory Reed MD    Anesthesia Type: general ASA Status: 4            Anesthesia Type: general    Vitals Value Taken Time   /75 07/03/24 1315   Temp 96.9 07/03/24 1315   Pulse 60 07/03/24 1315   Resp 10 07/03/24 1315   SpO2 100% 07/03/24 1315       [facility] AN Post Evaluation:   Patient Evaluated in ICU  Patient Participation: waiting for patient participation  Level of Consciousness: Post-procedure mental status: Intubated and sedated.  Pain Management: adequate  Airway Patency:patent  Dental exam unchanged from preop  Yes    Nausea/Vomiting: none  Cardiovascular Status: acceptable and hemodynamically stable  Respiratory Status: acceptable, ETT, ventilator and intubated  Postoperative Hydration acceptable  Comments: Transported to ICU intubated and sedated, vital signs stable throughout. On arrival to ICU, pt connected to ventilator. Continued on infusions of Insulin, Precedex, Amicar, Clevidipine and Amiodarone.   Infusions of Nitroglycerin, Dobutamine and Norepi in-line and available if needed for additional blood pressure control.   Given 500 cc Cell Saver.    Report given to ICU nurse at bedside.         Mallory Reed MD  7/3/2024 1:15 PM

## 2024-07-03 NOTE — ANESTHESIA PREPROCEDURE EVALUATION
Anesthesia PreOp Note    HPI:     Giuseppe Faustin is a 78 year old male who presents for preoperative consultation requested by: Brayan Castle MD    Date of Surgery: 7/2/2024 - 7/3/2024    Procedure(s):  CORONARY ARTERY BYPASS GRAFT; POSSIBLE ENDOSCOPIC VEIN HARVEST  Indication: Atherosclerosis of native coronary artery of native heart with other form of angina pectoris ([facility]) [I25.118]    Relevant Problems   No relevant active problems       NPO:  Last Liquid Consumption Date: 07/01/24  Last Liquid Consumption Time: 2000  Last Solid Consumption Date: 07/01/24  Last Solid Consumption Time: 2000  Last Liquid Consumption Date: 07/01/24          History Review:  Patient Active Problem List    Diagnosis Date Noted    Primary osteoarthritis of both knees 06/18/2024    Coronary artery disease involving native coronary artery of native heart without angina pectoris 04/22/2024    Moderate persistent asthma without complication ([facility]) 03/04/2024    Age-related cataract of right eye 11/14/2023    CKD (chronic kidney disease) stage 3, GFR 30-59 ml/min ([facility]) 09/07/2023    Gastroesophageal reflux disease without esophagitis 09/07/2023    Hyperlipidemia associated with type 2 diabetes mellitus ([facility]) 09/07/2023    Hypertension associated with type 2 diabetes mellitus ([facility]) 09/07/2023    Type 2 diabetes mellitus with stage 3a chronic kidney disease, without long-term current use of insulin ([facility]) 07/15/2019    Hyperthyroidism 06/22/2018    Vocal cord paralysis 11/10/2016       Past Medical History:    Adenomatous colon polyp    Arthritis    Benign prostatic hypertrophy with urinary retention    Chronic cough    Chronic sinusitis    Esophageal reflux    Essential and other specified forms of tremor    Essential hypertension    Gall stones    High blood pressure    Hoarseness, chronic    HTN (hypertension)    Osteoarthrosis, localized, primary, knee, left    Osteoarthrosis, localized, primary, knee, right    Osteoarthrosis,  unspecified whether generalized or localized, unspecified site    Pain in joints    Painful swallowing    Personal history of colonic polyps    PONV (postoperative nausea and vomiting)    Prediabetes    borderline    Problems with swallowing    trouble with solid    Second degree hemorrhoids    Shortness of breath    Sleep apnea    no cpap    Sleep disturbance    Sputum production    Visual impairment    reading glasses    Vocal cord paralysis    Wheezing       Past Surgical History:   Procedure Laterality Date    Cholecystectomy  07/2015    Colonoscopy  10/2012    3 mm adenoma. repeat 5 yrs, Dr Sarabia    Colonoscopy,biopsy  10/22/2012    Procedure: COLONOSCOPY, POSSIBLE BIOPSY, POSSIBLE POLYPECTOMY 89222;  Surgeon: Joe Sarabia MD;  Location: [facility]    Cystoscopy,insert ureteral stent  9-1-15    TURP    Laparoscopic cholecystectomy N/A 7/10/2015    Procedure: LAPAROSCOPIC CHOLECYSTECTOMY;  Surgeon: Tania Schmidt MD;  Location:  MAIN OR    Other surgical history  9/1/15    CYSTOSCOPY, TRANS URETHRAL RESECTION OF PROSTATE    Patient documented not to have experienced any of the following events  10/22/2012    Procedure: COLONOSCOPY, POSSIBLE BIOPSY, POSSIBLE POLYPECTOMY 37683;  Surgeon: Joe Sarabia MD;  Location: [facility]    Patient withough preoperative order for iv antibiotic surgical site infection prophylaxis.  10/22/2012    Procedure: COLONOSCOPY, POSSIBLE BIOPSY, POSSIBLE POLYPECTOMY 39982;  Surgeon: Joe Sarbaia MD;  Location: [facility]    Upper gi endoscopy,exam  09/2020       Medications Prior to Admission   Medication Sig Dispense Refill Last Dose    Propranolol HCl ER 80 MG Oral Capsule SR 24 Hr Take 1 capsule (80 mg total) by mouth daily. FOR BLOOD PRESSURE. 90 capsule 9 7/1/2024    telmisartan 80 MG Oral Tab Take 1 tablet (80 mg total) by mouth daily. FOR BLOOD PRESSURE. STOP ENALAPRIL DUE TO COUGH. 90 tablet 9 7/1/2024     atorvastatin 10 MG Oral Tab Take 1 tablet (10 mg total) by mouth nightly. FOR CHOLESTEROL. 90 tablet 9 7/1/2024    aspirin ( ASPIRIN) 81 MG Oral Tab EC Take 1 tablet (81 mg total) by mouth daily. 90 tablet 9 7/2/2024 at 0630    Empagliflozin (JARDIANCE) 25 MG Oral Tab Take 1 tablet by mouth daily. FOR DIABETES. 90 tablet 9 7/1/2024    metFORMIN  MG Oral Tablet 24 Hr Take 1 tablet (750 mg total) by mouth 2 (two) times daily with meals. FOR DIABETES. 180 tablet 9 7/1/2024    pantoprazole 40 MG Oral Tab EC Take 1 tablet (40 mg total) by mouth before breakfast. 90 tablet 3 7/1/2024    fluticasone-salmeterol (ADVAIR DISKUS) 250-50 MCG/ACT Inhalation Aerosol Powder, Breath Activated Inhale 1 puff into the lungs every 12 (twelve) hours. 3 each 9 7/1/2024    primidone 50 MG Oral Tab Take 1 tablet (50 mg total) by mouth 2 (two) times daily. FOR TREMORS. 180 tablet 9 7/1/2024    methIMAzole 5 MG Oral Tab Take 1 tablet (5 mg total) by mouth daily. 90 tablet 0     Melatonin 10 MG Oral Tab Take 10 mg by mouth at bedtime. 90 tablet 1 Unknown    Glucose Blood (ONETOUCH VERIO) In Vitro Strip 1 each 4 (four) times daily before meals and nightly. Test before meals and nightly 400 strip 1     Blood Glucose Monitoring Suppl (ONETOUCH VERIO) w/Device Does not apply Kit 1 each 4 (four) times daily before meals and nightly. 1 kit 0      Current Facility-Administered Medications Ordered in Epic   Medication Dose Route Frequency Provider Last Rate Last Admin    insulin regular human (Novolin R, Humulin R) 100 Units in sodium chloride 0.9% 100 mL standard infusion (100 mL)  1-40 Units/hr Intravenous Continuous Brayan Castle MD        aminocaproic acid 5 g BOLUS FROM BAG infusion  5 g Intravenous Once Brayan Castle MD        And    aminocaproic acid (Amicar) 10 g in sodium chloride 0.9% 250 mL infusion  1 g/hr Intravenous Once Brayan Castle MD        ceFAZolin (Ancef) 2g in 10mL IV syringe premix  2 g Intravenous Once Jacquelin Castillo  APRN        metoprolol tartrate (Lopressor) tab 25 mg  25 mg Oral Once Jacquelin Castillo, LEE ANN        melatonin cap/tab 10 mg  10 mg Oral Nightly Jak Soria MD   10 mg at 24    atorvastatin (Lipitor) tab 10 mg  10 mg Oral Nightly Jak Soria MD   10 mg at 24    primidone (Mysoline) tab 50 mg  50 mg Oral BID Jak Soria MD   50 mg at 24     No current [facility]-ordered outpatient medications on file.       No Known Allergies    Family History   Problem Relation Age of Onset    Heart Attack Father     Hypertension Father      Social History     Socioeconomic History    Marital status:    Tobacco Use    Smoking status: Former     Current packs/day: 0.00     Average packs/day: 0.5 packs/day for 20.0 years (10.0 ttl pk-yrs)     Types: Cigarettes     Start date: 1964     Quit date: 1984     Years since quittin.0     Passive exposure: Past    Smokeless tobacco: Never   Vaping Use    Vaping status: Never Used   Substance and Sexual Activity    Alcohol use: No    Drug use: No   Other Topics Concern    Caffeine Concern Yes     Comment: 1 tea daily    Exercise Yes     Comment: daily gym       Available pre-op labs reviewed.  Lab Results   Component Value Date    WBC 6.0 2024    RBC 4.13 2024    HGB 11.3 (L) 2024    HCT 35.0 (L) 2024    MCV 84.7 2024    MCH 27.4 2024    MCHC 32.3 2024    RDW 17.3 (H) 2024    .0 2024     Lab Results   Component Value Date     2024    K 4.4 2024     2024    CO2 26.0 2024    BUN 11 2024    CREATSERUM 1.15 2024     (H) 2024    PGLU 110 (H) 2024    CA 9.0 2024     Lab Results   Component Value Date    INR 1.03 2024       Vital Signs:  Body mass index is 28.82 kg/m².   height is 1.6 m (5' 3\") and weight is 73.8 kg (162 lb 11.2 oz). His temporal temperature is 97.8 °F (36.6 °C). His blood pressure is  141/71 and his pulse is 51. His respiration is 18 and oxygen saturation is 98%.   Vitals:    07/03/24 0300 07/03/24 0400 07/03/24 0500 07/03/24 0600   BP: 130/62 122/57 (!) 163/69 141/71   Pulse: 51 52 57 51   Resp: 23 17 17 18   Temp:   97.8 °F (36.6 °C)    TempSrc:   Temporal    SpO2: 97% 97% 99% 98%   Weight:   73.8 kg (162 lb 11.2 oz)    Height:            Anesthesia Evaluation     Patient summary reviewed and Nursing notes reviewed    History of anesthetic complications (PONV)   Airway   Mallampati: II  TM distance: >3 FB  Neck ROM: full  Dental          Pulmonary - normal exam   (+) asthma, shortness of breath, sleep apnea    ROS comment: + former smoker, states he quit 40 years ago. 10 pack year history.   Cardiovascular - normal exam  (+) hypertension, CAD    ROS comment: Echo 6/11/24:   LVEF 63%. Trace AI. Mild TR and TN    Neuro/Psych      Comments: + vocal cord paralysis  + essential tremors  + history of hearing loss    GI/Hepatic/Renal    (+) GERD, chronic renal disease (CKD stage 3)    Comments: + BPH    Endo/Other    (+) diabetes mellitus type 2, arthritis  Abdominal  - normal exam                 Anesthesia Plan:   ASA:  4  Plan:   General  Monitors and Lines:   A-line, Additonal IV, BIS, Brain oximetry, Central line, CVP, Elma-Sandi and SHILPA  Airway:  ETT and Video laryngoscope  Post-op Pain Management: IV analgesics  Plan Comments: Post-op intubation discussed.   Will use Nunez for intubation due to history of vocal cord paralysis.   Informed Consent Plan and Risks Discussed With:  Patient  Use of Blood Products Discussed With:  Patient  Discussed plan with:  Surgeon      I have informed Giuseppe Faustin and/or legal guardian or family member of the nature of the anesthetic plan, benefits, risks including possible dental damage if relevant, major complications, and any alternative forms of anesthetic management.   All of the patient's questions were answered to the best of my ability. The  patient desires the anesthetic management as planned.  Mallory Reed MD  7/3/2024 6:42 AM  Present on Admission:  **None**

## 2024-07-03 NOTE — ANESTHESIA PROCEDURE NOTES
Procedure Performed: SHILPA       Start Time:  7/3/2024 7:44 AM       End Time:   7/3/2024 1:30 PM    Preanesthesia Checklist:  Patient identified, IV assessed, risks and benefits discussed, monitors and equipment assessed, procedure being performed at surgeon's request and anesthesia consent obtained.    General Procedure Information  Diagnostic Indications for Echo:  hemodynamic monitoring  Physician Requesting Echo: Brayan Castle MD  Location performed:  OR  Intubated  Bite block placed  Heart visualized  Probe Insertion:  Easy  Probe Type:  Multiplane    Echocardiographic and Doppler Measurements    Ventricles    Left Ventricle:  Cavity size normal.  Thrombus not present.  Global Function normal.  Ejection Fraction 66%.      Ventricular Regional Function:  3- Basal Anterolateral:  normal  6- Basal Inferoseptal:  normal  7- Mid Anteroseptal:  normal  8- Mid Anterior:  normal  9- Mid Anterolateral:  normal  10- Mid Inferolateral:  normal  12- Mid Inferoseptal:  normal  14- Apical Lateral:  normal  16- Apical Septal:  normal      Valves    Aortic Valve:  Leaflets normal.  Leaflet motions normal.      Mitral Valve:  Regurgitation +1.  Leaflets normal.  Leaflet motions normal.              Atria      Left Atrium:  Left atrial appendage normal.                  Anesthesia Information  Performed Personally  Anesthesiologist:  Mallory Reed MD      Echocardiogram Comments:       Small amount of resistance with SHILPA placement, so Glidescope used to visualize area and SHILPA gently advanced.   Trivial AI prior to bypass.     LVEF 64% post-bypass, AI unchanged

## 2024-07-03 NOTE — CONSULTS
[facility]  part of [facility]    Report of Consultation    Giuseppeyesenia Faustin Patient Status:  Inpatient    1945 MRN [id number]   Location [facility] 2W/SW Attending Gavin Crandall MD   Hosp Day # 1 PCP Joshua Faustin MD     Date of Admission:  2024    Reason for Consultation:   Post CABG    History of Present Illness:   Patient is a 78-year-old male who presented for scheduled CABG today.  Recent history of dyspnea on exertion.  Had abnormal stress test followed by cardiac cath with significant coronary disease.  Underwent CABG x 3 earlier on 7/3/2024.  Currently intubated, sedated.  Unable to obtain 12 point review of system secondary patient's current clinical condition    Past Medical History  Past Medical History:    Adenomatous colon polyp    Arthritis    Benign prostatic hypertrophy with urinary retention    Chronic cough    Chronic sinusitis    Esophageal reflux    Essential and other specified forms of tremor    Essential hypertension    Gall stones    High blood pressure    Hoarseness, chronic    HTN (hypertension)    Osteoarthrosis, localized, primary, knee, left    Osteoarthrosis, localized, primary, knee, right    Osteoarthrosis, unspecified whether generalized or localized, unspecified site    Pain in joints    Painful swallowing    Personal history of colonic polyps    PONV (postoperative nausea and vomiting)    Prediabetes    borderline    Problems with swallowing    trouble with solid    Second degree hemorrhoids    Shortness of breath    Sleep apnea    no cpap    Sleep disturbance    Sputum production    Visual impairment    reading glasses    Vocal cord paralysis    Wheezing       Past Surgical History  Past Surgical History:   Procedure Laterality Date    Cholecystectomy  2015    Colonoscopy  10/2012    3 mm adenoma. repeat 5 yrs, Dr Sarabia    Colonoscopy,biopsy  10/22/2012    Procedure: COLONOSCOPY, POSSIBLE BIOPSY, POSSIBLE POLYPECTOMY 06706;   Surgeon: Joe Sarabia MD;  Location: [facility]    Cystoscopy,insert ureteral stent  9-1-15    TURP    Laparoscopic cholecystectomy N/A 7/10/2015    Procedure: LAPAROSCOPIC CHOLECYSTECTOMY;  Surgeon: Tania Schmidt MD;  Location:  MAIN OR    Other surgical history  9/1/15    CYSTOSCOPY, TRANS URETHRAL RESECTION OF PROSTATE    Patient documented not to have experienced any of the following events  10/22/2012    Procedure: COLONOSCOPY, POSSIBLE BIOPSY, POSSIBLE POLYPECTOMY 11086;  Surgeon: Joe Sarabia MD;  Location: [facility]    Patient withough preoperative order for iv antibiotic surgical site infection prophylaxis.  10/22/2012    Procedure: COLONOSCOPY, POSSIBLE BIOPSY, POSSIBLE POLYPECTOMY 18690;  Surgeon: Joe Sarabia MD;  Location: [facility]    Upper gi endoscopy,exam  2020       Family History  Family History   Problem Relation Age of Onset    Heart Attack Father     Hypertension Father        Social History  Social History     Socioeconomic History    Marital status:    Tobacco Use    Smoking status: Former     Current packs/day: 0.00     Average packs/day: 0.5 packs/day for 20.0 years (10.0 ttl pk-yrs)     Types: Cigarettes     Start date: 1964     Quit date: 1984     Years since quittin.0     Passive exposure: Past    Smokeless tobacco: Never   Vaping Use    Vaping status: Never Used   Substance and Sexual Activity    Alcohol use: No    Drug use: No   Other Topics Concern    Caffeine Concern Yes     Comment: 1 tea daily    Exercise Yes     Comment: daily gym           Current Medications:  Current Facility-Administered Medications   Medication Dose Route Frequency    sodium chloride 0.9% infusion   Intravenous Continuous    acetaminophen (Ofirmev) 10 mg/mL infusion premix 1,000 mg  1,000 mg Intravenous Q8H    melatonin tab 3 mg  3 mg Oral Nightly PRN    [START ON 2024] sennosides (Senokot) tab 8.6 mg  8.6 mg  Oral BID    [START ON 7/4/2024] docusate sodium (Colace) cap 100 mg  100 mg Oral BID    polyethylene glycol (PEG 3350) (Miralax) 17 g oral packet 17 g  17 g Oral Daily PRN    bisacodyl (Dulcolax) 10 MG rectal suppository 10 mg  10 mg Rectal Daily PRN    metoclopramide (Reglan) 5 mg/mL injection 10 mg  10 mg Intravenous Q6H    ondansetron (Zofran) 4 MG/2ML injection 4 mg  4 mg Intravenous Q6H PRN    famotidine (Pepcid) tab 20 mg  20 mg Oral Daily    Or    famotidine (Pepcid) 20 mg/2mL injection 20 mg  20 mg Intravenous Daily    dexmedeTOMIDine in sodium chloride 0.9% (Precedex) 400 mcg/100mL infusion premix  0.2-1.5 mcg/kg/hr (Dosing Weight) Intravenous Continuous    DOBUTamine in dextrose 5% (Dobutrex) 500 mg/250mL infusion premix  2.5-20 mcg/kg/min (Dosing Weight) Intravenous Continuous PRN    nitroGLYCERIN in dextrose 5% 50 mg/250mL infusion premix  5-300 mcg/min Intravenous Continuous PRN    norepinephrine (Levophed) 4 mg/250mL infusion premix  0.5-30 mcg/min Intravenous Continuous PRN    [START ON 7/4/2024] metoprolol tartrate (Lopressor) tab 25 mg  25 mg Oral 2x Daily(Beta Blocker)    clevidipine (Cleviprex) 25 MG/50ML IV infusion  1-6 mg/hr Intravenous Continuous    potassium chloride 20 mEq/100mL IVPB premix 20 mEq  20 mEq Intravenous PRN    Or    potassium chloride 40 mEq/100mL IVPB premix (central line) 40 mEq  40 mEq Intravenous PRN    magnesium sulfate in dextrose 5% 1 g/100mL infusion premix 1 g  1 g Intravenous PRN    magnesium sulfate in sterile water for injection 2 g/50mL IVPB premix 2 g  2 g Intravenous PRN    mupirocin (Bactroban) 2% nasal ointment 1 Application  1 Application Nasal BID    chlorhexidine gluconate (Peridex) 0.12 % oral solution 15 mL  15 mL Mouth/Throat BID    [START ON 7/4/2024] multivitamin (Tab-A-Ari/Beta Carotene) tab 1 tablet  1 tablet Oral Daily    [START ON 7/4/2024] ascorbic acid (Vitamin C) tab 500 mg  500 mg Oral TID    dextrose 5%-sodium chloride 0.45% infusion    mL/hr Intravenous Continuous    [START ON 7/4/2024] enoxaparin (Lovenox) 40 MG/0.4ML SUBQ injection 40 mg  40 mg Subcutaneous Daily    acetaminophen (Tylenol) tab 650 mg  650 mg Oral Q4H PRN    Or    HYDROcodone-acetaminophen (Norco) 5-325 MG per tab 1 tablet  1 tablet Oral Q4H PRN    Or    HYDROcodone-acetaminophen (Norco) 5-325 MG per tab 2 tablet  2 tablet Oral Q4H PRN    morphINE PF 4 MG/ML injection 2 mg  2 mg Intravenous Q2H PRN    Or    morphINE PF 4 MG/ML injection 4 mg  4 mg Intravenous Q2H PRN    sodium chloride 0.9 % IV bolus 250 mL  250 mL Intravenous PRN    albumin human (Albumin) 5% injection 12.5 g  12.5 g Intravenous Once PRN    [START ON 7/4/2024] clopidogrel (Plavix) tab 75 mg  75 mg Oral Daily    [START ON 7/4/2024] aspirin DR tab 81 mg  81 mg Oral Daily    ceFAZolin (Ancef) 2g in 10mL IV syringe premix  2 g Intravenous Q8H    glucose (Dex4) 15 GM/59ML oral liquid 15 g  15 g Oral Q15 Min PRN    Or    glucose (Glutose) 40% oral gel 15 g  15 g Oral Q15 Min PRN    Or    glucose-vitamin C (Dex-4) chewable tab 4 tablet  4 tablet Oral Q15 Min PRN    Or    dextrose 50% injection 50 mL  50 mL Intravenous Q15 Min PRN    Or    glucose (Dex4) 15 GM/59ML oral liquid 30 g  30 g Oral Q15 Min PRN    Or    glucose (Glutose) 40% oral gel 30 g  30 g Oral Q15 Min PRN    Or    glucose-vitamin C (Dex-4) chewable tab 8 tablet  8 tablet Oral Q15 Min PRN    insulin regular human (Novolin R, Humulin R) 100 Units in sodium chloride 0.9% 100 mL standard infusion (100 mL)  1-28 Units/hr Intravenous Continuous    sugammadex (Bridion) 200 MG/2ML injection 150 mg  2 mg/kg Intravenous Once    amiodarone (Cordarone) 450 mg in dextrose 5% 250 mL infusion  1 mg/min Intravenous Continuous    [Transfer Hold] metoprolol tartrate (Lopressor) tab 25 mg  25 mg Oral Once    [Transfer Hold] melatonin cap/tab 10 mg  10 mg Oral Nightly    [Transfer Hold] atorvastatin (Lipitor) tab 10 mg  10 mg Oral Nightly    [Transfer Hold] primidone  (Mysoline) tab 50 mg  50 mg Oral BID     Medications Prior to Admission   Medication Sig    Propranolol HCl ER 80 MG Oral Capsule SR 24 Hr Take 1 capsule (80 mg total) by mouth daily. FOR BLOOD PRESSURE.    telmisartan 80 MG Oral Tab Take 1 tablet (80 mg total) by mouth daily. FOR BLOOD PRESSURE. STOP ENALAPRIL DUE TO COUGH.    atorvastatin 10 MG Oral Tab Take 1 tablet (10 mg total) by mouth nightly. FOR CHOLESTEROL.    aspirin (KP ASPIRIN) 81 MG Oral Tab EC Take 1 tablet (81 mg total) by mouth daily.    Empagliflozin (JARDIANCE) 25 MG Oral Tab Take 1 tablet by mouth daily. FOR DIABETES.    metFORMIN  MG Oral Tablet 24 Hr Take 1 tablet (750 mg total) by mouth 2 (two) times daily with meals. FOR DIABETES.    pantoprazole 40 MG Oral Tab EC Take 1 tablet (40 mg total) by mouth before breakfast.    fluticasone-salmeterol (ADVAIR DISKUS) 250-50 MCG/ACT Inhalation Aerosol Powder, Breath Activated Inhale 1 puff into the lungs every 12 (twelve) hours.    primidone 50 MG Oral Tab Take 1 tablet (50 mg total) by mouth 2 (two) times daily. FOR TREMORS.    methIMAzole 5 MG Oral Tab Take 1 tablet (5 mg total) by mouth daily.    Melatonin 10 MG Oral Tab Take 10 mg by mouth at bedtime.    Glucose Blood (ONETOUCH VERIO) In Vitro Strip 1 each 4 (four) times daily before meals and nightly. Test before meals and nightly    Blood Glucose Monitoring Suppl (ONETOUCH VERIO) w/Device Does not apply Kit 1 each 4 (four) times daily before meals and nightly.       Allergies  No Known Allergies    Review of Systems:   Unable to obtain secondary to patient's current clinical condition      Physical Exam:   Blood pressure 123/76, pulse 72, temperature 96.6 °F (35.9 °C), temperature source Pulmonary Artery, resp. rate 10, height 5' 3\" (1.6 m), weight 162 lb 11.2 oz (73.8 kg), SpO2 100%.    Constitutional: no acute distress intubated, sedated  Eyes: PERRL  ENT: nares patent  Neck: neck supple, no JVD  Cardio: RRR, S1 S2  Respiratory: clear to  auscultation bilaterally, no wheezing, rales, rhonchi, crackles  GI: abdomen soft, non tender, active bowel souds, no organomegaly  Extremities: no clubbing, cyanosis, edema  Neurologic: no gross motor deficits  Skin: warm, dry    Results:   Laboratory Data  Lab Results   Component Value Date    WBC 6.0 07/02/2024    HGB 11.3 (L) 07/02/2024    HCT 35.0 (L) 07/02/2024    .0 07/02/2024    CREATSERUM 1.15 07/02/2024    BUN 11 07/02/2024     07/02/2024    K 4.4 07/02/2024     07/02/2024    CO2 26.0 07/02/2024     (H) 07/02/2024    CA 9.0 07/02/2024    ALB 4.2 07/02/2024    ALKPHO 53 07/02/2024    TP 6.9 07/02/2024    AST 24 07/02/2024    ALT 16 07/02/2024    PTT 28.1 07/02/2024    INR 1.03 07/02/2024    PTP 14.1 07/02/2024    T4F 1.1 06/11/2024    TSH 9.211 (H) 05/08/2024     08/11/2015    PSA 0.39 06/14/2023    ESRML 23 (H) 06/24/2024    CRP <0.29 02/24/2020    MG 2.3 07/02/2024    CK 76 06/24/2024         Imaging  US CAROTID DOPPLER BILAT - DIAG IMG (CPT=93880)    Result Date: 7/2/2024  CONCLUSION:   No evidence of bilateral hemodynamically significant stenosis.  Antegrade bilateral vertebral arteries.    Dictated by (CST): Abdirizak Encinsa MD on 7/02/2024 at 6:43 PM     Finalized by (CST): Abdirizak Encinas MD on 7/02/2024 at 6:44 PM           Assessment   1.  POD #0 status post CABG x 3  2.  Severe coronary disease  3.  Hypertension  4.  Hyperlipidemia  5.  Diabetes mellitus    Plan   -Patient presents for scheduled CABG x 3 on 7/3/2024.  -Postoperative chest x-ray awaiting results  -Ventilator weaning per protocol  -Aspirin, Plavix, beta-blocker, statin  -Monitor chest tube output  -Insulin gtt.  -Close hemodynamic monitoring  -DVT prophylaxis: SCDs, Lovenox  -Reviewed notes, labs and imaging    Omari Key DO  Pulmonary Critical Care Medicine  [facility]  7/3/2024  1:46 PM

## 2024-07-03 NOTE — ANESTHESIA PROCEDURE NOTES
Airway  Date/Time: 7/3/2024 7:13 AM  Urgency: Elective    Airway not difficult    General Information and Staff    Patient location during procedure: OR  Anesthesiologist: Mallory Reed MD  Performed: anesthesiologist   Performed by: Mallory Reed MD  Authorized by: Mallory Reed MD      Indications and Patient Condition  Indications for airway management: anesthesia  Spontaneous Ventilation: absent  Sedation level: deep  Preoxygenated: yes  Patient position: sniffing  MILS maintained throughout  Mask difficulty assessment: 1 - vent by mask  No planned trial extubation    Final Airway Details  Final airway type: endotracheal airway      Successful airway: ETT  Cuffed: yes   Successful intubation technique: Video laryngoscopy  Facilitating devices/methods: intubating stylet  Endotracheal tube insertion site: oral  Blade type: Nunez.  Blade size: #3  ETT size (mm): 7.0    Cormack-Lehane Classification: grade I - full view of glottis  Placement verified by: capnometry   Measured from: lips  ETT to lips (cm): 24  Number of attempts at approach: 1  Number of other approaches attempted: 0    Additional Comments  Video used for intubation due to pt's history of vocal cord paralysis.   Vocal cords noted to be fully open prior to ETT placement. Intubated easily first attempt. No dental or soft tissue damage. No signs of aspiration.

## 2024-07-03 NOTE — ANESTHESIA PROCEDURE NOTES
Arterial Line    Date/Time: 7/3/2024 7:05 AM    Performed by: Mallory Reed MD  Authorized by: Mallory Reed MD    General Information and Staff    Procedure Start:  7/3/2024 7:05 AM  Procedure End:  7/3/2024 7:07 AM  Anesthesiologist:  Mallory Reed MD  Performed By:  Anesthesiologist  Patient Location:  OR  Indication: continuous blood pressure monitoring and blood sampling needed    Site Identification: surface landmarks    Preanesthetic Checklist: 2 patient identifiers, IV checked, risks and benefits discussed, monitors and equipment checked, pre-op evaluation, timeout performed, anesthesia consent and sterile technique used    Procedure Details    Catheter Size:  20 G  Catheter Length:  1 and 3/4 inch  Catheter Type:  Arrow  Seldinger Technique?: Yes    Laterality:  Left  Site:  Radial artery  Site Prep: chlorhexidine    Line Secured:  Wrist Brace, tape and Tegaderm    Assessment    Events: patient tolerated procedure well with no complications      Medications  7/3/2024 7:05 AM      Additional Comments    Placed first attempt using sterile technique, no hematoma

## 2024-07-03 NOTE — DIETARY NOTE
Deferred Cardiac Education Note    Consult received for diet education per protocol. Education deferred until pt transferred out of CCU or until s/p intervention and when appropriate for teaching.       Yamini Dozier RD, LDN  Clinical Dietitian  P: [phone]

## 2024-07-03 NOTE — PROGRESS NOTES
[facility]  part of [facility]  Hospitalist Progress Note     Giuseppe Faustin Patient Status:  Inpatient    1945  78 year old CSN [id number]   Location [facility] OR POOL ROOMS/[facility] OR* Attending Gavin Crandall MD   Hosp Day # 1 PCP Joshua Faustin MD     Assessment & Plan:   ----------------------------------  CAD. S/p CABG x 3 on 7/3. Doing well.   -Vent support  -Pain control  -CV surgery and cardiology following  -Antiplatelets: aspirin and plavix  -Continue beta-blocker and statin  -Diuretics: per CVS  -Lines, damian, chest tubes, pacer wires per CV surg  -Activity as tolerated  -Precedex, insulin gtt    Bladder neck contracture.  Patient seen by urology in the OR.  Cystoscopy assisted Damian catheter placement.  History of TURP  -Keep Damian for duration of hospitalization  -Likely keep Damian after discharge as well  -Outpatient urology follow-up    Other problems  Hypertension  Dyslipidemia  GERD  Obstructive sleep apnea  Osteoarthritis  Type 2 diabetes  History of kidney stones  Essential tremor  CKD stage II    dvt prophylaxis: sc lovenox  code status: full code  dispo: PCU    I personally reviewed the available laboratories, imaging including cxr. I discussed/will discuss the case with . I ordered laboratories, studies including cbc. I adjusted medications including pain meds. Medical decision making high, risk is high.    Subjective:   ----------------------------------  Intubated and sedated postop      Objective:   Chief Complaint: No chief complaint on file.    ----------------------------------  Temp:  [97.7 °F (36.5 °C)-98 °F (36.7 °C)] 97.8 °F (36.6 °C)  Pulse:  [51-58] 51  Resp:  [13-23] 18  BP: (122-176)/(57-83) 141/71  SpO2:  [93 %-100 %] 98 %  Gen: Intubated and sedated  HEENT: NCAT, neck supple, no carotid bruit.  Right IJ line in place  CV: RRR, S1S2, and intact distal pulses. No gallop, rub, murmur.  Sternal incision clean dry and intact  Pulm: Effort and breath sounds  normal. No distress, wheezes, rales, rhonchi.  Chest tube in place.  Abd: Soft, NTND, BS normal, no mass, no HSM, no rebound/guarding.   Neuro: Withdraws to pain  MS: No joint effusions.  No peripheral edema.  Skin: Skin is warm and dry. No rashes, erythema, diaphoresis.   Psych: Unable to assess    Labs:  Lab Results   Component Value Date    HGB 11.3 (L) 07/02/2024    WBC 6.0 07/02/2024    .0 07/02/2024     07/02/2024    K 4.4 07/02/2024    CREATSERUM 1.15 07/02/2024    INR 1.03 07/02/2024    BILIRUBTOTAL 0.5 09/17/2012    AST 24 07/02/2024    ALT 16 07/02/2024          [Transfer Hold] metoprolol tartrate  25 mg Oral Once    [Transfer Hold] melatonin  10 mg Oral Nightly    [Transfer Hold] atorvastatin  10 mg Oral Nightly    [Transfer Hold] primidone  50 mg Oral BID       sodium chloride 0.9% 1,000 mL with heparin (Porcine) 10,000 Units mixture (OR nurse to mix)    sodium chloride 0.9% 30 mL with papaverine 300 mg mixture (OR nurse to mix)    gelatin adsorbable    vancomycin    lidocaine    ceFAZolin

## 2024-07-03 NOTE — RESPIRATORY THERAPY NOTE
Pt received to PCU post op intubated. ETT: 7 24@lips  Pt was placed on vent VC/SIMV 10 500 +5 PS-10 80%    Auscultated bs, suctioned, performed vent check. ABG/VBG were drawn.    RT to continue to monitor.       07/03/24 1305   Vent Information   Vent Mode SIMV/VC   Settings   FiO2 (%) 80 %   Resp Rate (Set) 10   Vt (Set, mL) 500 mL   Waveform Decelerating ramp   PEEP/CPAP (cm H2O) 5 cm H20   Press Support CWP 10   PEEP Low (cm H2O) 2 cm H2O   Peak Flow LPM 60   Humidification Heat and moisture exchanger   Readings   Total RR 10   Minute Ventilation (L/min) 4.5 L/min   Expiratory Tidal Volume 440 mL   PIP Observed (cm H2O) 24 cm H2O   MAP (cm H2O) 7

## 2024-07-03 NOTE — HISTORICAL OFFICE NOTE
Continuity of Care Document  6/18/2024  Giuseppe Faustin - 78 y.o. Male; born [birth year]Summary of episode note, generated on Jun. 24, 2024June 24, 2024   Additional Documents     Referral Summary - Referral Note (Last Received: 6/19/2024  5:10 PM)   Plain Text Document (Last Received: 6/19/2024  5:10 PM)   Continuity of Care Document (Last Received: 6/24/2024  9:34 AM) - Currently Viewing  CHIEF COMPLAINT    CHIEF COMPLAINT  Reason for Visit/Chief Complaint   Follow up   78-year-old male Being followed for severely elevated calcium score of 1200 distributed evenly amongst the coronary tree. Shortness of breath with activity which is slowly accelerating over the last 6 to 12 months. No chest pain with exertion. No palpitations or syncope. No lower extremity edema, orthopnea or PND.He does have diffuse muscle weakness, at times has trouble holding onto things as well as what he describes as bone pain in his shins.     PROBLEMS    PROBLEMS  Problem Effective Dates Date resolved Problem Status   Preop testing, [SNOMED-CT: 116004035] 6/19/2024 - Active   Screening, ischemic heart disease, [SNOMED-CT: 977754591] 5/2/2024 - Active     ENCOUNTER DIAGNOSIS    ENCOUNTER DIAGNOSIS  Problem Effective Dates Date resolved Problem Status   Screening, ischemic heart disease, [SNOMED-CT: 385906388] 5/2/2024 - Active     VITAL SIGNS    VITAL SIGNS  Date / Time: 6/19/2024   BP Systolic 130 mmHg   BP Diastolic 84 mmHg   Height 63 inches   Weight 165 lbs   Pulse Rate 70 bpm   BSA (Body Surface Area) 1.8 cc/m2   BMI (Body Mass Index) 29.2 cc/m2   Blood Pressure 130 / 84 mmHg     PHYSICAL EXAMINATION    PHYSICAL EXAMINATION  Header Details   Constitutional 98% O2 RA   Vitals Left Arm Sitting  / 84 mmHg, Pulse rate 70 bpm, Height in 5' 3\", BMI: 29.2, Weight in 165.35 lbs (or) 75 kgs, BSA : 1.85 cc/m²   General Appearance No Acute Distress, Well groomed   Cardiovascular      ALLERGIES, ADVERSE REACTIONS, ALERTS    No  data available    MEDICATIONS ADMINISTERED DURING VISIT    No data available    MEDICATIONS  Reconcile with Patient's ChartMEDICATIONS  Medication Start Date Route/Frequency Status   atorvastatin 10 mg tablet, [RxNorm: 266575] 6/19/2024 Take 1 tablet orally once a day. Active   Blood Glucose Monitoring Suppl (ONETOUCH VERIO) w/Device Does not apply Kit, [RxNorm: 0] 5/2/2024 1 each 4 (four) times daily before meals and nightly. Active   Empagliflozin (JARDIANCE) 25 MG Oral Tab, [RxNorm: 4128695] 5/2/2024 Take 1 tablet by mouth daily. FOR DIABETES. Active   fluticasone-salmeterol (ADVAIR DISKUS) 250-50 MCG/ACT Inhalation Aerosol Powder, Breath Activated, [RxNorm: 982058] 5/2/2024 Inhale 1 puff into the lungs every 12 (twelve) hours. Active   glipiZIDE 2.5 mg tablet, [RxNorm: 232273] 6/19/2024 Take 1 tablet orally once a day. Active   Glucose Blood (ONETOUCH VERIO) In Vitro Strip, [RxNorm: 0] 5/2/2024 1 each 4 (four) times daily before meals and nightly. Test before meals and nightly Active   metFORMIN  MG Oral Tablet 24 Hr, [RxNorm: 022820] 5/2/2024 Take 1 tablet (750 mg total) by mouth 2 (two) times daily with meals. FOR DIABETES. Active   methIMAzole 5 MG Oral Tab, [RxNorm: 223136] 5/2/2024 Take 1 tablet (5 mg total) by mouth daily. Active   pantoprazole 40 MG Oral Tab EC, [RxNorm: 053711] 5/2/2024 Take 1 tablet (40 mg total) by mouth every morning before breakfast. FOR ACID REFLUX. Active   primidone 50 MG Oral Tab, [RxNorm: 171492] 5/2/2024 Take 1 tablet (50 mg total) by mouth 2 (two) times daily. FOR TREMORS. Active   Propranolol HCl ER 80 MG Oral Capsule SR 24 Hr, [RxNorm: 439077] 5/2/2024 Take 1 capsule (80 mg total) by mouth daily. FOR BLOOD PRESSURE. Active   telmisartan 80 MG Oral Tab, [RxNorm: 036343] 5/2/2024 Take 1 tablet (80 mg total) by mouth daily. FOR BLOOD PRESSURE. STOP ENALAPRIL DUE TO COUGH. Active     ASSESSMENT    Shortness of breath: Concerning given his age, calcium score of 1200,  cardiac risk factors. Unable to walk due to severity of symptoms and orthopedic issues. PET stress with transient ischemic dilation, ongoing shortness of breath with minimal activity therefore needs invasive coronary angiogram.  -Left heart cath, radial approach, July 2ndWeakness/bone pain: PSA has been normal, differential still includes myositis possibly from statins versus orthopedic issues. If continues to have pain then may need x-rays or bone scan, Will discuss with PCP.  -Stop atorvastatin  -Labs: CK, ESR, CRPHypertension: Controlled on current regimen, no changesDiabetes per primaryFollow-up: Please give information regarding out portal access.  Clinic: 1 to 2 weeks after angiogram  Testing/intervention: Angiogram, labs     FAMILY HISTORY    No data available    GENERAL STATUS    No data available    PAST MEDICAL HISTORY    PAST MEDICAL HISTORY  Problem Date diagonsed Date resolved Status   Preop testing, [SNOMED-CT: 542317486] 6/19/2024 - Active   Screening, ischemic heart disease, [SNOMED-CT: 910879744] 5/2/2024 - Active     HISTORY OF PRESENT ILLNESS    78-year-old male Being followed for severely elevated calcium score of 1200 distributed evenly amongst the coronary tree. Shortness of breath with activity which is slowly accelerating over the last 6 to 12 months. No chest pain with exertion. No palpitations or syncope. No lower extremity edema, orthopnea or PND.He does have diffuse muscle weakness, at times has trouble holding onto things as well as what he describes as bone pain in his shins.     IMMUNIZATIONS    No data available    PLAN OF CARE    PLAN OF CARE  Planned Care Date   Sed Rate 1/1/1900   CK (Creatine kinase) 1/1/1900   BMP (Basic Metabolic Panel) 1/1/1900   CBC (Complete Blood Count) 1/1/1900   Chest x-ray, PA and lateral 1/1/1900   EKG (electrocardiogram) 1/1/1900   Wide range C-reactive protein (wr-CRP) measurement 1/1/1900   Referral Visit - Joshua Faustin  ([email or web address]) : 1/1/1900   Follow up visit - David Hyman MD 1/1/1900     PROCEDURES    No data available    RESULTS    RESULTS  Name Result Date Location - Ordered By   Sed Rate [LOINC: 93206-3] Pending Schedule next available     CK (Creatine kinase) [LOINC: QZK5610] Pending Schedule next available     BMP (Basic Metabolic Panel) [LOINC: 43658-7] Pending Schedule next available     CBC (Complete Blood Count) [LOINC: 28034-7] Pending Schedule next available     Wide range C-reactive protein (wr-CRP) measurement [LOINC: BWL1736] Pending Schedule next available     Nuclear PET 1.Stress EKG is normal. 2.Pt denied chest pain.3.No significant ectopy.1.This is a normal perfusion study, no perfusion defects noted. significant multivessel calcium however normal perfusion. Correlate clinically given TID.2.This scan is suggestive of low risk for future cardiovascular events. 3.The left ventricular cavity is noted to be normal on the stress studies. The stress left ventricular ejection fraction was calculated to be 72% and left ventricular global function is normal. The rest left ventricular cavity is noted to be normal. The rest left ventricular ejection fraction was calculated to be 68% and rest left ventricular global function is normal. 4.When compared to the resting ejection fraction (68%), the stress ejection fraction (72%) has increased. 5.Transient ischemic dilatation is present and has been described as a marker for high risk coronary artery disease. It has also been described in microvascular disease, hypertensive heart disease as well as cardiac deconditioning. 6.The study quality is excellent. 5/8/2024 1:30:00 PM David Hyman MD   Trans Thoracic Echocardiogram 1.The study quality is average. 2.The left ventricle is normal in size; mild focal septal hypertrophy is noted; there are no regional wall motion abnormalities. Global left ventricular systolic function is normal. The left ventricular ejection  fraction is 63%. Left ventricular diastolic function is normal. 3.The right ventricle is normal in size. Right ventricular systolic function is normal.4.Focal thickening of the aortic valve cusps is noted. Trace aortic regurgitation.5.Mild tricuspid regurgitation.6.Mild pulmonic regurgitation. 6/11/2024 2:30:00 PM David Hyman MD     REVIEW OF SYSTEMS    REVIEW OF SYSTEMS  Header Details   Cardiovascular No history of Chest pain, [place], Palpitations, Syncope, PND, Orthopnea, Edema, Claudication   Respiratory No history of SOB, Wheezing, Sputum   Hem/Lymphatic No history of Easy bruising, Blood clots, Hx of blood transfusion, Anemia, Bleeding problems     SOCIAL HISTORY    SOCIAL HISTORY  Social History Element Description Effective Dates   Smoking status Never smoked -     FUNCTIONAL STATUS    No data available    MEDICAL EQUIPMENT    No data available    Goals Sections    No data available    REASON FOR REFERRAL           Health Concerns Section    No data available    COGNITIVE/MENTAL STATUS    No data available    Patient Demographics    Patient Demographics  Patient Address Patient Name Communication   [address] [place] [address] Giuseppe Faustin [phone] (Mobile)     Patient Demographics  Language Race / Ethnicity Marital Status   Unknown - Spoken  / Not  or       Document Information    Primary Care Provider Other Service Providers Document Coverage Dates   David Hyman  NPI: [id number]  [phone] (Work)  [address] [facility], [facility] [address] [place] [address]  [place] [address]  Interpreting Physicians  [facility]  [phone] (Work)  [address] [place] [address] Snow Subramanian  NPI: [id number]  [phone] (Work)  [address] [facility], [address] [place] [address]  [place] [address]  Nurses     Emily Lerma  NPI: [id number]  [phone] (Work)  [address] [place] [address], [facility] [address] [place], [place]  [address]  [place] [address]  Nurses     Crys DUSTINJOANNE Courtney  NPI: [id number]  [phone] (Work)  [address] [facility], [address] [place] [address]  [place] [address]  Nurses Jun. 19, 2024June 19, 2024      Organization   [facility]  [phone] (Work)  [address] [facility], [address] [place] [address]  [place] [address]     Encounter Providers Encounter Date    Jun. 19, 2024June 19, 2024     Legal Authenticator    David Hyman  NPI: [id number]  [phone] (Work)  [address] [facility], [address] [place] [address]  [place] [address]

## 2024-07-03 NOTE — H&P
[facility]    PATIENT'S NAME: KARL UNDERWOOD   ATTENDING PHYSICIAN: David Hyman MD   PATIENT ACCOUNT#:   [id number]    LOCATION:  [address] [place] [address]  MEDICAL RECORD #:   [id number]       YOB: 1945  ADMISSION DATE:       07/02/2024    HISTORY AND PHYSICAL EXAMINATION    CHIEF COMPLAINT:  Multivessel coronary artery disease, for coronary artery bypass graft surgery tomorrow morning.     HISTORY OF PRESENT ILLNESS:  Patient is a 78-year-old [place]  male with a recent history of dyspnea on exertion and had an outpatient CT scan calcium score which was elevated at 1200.  He had a cardiac angiogram which showed distal left main to LAD and circumflex 95% and RCA 60% to 70%.  Cardiovascular surgery consult was obtained, and patient consented for coronary artery bypass graft surgery tomorrow morning.     PAST MEDICAL HISTORY:  Hypertension, hyperlipidemia, gastroesophageal reflux disease, obstructive sleep apnea, generalized osteoarthritis, diabetes mellitus type 2, kidney stones, essential tremors, chronic kidney disease stage 2.    PAST SURGICAL HISTORY:  Cholecystectomy, cystoscopy and ureteral stent.      MEDICATIONS:  Please see medication reconciliation list.     ALLERGIES:  No known drug allergies.     FAMILY HISTORY:  Positive for coronary artery disease.    SOCIAL HISTORY:  Ex-tobacco user.  No current tobacco, alcohol, or drug use.  Lives with his family.  At baseline, independent in his basic activities of daily living.     REVIEW OF SYSTEMS:  Currently resting in bed.  No chest pain.  No shortness of breath.  Other 12-point review of systems is negative.       PHYSICAL EXAMINATION:    GENERAL:  Alert and oriented to time, place and person.  No acute distress.   VITAL SIGNS:  Temperature 97.7, pulse 58, respiratory rate 20, blood pressure 165/71, pulse ox 100% on room air.  HEENT:  Atraumatic.  Oropharynx clear.  Moist mucous membranes.  Ears and nose normal.  Eyes:  Anicteric  sclerae.    NECK:  Supple.  No lymphadenopathy.  Trachea midline.  Full range of motion.   LUNGS:  Clear to auscultation bilaterally.  Normal respiratory effort.    HEART:  Regular rate and rhythm.  S1 and S2 auscultated.  No murmur.    ABDOMEN:  Soft, nondistended.  No tenderness.  Positive bowel sounds.   EXTREMITIES:  No peripheral edema, clubbing or cyanosis.   NEUROLOGIC:  Motor and sensory intact.      ASSESSMENT:    1.   Multivessel coronary artery disease as outlined above.   2.   Essential hypertension.   3.   Hyperlipidemia.  4.   Diabetes mellitus type 2.    PLAN:  Patient will be admitted to progressive care unit.  Monitor Accu-Cheks.  Control blood pressure.  N.p.o.  after midnight.  Coronary artery bypass graft surgery per Cardiothoracic surgery tomorrow morning.  Further recommendations to follow.      Dictated By Jak Soria MD  d: 07/02/2024 19:14:48  t: 07/02/2024 19:22:24  Job [id number]  FB/

## 2024-07-03 NOTE — PLAN OF CARE
No c/o overnight. Voiding without difficulty. Beta-sept bath & prep as per protocol. Off unit to OR @ 0650. Family in room. Metoprolol held this AM R/T decreased HR.     Problem: PAIN - ADULT  Goal: Verbalizes/displays adequate comfort level or patient's stated pain goal  Description: INTERVENTIONS:  - Encourage pt to monitor pain and request assistance  - Assess pain using appropriate pain scale  - Administer analgesics based on type and severity of pain and evaluate response  - Implement non-pharmacological measures as appropriate and evaluate response  - Consider cultural and social influences on pain and pain management  - Manage/alleviate anxiety  - Utilize distraction and/or relaxation techniques  - Monitor for opioid side effects  - Notify MD/LIP if interventions unsuccessful or patient reports new pain  - Anticipate increased pain with activity and pre-medicate as appropriate  Outcome: Progressing     Problem: CARDIOVASCULAR - ADULT  Goal: Maintains optimal cardiac output and hemodynamic stability  Description: INTERVENTIONS:  - Monitor vital signs, rhythm, and trends  - Monitor for bleeding, hypotension and signs of decreased cardiac output  - Evaluate effectiveness of vasoactive medications to optimize hemodynamic stability  - Monitor arterial and/or venous puncture sites for bleeding and/or hematoma  - Assess quality of pulses, skin color and temperature  - Assess for signs of decreased coronary artery perfusion - ex. Angina  - Evaluate fluid balance, assess for edema, trend weights  Outcome: Progressing  Goal: Absence of cardiac arrhythmias or at baseline  Description: INTERVENTIONS:  - Continuous cardiac monitoring, monitor vital signs, obtain 12 lead EKG if indicated  - Evaluate effectiveness of antiarrhythmic and heart rate control medications as ordered  - Initiate emergency measures for life threatening arrhythmias  - Monitor electrolytes and administer replacement therapy as ordered  Outcome:  Progressing     Problem: RESPIRATORY - ADULT  Goal: Achieves optimal ventilation and oxygenation  Description: INTERVENTIONS:  - Assess for changes in respiratory status  - Assess for changes in mentation and behavior  - Position to facilitate oxygenation and minimize respiratory effort  - Oxygen supplementation based on oxygen saturation or ABGs  - Provide Smoking Cessation handout, if applicable  - Encourage broncho-pulmonary hygiene including cough, deep breathe, Incentive Spirometry  - Assess the need for suctioning and perform as needed  - Assess and instruct to report SOB or any respiratory difficulty  - Respiratory Therapy support as indicated  - Manage/alleviate anxiety  - Monitor for signs/symptoms of CO2 retention  Outcome: Progressing

## 2024-07-03 NOTE — CM/SW NOTE
07/03/24 1600   CM/SW Referral Data   Referral Source Physician   Reason for Referral Discharge planning   Informant Son   Medical Hx   Does patient have an established PCP? Yes  (Joshua Faustin)   Patient Info   Patient's Current Mental Status at Time of Assessment Confused or unable to complete assessment   Patient Communication Issues Language barrier  (Jem)   Patient's Home Environment House   Patient lives with Spouse/Significant other   Patient Status Prior to Admission   Independent with ADLs and Mobility Yes   Discharge Needs   Anticipated D/C needs To be determined     Pt discussed during nursing rounds. Sx CABG x 3, intubated on sedation. Home w/spouse, independent w/o device at baseline though pt's son says he should be using a cane. Pt's son will be contact for dc planning. PT/OT evals will be needed for dc recommendation once appropriate.     Plan: TBD    / to remain available for support and/or discharge planning.     RADHA Pelletier    [phone]

## 2024-07-03 NOTE — OPERATIVE REPORT
[facility]  part of [facility]    Operative Note     Giuseppe Faustin Location: OR   CSN [id number] MRN [id number]   Admission Date 7/2/2024 Operation Date 7/3/2024   Attending Physician Gavin Crandall MD Operating Physician Kevin Blanchard MD      Preoperative Diagnosis: Inability to place Damian catheter , atherosclerosis of native coronary artery of native heart with other form of angina pectoris (HCC) [I25.118]     Postoperative Diagnosis: Bladder neck contracture ,atherosclerosis of native coronary artery of native heart with other form of angina pectoris (HCC) [I25.118]     Procedure Performed:   Cystoscopy, dilation of bladder neck contracture, Damian catheter placement     Primary Surgeon: Kevin Blanchard MD      Assistant: none     Surgical Findings: Bladder neck contracture noted.  Evidence of prior TURP of the prostate.     Anesthesia: Cardiac     Complications: None     Implants: * No implants in log *     Specimen: None      Drains: 12 f damian     Condition: stable     Estimated Blood Loss: No data recorded     Summary of Case: I was called into the operating room.  This patient was undergoing CABG.  Damian catheter could not be placed.  By the OR staff report, a 14 and a 16 [place]ones[place] straight and coudé tip catheters could not be successfully placed.  Resistance was encountered in the proximal urethra.    Flexible cystoscope was brought into the operating room.  I performed flexible cystoscopy after the penis was prepped and draped in the usual standard fashion.  Evidence of a bladder neck contracture could be seen.  Brief review of the patient's operative history suggests he had a TURP at an outside hospital in 2015.  A sensor wire was advanced through the stricture and the cystoscope was removed leaving the wire in place.  The Bard urologist tray was then used to dilate the stricture starting at 12 Gabonese up to 18 Gabonese in size.  I attempted to place a 16 [place] tip catheter  over the previously placed wire but continued to meet resistance in the proximal urethra.  As such, a 12 [place] catheter was converted to a coudé by cutting off its tip and it was easily advanced over the wire into the bladder.  The balloon was inflated with 10 cc of sterile water.  The catheter was secured to a drainage bag.  The case was then turned over to the cardiac surgery team to complete their portion of the operation.  The patient will be followed postoperatively.  He will likely require a outpatient follow-up and will need to keep the catheter in place for the duration of his hospitalization.  I communicated these plan with his cardiac surgeon.     Kevin Blanchard MD  7/3/2024  10:25 AM

## 2024-07-03 NOTE — OPERATIVE REPORT
Operative Note    Patient Name: Giuseppe Faustin    Preoperative Diagnosis: Atherosclerosis of native coronary artery of native heart with other form of angina pectoris (HCC) [I25.118]    Anginal equivalent  Multivessel coronary artery disease with critical left main stenosis  Paroxysmal atrial fibrillation    Postoperative Diagnosis: Atherosclerosis of native coronary artery of native heart with other form of angina pectoris (HCC) [I25.118]    Anginal equivalent  Multivessel coronary artery disease with critical left main stenosis  Paroxysmal atrial fibrillation    Surgeons and Role:  Panel 1:     * Brayan Castle MD - Primary    Assistant: PA: Harry Cespedes PA    Procedures:   Median sternotomy  Urgent coronary artery bypass grafting x 3  Left internal mammary artery to left anterior descending artery  Reverse saphenous vein graft to diagonal artery  Reverse saphenous vein graft to posterior descending artery  Endoscopic vein harvesting, greater saphenous vein, left lower extremity  Placement of 40 mm atrial clip  Placement of Delong catheter by Dr. Blanchard    Indication:  78-year-old male with evidence of dyspnea exertion and increased fatigue over the last 6 months, progressively worsening.  Coronary CT demonstrated elevated calcium score and stress test is abnormal leading to angiography yesterday demonstrating multivessel coronary artery disease with left main 95% critical stenosis.  Patient was admitted for expedited workup and surgical intervention.  I long discussion with the patient regarding alternatives, benefits and risks of surgery, he consents for surgery today.    Surgical Findings:   Left anterior descending artery target 2 mm vessel, good vessel  Diagonal artery 1.5 mm vessel, good target  Posterior descending artery is a 2 mm vessel, good target  The lateral obtuse marginal artery as well as the high obtuse marginal arteries are extremely small arteries, 0.5 to 0.75 mm vessels very  thin-walled and sclerotic, not safe targets for bypass.  Vein quality is very good, 3 to 4 mm vessel  Mammary artery quality is good, 2 mm vessel with excellent pulsatile flow  Completion echocardiogram ejection fraction around 50 to 60% with no wall motion abnormality.  Bladder neck contracture requiring urology placement of Delong, see their operative note for detail    Description of procedure:  Patient was taken to the operating room.  Anesthesia was induced.  Central line, swan, radial arterial line, and transesophageal echo were placed.  We difficulty placing the Delong catheter therefore urology was consulted intraoperatively and they placed Delong catheter, see other operative note for details.  Patient was then positioned, prepped, draped in the usual sterile fashion.  Arms were tucked at sides, pressure points were padded and wrist was in a neutral, thumbs up position.  Timeout then performed verifying patient, procedure, beta-blocker, antibiotic.  Skin incision made and midline sternotomy performed.  The left internal mammary artery was harvested in a pedicled fashion from the underside of the left endothoracic fascia.  Simultaneously, the greater saphenous vein was harvested in an endoscopic fashion from the left lower extremity.  Systemic heparin was given.  The distal branches of the mammary artery beyond its bifurcation were clipped and the mammary artery transected.  There was excellent pulsatile flow.  The distal ends were clipped, the pedicle was wrapped in papaverine gauze and placed in the left chest.  Sternal retractor was then placed.  Pericardium entered and pericardial well created.  The greater saphenous vein was then prepared for bypass by tying the branches and reinforcing with clips.  Patient was cannulated in the distal ascending aorta and right atrial appendage with a three-stage venous cannula.  After confirming ACT over 480, cardiopulmonary bypass commenced.  During the cannulation  process, patient entered atrial fibrillation rhythm and this persisted despite a cardioversion.  Distal anastomotic sites were inspected for feasibility.  A antegrade needle was placed in the proximal ascending aorta and a retrograde catheter into the coronary sinus via the right atrial free wall..  Cross-clamp was then applied, patient was arrested with 1.6 L of antegrade cold blood cardioplegia solution and another 500 mm retrograde.  There was excellent arrest after 400 mL.  Antegrade and retrograde cardioplegia was given at regular 15-20 minute intervals thruout the case.  Cold water and slush was applied to the heart and patient was cooled to 32 °C.  A notch was made in the pericardium for the mammary pedicle later in the operative case.  Phrenic nerve was identified and spared.  Heart rotated expose the posterior descending artery.  Arteriotomy in 2 mm vessel, 1 mm probe passed freely proximal and distal.  End-to-side anastomosis then made with reverse saphenous vein with 7-0 Prolene.  Prior to tying down, 1 mm probe passed freely proximal and distal in the target vessel.  After tying down, there is excellent antegrade retrograde flow.  Vein graft is then sized to the ascending aorta, transected and spatulated and anastomosed to the adali in end-to-side fashion with a 6-0 Prolene.  This process then repeated in the same exact fashion for the vein graft bypass to the diagonal artery.  The lateral obtuse marginal arteries were visualized and findings as above, no safe target for bypass given their size and appearance.  Left atrial appendage then sized for a 40 mm atrial clip was applied to the base left atrial appendage.  Mammary pedicle then brought out of the left chest.  Heart rotated expose left anterior descending artery.  Arteriotomy was made in the midportion of the 2 mm vessel left anterior descending artery.  1 mm probe passed freely proximal and distal.  The distal end of the mammary pedicle was then  partially skeletonized, transected and spatulated and anastomosed to the left anterior sending artery with an 8-0 Prolene.  Prior to tying down, 1 mm probe passed freely proximal and distal to target artery.  After tying down, the bulldog was removed and the anastomosis hemostatic.  Doppler examination confirms antegrade and retrograde flow in the left anterior descending artery.  The pedicle secured the heart with 6-0 Prolene x 2.  Hotshot dose given antegrade. Heart and root were de-aired.  Patient at this point was fully rewarmed and the cross-clamp was removed.  Patient regained a spontaneous normal sinus rhythm without cardioversion.  Right ventricular pacing wires was placed and externalized.  Anastomotic sites suspected for hemostasis which was adequate. After fully rewarming, patient was weaned off coronary bypass without event on low dose dobutamine infusion.  Patient was decannulated.  Protamine was given.  Cannulation sites were reinforced.  Hemostasis was ensured at the cannulation sites and proximal anastomoses.  There was no blood welling from behind the heart.  Mammary bed inspected and found to be hemostatic.  32 Prydeinig right angle chest tube was placed in the left pleural space, 32 Prydeinig straight chest tube in the pericardial space.  Pericardium reapproximated over the mammary pedicle and over the aorta.  The sternum was then closed with interrupted single stainless steel wires and reinforced with sternal plates and sternal lock blue plating system.  The wounds were all then copiously irrigated, closed multiple layers and sterile dressing applied.  Patient then taken to the ICU in critical condition on low-dose Cleviprex and nitroglycerin infusions and in normal sinus rhythm.  Sponge, needle and instrument counts were all correct at end of the case.    Anesthesia: Cardiac    Complications: None    Implants: Sternal plating system, sternal lock blue    Specimen: None    Drains: 32 Prydeinig chest tube  x 2    Condition: Critical to CVICU    Estimated Blood Loss: 200 mL    Brayan Castle MD

## 2024-07-03 NOTE — PROGRESS NOTES
[facility] - CARDIOLOGY PROGRESS NOTE  Giuseppe Faustin Patient Status:  Inpatient    1945 MRN [id number]   Location [facility] 2W/SW Attending Gavin Crandall MD   Hosp Day # 1 PCP Joshua Faustin MD     Assessment:    1.  CAD.  Critical left main and severe RCA disease.  Preserved LV systolic function.    Status post CABG x 3 with LIMA to LAD and SVGs to diagonal and right PDA  7/3/2024.  Had clipping of left atrial appendage.   POD #0.    -Seen in early postop timeframe.  Stable blood pressure, rhythm, PA pressure, cardiac output.  -Minimal drips.  -Chest x-ray clear.  PA catheter coiled the main pulmonary artery.  -EKG sinus rhythm, no acute changes.  -Transient atrial fibrillation.  Back in sinus rhythm.  Now on amnio protocol.      Plan:    The patient is stable in the early postop timeframe.    Routine postop care.      Subjective:  No chest pain or shortness of breath.    Objective:  /76   Pulse 72   Temp 96.6 °F (35.9 °C) (Pulmonary Artery)   Resp 10   Ht 160 cm (5' 3\")   Wt 162 lb 11.2 oz (73.8 kg)   SpO2 100%   BMI 28.82 kg/m²     Temp (24hrs), Av.4 °F (36.3 °C), Min:96.6 °F (35.9 °C), Max:98 °F (36.7 °C)      Intake/Output:    Intake/Output Summary (Last 24 hours) at 7/3/2024 1357  Last data filed at 7/3/2024 1330  Gross per 24 hour   Intake 2885 ml   Output 4602 ml   Net -1717 ml       Wt Readings from Last 2 Encounters:   24 162 lb 11.2 oz (73.8 kg)   24 165 lb (74.8 kg)       Physical Exam:    General: Intubated, sedated.  HEENT: No focal deficits.  Pupils equal, reactive.  Neck: PA catheter in right neck.  Cardiac: Distant S1, S2  no murmur.  Regular rhythm.  Lungs: Clear anterolaterally without wheezes, rales, rhonchi.    Abdomen: Soft  Extremities: Without clubbing, cyanosis or edema.  Peripheral pulses are 2+.  Skin: Warm and dry.     Labs:  Lab Results    Component Value Date    WBC 6.0 07/02/2024    HGB 11.3 07/02/2024    HCT 35.0 07/02/2024    .0 07/02/2024     Lab Results   Component Value Date    INR 1.03 07/02/2024     Lab Results   Component Value Date     07/02/2024    K 4.4 07/02/2024     07/02/2024    CO2 26.0 07/02/2024    BUN 11 07/02/2024    CREATSERUM 1.15 07/02/2024     07/02/2024    MG 2.3 07/02/2024    CA 9.0 07/02/2024    ALT 16 07/02/2024    AST 24 07/02/2024    ALB 4.2 07/02/2024        No results found for: \"TROP\", \"CKMB\"     Medications:     acetaminophen  1,000 mg Intravenous Q8H    [START ON 7/4/2024] sennosides  8.6 mg Oral BID    [START ON 7/4/2024] docusate sodium  100 mg Oral BID    metoclopramide  10 mg Intravenous Q6H    famotidine  20 mg Oral Daily    Or    famotidine  20 mg Intravenous Daily    [START ON 7/4/2024] metoprolol tartrate  25 mg Oral 2x Daily(Beta Blocker)    mupirocin  1 Application Nasal BID    chlorhexidine gluconate  15 mL Mouth/Throat BID    [START ON 7/4/2024] multivitamin  1 tablet Oral Daily    [START ON 7/4/2024] ascorbic acid  500 mg Oral TID    [START ON 7/4/2024] enoxaparin  40 mg Subcutaneous Daily    [START ON 7/4/2024] clopidogrel  75 mg Oral Daily    [START ON 7/4/2024] aspirin  81 mg Oral Daily    ceFAZolin  2 g Intravenous Q8H    [Transfer Hold] metoprolol tartrate  25 mg Oral Once    [Transfer Hold] melatonin  10 mg Oral Nightly    [Transfer Hold] atorvastatin  10 mg Oral Nightly    [Transfer Hold] primidone  50 mg Oral BID      sodium chloride      dexmedetomidine 0.7 mcg/kg/hr (07/03/24 1312)    DOBUTamine 2 mcg/kg/min (07/03/24 1308)    nitroGLYCERIN in dextrose 5%      norepinephrine      clevidipine 1 mg/hr (07/03/24 1311)    dextrose 5%-sodium chloride 0.45% 40 mL/hr (07/03/24 1312)    insulin regular 1.5 Units/hr (07/03/24 1316)    amiodarone 1 mg/min (07/03/24 1210)       Les Bettencourt MD  7/3/2024  1:57 PM

## 2024-07-03 NOTE — OR NURSING
Called and Spoke with patient family Oskar at # [phone] at 0830 to give update on start of procedure

## 2024-07-04 ENCOUNTER — APPOINTMENT (OUTPATIENT)
Dept: GENERAL RADIOLOGY | Facility: HOSPITAL | Age: 79
End: 2024-07-04
Attending: CLINICAL NURSE SPECIALIST
Payer: MEDICARE

## 2024-07-04 LAB
ANION GAP SERPL CALC-SCNC: 9 MMOL/L (ref 0–18)
BASOPHILS # BLD AUTO: 0.01 X10(3) UL (ref 0–0.2)
BASOPHILS NFR BLD AUTO: 0.1 %
BUN BLD-MCNC: 13 MG/DL (ref 9–23)
BUN/CREAT SERPL: 11.5 (ref 10–20)
CALCIUM BLD-MCNC: 7.7 MG/DL (ref 8.7–10.4)
CHLORIDE SERPL-SCNC: 112 MMOL/L (ref 98–112)
CO2 SERPL-SCNC: 20 MMOL/L (ref 21–32)
CREAT BLD-MCNC: 1.13 MG/DL
DEPRECATED RDW RBC AUTO: 51.8 FL (ref 35.1–46.3)
EGFRCR SERPLBLD CKD-EPI 2021: 66 ML/MIN/1.73M2 (ref 60–?)
EOSINOPHIL # BLD AUTO: 0 X10(3) UL (ref 0–0.7)
EOSINOPHIL NFR BLD AUTO: 0 %
ERYTHROCYTE [DISTWIDTH] IN BLOOD BY AUTOMATED COUNT: 17.2 % (ref 11–15)
GLUCOSE BLD-MCNC: 130 MG/DL (ref 70–99)
GLUCOSE BLDC GLUCOMTR-MCNC: 104 MG/DL (ref 70–99)
GLUCOSE BLDC GLUCOMTR-MCNC: 110 MG/DL (ref 70–99)
GLUCOSE BLDC GLUCOMTR-MCNC: 121 MG/DL (ref 70–99)
GLUCOSE BLDC GLUCOMTR-MCNC: 125 MG/DL (ref 70–99)
GLUCOSE BLDC GLUCOMTR-MCNC: 131 MG/DL (ref 70–99)
GLUCOSE BLDC GLUCOMTR-MCNC: 133 MG/DL (ref 70–99)
GLUCOSE BLDC GLUCOMTR-MCNC: 133 MG/DL (ref 70–99)
GLUCOSE BLDC GLUCOMTR-MCNC: 136 MG/DL (ref 70–99)
GLUCOSE BLDC GLUCOMTR-MCNC: 138 MG/DL (ref 70–99)
GLUCOSE BLDC GLUCOMTR-MCNC: 142 MG/DL (ref 70–99)
GLUCOSE BLDC GLUCOMTR-MCNC: 143 MG/DL (ref 70–99)
GLUCOSE BLDC GLUCOMTR-MCNC: 144 MG/DL (ref 70–99)
GLUCOSE BLDC GLUCOMTR-MCNC: 153 MG/DL (ref 70–99)
GLUCOSE BLDC GLUCOMTR-MCNC: 164 MG/DL (ref 70–99)
HCT VFR BLD AUTO: 25.6 %
HGB BLD-MCNC: 8.5 G/DL
IMM GRANULOCYTES # BLD AUTO: 0.04 X10(3) UL (ref 0–1)
IMM GRANULOCYTES NFR BLD: 0.3 %
LYMPHOCYTES # BLD AUTO: 0.72 X10(3) UL (ref 1–4)
LYMPHOCYTES NFR BLD AUTO: 6 %
MAGNESIUM SERPL-MCNC: 2.1 MG/DL (ref 1.6–2.6)
MCH RBC QN AUTO: 27.7 PG (ref 26–34)
MCHC RBC AUTO-ENTMCNC: 33.2 G/DL (ref 31–37)
MCV RBC AUTO: 83.4 FL
MONOCYTES # BLD AUTO: 1.06 X10(3) UL (ref 0.1–1)
MONOCYTES NFR BLD AUTO: 8.9 %
NEUTROPHILS # BLD AUTO: 10.13 X10 (3) UL (ref 1.5–7.7)
NEUTROPHILS # BLD AUTO: 10.13 X10(3) UL (ref 1.5–7.7)
NEUTROPHILS NFR BLD AUTO: 84.7 %
OSMOLALITY SERPL CALC.SUM OF ELEC: 294 MOSM/KG (ref 275–295)
PLATELET # BLD AUTO: 114 10(3)UL (ref 150–450)
POTASSIUM SERPL-SCNC: 4.3 MMOL/L (ref 3.5–5.1)
RBC # BLD AUTO: 3.07 X10(6)UL
SODIUM SERPL-SCNC: 141 MMOL/L (ref 136–145)
WBC # BLD AUTO: 12 X10(3) UL (ref 4–11)

## 2024-07-04 PROCEDURE — 99233 SBSQ HOSP IP/OBS HIGH 50: CPT | Performed by: HOSPITALIST

## 2024-07-04 PROCEDURE — 30233N1 TRANSFUSION OF NONAUTOLOGOUS RED BLOOD CELLS INTO PERIPHERAL VEIN, PERCUTANEOUS APPROACH: ICD-10-PCS | Performed by: INTERNAL MEDICINE

## 2024-07-04 PROCEDURE — 99232 SBSQ HOSP IP/OBS MODERATE 35: CPT | Performed by: INTERNAL MEDICINE

## 2024-07-04 PROCEDURE — 71045 X-RAY EXAM CHEST 1 VIEW: CPT | Performed by: CLINICAL NURSE SPECIALIST

## 2024-07-04 RX ORDER — ACETAMINOPHEN 10 MG/ML
1000 INJECTION, SOLUTION INTRAVENOUS EVERY 8 HOURS
Status: DISPENSED | OUTPATIENT
Start: 2024-07-04 | End: 2024-07-05

## 2024-07-04 RX ORDER — INSULIN DEGLUDEC 100 U/ML
45 INJECTION, SOLUTION SUBCUTANEOUS DAILY
Status: DISCONTINUED | OUTPATIENT
Start: 2024-07-04 | End: 2024-07-08

## 2024-07-04 RX ADMIN — ALBUMIN, HUMAN INJ 5% 12.5 G: 5 SOLUTION INTRAVENOUS at 00:03:00

## 2024-07-04 RX ADMIN — DOBUTAMINE HYDROCHLORIDE 2 MCG/KG/MIN: 200 INJECTION INTRAVENOUS at 02:00:00

## 2024-07-04 RX ADMIN — ONDANSETRON 4 MG: 2 INJECTION INTRAMUSCULAR; INTRAVENOUS at 05:55:00

## 2024-07-04 RX ADMIN — DOCUSATE SODIUM 100 MG: 100 CAPSULE, LIQUID FILLED ORAL at 20:35:00

## 2024-07-04 RX ADMIN — DOBUTAMINE HYDROCHLORIDE 1.5 MCG/KG/MIN: 200 INJECTION INTRAVENOUS at 03:22:00

## 2024-07-04 RX ADMIN — METOCLOPRAMIDE HYDROCHLORIDE 10 MG: 5 INJECTION INTRAMUSCULAR; INTRAVENOUS at 00:59:00

## 2024-07-04 RX ADMIN — INSULIN DEGLUDEC 45 UNITS: 100 INJECTION, SOLUTION SUBCUTANEOUS at 15:15:00

## 2024-07-04 RX ADMIN — ACETAMINOPHEN 1000 MG: 10 INJECTION, SOLUTION INTRAVENOUS at 04:57:00

## 2024-07-04 RX ADMIN — FAMOTIDINE 20 MG: 10 INJECTION, SOLUTION INTRAVENOUS at 08:02:00

## 2024-07-04 RX ADMIN — ACETAMINOPHEN 1000 MG: 10 INJECTION, SOLUTION INTRAVENOUS at 10:15:00

## 2024-07-04 RX ADMIN — ASPIRIN 81 MG: 81 TABLET ORAL at 15:14:00

## 2024-07-04 RX ADMIN — SENNOSIDES 8.6 MG: 8.6 MG TABLET ORAL at 20:35:00

## 2024-07-04 RX ADMIN — METOCLOPRAMIDE HYDROCHLORIDE 10 MG: 5 INJECTION INTRAMUSCULAR; INTRAVENOUS at 13:52:00

## 2024-07-04 RX ADMIN — METOCLOPRAMIDE HYDROCHLORIDE 10 MG: 5 INJECTION INTRAMUSCULAR; INTRAVENOUS at 06:36:00

## 2024-07-04 RX ADMIN — HYDROCODONE BITARTRATE AND ACETAMINOPHEN 1 TABLET: 5; 325 TABLET ORAL at 16:40:00

## 2024-07-04 RX ADMIN — DOBUTAMINE HYDROCHLORIDE 1 MCG/KG/MIN: 200 INJECTION INTRAVENOUS at 03:00:00

## 2024-07-04 RX ADMIN — CHLORHEXIDINE GLUCONATE ORAL RINSE 15 ML: 1.2 SOLUTION DENTAL at 08:02:00

## 2024-07-04 RX ADMIN — METOCLOPRAMIDE HYDROCHLORIDE 10 MG: 5 INJECTION INTRAMUSCULAR; INTRAVENOUS at 19:00:00

## 2024-07-04 RX ADMIN — ASCORBIC ACID 500 MG: 500 MG TABLET ORAL at 20:34:00

## 2024-07-04 RX ADMIN — ONDANSETRON 4 MG: 2 INJECTION INTRAMUSCULAR; INTRAVENOUS at 20:56:00

## 2024-07-04 RX ADMIN — HYDROCODONE BITARTRATE AND ACETAMINOPHEN 1 TABLET: 5; 325 TABLET ORAL at 20:34:00

## 2024-07-04 RX ADMIN — CHLORHEXIDINE GLUCONATE ORAL RINSE 15 ML: 1.2 SOLUTION DENTAL at 20:35:00

## 2024-07-04 RX ADMIN — CLOPIDOGREL BISULFATE 75 MG: 75 TABLET ORAL at 15:15:00

## 2024-07-04 RX ADMIN — ENOXAPARIN SODIUM 40 MG: 100 INJECTION SUBCUTANEOUS at 08:02:00

## 2024-07-04 NOTE — RESPIRATORY THERAPY NOTE
Pt received tonight at 1900 on CPAP ventilation, PS +5 and CPAP +5, 40% FiO2 tolerating well. FiO2 weaned down to 30%. Pt extubated to 3L NC tonight at 1920, pt tolerating well. ABG was drawn at 1838. NIF -20 and VC 0.8 L. RSBI 49. Suctioning provided before and after extubation.      Latest Reference Range & Units 07/03/24 18:38   ABG PH 7.35 - 7.45  7.39   ABG PCO2 35 - 45 mm Hg 35   ABG PO2 80 - 100 mm Hg 121 (H)   ABG HCO3 21.0 - 27.0 mEq/L 22.3   ABG O2 SATURATION 94.0 - 100.0 % 98.8   Blood Gas Base Excess -2.0 - 2.0 mmol/L -3.4 (L)   (H): Data is abnormally high  (L): Data is abnormally low

## 2024-07-04 NOTE — PROGRESS NOTES
[facility] - CARDIOLOGY PROGRESS NOTE  Giuseppe Faustin Patient Status:  Inpatient    1945 MRN [id number]   Location [facility] 2W/SW Attending Gavin Crandall MD   Hosp Day # 1 PCP Joshua Faustin MD     Assessment / Plan:    CAD  -S/p CABG x 3 with LIMA to LAD and SVGs to diagonal and right PDA  7/3/2024.  Had clipping of left atrial appendage.   POD #1.  -Some labile BPs.  Wean pressors slowly.  -Support with albumin and 0.9 NS infusion  -Encourage nutrition  -Continue asa, plavix, and metoprolol  -PA cath, chest tubes, pacer wires, etc per CV Surg Service  -Amio per CV Surg protocol      Discussed with several family members present in room.    Maranda Adhikari M.D.   Cardiac Electrophysiology   2024   L3  -----------------------------------------      Subjective:  Moderate sternal incisional pain, otherwise no complaints.  Had lightheadedness when sitting on edge of bed with RN earlier today.  Not much appetite.    Objective:  /57 (BP Location: Right arm)   Pulse 70   Temp 99.6 °F (37.6 °C) (Pulmonary Artery)   Resp 22   Ht 160 cm (5' 3\")   Wt 166 lb 7.2 oz (75.5 kg)   SpO2 98%   BMI 29.48 kg/m²     Temp (24hrs), Av.7 °F (37.1 °C), Min:98.1 °F (36.7 °C), Max:99.6 °F (37.6 °C)    Intake/Output Summary (Last 24 hours) at 2024 1705  Last data filed at 2024 1700  Gross per 24 hour   Intake 2862.7 ml   Output 1698 ml   Net 1164.7 ml     General: Intubated, sedated.  HEENT: No focal deficits.  Pupils equal, reactive.  Neck: PA catheter in right neck.  Cardiac: Sternal bandage C/D/I. RRR, soft S1, S2  no murmur or rub  Lungs: Clear anterolaterally without wheezes, rales, rhonchi.    Abdomen: Soft, nondistended  Extremities: Without clubbing, cyanosis or edema.  Peripheral pulses are 2+.  Skin: Warm and dry.     Labs:  Lab Results   Component Value Date    WBC 12.0 2024     HGB 8.5 07/04/2024    HCT 25.6 07/04/2024    .0 07/04/2024     Lab Results   Component Value Date    INR 1.45 (H) 07/03/2024     Lab Results   Component Value Date     07/04/2024    K 4.3 07/04/2024     07/04/2024    CO2 20.0 07/04/2024    BUN 13 07/04/2024    CREATSERUM 1.13 07/04/2024     07/04/2024    MG 2.1 07/04/2024    CA 7.7 07/04/2024          Medications:     acetaminophen  1,000 mg Intravenous Q8H    insulin degludec  45 Units Subcutaneous Daily    insulin aspart  1-7 Units Subcutaneous TID CC    sennosides  8.6 mg Oral BID    docusate sodium  100 mg Oral BID    metoclopramide  10 mg Intravenous Q6H    famotidine  20 mg Oral Daily    Or    famotidine  20 mg Intravenous Daily    metoprolol tartrate  25 mg Oral 2x Daily(Beta Blocker)    mupirocin  1 Application Nasal BID    chlorhexidine gluconate  15 mL Mouth/Throat BID    multivitamin  1 tablet Oral Daily    ascorbic acid  500 mg Oral TID    enoxaparin  40 mg Subcutaneous Daily    clopidogrel  75 mg Oral Daily    aspirin  81 mg Oral Daily    [Transfer Hold] metoprolol tartrate  25 mg Oral Once    [Transfer Hold] melatonin  10 mg Oral Nightly    [Transfer Hold] atorvastatin  10 mg Oral Nightly    [Transfer Hold] primidone  50 mg Oral BID      sodium chloride 10 mL/hr at 07/03/24 1406    dexmedetomidine Stopped (07/03/24 1608)    DOBUTamine Stopped (07/04/24 1241)    nitroGLYCERIN in dextrose 5% Stopped (07/03/24 1800)    norepinephrine 1 mcg/min (07/04/24 1658)    clevidipine Stopped (07/03/24 1655)    dextrose 5%-sodium chloride 0.45% 40 mL/hr (07/04/24 0600)

## 2024-07-04 NOTE — SLP NOTE
ADULT SWALLOWING EVALUATION    ASSESSMENT    ASSESSMENT/OVERALL IMPRESSION:  PPE REQUIRED. THIS THERAPIST WORE GLOVES, DROPLET MASK, AND GOGGLES FOR DURATION OF EVALUATION. HANDS WASHED UPON ENTRANCE/EXIT.    SLP BSSE orders received and acknowledged. A swallow evaluation warranted as pt at risk for aspiration s/p CABG. Pt afebrile with weak vocal quality, on 1L/Min, with oxygen saturation at 98. Pt with no prior hx of dysphagia at [facility]. Pt positioned upright in bed. Pt with no complaints of pain, RN aware. Pt with adequate oral acceptance and bilabial seal across all trials. Pt with intact bite, prolonged mastication of solids, and increased AME due to holding (appears to be baseline swallow). Pt's swallow response appears delayed with reduced hyolaryngeal elevation/excursion. No clinical signs of aspiration (e.g., immediate/delayed throat clear, immediate/delayed cough, wet vocal quality, increased O2 effort) observed across all trials of puree, soft solids, and thin liquids. Throat clearing observed with hard solids. Trials discontinued. Oral/buccal cavities clear of residue following all trials. Oxygen status remained >96 t/o the entire evaluation.     At this time, pt presents with mild oral dysphagia and probable pharyngeal dysfunction. Recommend an easy to chew diet and thin liquids with strict adherence to safe swallowing compensatory strategies. Results and recommendations reviewed with RN and pt. Pt v/u to all results/recommendations. Recommendations remain written on whiteboard. SLP collaborated with RN for MD diet orders.     PLAN: SLP to f/u x3 meal assessments, monitor CXR, and VFSS if clinically warranted.     RECOMMENDATIONS   Diet Recommendations - Solids: Soft/ Easy to chew  Diet Recommendations - Liquids: Thin Liquids      Compensatory Strategies Recommended: No straws;Slow rate;Alternate consistencies;Small bites and sips  Aspiration Precautions: Upright position;Slow rate;Small bites and sips;No  straw  Medication Administration Recommendations: Whole in puree  Treatment Plan/Recommendations: Aspiration precautions    HISTORY   MEDICAL HISTORY  Reason for Referral: R/O aspiration    Problem List  Active Problems:    Abnormal stress test    Elevated coronary artery calcium score    SOB (shortness of breath)      Past Medical History  Past Medical History:    Adenomatous colon polyp    Arthritis    Benign prostatic hypertrophy with urinary retention    Chronic cough    Chronic sinusitis    Esophageal reflux    Essential and other specified forms of tremor    Essential hypertension    Gall stones    High blood pressure    Hoarseness, chronic    HTN (hypertension)    Osteoarthrosis, localized, primary, knee, left    Osteoarthrosis, localized, primary, knee, right    Osteoarthrosis, unspecified whether generalized or localized, unspecified site    Pain in joints    Painful swallowing    Personal history of colonic polyps    PONV (postoperative nausea and vomiting)    Prediabetes    borderline    Problems with swallowing    trouble with solid    Second degree hemorrhoids    Shortness of breath    Sleep apnea    no cpap    Sleep disturbance    Sputum production    Visual impairment    reading glasses    Vocal cord paralysis    Wheezing       Prior Living Situation: Home with spouse  Diet Prior to Admission: Regular;Thin liquids (per pt)  Precautions: Aspiration    Patient/Family Goals:     SWALLOWING HISTORY  Current Diet Consistency: NPO  Dysphagia History: None at [facility]    Imaging Results:     CXR 7/4/24:  CONCLUSION:      Lines and tubes in place as described.      Trace pulmonary vascular redistribution without edema, unchanged.            Dictated by (CST): Ton Titus MD on 7/04/2024 at 9:16 AM       Finalized by (CST): Ton Titus MD on 7/04/2024 at 9:17 AM     OBJECTIVE   ORAL MOTOR EXAMINATION  Dentition: Natural  Symmetry: Within Functional Limits  Strength:  (reduced)     Range of Motion: Within  Functional Limits  Rate of Motion: Reduced    Voice Quality: Weak  Respiratory Status: Supplemental O2;Nasal cannula;Recently extubated  Consistencies Trialed: Thin liquids;Nectar thick liquids;Soft solid;Hard solid  Method of Presentation: Self presentation;Staff/Clinician assistance;Spoon;Cup;Single sips  Patient Positioning: Upright;Midline    Oral Phase of Swallow: Impaired  Bolus Retrieval: Intact  Bilabial Seal: Intact  Bolus Formation: Impaired  Bolus Propulsion: Impaired  Mastication: Impaired       Pharyngeal Phase of Swallow: Impaired  Laryngeal Elevation Timing: Appears impaired  Laryngeal Elevation Strength: Appears impaired     (Please note: Silent aspiration cannot be evaluated clinically. Videofluoroscopic Swallow Study is required to rule-out silent aspiration.)    Esophageal Phase of Swallow: No complaints consistent with possible esophageal involvement      GOALS  Goal #1 The patient will tolerate easy to chew consistency and thin liquids without overt signs or symptoms of aspiration with 100 % accuracy over 2 session(s).  In Progress   Goal #2 The patient/family/caregiver will demonstrate understanding and implementation of aspiration precautions and swallow strategies independently over 3 session(s).    In Progress   Goal #3 The patient will tolerate trial upgrade of regular consistency and thin liquids without overt signs or symptoms of aspiration with 100 % accuracy over 3 session(s).  In Progress   Goal #4 The patient will utilize compensatory strategies as outlined by  BSSE (clinical evaluation) including Slow rate, Small bites, Small sips, Alternate liquids/solids, No straws, Upright 90 degrees with minimal assistance 100 % of the time across 2 sessions.    In Progress     FOLLOW UP  Treatment Plan/Recommendations: Aspiration precautions  Number of Visits to Meet Established Goals: 3  Follow Up Needed (Documentation Required): Yes  SLP Follow-up Date: 07/05/24    Thank you for your referral.    If you have any questions, please contact RACHEL Shen M.S. CCC-SLP  Speech Language Pathologist  Phone Number [phone]

## 2024-07-04 NOTE — PROGRESS NOTES
[facility]  part of [facility]     Progress Note    Giuseppe Ayse Faustin Patient Status:  Inpatient    1945 MRN [id number]   Location [facility] 2W/SW Attending Gavin Crandall MD   Hosp Day # 2 PCP Joshua Faustin MD       Subjective:   Patient seen and examined.  Resting in bed.  Pain well-controlled.  Denies significant dyspnea    Objective:   Blood pressure 92/50, pulse 72, temperature 98.6 °F (37 °C), temperature source Pulmonary Artery, resp. rate 22, height 5' 3\" (1.6 m), weight 166 lb 7.2 oz (75.5 kg), SpO2 98%.  Intake/Output:   Last 3 shifts: I/O last 3 completed shifts:  In: 5407.7 [P.O.:60; I.V.:4022.7; Blood:500; Other:700; IV PIGGYBACK:125]  Out: 6520 [Urine:6030; Chest Tube:490]   This shift: No intake/output data recorded.     Vent Settings: Vent Mode: CPAP;PS  FiO2 (%):  [30 %-80 %] 40 %  S RR:  [10] 10  S VT:  [500 mL] 500 mL  PEEP/CPAP (cm H2O):  [5 cm H20] 5 cm H20  MAP (cm H2O):  [7] 7    Hemodynamic parameters (last 24 hours): PAP: (15-29)/(5-17) 25/5  CO:  [2.9 L/min-4.7 L/min] 3.9 L/min  CI:  [1.6 L/min/m2-2.7 L/min/m2] 2.2 L/min/m2    Scheduled Meds:   Current Facility-Administered Medications   Medication Dose Route Frequency    sodium chloride 0.9% infusion   Intravenous Continuous    melatonin tab 3 mg  3 mg Oral Nightly PRN    sennosides (Senokot) tab 8.6 mg  8.6 mg Oral BID    docusate sodium (Colace) cap 100 mg  100 mg Oral BID    polyethylene glycol (PEG 3350) (Miralax) 17 g oral packet 17 g  17 g Oral Daily PRN    bisacodyl (Dulcolax) 10 MG rectal suppository 10 mg  10 mg Rectal Daily PRN    metoclopramide (Reglan) 5 mg/mL injection 10 mg  10 mg Intravenous Q6H    ondansetron (Zofran) 4 MG/2ML injection 4 mg  4 mg Intravenous Q6H PRN    famotidine (Pepcid) tab 20 mg  20 mg Oral Daily    Or    famotidine (Pepcid) 20 mg/2mL injection 20 mg  20 mg Intravenous Daily    dexmedeTOMIDine in sodium chloride 0.9% (Precedex) 400 mcg/100mL infusion premix   0.2-1.5 mcg/kg/hr (Dosing Weight) Intravenous Continuous    DOBUTamine in dextrose 5% (Dobutrex) 500 mg/250mL infusion premix  2.5-20 mcg/kg/min (Dosing Weight) Intravenous Continuous PRN    nitroGLYCERIN in dextrose 5% 50 mg/250mL infusion premix  5-300 mcg/min Intravenous Continuous PRN    norepinephrine (Levophed) 4 mg/250mL infusion premix  0.5-30 mcg/min Intravenous Continuous PRN    metoprolol tartrate (Lopressor) tab 25 mg  25 mg Oral 2x Daily(Beta Blocker)    clevidipine (Cleviprex) 25 MG/50ML IV infusion  1-6 mg/hr Intravenous Continuous    potassium chloride 20 mEq/100mL IVPB premix 20 mEq  20 mEq Intravenous PRN    Or    potassium chloride 40 mEq/100mL IVPB premix (central line) 40 mEq  40 mEq Intravenous PRN    magnesium sulfate in dextrose 5% 1 g/100mL infusion premix 1 g  1 g Intravenous PRN    magnesium sulfate in sterile water for injection 2 g/50mL IVPB premix 2 g  2 g Intravenous PRN    mupirocin (Bactroban) 2% nasal ointment 1 Application  1 Application Nasal BID    chlorhexidine gluconate (Peridex) 0.12 % oral solution 15 mL  15 mL Mouth/Throat BID    multivitamin (Tab-A-Ari/Beta Carotene) tab 1 tablet  1 tablet Oral Daily    ascorbic acid (Vitamin C) tab 500 mg  500 mg Oral TID    dextrose 5%-sodium chloride 0.45% infusion   mL/hr Intravenous Continuous    enoxaparin (Lovenox) 40 MG/0.4ML SUBQ injection 40 mg  40 mg Subcutaneous Daily    acetaminophen (Tylenol) tab 650 mg  650 mg Oral Q4H PRN    Or    HYDROcodone-acetaminophen (Norco) 5-325 MG per tab 1 tablet  1 tablet Oral Q4H PRN    Or    HYDROcodone-acetaminophen (Norco) 5-325 MG per tab 2 tablet  2 tablet Oral Q4H PRN    morphINE PF 4 MG/ML injection 2 mg  2 mg Intravenous Q2H PRN    Or    morphINE PF 4 MG/ML injection 4 mg  4 mg Intravenous Q2H PRN    sodium chloride 0.9 % IV bolus 250 mL  250 mL Intravenous PRN    albumin human (Albumin) 5% injection 12.5 g  12.5 g Intravenous Once PRN    clopidogrel (Plavix) tab 75 mg  75 mg Oral  Daily    aspirin DR tab 81 mg  81 mg Oral Daily    glucose (Dex4) 15 GM/59ML oral liquid 15 g  15 g Oral Q15 Min PRN    Or    glucose (Glutose) 40% oral gel 15 g  15 g Oral Q15 Min PRN    Or    glucose-vitamin C (Dex-4) chewable tab 4 tablet  4 tablet Oral Q15 Min PRN    Or    dextrose 50% injection 50 mL  50 mL Intravenous Q15 Min PRN    Or    glucose (Dex4) 15 GM/59ML oral liquid 30 g  30 g Oral Q15 Min PRN    Or    glucose (Glutose) 40% oral gel 30 g  30 g Oral Q15 Min PRN    Or    glucose-vitamin C (Dex-4) chewable tab 8 tablet  8 tablet Oral Q15 Min PRN    insulin regular human (Novolin R, Humulin R) 100 Units in sodium chloride 0.9% 100 mL standard infusion (100 mL)  1-28 Units/hr Intravenous Continuous    amiodarone (Cordarone) 450 mg in dextrose 5% 250 mL infusion  0.5 mg/min Intravenous Continuous    [Transfer Hold] metoprolol tartrate (Lopressor) tab 25 mg  25 mg Oral Once    [Transfer Hold] melatonin cap/tab 10 mg  10 mg Oral Nightly    [Transfer Hold] atorvastatin (Lipitor) tab 10 mg  10 mg Oral Nightly    [Transfer Hold] primidone (Mysoline) tab 50 mg  50 mg Oral BID       Continuous Infusions:    sodium chloride 10 mL/hr at 07/03/24 1406    dexmedetomidine Stopped (07/03/24 1608)    DOBUTamine 1.5 mcg/kg/min (07/04/24 0600)    nitroGLYCERIN in dextrose 5% Stopped (07/03/24 1800)    norepinephrine 3 mcg/min (07/04/24 0700)    clevidipine Stopped (07/03/24 1655)    dextrose 5%-sodium chloride 0.45% 40 mL/hr (07/04/24 0600)    insulin regular 3 Units/hr (07/04/24 0700)    amiodarone 0.5 mg/min (07/04/24 0600)       Physical Exam  Constitutional: no acute distress  Eyes: PERRL  ENT: nares pateint  Neck: supple, no JVD  Cardio: RRR, S1 S2  Respiratory: Faint basilar crackles  GI: abdomen soft, non tender, active bowel sounds, no organomegaly  Extremities: no clubbing, cyanosis, edema  Neurologic: no gross motor deficits  Skin: warm, dry      Results:     Lab Results   Component Value Date    WBC 12.0  07/04/2024    HGB 8.5 07/04/2024    HCT 25.6 07/04/2024    .0 07/04/2024    CREATSERUM 1.13 07/04/2024    BUN 13 07/04/2024     07/04/2024    K 4.3 07/04/2024     07/04/2024    CO2 20.0 07/04/2024     07/04/2024    CA 7.7 07/04/2024    PTT 31.4 07/03/2024    INR 1.45 07/03/2024    MG 2.1 07/04/2024       XR CHEST AP PORTABLE  (CPT=71045)    Result Date: 7/3/2024  CONCLUSION:  1.  Post CABG. 2.  Post [place] catheter placement with tip in the pulmonary outflow.  The distal end of the tube is slightly coiled on the view obtained.  Recommend adjustment in position.  Other lines and tubes are in gross satisfactory/stable position. 3.  Trace/small left pleural effusion.  Left basal pulmonary opacity most compatible with atelectasis.   Exam discussed with Jacquelin Castillo on 7/3/24 at 2:00 p.m.   Dictated by (CST): Gregory Stratton MD on 7/03/2024 at 1:55 PM     Finalized by (CST): Gregory Stratton MD on 7/03/2024 at 2:02 PM          US CAROTID DOPPLER BILAT - DIAG IMG (CPT=93880)    Result Date: 7/2/2024  CONCLUSION:   No evidence of bilateral hemodynamically significant stenosis.  Antegrade bilateral vertebral arteries.    Dictated by (CST): Abdirizak Encinas MD on 7/02/2024 at 6:43 PM     Finalized by (CST): Abdirizak Encinas MD on 7/02/2024 at 6:44 PM           EKG 12 Lead    Result Date: 7/4/2024  Normal sinus rhythm Nonspecific T wave abnormality Abnormal ECG When compared with ECG of 03-JUL-2024 13:55, ST no longer depressed in Anterior leads    EKG 12 Lead    Result Date: 7/3/2024  Normal sinus rhythm Low voltage QRS, consider pulmonary disease, pericardial effusion, or normal variant Nonspecific ST abnormality Abnormal ECG When compared with ECG of 02-JUL-2024 17:54, Vent. rate has increased BY  35 BPM Confirmed by VERITO BORJA JOHN ([id number]) on 7/3/2024 2:22:59 PM    EKG 12 Lead    Result Date: 7/3/2024  Sinus bradycardia Otherwise normal ECG When compared with ECG of 10-MAR-2023 13:55, No  significant change was found Confirmed by VERITO GUILLERMO, DAVIN ([id number]) on 7/3/2024 12:39:03 PM     Assessment   1.  POD #1 status post CABG x 3  2.  Severe coronary disease  3.  Hypertension  4.  Hyperlipidemia  5.  Diabetes mellitus     Plan   -Patient presents for scheduled CABG x 3 on 7/3/2024.  -Postoperative chest x-ray with stable findings  -Tolerating extubation  -Up in chair.  Incentive spirometry  -Aspirin, Plavix, beta-blocker, statin  -Monitor chest tube output  -Weanl norepinephrine and dobutamine  -Wean insulin gtt.  -Close hemodynamic monitoring  -DVT prophylaxis: [place] Medina,   Pulmonary Critical Care Medicine  [facility]

## 2024-07-04 NOTE — PLAN OF CARE
Extubated @ 1920 to 3L HFNC. Tolerated well. Good urine output overnight.  Unable to wean Dobut & Levo overnight. Dobut down to 1.5mcg. While @ 1mcg, BP will decrease to the 80's systolic. While @ 2mcg, 's systolic. CI remains 2.1-2.4.  Levo remains @ 2mcg.  Albumin given @ 0000 (3rd one in total) & this did help to decrease Dobut from 3mcg, but unable to wean off.     Unable to get to chair this AM. Once standing @ bedside pt was very dizzy & was assisted back to bed before nearly passing out. Zofran for nausea after back in bed. BP was noted to be in the 70's systolic when back in bed.     Total CT output: 290cc  Total urine output: 650cc    Current gtts:  Levo 2mcg  Dobut 1.5mcg  Insulin 4units Algo 4    Problem: PAIN - ADULT  Goal: Verbalizes/displays adequate comfort level or patient's stated pain goal  Description: INTERVENTIONS:  - Encourage pt to monitor pain and request assistance  - Assess pain using appropriate pain scale  - Administer analgesics based on type and severity of pain and evaluate response  - Implement non-pharmacological measures as appropriate and evaluate response  - Consider cultural and social influences on pain and pain management  - Manage/alleviate anxiety  - Utilize distraction and/or relaxation techniques  - Monitor for opioid side effects  - Notify MD/LIP if interventions unsuccessful or patient reports new pain  - Anticipate increased pain with activity and pre-medicate as appropriate  Outcome: Progressing     Problem: CARDIOVASCULAR - ADULT  Goal: Maintains optimal cardiac output and hemodynamic stability  Description: INTERVENTIONS:  - Monitor vital signs, rhythm, and trends  - Monitor for bleeding, hypotension and signs of decreased cardiac output  - Evaluate effectiveness of vasoactive medications to optimize hemodynamic stability  - Monitor arterial and/or venous puncture sites for bleeding and/or hematoma  - Assess quality of pulses, skin color and temperature  -  Assess for signs of decreased coronary artery perfusion - ex. Angina  - Evaluate fluid balance, assess for edema, trend weights  Outcome: Progressing  Goal: Absence of cardiac arrhythmias or at baseline  Description: INTERVENTIONS:  - Continuous cardiac monitoring, monitor vital signs, obtain 12 lead EKG if indicated  - Evaluate effectiveness of antiarrhythmic and heart rate control medications as ordered  - Initiate emergency measures for life threatening arrhythmias  - Monitor electrolytes and administer replacement therapy as ordered  Outcome: Progressing     Problem: RESPIRATORY - ADULT  Goal: Achieves optimal ventilation and oxygenation  Description: INTERVENTIONS:  - Assess for changes in respiratory status  - Assess for changes in mentation and behavior  - Position to facilitate oxygenation and minimize respiratory effort  - Oxygen supplementation based on oxygen saturation or ABGs  - Provide Smoking Cessation handout, if applicable  - Encourage broncho-pulmonary hygiene including cough, deep breathe, Incentive Spirometry  - Assess the need for suctioning and perform as needed  - Assess and instruct to report SOB or any respiratory difficulty  - Respiratory Therapy support as indicated  - Manage/alleviate anxiety  - Monitor for signs/symptoms of CO2 retention  Outcome: Progressing

## 2024-07-04 NOTE — PROGRESS NOTES
[facility]  part of [facility]  Hospitalist Progress Note     Giuseppe Faustin Patient Status:  Inpatient    1945  78 year old CSN [id number]   [facility] Attending Gavin Crandall MD   Hosp Day # 2 PCP Joshua Faustin MD     Assessment & Plan:   ----------------------------------  CAD. S/p CABG x 3 on 7/3. Doing well.  Edi extubation  -Pain control  -CV surgery and cardiology following  -Antiplatelets: aspirin and plavix  -Continue beta-blocker and statin  -Diuretics: per CVS  -Lines, damian, chest tubes, pacer wires per CV surg  -Activity as tolerated  -pressors, insulin gtt, wean off    Bladder neck contracture.  Patient seen by urology in the OR.  Cystoscopy assisted Damian catheter placement.  History of TURP  -Keep Damian for duration of hospitalization  -Keep Damian after discharge as well (d/w Dr Blanchard)  -Outpatient urology follow-up    Other problems  Hypertension  Dyslipidemia  GERD  Obstructive sleep apnea  Osteoarthritis  Type 2 diabetes  History of kidney stones  Essential tremor  CKD stage II    dvt prophylaxis: sc lovenox  code status: full code  dispo: PCU    I personally reviewed the available laboratories, imaging including cxr. I discussed/will discuss the case with . I ordered laboratories, studies including cbc. I adjusted medications including pain meds. Medical decision making high, risk is high.    Subjective:   ----------------------------------  \"No pain\" slept well. No CP or SOB      Objective:   Chief Complaint: No chief complaint on file.    ----------------------------------  Temp:  [96.5 °F (35.8 °C)-99 °F (37.2 °C)] 98.6 °F (37 °C)  Pulse:  [] 72  Resp:  [10-23] 22  BP: ()/(50-94) 92/50  SpO2:  [97 %-100 %] 98 %  AO: ()/(35-71) 96/35  FiO2 (%):  [30 %-80 %] 40 %  Gen: Alert and oriented x 3  HEENT: NCAT, neck supple, no carotid bruit.  Right IJ line in place  CV: RRR, S1S2, and intact distal pulses. No gallop, rub,  murmur.  Sternal incision clean dry and intact  Pulm: Effort and breath sounds normal. No distress, wheezes, rales, rhonchi.  Chest tube in place.  Abd: Soft, NTND, BS normal, no mass, no HSM, no rebound/guarding.   Neuro: Strength intact, sensory intact, grossly nonfocal  MS: No joint effusions.  No peripheral edema.  Skin: Skin is warm and dry. No rashes, erythema, diaphoresis.   Psych: Calm    Labs:  Lab Results   Component Value Date    HGB 8.5 (L) 07/04/2024    WBC 12.0 (H) 07/04/2024    .0 (L) 07/04/2024     07/04/2024    K 4.3 07/04/2024    CREATSERUM 1.13 07/04/2024    VHCO3 21.3 (L) 07/03/2024    INR 1.45 (H) 07/03/2024    BILIRUBTOTAL 0.5 09/17/2012    AST 24 07/02/2024    ALT 16 07/02/2024          sennosides  8.6 mg Oral BID    docusate sodium  100 mg Oral BID    metoclopramide  10 mg Intravenous Q6H    famotidine  20 mg Oral Daily    Or    famotidine  20 mg Intravenous Daily    metoprolol tartrate  25 mg Oral 2x Daily(Beta Blocker)    mupirocin  1 Application Nasal BID    chlorhexidine gluconate  15 mL Mouth/Throat BID    multivitamin  1 tablet Oral Daily    ascorbic acid  500 mg Oral TID    enoxaparin  40 mg Subcutaneous Daily    clopidogrel  75 mg Oral Daily    aspirin  81 mg Oral Daily    [Transfer Hold] metoprolol tartrate  25 mg Oral Once    [Transfer Hold] melatonin  10 mg Oral Nightly    [Transfer Hold] atorvastatin  10 mg Oral Nightly    [Transfer Hold] primidone  50 mg Oral BID       melatonin    polyethylene glycol (PEG 3350)    bisacodyl    ondansetron    DOBUTamine    nitroGLYCERIN in dextrose 5%    norepinephrine    potassium chloride **OR** potassium chloride    magnesium sulfate in dextrose 5%    magnesium sulfate in sterile water for injection    acetaminophen **OR** HYDROcodone-acetaminophen **OR** HYDROcodone-acetaminophen    morphINE **OR** morphINE    sodium chloride    albumin human    glucose **OR** glucose **OR** glucose-vitamin C **OR** dextrose **OR** glucose **OR**  glucose **OR** glucose-vitamin C

## 2024-07-05 LAB
ALBUMIN SERPL-MCNC: 3.7 G/DL (ref 3.2–4.8)
ALBUMIN/GLOB SERPL: 1.8 {RATIO} (ref 1–2)
ALP LIVER SERPL-CCNC: 34 U/L
ALT SERPL-CCNC: <7 U/L
ANION GAP SERPL CALC-SCNC: 8 MMOL/L (ref 0–18)
AST SERPL-CCNC: 31 U/L (ref ?–34)
ATRIAL RATE: 74 BPM
BASOPHILS # BLD AUTO: 0.01 X10(3) UL (ref 0–0.2)
BASOPHILS NFR BLD AUTO: 0.1 %
BILIRUB SERPL-MCNC: 0.7 MG/DL (ref 0.2–1.1)
BLOOD TYPE BARCODE: 5100
BUN BLD-MCNC: 24 MG/DL (ref 9–23)
BUN/CREAT SERPL: 19 (ref 10–20)
CALCIUM BLD-MCNC: 8.3 MG/DL (ref 8.7–10.4)
CHLORIDE SERPL-SCNC: 108 MMOL/L (ref 98–112)
CO2 SERPL-SCNC: 22 MMOL/L (ref 21–32)
CREAT BLD-MCNC: 1.26 MG/DL
DEPRECATED RDW RBC AUTO: 57.1 FL (ref 35.1–46.3)
EGFRCR SERPLBLD CKD-EPI 2021: 58 ML/MIN/1.73M2 (ref 60–?)
EOSINOPHIL # BLD AUTO: 0 X10(3) UL (ref 0–0.7)
EOSINOPHIL NFR BLD AUTO: 0 %
ERYTHROCYTE [DISTWIDTH] IN BLOOD BY AUTOMATED COUNT: 17.9 % (ref 11–15)
GLOBULIN PLAS-MCNC: 2.1 G/DL (ref 2–3.5)
GLUCOSE BLD-MCNC: 162 MG/DL (ref 70–99)
GLUCOSE BLDC GLUCOMTR-MCNC: 148 MG/DL (ref 70–99)
GLUCOSE BLDC GLUCOMTR-MCNC: 195 MG/DL (ref 70–99)
GLUCOSE BLDC GLUCOMTR-MCNC: 213 MG/DL (ref 70–99)
HCT VFR BLD AUTO: 28.2 %
HGB BLD-MCNC: 9.1 G/DL
IMM GRANULOCYTES # BLD AUTO: 0.07 X10(3) UL (ref 0–1)
IMM GRANULOCYTES NFR BLD: 0.6 %
LYMPHOCYTES # BLD AUTO: 1.24 X10(3) UL (ref 1–4)
LYMPHOCYTES NFR BLD AUTO: 10.1 %
MCH RBC QN AUTO: 28.3 PG (ref 26–34)
MCHC RBC AUTO-ENTMCNC: 32.3 G/DL (ref 31–37)
MCV RBC AUTO: 87.6 FL
MONOCYTES # BLD AUTO: 1.66 X10(3) UL (ref 0.1–1)
MONOCYTES NFR BLD AUTO: 13.6 %
NEUTROPHILS # BLD AUTO: 9.27 X10 (3) UL (ref 1.5–7.7)
NEUTROPHILS # BLD AUTO: 9.27 X10(3) UL (ref 1.5–7.7)
NEUTROPHILS NFR BLD AUTO: 75.6 %
OSMOLALITY SERPL CALC.SUM OF ELEC: 294 MOSM/KG (ref 275–295)
P AXIS: 35 DEGREES
P-R INTERVAL: 128 MS
PLATELET # BLD AUTO: 113 10(3)UL (ref 150–450)
POTASSIUM SERPL-SCNC: 4.5 MMOL/L (ref 3.5–5.1)
PROT SERPL-MCNC: 5.8 G/DL (ref 5.7–8.2)
Q-T INTERVAL: 316 MS
Q-T INTERVAL: 384 MS
QRS DURATION: 112 MS
QRS DURATION: 84 MS
QTC CALCULATION (BEZET): 426 MS
QTC CALCULATION (BEZET): 475 MS
R AXIS: 4 DEGREES
R AXIS: 8 DEGREES
RBC # BLD AUTO: 3.22 X10(6)UL
SODIUM SERPL-SCNC: 138 MMOL/L (ref 136–145)
T AXIS: -7 DEGREES
T AXIS: 29 DEGREES
UNIT VOLUME: 233 ML
VENTRICULAR RATE: 136 BPM
VENTRICULAR RATE: 74 BPM
WBC # BLD AUTO: 12.3 X10(3) UL (ref 4–11)

## 2024-07-05 PROCEDURE — 99233 SBSQ HOSP IP/OBS HIGH 50: CPT | Performed by: INTERNAL MEDICINE

## 2024-07-05 PROCEDURE — 99233 SBSQ HOSP IP/OBS HIGH 50: CPT | Performed by: STUDENT IN AN ORGANIZED HEALTH CARE EDUCATION/TRAINING PROGRAM

## 2024-07-05 RX ADMIN — METOCLOPRAMIDE HYDROCHLORIDE 10 MG: 5 INJECTION INTRAMUSCULAR; INTRAVENOUS at 01:00:00

## 2024-07-05 RX ADMIN — SENNOSIDES 8.6 MG: 8.6 MG TABLET ORAL at 08:50:00

## 2024-07-05 RX ADMIN — HYDROCODONE BITARTRATE AND ACETAMINOPHEN 1 TABLET: 5; 325 TABLET ORAL at 09:00:00

## 2024-07-05 RX ADMIN — INSULIN DEGLUDEC 45 UNITS: 100 INJECTION, SOLUTION SUBCUTANEOUS at 08:58:00

## 2024-07-05 RX ADMIN — METOCLOPRAMIDE HYDROCHLORIDE 10 MG: 5 INJECTION INTRAMUSCULAR; INTRAVENOUS at 06:15:00

## 2024-07-05 RX ADMIN — Medication 25 MG: at 06:15:00

## 2024-07-05 RX ADMIN — ASCORBIC ACID 500 MG: 500 MG TABLET ORAL at 08:50:00

## 2024-07-05 RX ADMIN — ASCORBIC ACID 500 MG: 500 MG TABLET ORAL at 16:42:00

## 2024-07-05 RX ADMIN — DOXEPIN HYDROCHLORIDE 1 TABLET: 50 CAPSULE ORAL at 08:50:00

## 2024-07-05 RX ADMIN — CHLORHEXIDINE GLUCONATE ORAL RINSE 15 ML: 1.2 SOLUTION DENTAL at 19:44:00

## 2024-07-05 RX ADMIN — CLOPIDOGREL BISULFATE 75 MG: 75 TABLET ORAL at 08:50:00

## 2024-07-05 RX ADMIN — DOCUSATE SODIUM 100 MG: 100 CAPSULE, LIQUID FILLED ORAL at 08:50:00

## 2024-07-05 RX ADMIN — ASCORBIC ACID 500 MG: 500 MG TABLET ORAL at 19:43:00

## 2024-07-05 RX ADMIN — ENOXAPARIN SODIUM 40 MG: 100 INJECTION SUBCUTANEOUS at 08:58:00

## 2024-07-05 RX ADMIN — METOCLOPRAMIDE HYDROCHLORIDE 10 MG: 5 INJECTION INTRAMUSCULAR; INTRAVENOUS at 18:41:00

## 2024-07-05 RX ADMIN — FAMOTIDINE 20 MG: 20 TABLET, FILM COATED ORAL at 08:50:00

## 2024-07-05 RX ADMIN — SENNOSIDES 8.6 MG: 8.6 MG TABLET ORAL at 19:44:00

## 2024-07-05 RX ADMIN — CHLORHEXIDINE GLUCONATE ORAL RINSE 15 ML: 1.2 SOLUTION DENTAL at 08:50:00

## 2024-07-05 RX ADMIN — METOCLOPRAMIDE HYDROCHLORIDE 10 MG: 5 INJECTION INTRAMUSCULAR; INTRAVENOUS at 13:40:00

## 2024-07-05 RX ADMIN — HYDROCODONE BITARTRATE AND ACETAMINOPHEN 1 TABLET: 5; 325 TABLET ORAL at 16:06:00

## 2024-07-05 RX ADMIN — HYDROCODONE BITARTRATE AND ACETAMINOPHEN 1 TABLET: 5; 325 TABLET ORAL at 03:50:00

## 2024-07-05 RX ADMIN — ASPIRIN 81 MG: 81 TABLET ORAL at 08:50:00

## 2024-07-05 RX ADMIN — DOCUSATE SODIUM 100 MG: 100 CAPSULE, LIQUID FILLED ORAL at 19:44:00

## 2024-07-05 RX ADMIN — Medication 25 MG: at 18:18:00

## 2024-07-05 NOTE — PROGRESS NOTES
CV Surgery    Up to chair this AM and went into Afib RVR, HR 130s, SBP 100s  Patient asymptomatic  Weaned off dobutamine and leovophed over last 24 hours    CT 300cc last 24 hours no airleak  UOP 1.5L last 24 hours    AAOx3  CTAB  RRR  Abdomen soft    Recent Labs   Lab 07/03/24  1251 07/04/24  0410 07/05/24  0746   * 130* 162*   BUN 11 13 24*   CREATSERUM 0.89 1.13 1.26   EGFRCR 87 66 58*   CA 7.1* 7.7* 8.3*    141 138   K 4.6 4.3 4.5   * 112 108   CO2 22.0 20.0* 22.0     Recent Labs   Lab 07/03/24  1251 07/04/24  0410 07/05/24  0746   RBC 3.74* 3.07* 3.22*   HGB 10.5* 8.5* 9.1*   HCT 31.2* 25.6* 28.2*   MCV 83.4 83.4 87.6   MCH 28.1 27.7 28.3   MCHC 33.7 33.2 32.3   RDW 17.2* 17.2* 17.9*   NEPRELIM  --  10.13* 9.27*   WBC 13.5* 12.0* 12.3*   .0* 114.0* 113.0*       PLAN:  Amiodarone bolus and infusion for paroxysmal atrial fibrillation  ASA, statin, plavix  Started on metoprolol  Keep cordis, a-line, chest tubes today  Delong will stay in at discharge per urology and he will follow up with urology as outpatient    Brayan Castle MD  Cardiothoracic Surgery  [facility]

## 2024-07-05 NOTE — PROGRESS NOTES
[facility] - CARDIOLOGY PROGRESS NOTE  Giuseppe Faustin Patient Status:  Inpatient    1945 MRN [id number]   Location [facility] 2W/SW Attending Gavin Crandall MD   Hosp Day # 2 PCP Joshua Faustin MD     Assessment / Plan:  78-year-old male admitted for outpatient angiogram due to accelerating anginal symptoms and abnormal stress test, found to have severe distal left main disease now status post bypass surgery.    Overnight no events, significant pain but manageable.    Assessment:  CAD: S/p CABG x 3 with LIMA to LAD and SVGs to diagonal and right PDA  7/3/2024.  Had clipping of left atrial appendage.       Paroxysmal atrial fibrillation: Left atrial appendage clip    Plan:  Routine postop management      David Hyman MD  Interventional Cardiology  [facility]  L3  -----------------------------------------      Subjective:  Moderate sternal incisional pain, otherwise no complaints.  Had lightheadedness when sitting on edge of bed with RN earlier today.  Not much appetite.    Objective:  /80 (BP Location: Right arm)   Pulse 86   Temp 98.8 °F (37.1 °C) (Temporal)   Resp 18   Ht 63\"   Wt 172 lb 9.9 oz (78.3 kg)   SpO2 98%   BMI 30.58 kg/m²     Temp (24hrs), Av.8 °F (37.1 °C), Min:98.1 °F (36.7 °C), Max:99.6 °F (37.6 °C)    Intake/Output Summary (Last 24 hours) at 2024 0641  Last data filed at 2024 0600  Gross per 24 hour   Intake 609.38 ml   Output 1565 ml   Net -955.62 ml     General: Intubated, sedated.  HEENT: No focal deficits.  Pupils equal, reactive.  Neck: PA catheter in right neck.  Cardiac: Sternal bandage C/D/I. RRR, soft S1, S2  no murmur or rub  Lungs: Clear anterolaterally without wheezes, rales, rhonchi.    Abdomen: Soft, nondistended  Extremities: Without clubbing, cyanosis or edema.  Peripheral pulses are 2+.  Skin: Warm and dry.     Labs:        Lab Results   Component Value Date    INR 1.45 (H) 07/03/2024               Medications:     insulin degludec  45 Units Subcutaneous Daily    insulin aspart  1-7 Units Subcutaneous TID CC    sennosides  8.6 mg Oral BID    docusate sodium  100 mg Oral BID    metoclopramide  10 mg Intravenous Q6H    famotidine  20 mg Oral Daily    Or    famotidine  20 mg Intravenous Daily    metoprolol tartrate  25 mg Oral 2x Daily(Beta Blocker)    mupirocin  1 Application Nasal BID    chlorhexidine gluconate  15 mL Mouth/Throat BID    multivitamin  1 tablet Oral Daily    ascorbic acid  500 mg Oral TID    enoxaparin  40 mg Subcutaneous Daily    clopidogrel  75 mg Oral Daily    aspirin  81 mg Oral Daily    [Transfer Hold] melatonin  10 mg Oral Nightly    [Transfer Hold] atorvastatin  10 mg Oral Nightly    [Transfer Hold] primidone  50 mg Oral BID      sodium chloride 10 mL/hr at 07/03/24 1406    dexmedetomidine Stopped (07/03/24 1608)    DOBUTamine Stopped (07/04/24 1241)    nitroGLYCERIN in dextrose 5% Stopped (07/03/24 1800)    norepinephrine Stopped (07/05/24 0100)    clevidipine Stopped (07/03/24 1655)    dextrose 5%-sodium chloride 0.45% 40 mL/hr (07/04/24 0600)

## 2024-07-05 NOTE — PLAN OF CARE
Better w/ movement from chair to bed, some marching in place. Went into Afib RVR, Amio protocol initiated. BP labile with Afib & Amio infusion, some intermittent Levo through shift. Keep A-Line, Cordis, CT, Pacer, & Delong today. Maintained patient safety.   Problem: Patient Centered Care  Goal: Patient preferences are identified and integrated in the patient's plan of care  Description: Interventions:  - What would you like us to know as we care for you?   - Provide timely, complete, and accurate information to patient/family  - Incorporate patient and family knowledge, values, beliefs, and cultural backgrounds into the planning and delivery of care  - Encourage patient/family to participate in care and decision-making at the level they choose  - Honor patient and family perspectives and choices  Outcome: Progressing     Problem: PAIN - ADULT  Goal: Verbalizes/displays adequate comfort level or patient's stated pain goal  Description: INTERVENTIONS:  - Encourage pt to monitor pain and request assistance  - Assess pain using appropriate pain scale  - Administer analgesics based on type and severity of pain and evaluate response  - Implement non-pharmacological measures as appropriate and evaluate response  - Consider cultural and social influences on pain and pain management  - Manage/alleviate anxiety  - Utilize distraction and/or relaxation techniques  - Monitor for opioid side effects  - Notify MD/LIP if interventions unsuccessful or patient reports new pain  - Anticipate increased pain with activity and pre-medicate as appropriate  Outcome: Progressing     Problem: SAFETY ADULT - FALL  Goal: Free from fall injury  Description: INTERVENTIONS:  - Assess pt frequently for physical needs  - Identify cognitive and physical deficits and behaviors that affect risk of falls.  - [place] fall precautions as indicated by assessment.  - Educate pt/family on patient safety including physical limitations  - Instruct pt to  call for assistance with activity based on assessment  - Modify environment to reduce risk of injury  - Provide assistive devices as appropriate  - Consider OT/PT consult to assist with strengthening/mobility  - Encourage toileting schedule  Outcome: Progressing     Problem: CARDIOVASCULAR - ADULT  Goal: Maintains optimal cardiac output and hemodynamic stability  Description: INTERVENTIONS:  - Monitor vital signs, rhythm, and trends  - Monitor for bleeding, hypotension and signs of decreased cardiac output  - Evaluate effectiveness of vasoactive medications to optimize hemodynamic stability  - Monitor arterial and/or venous puncture sites for bleeding and/or hematoma  - Assess quality of pulses, skin color and temperature  - Assess for signs of decreased coronary artery perfusion - ex. Angina  - Evaluate fluid balance, assess for edema, trend weights  Outcome: Progressing  Goal: Absence of cardiac arrhythmias or at baseline  Description: INTERVENTIONS:  - Continuous cardiac monitoring, monitor vital signs, obtain 12 lead EKG if indicated  - Evaluate effectiveness of antiarrhythmic and heart rate control medications as ordered  - Initiate emergency measures for life threatening arrhythmias  - Monitor electrolytes and administer replacement therapy as ordered  Outcome: Progressing     Problem: RESPIRATORY - ADULT  Goal: Achieves optimal ventilation and oxygenation  Description: INTERVENTIONS:  - Assess for changes in respiratory status  - Assess for changes in mentation and behavior  - Position to facilitate oxygenation and minimize respiratory effort  - Oxygen supplementation based on oxygen saturation or ABGs  - Provide Smoking Cessation handout, if applicable  - Encourage broncho-pulmonary hygiene including cough, deep breathe, Incentive Spirometry  - Assess the need for suctioning and perform as needed  - Assess and instruct to report SOB or any respiratory difficulty  - Respiratory Therapy support as indicated  -  Manage/alleviate anxiety  - Monitor for signs/symptoms of CO2 retention  Outcome: Progressing

## 2024-07-05 NOTE — PROGRESS NOTES
[facility]  part of [facility]    Progress Note    Giuseppe Faustin Patient Status:  Inpatient    1945 MRN [id number]   Location [facility] 2W/SW Attending Sarita Franklin MD   Hosp Day # 2 PCP Joshua Faustin MD         Subjective:     Constitutional:  Negative for fever.   HENT:  Negative for congestion.    Respiratory: Negative.     Cardiovascular: Negative.    Gastrointestinal: Negative.    Skin: Negative.    Neurological: Negative.    Hematological: Negative.    Psychiatric/Behavioral: Negative.       Patient was seen and examined  Up to the chair  Pain controlled  Doing well with I-S  On room air  Positive flatus with no abdominal pain  Denies fever or chills  Objective:   Blood pressure 98/54, pulse (!) 121, temperature 98.5 °F (36.9 °C), temperature source Temporal, resp. rate 21, height 5' 3\" (1.6 m), weight 172 lb 9.9 oz (78.3 kg), SpO2 95%.  Physical Exam  Constitutional:       General: He is not in acute distress.  HENT:      Head: Normocephalic and atraumatic.      Nose: Nose normal.      Mouth/Throat:      Mouth: Mucous membranes are moist.   Eyes:      General: No scleral icterus.  Cardiovascular:      Rate and Rhythm: Normal rate.      Heart sounds: No murmur heard.     No gallop.   Pulmonary:      Effort: No respiratory distress.      Breath sounds: No stridor. No wheezing, rhonchi or rales.   Abdominal:      General: Abdomen is flat. Bowel sounds are normal. There is no distension.      Palpations: Abdomen is soft.      Tenderness: There is no guarding.   Musculoskeletal:      Cervical back: Normal range of motion.      Right lower leg: No edema.      Left lower leg: No edema.   Skin:     General: Skin is dry.   Neurological:      General: No focal deficit present.      Mental Status: He is oriented to person, place, and time.         Results:   Lab Results   Component Value Date    WBC 12.3 (H) 2024    HGB 9.1 (L) 2024    HCT 28.2 (L) 2024     .0 (L) 07/05/2024    CREATSERUM 1.26 07/05/2024    BUN 24 (H) 07/05/2024     07/05/2024    K 4.5 07/05/2024     07/05/2024    CO2 22.0 07/05/2024     (H) 07/05/2024    CA 8.3 (L) 07/05/2024    ALB 3.7 07/05/2024    ALKPHO 34 (L) 07/05/2024    BILT 0.7 07/05/2024    TP 5.8 07/05/2024    AST 31 07/05/2024    ALT <7 (L) 07/05/2024    PTT 31.4 07/03/2024    INR 1.45 (H) 07/03/2024    T4F 1.1 06/11/2024    TSH 9.211 (H) 05/08/2024     08/11/2015    PSA 0.39 06/14/2023    ESRML 23 (H) 06/24/2024    CRP <0.29 02/24/2020    MG 2.1 07/04/2024    CK 76 06/24/2024       XR CHEST AP PORTABLE  (CPT=71045)    Result Date: 7/4/2024  CONCLUSION:   Lines and tubes in place as described.  Trace pulmonary vascular redistribution without edema, unchanged.    Dictated by (CST): Ton Titus MD on 7/04/2024 at 9:16 AM     Finalized by (CST): Ton Titus MD on 7/04/2024 at 9:17 AM          XR CHEST AP PORTABLE  (CPT=71045)    Result Date: 7/3/2024  CONCLUSION:  1.  Post CABG. 2.  Post Gilbert-Sandi catheter placement with tip in the pulmonary outflow.  The distal end of the tube is slightly coiled on the view obtained.  Recommend adjustment in position.  Other lines and tubes are in gross satisfactory/stable position. 3.  Trace/small left pleural effusion.  Left basal pulmonary opacity most compatible with atelectasis.   Exam discussed with Jacquelin Castillo on 7/3/24 at 2:00 p.m.   Dictated by (CST): Gregory Stratton MD on 7/03/2024 at 1:55 PM     Finalized by (CST): Gregory Stratton MD on 7/03/2024 at 2:02 PM         EKG 12 Lead    Result Date: 7/5/2024  Atrial fibrillation with rapid ventricular response Incomplete right bundle branch block Abnormal ECG When compared with ECG of 04-JUL-2024 05:10, Atrial fibrillation has replaced Sinus rhythm Vent. rate has increased BY  62 BPM Incomplete right bundle branch block is now Present    EKG 12 Lead    Result Date: 7/5/2024  Normal sinus rhythm Nonspecific T wave  abnormality Abnormal ECG When compared with ECG of 03-JUL-2024 13:55, ST no longer depressed in Anterior leads    EKG 12 Lead    Result Date: 7/3/2024  Normal sinus rhythm Low voltage QRS, consider pulmonary disease, pericardial effusion, or normal variant Nonspecific ST abnormality Abnormal ECG When compared with ECG of 02-JUL-2024 17:54, Vent. 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

## 2024-07-05 NOTE — DIETARY NOTE
NUTRITION EDUCATION NOTE     Received consult for cardiac nutrition education. Verbally reviewed basic cardiac diet restrictions. Provided with Eating Heart-Healthy and NCM handout to reinforce. Receptive to instruction. Would benefit from outpt f/u. Family member present for visit. Expect fair compliance. RD contact information provided to pt.        Yamini Dozier RD, LDN  Clinical Dietitian  P: [phone]

## 2024-07-05 NOTE — DISCHARGE PLANNING
referrals started in [place].  Needs list for choice.    Morenita ENGLANDA BSN RN CRRN   RN Case Manager  [phone]

## 2024-07-05 NOTE — PAYOR COMM NOTE
ASKING FOR RECONSIDERATION INPT  PT HAD CATH ON 7/2 OPIB  WENT FOR CABG ON 7/3 MADE INPT    ADMISSION REVIEW     Payor: [employer]  Subscriber #:  [id number]  Authorization Number: [id number]   and   [id number]    Admit date: 7/3/24  Admit time:  6:58 AM       REVIEW DOCUMENTATION:  ED Provider Notes    No notes of this type exist for this encounter.         MEDICATIONS ADMINISTERED IN LAST 1 DAY:  amiodarone (Cordarone) 450 mg in dextrose 5% 250 mL infusion       Date Action Dose Route User    7/5/2024 1029 New Bag 1 mg/min Intravenous Luis Prather RN          amiodarone (Cordarone) 150 mg in dextrose 5% 100 mL IV bolus       Date Action Dose Route User    7/5/2024 1018 New Bag 150 mg Intravenous Luis Prather RN          ascorbic acid (Vitamin C) tab 500 mg       Date Action Dose Route User    7/5/2024 0850 Given 500 mg Oral Luis Prather RN    7/4/2024 2034 Given 500 mg Oral Debra Xiao RN          aspirin DR tab 81 mg       Date Action Dose Route User    7/5/2024 0850 Given 81 mg Oral Luis Prather RN    7/4/2024 1514 Given 81 mg Oral Candace Briseno RN          chlorhexidine gluconate (Peridex) 0.12 % oral solution 15 mL       Date Action Dose Route User    7/5/2024 0850 Given 15 mL Mouth/Throat Luis Prather RN    7/4/2024 2035 Given 15 mL Mouth/Throat Debra Xiao RN          clopidogrel (Plavix) tab 75 mg       Date Action Dose Route User    7/5/2024 0850 Given 75 mg Oral Luis Prather RN    7/4/2024 1515 Given 75 mg Oral Candace Briseno RN          docusate sodium (Colace) cap 100 mg       Date Action Dose Route User    7/5/2024 0850 Given 100 mg Oral Luis Prather RN    7/4/2024 2035 Given 100 mg Oral Debra Xiao RN          enoxaparin (Lovenox) 40 MG/0.4ML SUBQ injection 40 mg       Date Action Dose Route User    7/5/2024 0858 Given 40 mg Subcutaneous (Left Lower Abdomen) Prather, Luis, RN          famotidine (Pepcid) tab 20 mg       Date Action Dose Route User    7/5/2024 0850 Given 20 mg Oral  Prather, Luis, RN          HYDROcodone-acetaminophen (Norco) 5-325 MG per tab 1 tablet       Date Action Dose Route User    7/5/2024 0900 Given 1 tablet Oral Luis Prather RN    7/5/2024 0350 Given 1 tablet Oral Debra Xiao RN    7/4/2024 2034 Given 1 tablet Oral Debra Xiao RN    7/4/2024 1640 Given 1 tablet Oral Candace Briseno RN          insulin aspart (NovoLOG) 100 Units/mL FlexPen 1-7 Units       Date Action Dose Route User    7/5/2024 1224 Given 3 Units Subcutaneous (Left Upper Arm) Luis Prather RN    7/4/2024 1848 Given 1 Units Subcutaneous (Right Upper Arm) Candace Briseno RN          insulin degludec (Tresiba) 100 units/mL flextouch 45 Units       Date Action Dose Route User    7/5/2024 0858 Given 45 Units Subcutaneous (Left Lower Abdomen) Luis Prather RN    7/4/2024 1515 Given 45 Units Subcutaneous (Right Upper Arm) Candace Briseno RN          metoclopramide (Reglan) 5 mg/mL injection 10 mg       Date Action Dose Route User    7/5/2024 1340 Given 10 mg Intravenous Luis Prather RN    7/5/2024 0615 Given 10 mg Intravenous Debra Xiao RN    7/5/2024 0100 Given 10 mg Intravenous Debra Xiao RN    7/4/2024 1900 Given 10 mg Intravenous Candace Briseno RN          metoprolol tartrate (Lopressor) tab 25 mg       Date Action Dose Route User    7/5/2024 0615 Given 25 mg Oral Debra Xiao RN          mupirocin (Bactroban) 2% nasal ointment 1 Application       Date Action Dose Route User    7/5/2024 0850 Given 1 Application Nasal (Bilateral Nares) Luis Prather RN    7/4/2024 2035 Given 1 Application Nasal (Bilateral Nares) Debra Xiao RN          norepinephrine (Levophed) 4 mg/250mL infusion premix       Date Action Dose Route User    7/5/2024 1427 Rate/Dose Change 3 mcg/min Intravenous Luis Prather RN    7/5/2024 1412 Rate/Dose Change 2 mcg/min Intravenous Luis Prather RN    7/5/2024 0942 Restarted 1 mcg/min Intravenous Luis Prather RN    7/5/2024 0000 Rate/Dose Change 0.5 mcg/min  Intravenous Debra Xiao RN    7/4/2024 1658 Rate/Dose Change 1 mcg/min Intravenous Candace Briseno RN    7/4/2024 1531 Rate/Dose Change 2 mcg/min Intravenous Candace Briseno RN    7/4/2024 1512 Rate/Dose Change 1 mcg/min Intravenous Candace Briseno RN          ondansetron (Zofran) 4 MG/2ML injection 4 mg       Date Action Dose Route User    7/4/2024 2056 Given 4 mg Intravenous Debra Xiao RN          sennosides (Senokot) tab 8.6 mg       Date Action Dose Route User    7/5/2024 0850 Given 8.6 mg Oral Luis Prather RN    7/4/2024 2035 Given 8.6 mg Oral Debra Xiao RN          multivitamin (Tab-A-Ari/Beta Carotene) tab 1 tablet       Date Action Dose Route User    7/5/2024 0850 Given 1 tablet Oral Luis Prather RN            Vitals (last day)       Date/Time Temp Pulse Resp BP SpO2 Weight O2 Device O2 Flow Rate (L/min) [facility]    07/05/24 1400 -- 110 18 82/55 95 % -- None (Room air) -- [place]    07/05/24 1300 -- 94 23 90/61 95 % -- None (Room air) -- [place]    07/05/24 1200 98.1 °F (36.7 °C) 108 19 95/70 97 % -- None (Room air) -- [place]    07/05/24 1100 -- 113 21 83/56 93 % -- None (Room air) -- [place] 07/05/24 1029 -- 114 20 98/54 93 % -- None (Room air) -- [place] 07/05/24 1018 -- 121 21 112/63 95 % -- None (Room air) -- [place]    07/05/24 1000 -- 120 25 82/63 95 % -- None (Room air) -- [place]    07/05/24 0900 -- 132 24 84/64 94 % -- None (Room air) -- [place]    07/05/24 0800 98.5 °F (36.9 °C) 87 21 101/58 98 % -- None (Room air) -- [place]    07/05/24 0700 -- 82 22 106/63 96 % -- None (Room air) -- [place]    07/05/24 0600 -- 86 18 119/80 98 % 172 lb 9.9 oz (78.3 kg) None (Room air) -- [place]    07/05/24 0500 -- 79 18 121/62 95 % -- None (Room air) -- AS    07/05/24 0400 98.8 °F (37.1 °C) 88 20 99/84 96 % -- None (Room air) -- AS    07/05/24 0300 -- 80 20 113/66 95 % -- -- -- AS    07/05/24 0200 -- 79 22 93/57 95 % -- None (Room air) -- AS    07/05/24 0100 -- 77 19 111/61 96 % -- -- -- AS    07/05/24 0000 98.7 °F (37.1 °C) 78 21 116/57 95 %  -- None (Room air) -- AS    07/04/24 2300 -- 76 20 104/59 95 % -- -- -- AS    07/04/24 2200 -- 79 20 111/60 95 % -- -- -- AS    07/04/24 2100 -- 82 18 117/68 97 % -- None (Room air) -- AS    07/04/24 2000 99.6 °F (37.6 °C) 79 21 92/63 97 % -- None (Room air) -- AS    07/04/24 1900 -- 79 21 100/62 96 % -- -- -- SM    07/04/24 1800 -- 79 23 102/57 97 % -- -- -- SM    07/04/24 1700 99.4 °F (37.4 °C) 75 22 100/57 96 % -- None (Room air) -- SM    07/04/24 1600 99.6 °F (37.6 °C) 70 22 101/57 98 % -- None (Room air) -- SM    07/04/24 1500 99.1 °F (37.3 °C) 65 24 108/60 98 % -- None (Room air) -- SM    07/04/24 1400 98.7 °F (37.1 °C) 69 18 88/59 98 % -- None (Room air) -- SM    07/04/24 1300 98.8 °F (37.1 °C) 67 20 87/55 96 % -- None (Room air) -- SM    07/04/24 1233 98.6 °F (37 °C) 69 21 -- 97 % -- -- -- SM    07/04/24 1215 98.5 °F (36.9 °C) -- -- 112/53 -- -- -- -- SM    07/04/24 1200 98.3 °F (36.8 °C) 71 18 110/54 97 % -- None (Room air) -- SM    07/04/24 1100 98.4 °F (36.9 °C) 68 19 94/54 97 % -- None (Room air) -- SM    07/04/24 1059 -- -- -- -- -- -- None (Room air)  -- SS    07/04/24 1000 98.3 °F (36.8 °C) 68 19 113/56 99 % -- High flow nasal cannula 1 L/min SM    07/04/24 0900 98.1 °F (36.7 °C) 69 24 113/54 99 % -- High flow nasal cannula 1 L/min SM    07/04/24 0800 98.6 °F (37 °C) 68 21 101/56 99 % -- High flow nasal cannula 3 L/min SM    07/04/24 0700 98.6 °F (37 °C) 72 22 92/50 98 % -- High flow nasal cannula 3 L/min AW    07/04/24 0600 98.9 °F (37.2 °C) 64 20 106/60 99 % 166 lb 7.2 oz (75.5 kg) High flow nasal cannula 3 L/min AW    07/04/24 0500 99 °F (37.2 °C) 70 19 109/59 100 % -- High flow nasal cannula 3 L/min AW    07/04/24 0400 99 °F (37.2 °C) 68 20 118/59 100 % -- High flow nasal cannula 3 L/min AW    07/04/24 0330 -- 63 18 -- 100 % -- -- -- AW    07/04/24 0322 -- 64 16 -- 100 % -- -- -- AW    07/04/24 0300 98.9 °F (37.2 °C) 69 18 103/60 100 % -- High flow nasal cannula 3 L/min AW    07/04/24 0200 98.6 °F  (37 °C) 73 23 124/62 99 % -- High flow nasal cannula 3 L/min AW    07/04/24 0100 98.7 °F (37.1 °C) 76 21 116/63 100 % -- High flow nasal cannula 3 L/min AW    07/04/24 0000 98.8 °F (37.1 °C) 81 20 109/59 98 % -- High flow nasal cannula 3 L/min AW         Blood Transfusion Record       Product Unit Status Volume Start End            Transfuse RBC       24  [id number]  [id number] Completed 07/04/24 1655 340 mL 07/04/24 1218 07/04/24 1400              7/2 CARDIOTHORACIC SURGERY NOTE    Date of Admission:  7/2/2024  Date of Consult:  7/2/2024     Reason for Consultation:  Multivessel coronary artery disease     History of Present Illness:  Giuseppe Faustin is a a(n) 78 year old male. Has been experiencing worsening dyspnea on exertion over the last 6 months, now can only walk 1-2  blocks before becoming dyspneic.  No chest pain, no chest pressure, no discomfort, nausea or emesis.  Underwent calcium CT and abnormal at 1200.  Stress test abnormal and angiography recommended, performed today, 95% left main stenosis and 70% mid RCA stenosis.  We are asked to evaluate for surgical coronary revascularization options.  Wife and daughter are present at bedside.     Takes ASA, no other anticoagulation and he did not receive any anticoagulation during angiogram today.  No history of varicose veins or varicose vein procedures.       Physical Exam:  Blood pressure 152/69, pulse 57, temperature 97.6 °F (36.4 °C), temperature source Temporal, resp. rate 18, height 5' 3\" (1.6 m), weight 165 lb (74.8 kg), SpO2 99%.     GENERAL: No acute distress.  VITAL SIGNS: See above.  HEENT: Trachea midline.  No jugular venous distention.  No carotid bruit.  CHEST: Chest wall is nontender.  HEART: Regular rate and rhythm without murmurs.  LUNGS: Clear to auscultation bilaterally.  ABDOMEN: Soft, nontender nondistended.  VASCULAR: Palpable radial artery pulses 2+ bilateral.  SKIN: No rash, no excessive bruising, petechiae, or  purpura.  NEUROLOGIC: Cranial nerves II-XII intact without motor/sensory deficit.        Laboratory Data:  WBC 6.6, hematocrit 37, Plt 236, Cr 1.25       Cardiologist: David Hyman MD  Primary Proceduralist: David Hyman MD  Procedure Performed: LHC, COR, and LV  Date of Procedure: 7/2/2024   Indication: Abnormal stress test     Left Ventriculography and hemodynamics:   LV EF not done  LV EDP 12 mmHg  No gradient across aortic valve      Coronary Angiography  RCA:  Dominant with mid 70% diffuse irregular stenosis, supplies large PDA and PL territory which is nonobstructive     Left main: Distal left main stenosis 95% into the bifurcation where there is high takeoff of diagonal and marginal branch resulting in 4 vessels close to the bifurcation of the left main     Left anterior descending: Distal left main stenosis likely extending to the ostium of the LAD and the LAD otherwise free of obstructive disease, supplies multiple diagonals which are non-obstructive     Circumflex: Distal left main stenosis extends into the ostium and the proximal circumflex, otherwise circumflex is free of obstructive disease, supplies multiple OM branches which are patent     ECHO 6/11/24 - EF 63%, trace AI, trivial MR, trivial TR, mild LVH, no LV wall motion abnormalities        Impression and Plan:      Patient Active Problem List   Diagnosis    Vocal cord paralysis    Hyperthyroidism    Type 2 diabetes mellitus with stage 3a chronic kidney disease, without long-term current use of insulin ([facility])    CKD (chronic kidney disease) stage 3, GFR 30-59 ml/min ([facility])    Gastroesophageal reflux disease without esophagitis    Hyperlipidemia associated with type 2 diabetes mellitus ([facility])    Hypertension associated with type 2 diabetes mellitus ([facility])    Age-related cataract of right eye    Moderate persistent asthma without complication ([facility])    Coronary artery disease involving native coronary artery of native heart without angina pectoris     Primary osteoarthritis of both knees         78 year old male abnormal stress test, multivessel coronary artery disease, left main stenosis     Patient will  benefit from surgical revascularization.  I had a long discussion with the patient, wife, and daughter.  Using pictures, I explained the nature his heart disease.  I explained his current disease.  I explained treatement options of medical management, high risk PCI and surgical revascularization.  I explained the operation, median sternotomy, use of cardiopulmonary bypass machine, and cardiac arrest.  I explained the bypass operation, conduit selection, long term patency rates of mammary artery and reverse saphenous vein.  We discussed benefits. We discussed risks including but not limited to: bleeding, coagulopathy, transfusion (to which he agrees to accept after discussing risks of transfusion), infection, sternal dehiscence, prolonged ventilation, myocardial infarction, stroke, arrhythmia, atrial fibrillation, kidney injury, dialysis, multisystem organ dysfunction, imponderables and death.       Given left main stenosis 95%, will admit today and plan for CABG tomorrow 700AM.  Will check carotid duplex to complete preop workup.  Discussed with Dr. Hyman.  Case has been scheduled.       Patient and family questions answered to their satisfaction.    7/3 CARDIOTHORACIC OP NOTE    Patient Name: Giuseppe Faustin     Preoperative Diagnosis: Atherosclerosis of native coronary artery of native heart with other form of angina pectoris (HCC) [I25.118]     Anginal equivalent  Multivessel coronary artery disease with critical left main stenosis  Paroxysmal atrial fibrillation     Postoperative Diagnosis: Atherosclerosis of native coronary artery of native heart with other form of angina pectoris (HCC) [I25.118]     Anginal equivalent  Multivessel coronary artery disease with critical left main stenosis  Paroxysmal atrial fibrillation     Surgeons and Role:  Panel 1:      * Brayan Castle MD - Primary     Assistant: PA: Harry Cespedes PA     Procedures:   Median sternotomy  Urgent coronary artery bypass grafting x 3  Left internal mammary artery to left anterior descending artery  Reverse saphenous vein graft to diagonal artery  Reverse saphenous vein graft to posterior descending artery  Endoscopic vein harvesting, greater saphenous vein, left lower extremity  Placement of 40 mm atrial clip  Placement of Damian catheter by Dr. Blanchard     Indication:  78-year-old male with evidence of dyspnea exertion and increased fatigue over the last 6 months, progressively worsening.  Coronary CT demonstrated elevated calcium score and stress test is abnormal leading to angiography yesterday demonstrating multivessel coronary artery disease with left main 95% critical stenosis.  Patient was admitted for expedited workup and surgical intervention.  I long discussion with the patient regarding alternatives, benefits and risks of surgery, he consents for surgery today.    7/3 HOSPITALIST NOTE    Assessment & Plan:  ----------------------------------  CAD. S/p CABG x 3 on 7/3. Doing well.   -Vent support  -Pain control  -CV surgery and cardiology following  -Antiplatelets: aspirin and plavix  -Continue beta-blocker and statin  -Diuretics: per CVS  -Lines, damian, chest tubes, pacer wires per CV surg  -Activity as tolerated  -Precedex, insulin gtt     Bladder neck contracture.  Patient seen by urology in the OR.  Cystoscopy assisted Damian catheter placement.  History of TURP  -Keep Damian for duration of hospitalization  -Likely keep Damian after discharge as well  -Outpatient urology follow-up     Other problems  Hypertension  Dyslipidemia  GERD  Obstructive sleep apnea  Osteoarthritis  Type 2 diabetes  History of kidney stones  Essential tremor  CKD stage II     dvt prophylaxis: sc lovenox  code status: full code    7/4 PULMONARY NOTE       Subjective:   Patient seen and examined.  Resting in  bed.  Pain well-controlled.  Denies significant dyspnea     Objective:   Blood pressure 92/50, pulse 72, temperature 98.6 °F (37 °C), temperature source Pulmonary Artery, resp. rate 22, height 5' 3\" (1.6 m), weight 166 lb 7.2 oz (75.5 kg), SpO2 98%.  Intake/Output:                 Last 3 shifts: I/O last 3 completed shifts:  In: 5407.7 [P.O.:60; I.V.:4022.7; Blood:500; Other:700; IV PIGGYBACK:125]  Out: 6520 [Urine:6030; Chest Tube:490]                 This shift: No intake/output data recorded.               Vent Settings: Vent Mode: CPAP;PS  FiO2 (%):  [30 %-80 %] 40 %  S RR:  [10] 10  S VT:  [500 mL] 500 mL  PEEP/CPAP (cm H2O):  [5 cm H20] 5 cm H20  MAP (cm H2O):  [7] 7     Hemodynamic parameters (last 24 hours): PAP: (15-29)/(5-17) 25/5  CO:  [2.9 L/min-4.7 L/min] 3.9 L/min  CI:  [1.6 L/min/m2-2.7 L/min/m2] 2.2 L/min/m2       Continuous Infusions:   Medication Infusions    sodium chloride 10 mL/hr at 07/03/24 1406    dexmedetomidine Stopped (07/03/24 1608)    DOBUTamine 1.5 mcg/kg/min (07/04/24 0600)    nitroGLYCERIN in dextrose 5% Stopped (07/03/24 1800)    norepinephrine 3 mcg/min (07/04/24 0700)    clevidipine Stopped (07/03/24 1655)    dextrose 5%-sodium chloride 0.45% 40 mL/hr (07/04/24 0600)    insulin regular 3 Units/hr (07/04/24 0700)    amiodarone 0.5 mg/min (07/04/24 0600)            Physical Exam  Constitutional: no acute distress  Eyes: PERRL  ENT: nares pateint  Neck: supple, no JVD  Cardio: RRR, S1 S2  Respiratory: Faint basilar crackles  GI: abdomen soft, non tender, active bowel sounds, no organomegaly  Extremities: no clubbing, cyanosis, edema  Neurologic: no gross motor deficits  Skin: warm, dry        Results:            Lab Results   Component Value Date     WBC 12.0 07/04/2024     HGB 8.5 07/04/2024     HCT 25.6 07/04/2024     .0 07/04/2024     CREATSERUM 1.13 07/04/2024     BUN 13 07/04/2024      07/04/2024     K 4.3 07/04/2024      07/04/2024     CO2 20.0 07/04/2024       07/04/2024     CA 7.7 07/04/2024     PTT 31.4 07/03/2024     INR 1.45 07/03/2024     MG 2.1 07/04/2024         XR CHEST AP PORTABLE  (CPT=71045)     Result Date: 7/3/2024  CONCLUSION:         1.  Post CABG. 2.  Post [place] catheter placement with tip in the pulmonary outflow.  The distal end of the tube is slightly coiled on the view obtained.  Recommend adjustment in position.  Other lines and tubes are in gross satisfactory/stable position. 3.  Trace/small left pleural effusion.  Left basal pulmonary opacity most compatible with atelectasis.   Exam discussed with Jacquelin Castillo on 7/3/24 at 2:00 p.m.   Dictated by (CST): Gregory Stratton MD on 7/03/2024 at 1:55 PM     Finalized by (CST): Gregory Stratton MD on 7/03/2024 at 2:02 PM             Assessment   1.  POD #1 status post CABG x 3  2.  Severe coronary disease  3.  Hypertension  4.  Hyperlipidemia  5.  Diabetes mellitus      Plan   -Patient presents for scheduled CABG x 3 on 7/3/2024.  -Postoperative chest x-ray with stable findings  -Tolerating extubation  -Up in chair.  Incentive spirometry  -Aspirin, Plavix, beta-blocker, statin  -Monitor chest tube output  -Weanl norepinephrine and dobutamine  -Wean insulin gtt.  -Close hemodynamic monitoring  -DVT prophylaxis: SCDs, Lovenox    7/5 CARDIOTHORACIC SURGERY NOTE  CV Surgery     Up to chair this AM and went into Afib RVR, HR 130s, SBP 100s  Patient asymptomatic  Weaned off dobutamine and leovophed over last 24 hours     CT 300cc last 24 hours no airleak  UOP 1.5L last 24 hours     AAOx3  CTAB  RRR  Abdomen soft           Recent Labs   Lab 07/03/24  1251 07/04/24  0410 07/05/24  0746   * 130* 162*   BUN 11 13 24*   CREATSERUM 0.89 1.13 1.26   EGFRCR 87 66 58*   CA 7.1* 7.7* 8.3*    141 138   K 4.6 4.3 4.5   * 112 108   CO2 22.0 20.0* 22.0            Recent Labs   Lab 07/03/24  1251 07/04/24  0410 07/05/24  0746   RBC 3.74* 3.07* 3.22*   HGB 10.5* 8.5* 9.1*   HCT 31.2* 25.6* 28.2*   MCV  83.4 83.4 87.6   MCH 28.1 27.7 28.3   MCHC 33.7 33.2 32.3   RDW 17.2* 17.2* 17.9*   NEPRELIM  --  10.13* 9.27*   WBC 13.5* 12.0* 12.3*   .0* 114.0* 113.0*         PLAN:  Amiodarone bolus and infusion for paroxysmal atrial fibrillation  ASA, statin, plavix  Started on metoprolol  Keep cordis, a-line, chest tubes today  Delong will stay in at discharge per urology and he will follow up with urology as outpatient     Brayan Castle MD  Cardiothoracic Surgery

## 2024-07-05 NOTE — PROGRESS NOTES
Progress Note     Giuseppe Faustin Patient Status:  Inpatient    1945 MRN [id number]   Location [facility] 2W/SW Attending Sarita Franklin MD   Hosp Day # 2 PCP Joshua Faustin MD       Subjective:   S: Overnight pt weaned off Levophed, MAPs maintaining.   Patient endorsing pleuritic chest pressure  this morning.   Getting lightheaded with movement, went into Afib.       Review of Systems:   10 point ROS completed and was negative, except for pertinent positive and negatives stated in subjective.    Objective:   Vital signs:  Temp:  [98.5 °F (36.9 °C)-99.6 °F (37.6 °C)] 98.5 °F (36.9 °C)  Pulse:  [] 108  Resp:  [18-25] 19  BP: ()/(54-84) 95/70  SpO2:  [93 %-98 %] 97 %  AO: ()/(41-67) 112/63    Wt Readings from Last 6 Encounters:   24 172 lb 9.9 oz (78.3 kg)   24 165 lb (74.8 kg)   24 170 lb (77.1 kg)   24 170 lb (77.1 kg)   24 169 lb (76.7 kg)   24 171 lb (77.6 kg)         Physical Exam:    General: No acute distress. Alert ,         Respiratory: Clear to auscultation bilaterally. No wheezes. No rhonchi.  Chest: chest tuibe in place   Cardiovascular: RRR  Abdomen: Soft, nontender, nondistended.    Neurologic: No focal neurological deficits.   Musculoskeletal: Moves all extremities.  Extremities: No edema.    Results:   Diagnostic Data:      Labs:    Labs Last 24 Hours:   BMP     CBC    Other     Na 138 Cl 108 BUN 24 Glu 162   Hb 9.1   PTT - Procal -   K 4.5 CO2 22.0 Cr 1.26   WBC 12.3 >< .0  INR - CRP -   Renal Lytes Endo    Hct 28.2   Trop - D dim -   eGFR - Ca 8.3 POC Gluc  213    LFT   pBNP - Lactic -   eGFR AA - PO4 - A1c -   AST 31 APk 34 Prot 5.8  LDL -     Mg - TSH -   ALT <7 T denton 0.7 Alb 3.7        COVID-19 Lab Results    COVID-19  Lab Results   Component Value Date    COVID19 Not Detected 2021    COVID19 Not Detected 2020    COVID19 Not Detected 10/05/2020       Pro-Calcitonin  No results for input(s): \"PCT\" in the  last 168 hours.    Cardiac  No results for input(s): \"TROP\", \"PBNP\" in the last 168 hours.    Creatinine Kinase  No results for input(s): \"CK\" in the last 168 hours.    Inflammatory Markers  No results for input(s): \"CRP\", \"OMAIRA\", \"LDH\", \"DDIMER\" in the last 168 hours.    Imaging: Imaging data reviewed in Epic.    Medications:    insulin degludec  45 Units Subcutaneous Daily    insulin aspart  1-7 Units Subcutaneous TID CC    sennosides  8.6 mg Oral BID    docusate sodium  100 mg Oral BID    metoclopramide  10 mg Intravenous Q6H    famotidine  20 mg Oral Daily    Or    famotidine  20 mg Intravenous Daily    metoprolol tartrate  25 mg Oral 2x Daily(Beta Blocker)    mupirocin  1 Application Nasal BID    chlorhexidine gluconate  15 mL Mouth/Throat BID    multivitamin  1 tablet Oral Daily    ascorbic acid  500 mg Oral TID    enoxaparin  40 mg Subcutaneous Daily    clopidogrel  75 mg Oral Daily    aspirin  81 mg Oral Daily    [Transfer Hold] melatonin  10 mg Oral Nightly    [Transfer Hold] atorvastatin  10 mg Oral Nightly    [Transfer Hold] primidone  50 mg Oral BID       Assessment & Plan:   ASSESSMENT / PLAN:     CAD. S/p CABG x 3 on 7/3. Doing well.  Edi extubation  -Pain control  -CV surgery and cardiology following  -Antiplatelets: aspirin and plavix  -Continue beta-blocker and statin  -Diuretics: per CVS  -Lines, damian, chest tubes, pacer wires per CV surg  -Activity as tolerated  -pressors, insulin gtt, wean off     #Paroxysmal Afib  - cardiology following, pt started on amiodarone gtt  - cont to monitor    Bladder neck contracture.  Patient seen by urology in the OR.  Cystoscopy assisted Damian catheter placement.  History of TURP  -Keep Damian for duration of hospitalization  -Keep Damian after discharge as well (d/w Dr Blanchard)  -Outpatient urology follow-up     Other problems  Hypertension  Dyslipidemia  GERD  Obstructive sleep apnea  Osteoarthritis  Type 2 diabetes  History of kidney stones  Essential tremor  CKD  stage II     dvt prophylaxis: sc lovenox  code status: full code  dispo: anticipate home with HH once medically optimized         MDM: High complexity- severe exacerbation of chronic illness posing a threat to life. IV meds requiring close inpatient monitoring.         Sarita Franklin MD    Supplementary Documentation:

## 2024-07-06 ENCOUNTER — APPOINTMENT (OUTPATIENT)
Dept: GENERAL RADIOLOGY | Facility: HOSPITAL | Age: 79
End: 2024-07-06
Attending: THORACIC SURGERY (CARDIOTHORACIC VASCULAR SURGERY)
Payer: MEDICARE

## 2024-07-06 ENCOUNTER — APPOINTMENT (OUTPATIENT)
Dept: GENERAL RADIOLOGY | Facility: HOSPITAL | Age: 79
End: 2024-07-06
Attending: INTERNAL MEDICINE
Payer: MEDICARE

## 2024-07-06 PROBLEM — K92.0 COFFEE GROUND EMESIS: Status: ACTIVE | Noted: 2024-07-06

## 2024-07-06 LAB
ALBUMIN SERPL-MCNC: 3.3 G/DL (ref 3.2–4.8)
ALBUMIN/GLOB SERPL: 1.7 {RATIO} (ref 1–2)
ALP LIVER SERPL-CCNC: 32 U/L
ALT SERPL-CCNC: <7 U/L
ANION GAP SERPL CALC-SCNC: 7 MMOL/L (ref 0–18)
ANTIBODY SCREEN: NEGATIVE
APTT PPP: 33.7 SECONDS (ref 23–36)
AST SERPL-CCNC: 22 U/L (ref ?–34)
BASOPHILS # BLD AUTO: 0.01 X10(3) UL (ref 0–0.2)
BASOPHILS # BLD AUTO: 0.01 X10(3) UL (ref 0–0.2)
BASOPHILS NFR BLD AUTO: 0.1 %
BASOPHILS NFR BLD AUTO: 0.1 %
BILIRUB SERPL-MCNC: 0.6 MG/DL (ref 0.2–1.1)
BLOOD TYPE BARCODE: 5100
BUN BLD-MCNC: 39 MG/DL (ref 9–23)
BUN/CREAT SERPL: 33.9 (ref 10–20)
CALCIUM BLD-MCNC: 7.6 MG/DL (ref 8.7–10.4)
CHLORIDE SERPL-SCNC: 108 MMOL/L (ref 98–112)
CO2 SERPL-SCNC: 21 MMOL/L (ref 21–32)
CREAT BLD-MCNC: 1.15 MG/DL
DEPRECATED RDW RBC AUTO: 50.6 FL (ref 35.1–46.3)
DEPRECATED RDW RBC AUTO: 58.3 FL (ref 35.1–46.3)
EGFRCR SERPLBLD CKD-EPI 2021: 65 ML/MIN/1.73M2 (ref 60–?)
EOSINOPHIL # BLD AUTO: 0 X10(3) UL (ref 0–0.7)
EOSINOPHIL # BLD AUTO: 0.01 X10(3) UL (ref 0–0.7)
EOSINOPHIL NFR BLD AUTO: 0 %
EOSINOPHIL NFR BLD AUTO: 0.1 %
ERYTHROCYTE [DISTWIDTH] IN BLOOD BY AUTOMATED COUNT: 17.2 % (ref 11–15)
ERYTHROCYTE [DISTWIDTH] IN BLOOD BY AUTOMATED COUNT: 18.5 % (ref 11–15)
GLOBULIN PLAS-MCNC: 2 G/DL (ref 2–3.5)
GLUCOSE BLD-MCNC: 182 MG/DL (ref 70–99)
GLUCOSE BLDC GLUCOMTR-MCNC: 138 MG/DL (ref 70–99)
GLUCOSE BLDC GLUCOMTR-MCNC: 168 MG/DL (ref 70–99)
GLUCOSE BLDC GLUCOMTR-MCNC: 175 MG/DL (ref 70–99)
GLUCOSE BLDC GLUCOMTR-MCNC: 176 MG/DL (ref 70–99)
GLUCOSE BLDC GLUCOMTR-MCNC: 187 MG/DL (ref 70–99)
HCT VFR BLD AUTO: 21.4 %
HCT VFR BLD AUTO: 23 %
HCT VFR BLD AUTO: 25.2 %
HCT VFR BLD AUTO: 25.3 %
HGB BLD-MCNC: 7.4 G/DL
HGB BLD-MCNC: 7.4 G/DL
HGB BLD-MCNC: 7.9 G/DL
HGB BLD-MCNC: 8.7 G/DL
HGB BLD-MCNC: 9.1 G/DL
IMM GRANULOCYTES # BLD AUTO: 0.11 X10(3) UL (ref 0–1)
IMM GRANULOCYTES # BLD AUTO: 0.14 X10(3) UL (ref 0–1)
IMM GRANULOCYTES NFR BLD: 0.9 %
IMM GRANULOCYTES NFR BLD: 1.3 %
INR BLD: 1.25 (ref 0.8–1.2)
LYMPHOCYTES # BLD AUTO: 1.68 X10(3) UL (ref 1–4)
LYMPHOCYTES # BLD AUTO: 1.97 X10(3) UL (ref 1–4)
LYMPHOCYTES NFR BLD AUTO: 16 %
LYMPHOCYTES NFR BLD AUTO: 16.7 %
MCH RBC QN AUTO: 28.4 PG (ref 26–34)
MCH RBC QN AUTO: 29.6 PG (ref 26–34)
MCHC RBC AUTO-ENTMCNC: 32.2 G/DL (ref 31–37)
MCHC RBC AUTO-ENTMCNC: 36.1 G/DL (ref 31–37)
MCV RBC AUTO: 82.1 FL
MCV RBC AUTO: 88.1 FL
MONOCYTES # BLD AUTO: 1.51 X10(3) UL (ref 0.1–1)
MONOCYTES # BLD AUTO: 1.66 X10(3) UL (ref 0.1–1)
MONOCYTES NFR BLD AUTO: 14.1 %
MONOCYTES NFR BLD AUTO: 14.4 %
NEUTROPHILS # BLD AUTO: 7.13 X10 (3) UL (ref 1.5–7.7)
NEUTROPHILS # BLD AUTO: 7.13 X10(3) UL (ref 1.5–7.7)
NEUTROPHILS # BLD AUTO: 8.04 X10 (3) UL (ref 1.5–7.7)
NEUTROPHILS # BLD AUTO: 8.04 X10(3) UL (ref 1.5–7.7)
NEUTROPHILS NFR BLD AUTO: 68.1 %
NEUTROPHILS NFR BLD AUTO: 68.2 %
OSMOLALITY SERPL CALC.SUM OF ELEC: 296 MOSM/KG (ref 275–295)
PLATELET # BLD AUTO: 114 10(3)UL (ref 150–450)
PLATELET # BLD AUTO: 145 10(3)UL (ref 150–450)
POTASSIUM SERPL-SCNC: 4.8 MMOL/L (ref 3.5–5.1)
PROT SERPL-MCNC: 5.3 G/DL (ref 5.7–8.2)
PROTHROMBIN TIME: 16.5 SECONDS (ref 11.6–14.8)
RBC # BLD AUTO: 2.61 X10(6)UL
RBC # BLD AUTO: 3.07 X10(6)UL
RH BLOOD TYPE: POSITIVE
SODIUM SERPL-SCNC: 136 MMOL/L (ref 136–145)
UNIT VOLUME: 350 ML
WBC # BLD AUTO: 10.5 X10(3) UL (ref 4–11)
WBC # BLD AUTO: 11.8 X10(3) UL (ref 4–11)

## 2024-07-06 PROCEDURE — 99222 1ST HOSP IP/OBS MODERATE 55: CPT | Performed by: INTERNAL MEDICINE

## 2024-07-06 PROCEDURE — 99233 SBSQ HOSP IP/OBS HIGH 50: CPT | Performed by: INTERNAL MEDICINE

## 2024-07-06 PROCEDURE — 99233 SBSQ HOSP IP/OBS HIGH 50: CPT | Performed by: HOSPITALIST

## 2024-07-06 PROCEDURE — 71045 X-RAY EXAM CHEST 1 VIEW: CPT | Performed by: THORACIC SURGERY (CARDIOTHORACIC VASCULAR SURGERY)

## 2024-07-06 PROCEDURE — 74018 RADEX ABDOMEN 1 VIEW: CPT | Performed by: THORACIC SURGERY (CARDIOTHORACIC VASCULAR SURGERY)

## 2024-07-06 PROCEDURE — 74021 RADEX ABDOMEN 3+ VIEWS: CPT | Performed by: INTERNAL MEDICINE

## 2024-07-06 RX ORDER — COLCHICINE 0.6 MG/1
0.6 TABLET ORAL DAILY
Status: DISCONTINUED | OUTPATIENT
Start: 2024-07-06 | End: 2024-07-08

## 2024-07-06 RX ORDER — ONDANSETRON 2 MG/ML
4 INJECTION INTRAMUSCULAR; INTRAVENOUS EVERY 6 HOURS PRN
Status: DISCONTINUED | OUTPATIENT
Start: 2024-07-06 | End: 2024-07-11

## 2024-07-06 RX ORDER — FUROSEMIDE 10 MG/ML
INJECTION INTRAMUSCULAR; INTRAVENOUS
Status: COMPLETED
Start: 2024-07-06 | End: 2024-07-06

## 2024-07-06 RX ORDER — LIDOCAINE HYDROCHLORIDE 20 MG/ML
1 JELLY TOPICAL AS NEEDED
Status: DISCONTINUED | OUTPATIENT
Start: 2024-07-06 | End: 2024-07-11

## 2024-07-06 RX ORDER — SODIUM CHLORIDE 9 MG/ML
INJECTION, SOLUTION INTRAVENOUS ONCE
Status: COMPLETED | OUTPATIENT
Start: 2024-07-06 | End: 2024-07-06

## 2024-07-06 RX ORDER — FUROSEMIDE 10 MG/ML
20 INJECTION INTRAMUSCULAR; INTRAVENOUS ONCE
Status: COMPLETED | OUTPATIENT
Start: 2024-07-06 | End: 2024-07-06

## 2024-07-06 RX ADMIN — HYDROCODONE BITARTRATE AND ACETAMINOPHEN 1 TABLET: 5; 325 TABLET ORAL at 02:27:00

## 2024-07-06 RX ADMIN — SODIUM CHLORIDE: 9 INJECTION, SOLUTION INTRAVENOUS at 17:25:00

## 2024-07-06 RX ADMIN — METOCLOPRAMIDE HYDROCHLORIDE 10 MG: 5 INJECTION INTRAMUSCULAR; INTRAVENOUS at 05:36:00

## 2024-07-06 RX ADMIN — CHLORHEXIDINE GLUCONATE ORAL RINSE 15 ML: 1.2 SOLUTION DENTAL at 08:16:00

## 2024-07-06 RX ADMIN — METOCLOPRAMIDE HYDROCHLORIDE 10 MG: 5 INJECTION INTRAMUSCULAR; INTRAVENOUS at 12:03:00

## 2024-07-06 RX ADMIN — MORPHINE SULFATE 4 MG: 4 INJECTION, SOLUTION INTRAMUSCULAR; INTRAVENOUS at 14:37:00

## 2024-07-06 RX ADMIN — MORPHINE SULFATE 4 MG: 4 INJECTION, SOLUTION INTRAMUSCULAR; INTRAVENOUS at 11:24:00

## 2024-07-06 RX ADMIN — INSULIN DEGLUDEC 45 UNITS: 100 INJECTION, SOLUTION SUBCUTANEOUS at 08:18:00

## 2024-07-06 RX ADMIN — FUROSEMIDE 20 MG: 10 INJECTION INTRAMUSCULAR; INTRAVENOUS at 04:45:00

## 2024-07-06 RX ADMIN — Medication 25 MG: at 05:36:00

## 2024-07-06 RX ADMIN — METOCLOPRAMIDE HYDROCHLORIDE 10 MG: 5 INJECTION INTRAMUSCULAR; INTRAVENOUS at 18:08:00

## 2024-07-06 RX ADMIN — CHLORHEXIDINE GLUCONATE ORAL RINSE 15 ML: 1.2 SOLUTION DENTAL at 21:19:00

## 2024-07-06 RX ADMIN — COLCHICINE 0.6 MG: 0.6 TABLET ORAL at 05:18:00

## 2024-07-06 RX ADMIN — ONDANSETRON 4 MG: 2 INJECTION INTRAMUSCULAR; INTRAVENOUS at 23:45:00

## 2024-07-06 RX ADMIN — FUROSEMIDE 20 MG: 10 INJECTION INTRAMUSCULAR; INTRAVENOUS at 18:43:00

## 2024-07-06 RX ADMIN — METOCLOPRAMIDE HYDROCHLORIDE 10 MG: 5 INJECTION INTRAMUSCULAR; INTRAVENOUS at 01:30:00

## 2024-07-06 NOTE — PLAN OF CARE
Patient had coffee ground emesis early in the morning, HGB dropped from 9.1 to 7.4. patient got a unit of blood and responded with the recheck being 9.1. GI is holding off on the EGD because of possible Ileus. NG placed at bedside to low intermittent suction. NPO at the moment. Patient in Normal sinus but remains on IV amiodarone because of the NPO status.    Problem: Patient Centered Care  Goal: Patient preferences are identified and integrated in the patient's plan of care  Description: Interventions:  - What would you like us to know as we care for you?   - Provide timely, complete, and accurate information to patient/family  - Incorporate patient and family knowledge, values, beliefs, and cultural backgrounds into the planning and delivery of care  - Encourage patient/family to participate in care and decision-making at the level they choose  - Honor patient and family perspectives and choices  Outcome: Progressing     Problem: PAIN - ADULT  Goal: Verbalizes/displays adequate comfort level or patient's stated pain goal  Description: INTERVENTIONS:  - Encourage pt to monitor pain and request assistance  - Assess pain using appropriate pain scale  - Administer analgesics based on type and severity of pain and evaluate response  - Implement non-pharmacological measures as appropriate and evaluate response  - Consider cultural and social influences on pain and pain management  - Manage/alleviate anxiety  - Utilize distraction and/or relaxation techniques  - Monitor for opioid side effects  - Notify MD/LIP if interventions unsuccessful or patient reports new pain  - Anticipate increased pain with activity and pre-medicate as appropriate  Outcome: Progressing     Problem: SAFETY ADULT - FALL  Goal: Free from fall injury  Description: INTERVENTIONS:  - Assess pt frequently for physical needs  - Identify cognitive and physical deficits and behaviors that affect risk of falls.  - [place] fall precautions as indicated  by assessment.  - Educate pt/family on patient safety including physical limitations  - Instruct pt to call for assistance with activity based on assessment  - Modify environment to reduce risk of injury  - Provide assistive devices as appropriate  - Consider OT/PT consult to assist with strengthening/mobility  - Encourage toileting schedule  Outcome: Progressing     Problem: CARDIOVASCULAR - ADULT  Goal: Maintains optimal cardiac output and hemodynamic stability  Description: INTERVENTIONS:  - Monitor vital signs, rhythm, and trends  - Monitor for bleeding, hypotension and signs of decreased cardiac output  - Evaluate effectiveness of vasoactive medications to optimize hemodynamic stability  - Monitor arterial and/or venous puncture sites for bleeding and/or hematoma  - Assess quality of pulses, skin color and temperature  - Assess for signs of decreased coronary artery perfusion - ex. Angina  - Evaluate fluid balance, assess for edema, trend weights  Outcome: Progressing  Goal: Absence of cardiac arrhythmias or at baseline  Description: INTERVENTIONS:  - Continuous cardiac monitoring, monitor vital signs, obtain 12 lead EKG if indicated  - Evaluate effectiveness of antiarrhythmic and heart rate control medications as ordered  - Initiate emergency measures for life threatening arrhythmias  - Monitor electrolytes and administer replacement therapy as ordered  Outcome: Progressing     Problem: RESPIRATORY - ADULT  Goal: Achieves optimal ventilation and oxygenation  Description: INTERVENTIONS:  - Assess for changes in respiratory status  - Assess for changes in mentation and behavior  - Position to facilitate oxygenation and minimize respiratory effort  - Oxygen supplementation based on oxygen saturation or ABGs  - Provide Smoking Cessation handout, if applicable  - Encourage broncho-pulmonary hygiene including cough, deep breathe, Incentive Spirometry  - Assess the need for suctioning and perform as needed  - Assess  and instruct to report SOB or any respiratory difficulty  - Respiratory Therapy support as indicated  - Manage/alleviate anxiety  - Monitor for signs/symptoms of CO2 retention  Outcome: Progressing

## 2024-07-06 NOTE — PLAN OF CARE
Patient is alert and oriented. Requiring levophed intermittently throughout the shift. Around 0400, patient became very clammy and soaked through gown, stated \"its hard to breathe\" saturations and vitals remained stable throughout this time. STAT CXR showed small left apical pneumo, lasix 20mg given, and colchicine. Patient flipped out of afib to NSR around 0500. Urine averaging 30cc/hr.    0530 patient projectile vomited coffee ground emesis, hemoglobin this morning 7.4 from 9.2. 1 unit PRBC ordered. NPO. Q6h CBC per Dr. Castle. IV Protonix. Plavix being held.  GI consulted.      Chest tube output overnight: 40cc, no air leak  Urine output overnight. 700cc    Problem: Patient Centered Care  Goal: Patient preferences are identified and integrated in the patient's plan of care  Description: Interventions:  - What would you like us to know as we care for you?   - Provide timely, complete, and accurate information to patient/family  - Incorporate patient and family knowledge, values, beliefs, and cultural backgrounds into the planning and delivery of care  - Encourage patient/family to participate in care and decision-making at the level they choose  - Honor patient and family perspectives and choices  Outcome: Progressing     Problem: PAIN - ADULT  Goal: Verbalizes/displays adequate comfort level or patient's stated pain goal  Description: INTERVENTIONS:  - Encourage pt to monitor pain and request assistance  - Assess pain using appropriate pain scale  - Administer analgesics based on type and severity of pain and evaluate response  - Implement non-pharmacological measures as appropriate and evaluate response  - Consider cultural and social influences on pain and pain management  - Manage/alleviate anxiety  - Utilize distraction and/or relaxation techniques  - Monitor for opioid side effects  - Notify MD/LIP if interventions unsuccessful or patient reports new pain  - Anticipate increased pain with activity and  pre-medicate as appropriate  Outcome: Progressing     Problem: CARDIOVASCULAR - ADULT  Goal: Maintains optimal cardiac output and hemodynamic stability  Description: INTERVENTIONS:  - Monitor vital signs, rhythm, and trends  - Monitor for bleeding, hypotension and signs of decreased cardiac output  - Evaluate effectiveness of vasoactive medications to optimize hemodynamic stability  - Monitor arterial and/or venous puncture sites for bleeding and/or hematoma  - Assess quality of pulses, skin color and temperature  - Assess for signs of decreased coronary artery perfusion - ex. Angina  - Evaluate fluid balance, assess for edema, trend weights  Outcome: Progressing     Problem: RESPIRATORY - ADULT  Goal: Achieves optimal ventilation and oxygenation  Description: INTERVENTIONS:  - Assess for changes in respiratory status  - Assess for changes in mentation and behavior  - Position to facilitate oxygenation and minimize respiratory effort  - Oxygen supplementation based on oxygen saturation or ABGs  - Provide Smoking Cessation handout, if applicable  - Encourage broncho-pulmonary hygiene including cough, deep breathe, Incentive Spirometry  - Assess the need for suctioning and perform as needed  - Assess and instruct to report SOB or any respiratory difficulty  - Respiratory Therapy support as indicated  - Manage/alleviate anxiety  - Monitor for signs/symptoms of CO2 retention  Outcome: Progressing

## 2024-07-06 NOTE — PROGRESS NOTES
[facility] - CARDIOLOGY PROGRESS NOTE  Giuseppe Faustin Patient Status:  Inpatient    1945 MRN [id number]   Location [facility] 2W/SW Attending Gavin Crandall MD   Hosp Day # 3 PCP Joshua Faustin MD     Assessment / Plan:  78-year-old male admitted for outpatient angiogram due to accelerating anginal symptoms and abnormal stress test, found to have severe distal left main disease now status post bypass surgery.    Overnight developed cough with bright red blood coming out.      Telemetry A-fib 80s to 90s.        Assessment:    CAD: S/p CABG x 3 with LIMA to LAD and SVGs to diagonal and right PDA  7/3/2024.  Had clipping of left atrial appendage.       Paroxysmal atrial fibrillation: Left atrial appendage clip    Suspected upper GI bleed  GI consult requested pending evaluation.  Continue supportive care hemoglobin 7.4 status post 1 unit blood transfusion.    Anemia  As above      Plan:  As above.  Continue to hold Plavix      Angel Knox MD  Interventional Cardiology  [facility]  L3  -----------------------------------------        Objective:  /66 (BP Location: Right arm)   Pulse 90   Temp 97.4 °F (36.3 °C) (Temporal)   Resp 19   Ht 160 cm (5' 3\")   Wt 175 lb 7.8 oz (79.6 kg)   SpO2 95%   BMI 31.09 kg/m²     Temp (24hrs), Av.6 °F (36.4 °C), Min:97 °F (36.1 °C), Max:98.1 °F (36.7 °C)    Intake/Output Summary (Last 24 hours) at 2024 0853  Last data filed at 2024 0800  Gross per 24 hour   Intake 694.13 ml   Output 1265 ml   Net -570.87 ml     General: Does not respond to questions.  HEENT: No focal deficits.  Pupils equal, reactive.  Neck: PA catheter in right neck.  Cardiac: Sternal bandage C/D/I. RRR, soft S1, S2  no murmur or rub  Lungs: Clear anterolaterally without wheezes, rales, rhonchi.    Abdomen: Soft, nondistended  Extremities: Without  clubbing, cyanosis or edema.  Peripheral pulses are 2+.  Skin: Warm and dry.     Labs:  Lab Results   Component Value Date    WBC 11.8 07/06/2024    HGB 7.4 07/06/2024    HCT 23.0 07/06/2024    .0 07/06/2024       Lab Results   Component Value Date    INR 1.25 (H) 07/06/2024     Lab Results   Component Value Date     07/06/2024    K 4.8 07/06/2024     07/06/2024    CO2 21.0 07/06/2024    BUN 39 07/06/2024    CREATSERUM 1.15 07/06/2024     07/06/2024    CA 7.6 07/06/2024    ALT <7 07/06/2024    AST 22 07/06/2024    ALB 3.3 07/06/2024            Medications:     colchicine  0.6 mg Oral Daily    pantoprazole  40 mg Intravenous Q12H    insulin degludec  45 Units Subcutaneous Daily    insulin aspart  1-7 Units Subcutaneous TID CC    sennosides  8.6 mg Oral BID    docusate sodium  100 mg Oral BID    metoclopramide  10 mg Intravenous Q6H    metoprolol tartrate  25 mg Oral 2x Daily(Beta Blocker)    mupirocin  1 Application Nasal BID    chlorhexidine gluconate  15 mL Mouth/Throat BID    multivitamin  1 tablet Oral Daily    ascorbic acid  500 mg Oral TID    enoxaparin  40 mg Subcutaneous Daily    [Held by provider] clopidogrel  75 mg Oral Daily    aspirin  81 mg Oral Daily    [Transfer Hold] melatonin  10 mg Oral Nightly    [Transfer Hold] atorvastatin  10 mg Oral Nightly    [Transfer Hold] primidone  50 mg Oral BID      amiodarone 0.5 mg/min (07/05/24 1853)    sodium chloride 10 mL/hr at 07/03/24 1406    dexmedetomidine Stopped (07/03/24 1608)    DOBUTamine Stopped (07/04/24 1241)    nitroGLYCERIN in dextrose 5% Stopped (07/03/24 1800)    norepinephrine Stopped (07/06/24 0200)    clevidipine Stopped (07/03/24 1655)    dextrose 5%-sodium chloride 0.45% 40 mL/hr (07/04/24 0600)

## 2024-07-06 NOTE — PROGRESS NOTES
[facility]  part of [facility]    Progress Note    Giuseppe Faustin Patient Status:  Inpatient    1945 MRN [id number]   Location [facility] 2W/SW Attending Delicia Strauss MD   Hosp Day # 3 PCP Joshua Faustin MD         Subjective:   Subjective:  Patient was seen and examined  Coffee-ground emesis reported around 4 AM and hemoglobin dropped to 7.4  Patient received unit of PRBC now 9.1  Denies abdominal pain  Denies fever or cough or chest pain  Generalized fatigue  Objective:   Blood pressure 109/61, pulse 93, temperature 97.9 °F (36.6 °C), temperature source Temporal, resp. rate 21, height 5' 3\" (1.6 m), weight 175 lb 7.8 oz (79.6 kg), SpO2 95%.  Physical Exam  Constitutional:       General: He is not in acute distress.     Appearance: He is ill-appearing.   HENT:      Head: Normocephalic and atraumatic.      Nose: Nose normal.      Mouth/Throat:      Mouth: Mucous membranes are moist.   Eyes:      General: No scleral icterus.  Cardiovascular:      Rate and Rhythm: Normal rate.      Heart sounds:      No gallop.   Pulmonary:      Effort: No respiratory distress.      Breath sounds: No stridor. No wheezing, rhonchi or rales.   Chest:      Chest wall: No tenderness.   Abdominal:      General: Abdomen is flat. Bowel sounds are normal. There is no distension.      Palpations: Abdomen is soft.      Tenderness: There is no guarding.   Musculoskeletal:      Cervical back: Normal range of motion. No rigidity.      Right lower leg: No edema.      Left lower leg: No edema.   Skin:     General: Skin is dry.   Neurological:      General: No focal deficit present.      Mental Status: He is oriented to person, place, and time.         Results:   Lab Results   Component Value Date    WBC 10.5 2024    HGB 9.1 (L) 2024    HCT 25.2 (L) 2024    .0 (L) 2024    CREATSERUM 1.15 2024    BUN 39 (H) 2024     2024    K 4.8 2024    CL  108 07/06/2024    CO2 21.0 07/06/2024     (H) 07/06/2024    CA 7.6 (L) 07/06/2024    ALB 3.3 07/06/2024    ALKPHO 32 (L) 07/06/2024    BILT 0.6 07/06/2024    TP 5.3 (L) 07/06/2024    AST 22 07/06/2024    ALT <7 (L) 07/06/2024    PTT 33.7 07/06/2024    INR 1.25 (H) 07/06/2024    T4F 1.1 06/11/2024    TSH 9.211 (H) 05/08/2024     08/11/2015    PSA 0.39 06/14/2023    ESRML 23 (H) 06/24/2024    CRP <0.29 02/24/2020    MG 2.1 07/04/2024    CK 76 06/24/2024       XR CHEST AP PORTABLE  (CPT=71045)    Result Date: 7/6/2024  CONCLUSION:  1. Trace left apical pneumothorax, new from previous.  2. Suggestion of trace bilateral pleural effusions.  3. Basilar reticular opacities are seen, likely reflecting atelectasis.  4. Postthoracotomy chest with stable cardiomegaly.  5. Additional support tubes and lines as above.    A preliminary report was issued by the [facility] Radiology teleradiology service. There are no major discrepancies.   Dictated by (CST): Lawrence Muse MD on 7/06/2024 at 7:53 AM     Finalized by (CST): Lawrence Muse MD on 7/06/2024 at 7:57 AM         EKG 12 Lead    Result Date: 7/5/2024  Atrial fibrillation with rapid ventricular response Incomplete right bundle branch block Abnormal ECG When compared with ECG of 04-JUL-2024 05:10, Atrial fibrillation has replaced Sinus rhythm Vent. rate has increased BY  62 BPM Incomplete right bundle branch block is now Present Confirmed by VERITO MCCOY, GENARO ([id number]) on 7/5/2024 12:23:04 PM     Assessment & Plan:     1-severe CAD with status post CABG on 7/2/24  CXR reported tiny left apical pneumothorax otherwise clear  Pulmonary status stable and patient currently on room air    Ct in-place with no significant air leak or bleed  Pain management  ncentive spirometry  Beta-blocker and aspirin cardiology following    2-acute GI bleed likely upper with coffee-ground emesis  Hemoglobin dropped to 7.4 s/p 1 unit of PRBC   Hold Lovenox subcu and Plavix  PPI every 12  hours  Appreciate GI consult patient going to have EGD     3-DM, HTN, HL  Statin and beta-blocker and as needed insulin     4-DVT prophylaxis  Lovenox subcu now on hold for GI bleed    D/w pt and staff                  Ary Tian MD  7/6/2024

## 2024-07-06 NOTE — PROGRESS NOTES
CV Surgery    [place] \"clammy\" this AM, projectile emesis of coffee ground emesis and Hgb drop to 7  1U PRBC given with improvement  CXR today with large gastric bubble, has not had flatus or BM postop  GI consulted, initially planning for EGD however deferred given possible ileus  I placed NGT at bedside, KUB is pending    Afebrile, NSR 90s, -130s, 97% on room 2L NC    Abdomen soft, but distended, no rigidity, no guarding, no rebound  No history of abdominal surgery    Labs reviewed    PLAN:  Ileus management  NPO, NGT to LIS  Protonix 40mg IV BID  Stop plavix  Continue ASA given CABG history  Await bowel function  Continue IV amiodarone for afib, now NSR  Keep chest tubes and cordis today  Ok to remove arterial line  Follow up KUB, awaiting radiology    Brayan Castle MD  Cardiothoracic Surgery  [facility]

## 2024-07-06 NOTE — CONSULTS
[facility]   Gastroenterology Consultation Note    Giuseppe Faustin  Patient Status:    Inpatient  Date of Admission:         7/2/2024, Hospital day #3  Attending:   Delicia Strauss MD  PCP:     Joshua Faustin MD    Chief Complaint:  Coffee ground emesis     History of Present Illness:  78 M w/ a history of CAD s/p CABG 7/3/24; GI consulted after he developed coffee ground emesis overnight.  He is off pressors and hgb is 7.4 from a baseline of ~9-10.  His Plavix was held.  He has been in Afib intermittently but is currently in NSR.  He denies any symptoms of abdominal pain at this time, nausea, vomiting, fevers, chills, or any other symptoms at this time.     History:  Past Medical History:    Adenomatous colon polyp    Arthritis    Benign prostatic hypertrophy with urinary retention    Chronic cough    Chronic sinusitis    Esophageal reflux    Essential and other specified forms of tremor    Essential hypertension    Gall stones    High blood pressure    Hoarseness, chronic    HTN (hypertension)    Osteoarthrosis, localized, primary, knee, left    Osteoarthrosis, localized, primary, knee, right    Osteoarthrosis, unspecified whether generalized or localized, unspecified site    Pain in joints    Painful swallowing    Personal history of colonic polyps    PONV (postoperative nausea and vomiting)    Prediabetes    borderline    Problems with swallowing    trouble with solid    Second degree hemorrhoids    Shortness of breath    Sleep apnea    no cpap    Sleep disturbance    Sputum production    Visual impairment    reading glasses    Vocal cord paralysis    Wheezing     Past Surgical History:   Procedure Laterality Date    Cholecystectomy  07/2015    Colonoscopy  10/2012    3 mm adenoma. repeat 5 yrs, Dr Sarabia    Colonoscopy,biopsy  10/22/2012    Procedure: COLONOSCOPY, POSSIBLE BIOPSY, POSSIBLE POLYPECTOMY 19575;  Surgeon: Joe Sarabia MD;  Location: [facility]     Cystoscopy,insert ureteral stent  9-1-15    TURP    Laparoscopic cholecystectomy N/A 7/10/2015    Procedure: LAPAROSCOPIC CHOLECYSTECTOMY;  Surgeon: Tania Schmidt MD;  Location:  MAIN OR    Other surgical history  9/1/15    CYSTOSCOPY, TRANS URETHRAL RESECTION OF PROSTATE    Patient documented not to have experienced any of the following events  10/22/2012    Procedure: COLONOSCOPY, POSSIBLE BIOPSY, POSSIBLE POLYPECTOMY 85583;  Surgeon: Joe Sarabia MD;  Location: [facility]    Patient withough preoperative order for iv antibiotic surgical site infection prophylaxis.  10/22/2012    Procedure: COLONOSCOPY, POSSIBLE BIOPSY, POSSIBLE POLYPECTOMY 28160;  Surgeon: Joe Sarabia MD;  Location: [facility]    Upper gi endoscopy,exam  2020     Family History   Problem Relation Age of Onset    Heart Attack Father     Hypertension Father       reports that he quit smoking about 40 years ago. His smoking use included cigarettes. He started smoking about 60 years ago. He has a 10 pack-year smoking history. He has been exposed to tobacco smoke. He has never used smokeless tobacco. He reports that he does not drink alcohol and does not use drugs.    Allergies:  No Known Allergies    Medications:    Current Facility-Administered Medications:     colchicine tab 0.6 mg, 0.6 mg, Oral, Daily    pantoprazole (Protonix) 40 mg in sodium chloride 0.9% PF 10 mL IV push, 40 mg, Intravenous, Q12H    ondansetron (Zofran) 4 MG/2ML injection 4 mg, 4 mg, Intravenous, Q6H PRN    [COMPLETED] amiodarone (Cordarone) 150 mg in dextrose 5% 100 mL IV bolus, 150 mg, Intravenous, Once **FOLLOWED BY** [] amiodarone (Cordarone) 450 mg in dextrose 5% 250 mL infusion, 1 mg/min, Intravenous, Continuous **FOLLOWED BY** amiodarone (Cordarone) 450 mg in dextrose 5% 250 mL infusion, 0.5 mg/min, Intravenous, Continuous    insulin degludec (Tresiba) 100 units/mL flextouch 45 Units, 45 Units, Subcutaneous,  Daily    insulin aspart (NovoLOG) 100 Units/mL FlexPen 1-7 Units, 1-7 Units, Subcutaneous, TID CC    sodium chloride 0.9% infusion, , Intravenous, Continuous    melatonin tab 3 mg, 3 mg, Oral, Nightly PRN    sennosides (Senokot) tab 8.6 mg, 8.6 mg, Oral, BID    docusate sodium (Colace) cap 100 mg, 100 mg, Oral, BID    polyethylene glycol (PEG 3350) (Miralax) 17 g oral packet 17 g, 17 g, Oral, Daily PRN    bisacodyl (Dulcolax) 10 MG rectal suppository 10 mg, 10 mg, Rectal, Daily PRN    metoclopramide (Reglan) 5 mg/mL injection 10 mg, 10 mg, Intravenous, Q6H    ondansetron (Zofran) 4 MG/2ML injection 4 mg, 4 mg, Intravenous, Q6H PRN    dexmedeTOMIDine in sodium chloride 0.9% (Precedex) 400 mcg/100mL infusion premix, 0.2-1.5 mcg/kg/hr (Dosing Weight), Intravenous, Continuous    DOBUTamine in dextrose 5% (Dobutrex) 500 mg/250mL infusion premix, 2.5-20 mcg/kg/min (Dosing Weight), Intravenous, Continuous PRN    nitroGLYCERIN in dextrose 5% 50 mg/250mL infusion premix, 5-300 mcg/min, Intravenous, Continuous PRN    norepinephrine (Levophed) 4 mg/250mL infusion premix, 0.5-30 mcg/min, Intravenous, Continuous PRN    metoprolol tartrate (Lopressor) tab 25 mg, 25 mg, Oral, 2x Daily(Beta Blocker)    clevidipine (Cleviprex) 25 MG/50ML IV infusion, 1-6 mg/hr, Intravenous, Continuous    potassium chloride 20 mEq/100mL IVPB premix 20 mEq, 20 mEq, Intravenous, PRN **OR** potassium chloride 40 mEq/100mL IVPB premix (central line) 40 mEq, 40 mEq, Intravenous, PRN    magnesium sulfate in dextrose 5% 1 g/100mL infusion premix 1 g, 1 g, Intravenous, PRN    magnesium sulfate in sterile water for injection 2 g/50mL IVPB premix 2 g, 2 g, Intravenous, PRN    mupirocin (Bactroban) 2% nasal ointment 1 Application, 1 Application, Nasal, BID    chlorhexidine gluconate (Peridex) 0.12 % oral solution 15 mL, 15 mL, Mouth/Throat, BID    multivitamin (Tab-A-Ari/Beta Carotene) tab 1 tablet, 1 tablet, Oral, Daily    ascorbic acid (Vitamin C) tab 500  mg, 500 mg, Oral, TID    dextrose 5%-sodium chloride 0.45% infusion,  mL/hr, Intravenous, Continuous    enoxaparin (Lovenox) 40 MG/0.4ML SUBQ injection 40 mg, 40 mg, Subcutaneous, Daily    acetaminophen (Tylenol) tab 650 mg, 650 mg, Oral, Q4H PRN **OR** HYDROcodone-acetaminophen (Norco) 5-325 MG per tab 1 tablet, 1 tablet, Oral, Q4H PRN **OR** HYDROcodone-acetaminophen (Norco) 5-325 MG per tab 2 tablet, 2 tablet, Oral, Q4H PRN    morphINE PF 4 MG/ML injection 2 mg, 2 mg, Intravenous, Q2H PRN **OR** morphINE PF 4 MG/ML injection 4 mg, 4 mg, Intravenous, Q2H PRN    [Held by provider] clopidogrel (Plavix) tab 75 mg, 75 mg, Oral, Daily    aspirin DR tab 81 mg, 81 mg, Oral, Daily    glucose (Dex4) 15 GM/59ML oral liquid 15 g, 15 g, Oral, Q15 Min PRN **OR** glucose (Glutose) 40% oral gel 15 g, 15 g, Oral, Q15 Min PRN **OR** glucose-vitamin C (Dex-4) chewable tab 4 tablet, 4 tablet, Oral, Q15 Min PRN **OR** dextrose 50% injection 50 mL, 50 mL, Intravenous, Q15 Min PRN **OR** glucose (Dex4) 15 GM/59ML oral liquid 30 g, 30 g, Oral, Q15 Min PRN **OR** glucose (Glutose) 40% oral gel 30 g, 30 g, Oral, Q15 Min PRN **OR** glucose-vitamin C (Dex-4) chewable tab 8 tablet, 8 tablet, Oral, Q15 Min PRN    [Transfer Hold] melatonin cap/tab 10 mg, 10 mg, Oral, Nightly    [Transfer Hold] atorvastatin (Lipitor) tab 10 mg, 10 mg, Oral, Nightly    [Transfer Hold] primidone (Mysoline) tab 50 mg, 50 mg, Oral, BID    Review of Systems:  CONSTITUTIONAL:  negative for fevers, rigors  EYES:  negative for diplopia   RESPIRATORY:  negative for severe shortness of breath  CARDIOVASCULAR:  negative for crushing sub-sternal chest pain  GASTROINTESTINAL:  see HPI  GENITOURINARY:  negative for dysuria or gross hematuria  INTEGUMENT/BREAST:  SKIN:  negative for jaundice   ALLERGIC/IMMUNOLOGIC:  negative for hay fever  ENDOCRINE:  negative for cold intolerance and heat intolerance  MUSCULOSKELETAL:  negative for joint effusion/severe  erythema  BEHAVIOR/PSYCH:  negative for psychotic behavior    Physical Exam:    Blood pressure 103/66, pulse 90, temperature 97.4 °F (36.3 °C), temperature source Temporal, resp. rate 19, height 5' 3\" (1.6 m), weight 175 lb 7.8 oz (79.6 kg), SpO2 95%. Body mass index is 31.09 kg/m².    GEN - Patient appears comfortable and in no acute discomfort  ENT - MMM  EYES - the sclera appears anicteric  CV - no edema  RESP -  No increased work of breathing  ABDOMEN - soft, non-tender exam in all quadrants without rigidity or guarding, non-distended, no abnormal bowel sounds noted, no masses are palpated  SKIN - No jaundice  NEURO - Alert and appropriate, and gross movements of extremities normal  PSYCH - normal affect, non-agitated      Laboratory Data:  Lab Results   Component Value Date    WBC 11.8 07/06/2024    HGB 7.4 07/06/2024    HCT 23.0 07/06/2024    .0 07/06/2024    CREATSERUM 1.15 07/06/2024    BUN 39 07/06/2024     07/06/2024    K 4.8 07/06/2024     07/06/2024    CO2 21.0 07/06/2024     07/06/2024    CA 7.6 07/06/2024    ALB 3.3 07/06/2024    ALKPHO 32 07/06/2024    BILT 0.6 07/06/2024    TP 5.3 07/06/2024    AST 22 07/06/2024    ALT <7 07/06/2024    PTT 33.7 07/06/2024    INR 1.25 07/06/2024       Imaging:  XR CHEST AP PORTABLE  (CPT=71045)    Result Date: 7/6/2024  CONCLUSION:  1. Trace left apical pneumothorax, new from previous.  2. Suggestion of trace bilateral pleural effusions.  3. Basilar reticular opacities are seen, likely reflecting atelectasis.  4. Postthoracotomy chest with stable cardiomegaly.  5. Additional support tubes and lines as above.    A preliminary report was issued by the [facility] teleradiology service. There are no major discrepancies.   Dictated by (CST): Lawrence Muse MD on 7/06/2024 at 7:53 AM     Finalized by (CST): Lawrence Muse MD on 7/06/2024 at 7:57 AM          XR CHEST AP PORTABLE  (CPT=71045)    Result Date: 7/4/2024  CONCLUSION:   Lines and  tubes in place as described.  Trace pulmonary vascular redistribution without edema, unchanged.    Dictated by (CST): Ton Titus MD on 7/04/2024 at 9:16 AM     Finalized by (CST): Ton Titus MD on 7/04/2024 at 9:17 AM          XR CHEST AP PORTABLE  (CPT=71045)    Result Date: 7/3/2024  CONCLUSION:  1.  Post CABG. 2.  Post [place] catheter placement with tip in the pulmonary outflow.  The distal end of the tube is slightly coiled on the view obtained.  Recommend adjustment in position.  Other lines and tubes are in gross satisfactory/stable position. 3.  Trace/small left pleural effusion.  Left basal pulmonary opacity most compatible with atelectasis.   Exam discussed with Jacquelin Castillo on 7/3/24 at 2:00 p.m.   Dictated by (CST): Gregory Stratton MD on 7/03/2024 at 1:55 PM     Finalized by (CST): Gregory Stratton MD on 7/03/2024 at 2:02 PM           Assessment & Plan   Giuseppe Faustin is a a(n) 78 year old male w/ a history of CAD s/p CABG 7/3/24; GI consulted after he developed coffee ground emesis overnight.  He is off pressors and hgb is 7.4 from a baseline of ~9-10.  His Plavix was held.  He has been in Afib intermittently but is currently in NSR.      Coffee ground emesis, acute blood loss anemia - hgb is down to 7.4 from a baseline of 9-10.  He is getting transfusion of PRBCs now.  Ddx is broad and includes PUD, dieulafoy, AVMs, etc.. Recommend EGD for further evaluation. This was discussed with the patient and his daughter per his request, and both are amenable. His Plavix was held.      Recommend    - EGD with MAC    - IV protonix 40 mg BID    - keep active type and scree    - 2 large bore IVs in place    - serial H/H q6 for now      EGD consent: I have discussed the risks, benefits, and alternatives to upper endoscopy/enteroscopy with the patient/primary decision maker [who demonstrated understanding], including but not limited to the risks of bleeding, infection, pain, death, as well as the risks of  anesthesia and perforation all leading to prolonged hospitalization, surgical intervention, or even death. I also specifically mentioned the miss rate of upper endoscopy of 5-10% in the best of all circumstances.  The patient has agreed to sign an informed consent and elected to proceed with procedure with possible intervention [i.e. polypectomy, stent placement, etc.] as indicated.      Wen Bryan MD  [facility] Gastroenterology      ADDENDUM  Pt has an ileus and NGT is needed.  Recommend canceling the EGD but will place an endoscopically guided NGT with anesthesia's assistance since he didn't tolerate bedside placement.  He will need an abdominal x-ray after placement.  Recommend EGD once the ileus has resolved or emergently if he has significant bleeding or hemodynamic instability in the interim.      ADDENDUM  Case discussed with anesthesia. They do not recommend MAC in a patient with an ileus for endoscopic guided placement of the NGT.  The ICU team was updated.  Another bedside attempt is being done currently.  Awaiting KUB.       This note may have been partially prepared using [employer] voice recognition dictation software. As a result, errors may occur. When identified, these errors have been corrected. While every attempt is made to correct errors during dictation, discrepancies may still exist.

## 2024-07-06 NOTE — PROGRESS NOTES
Progress Note     Giuseppe Tavera Faustin Patient Status:  Inpatient    1945 MRN [id number]   Location [facility] 2W/SW Attending Sarita Franklin MD   Hosp Day # 3 PCP Joshua Faustin MD       Subjective:   S: Coffee ground vomiting overnight associated with abdominal distention and possible ileus, NG tube placed, bloody matter in NG tube    Daughter at the bedside      Review of Systems:   10 point ROS completed and was negative, except for pertinent positive and negatives stated in subjective.    Objective:   Vital signs:  Temp:  [97 °F (36.1 °C)-98 °F (36.7 °C)] 97.8 °F (36.6 °C)  Pulse:  [] 98  Resp:  [16-23] 17  BP: ()/(54-78) 114/63  SpO2:  [93 %-98 %] 94 %  AO: ()/(47-79) 127/64    Wt Readings from Last 6 Encounters:   24 175 lb 7.8 oz (79.6 kg)   24 165 lb (74.8 kg)   24 170 lb (77.1 kg)   24 170 lb (77.1 kg)   24 169 lb (76.7 kg)   24 171 lb (77.6 kg)         Physical Exam:    General: Resting in bed, NAD, NGT in place with bloody matter        Respiratory: Clear to auscultation bilaterally. No wheezes. No rhonchi.  Chest: chest tuibe in place   Cardiovascular: RRR  Abdomen: Soft, nontender,  distended, quiet  Neurologic: No focal neurological deficits.   Musculoskeletal: Moves all extremities.  Extremities: No edema.    Results:   Diagnostic Data:      Labs:    Labs Last 24 Hours:   BMP     CBC    Other     Na 136 Cl 108 BUN 39 Glu 182   Hb 7.9   PTT 33.7 Procal -   K 4.8 CO2 21.0 Cr 1.15   WBC 10.5 >< .0  INR 1.25 CRP -   Renal Lytes Endo    Hct 25.2   Trop - D dim -   eGFR - Ca 7.6 POC Gluc  138    LFT   pBNP - Lactic -   eGFR AA - PO4 - A1c -   AST 22 APk 32 Prot 5.3  LDL -     Mg - TSH -   ALT <7 T denton 0.6 Alb 3.3        COVID-19 Lab Results    COVID-19  Lab Results   Component Value Date    COVID19 Not Detected 2021    COVID19 Not Detected 2020    COVID19 Not Detected 10/05/2020       Pro-Calcitonin  No results for  input(s): \"PCT\" in the last 168 hours.    Cardiac  No results for input(s): \"TROP\", \"PBNP\" in the last 168 hours.    Creatinine Kinase  No results for input(s): \"CK\" in the last 168 hours.    Inflammatory Markers  No results for input(s): \"CRP\", \"OMAIRA\", \"LDH\", \"DDIMER\" in the last 168 hours.    Imaging: Imaging data reviewed in Epic.    Medications:    colchicine  0.6 mg Oral Daily    pantoprazole  40 mg Intravenous Q12H    sodium chloride   Intravenous Once    insulin degludec  45 Units Subcutaneous Daily    insulin aspart  1-7 Units Subcutaneous TID CC    sennosides  8.6 mg Oral BID    docusate sodium  100 mg Oral BID    metoclopramide  10 mg Intravenous Q6H    metoprolol tartrate  25 mg Oral 2x Daily(Beta Blocker)    mupirocin  1 Application Nasal BID    chlorhexidine gluconate  15 mL Mouth/Throat BID    multivitamin  1 tablet Oral Daily    ascorbic acid  500 mg Oral TID    [Held by provider] enoxaparin  40 mg Subcutaneous Daily    [Held by provider] clopidogrel  75 mg Oral Daily    aspirin  81 mg Oral Daily    [Transfer Hold] melatonin  10 mg Oral Nightly    [Transfer Hold] atorvastatin  10 mg Oral Nightly    [Transfer Hold] primidone  50 mg Oral BID       Assessment & Plan:   ASSESSMENT / PLAN:     CAD. S/p CABG x 3 with LIMA to LAD and SVGs to diagonal and right PDA on 7/3. Edi extubation  -CV surgery and cardiology following  -Activity as tolerated  -off pressors, insulin gtt     #Paroxysmal Afib  S/p Left atrial appendage clip   - cardiology following, pt started on amiodarone gtt  - cont to monitor    Suspected acute upper GIB 7/6  Ileus  Acute posthemorrhagic anemia  -GI consulted, seen by Dr. Bryan  -NGT placed  -PPI bid  -s/p PRBC x1 with improvement of hgb 7.4 -->9.1  Repeat hemoglobin in afternoon 7.9--> transfuse 1 more unit  -Trend hemoglobin  -Plavix on hold    Bladder neck contracture.  Patient seen by urology in the OR.  Cystoscopy assisted Delong catheter placement.  History of TURP  -Keep Delong  for duration of hospitalization  -Keep Delong after discharge as well (per Dr Blanchard)  -Outpatient urology follow-up     Other problems  Hypertension  Dyslipidemia  GERD  Obstructive sleep apnea  Osteoarthritis  Type 2 diabetes  History of kidney stones  Essential tremor  CKD stage II     dvt prophylaxis: sc lovenox  code status: full code  dispo: anticipate home with HH once medically optimized         MDM: High complexity- severe exacerbation of chronic illness posing a threat to life. IV meds requiring close inpatient monitoring.           Supplementary Documentation:

## 2024-07-07 ENCOUNTER — ANESTHESIA EVENT (OUTPATIENT)
Dept: ENDOSCOPY | Facility: HOSPITAL | Age: 79
End: 2024-07-07
Payer: MEDICARE

## 2024-07-07 ENCOUNTER — APPOINTMENT (OUTPATIENT)
Dept: GENERAL RADIOLOGY | Facility: HOSPITAL | Age: 79
End: 2024-07-07
Attending: INTERNAL MEDICINE
Payer: MEDICARE

## 2024-07-07 ENCOUNTER — ANESTHESIA (OUTPATIENT)
Dept: ENDOSCOPY | Facility: HOSPITAL | Age: 79
End: 2024-07-07
Payer: MEDICARE

## 2024-07-07 PROBLEM — K92.1 MELENA: Status: ACTIVE | Noted: 2024-07-07

## 2024-07-07 PROBLEM — D62 ACUTE BLOOD LOSS ANEMIA: Status: ACTIVE | Noted: 2024-07-07

## 2024-07-07 PROBLEM — K92.2 UPPER GI BLEED: Status: ACTIVE | Noted: 2024-07-07

## 2024-07-07 LAB
ANION GAP SERPL CALC-SCNC: 9 MMOL/L (ref 0–18)
BASOPHILS # BLD AUTO: 0.01 X10(3) UL (ref 0–0.2)
BASOPHILS NFR BLD AUTO: 0.1 %
BLOOD TYPE BARCODE: 5100
BUN BLD-MCNC: 64 MG/DL (ref 9–23)
BUN/CREAT SERPL: 46 (ref 10–20)
CALCIUM BLD-MCNC: 7.5 MG/DL (ref 8.7–10.4)
CHLORIDE SERPL-SCNC: 110 MMOL/L (ref 98–112)
CO2 SERPL-SCNC: 20 MMOL/L (ref 21–32)
CREAT BLD-MCNC: 1.39 MG/DL
DEPRECATED RDW RBC AUTO: 49.6 FL (ref 35.1–46.3)
EGFRCR SERPLBLD CKD-EPI 2021: 52 ML/MIN/1.73M2 (ref 60–?)
EOSINOPHIL # BLD AUTO: 0 X10(3) UL (ref 0–0.7)
EOSINOPHIL NFR BLD AUTO: 0 %
ERYTHROCYTE [DISTWIDTH] IN BLOOD BY AUTOMATED COUNT: 16.7 % (ref 11–15)
GLUCOSE BLD-MCNC: 192 MG/DL (ref 70–99)
GLUCOSE BLDC GLUCOMTR-MCNC: 168 MG/DL (ref 70–99)
GLUCOSE BLDC GLUCOMTR-MCNC: 176 MG/DL (ref 70–99)
GLUCOSE BLDC GLUCOMTR-MCNC: 189 MG/DL (ref 70–99)
GLUCOSE BLDC GLUCOMTR-MCNC: 190 MG/DL (ref 70–99)
GLUCOSE BLDC GLUCOMTR-MCNC: 191 MG/DL (ref 70–99)
GLUCOSE BLDC GLUCOMTR-MCNC: 205 MG/DL (ref 70–99)
HCT VFR BLD AUTO: 27 %
HCT VFR BLD AUTO: 27 %
HCT VFR BLD AUTO: 30.6 %
HGB BLD-MCNC: 10.5 G/DL
HGB BLD-MCNC: 10.5 G/DL
HGB BLD-MCNC: 9 G/DL
HGB BLD-MCNC: 9 G/DL
HGB BLD-MCNC: 9.5 G/DL
HGB BLD-MCNC: 9.5 G/DL
IMM GRANULOCYTES # BLD AUTO: 0.12 X10(3) UL (ref 0–1)
IMM GRANULOCYTES NFR BLD: 1.2 %
ISTAT ACTIVATED CLOTTING TIME: 134 SECONDS (ref 125–137)
ISTAT ACTIVATED CLOTTING TIME: <125 SECONDS (ref 125–137)
LYMPHOCYTES # BLD AUTO: 1.15 X10(3) UL (ref 1–4)
LYMPHOCYTES NFR BLD AUTO: 11.1 %
MCH RBC QN AUTO: 28.2 PG (ref 26–34)
MCHC RBC AUTO-ENTMCNC: 33.3 G/DL (ref 31–37)
MCV RBC AUTO: 84.6 FL
MONOCYTES # BLD AUTO: 1.29 X10(3) UL (ref 0.1–1)
MONOCYTES NFR BLD AUTO: 12.4 %
NEUTROPHILS # BLD AUTO: 7.83 X10 (3) UL (ref 1.5–7.7)
NEUTROPHILS # BLD AUTO: 7.83 X10(3) UL (ref 1.5–7.7)
NEUTROPHILS NFR BLD AUTO: 75.2 %
OSMOLALITY SERPL CALC.SUM OF ELEC: 312 MOSM/KG (ref 275–295)
PLATELET # BLD AUTO: 94 10(3)UL (ref 150–450)
PLATELETS.RETICULATED NFR BLD AUTO: 5.7 % (ref 0–7)
POTASSIUM SERPL-SCNC: 4.7 MMOL/L (ref 3.5–5.1)
RBC # BLD AUTO: 3.19 X10(6)UL
SODIUM SERPL-SCNC: 139 MMOL/L (ref 136–145)
UNIT VOLUME: 350 ML
WBC # BLD AUTO: 10.4 X10(3) UL (ref 4–11)

## 2024-07-07 PROCEDURE — 74018 RADEX ABDOMEN 1 VIEW: CPT | Performed by: INTERNAL MEDICINE

## 2024-07-07 PROCEDURE — 71045 X-RAY EXAM CHEST 1 VIEW: CPT | Performed by: INTERNAL MEDICINE

## 2024-07-07 PROCEDURE — 99233 SBSQ HOSP IP/OBS HIGH 50: CPT | Performed by: INTERNAL MEDICINE

## 2024-07-07 PROCEDURE — 99233 SBSQ HOSP IP/OBS HIGH 50: CPT | Performed by: HOSPITALIST

## 2024-07-07 PROCEDURE — 43235 EGD DIAGNOSTIC BRUSH WASH: CPT | Performed by: INTERNAL MEDICINE

## 2024-07-07 PROCEDURE — 99232 SBSQ HOSP IP/OBS MODERATE 35: CPT | Performed by: INTERNAL MEDICINE

## 2024-07-07 RX ORDER — ACETAMINOPHEN 10 MG/ML
1000 INJECTION, SOLUTION INTRAVENOUS ONCE
Status: COMPLETED | OUTPATIENT
Start: 2024-07-07 | End: 2024-07-07

## 2024-07-07 RX ORDER — LIDOCAINE HYDROCHLORIDE 10 MG/ML
INJECTION, SOLUTION EPIDURAL; INFILTRATION; INTRACAUDAL; PERINEURAL AS NEEDED
Status: DISCONTINUED | OUTPATIENT
Start: 2024-07-07 | End: 2024-07-07 | Stop reason: SURG

## 2024-07-07 RX ORDER — NALOXONE HYDROCHLORIDE 0.4 MG/ML
0.08 INJECTION, SOLUTION INTRAMUSCULAR; INTRAVENOUS; SUBCUTANEOUS ONCE AS NEEDED
Status: DISCONTINUED | OUTPATIENT
Start: 2024-07-07 | End: 2024-07-07 | Stop reason: HOSPADM

## 2024-07-07 RX ORDER — SODIUM CHLORIDE, SODIUM LACTATE, POTASSIUM CHLORIDE, CALCIUM CHLORIDE 600; 310; 30; 20 MG/100ML; MG/100ML; MG/100ML; MG/100ML
INJECTION, SOLUTION INTRAVENOUS CONTINUOUS
Status: DISCONTINUED | OUTPATIENT
Start: 2024-07-07 | End: 2024-07-08

## 2024-07-07 RX ORDER — DEXTROSE MONOHYDRATE AND SODIUM CHLORIDE 5; .45 G/100ML; G/100ML
INJECTION, SOLUTION INTRAVENOUS CONTINUOUS
Status: DISCONTINUED | OUTPATIENT
Start: 2024-07-07 | End: 2024-07-08

## 2024-07-07 RX ORDER — SODIUM CHLORIDE, SODIUM LACTATE, POTASSIUM CHLORIDE, CALCIUM CHLORIDE 600; 310; 30; 20 MG/100ML; MG/100ML; MG/100ML; MG/100ML
INJECTION, SOLUTION INTRAVENOUS CONTINUOUS PRN
Status: DISCONTINUED | OUTPATIENT
Start: 2024-07-07 | End: 2024-07-07 | Stop reason: SURG

## 2024-07-07 RX ADMIN — SODIUM CHLORIDE, SODIUM LACTATE, POTASSIUM CHLORIDE, CALCIUM CHLORIDE: 600; 310; 30; 20 INJECTION, SOLUTION INTRAVENOUS at 11:43:00

## 2024-07-07 RX ADMIN — CHLORHEXIDINE GLUCONATE ORAL RINSE 15 ML: 1.2 SOLUTION DENTAL at 08:09:00

## 2024-07-07 RX ADMIN — LIDOCAINE HYDROCHLORIDE 50 MG: 10 INJECTION, SOLUTION EPIDURAL; INFILTRATION; INTRACAUDAL; PERINEURAL at 11:46:00

## 2024-07-07 RX ADMIN — METOCLOPRAMIDE HYDROCHLORIDE 10 MG: 5 INJECTION INTRAMUSCULAR; INTRAVENOUS at 00:56:00

## 2024-07-07 RX ADMIN — CHLORHEXIDINE GLUCONATE ORAL RINSE 15 ML: 1.2 SOLUTION DENTAL at 21:08:00

## 2024-07-07 RX ADMIN — SODIUM CHLORIDE, SODIUM LACTATE, POTASSIUM CHLORIDE, CALCIUM CHLORIDE: 600; 310; 30; 20 INJECTION, SOLUTION INTRAVENOUS at 11:55:00

## 2024-07-07 RX ADMIN — ACETAMINOPHEN 1000 MG: 10 INJECTION, SOLUTION INTRAVENOUS at 17:06:00

## 2024-07-07 RX ADMIN — INSULIN DEGLUDEC 45 UNITS: 100 INJECTION, SOLUTION SUBCUTANEOUS at 08:12:00

## 2024-07-07 RX ADMIN — DEXTROSE MONOHYDRATE AND SODIUM CHLORIDE: 5; .45 INJECTION, SOLUTION INTRAVENOUS at 09:00:00

## 2024-07-07 NOTE — PROGRESS NOTES
[facility]  part of [facility]    Progress Note    Giuseppe Faustin Patient Status:  Inpatient    1945 MRN [id number]   Location [facility] 2W/SW Attending Delicia Strauss MD   Hosp Day # 4 PCP Joshua Faustin MD         Subjective:   Subjective:  Patient had melanic stool last night and required 2 more units of PRBC transfusion  NG to suction with no significant output  Abdomen soft and patient had bowel movement and passing gas  Denies chest pain  No shortness of breath or cough and currently comfortable on room air  No focal weakness and no fever  Generalized fatigue  Objective:   Blood pressure 131/66, pulse 94, temperature 97.7 °F (36.5 °C), temperature source Temporal, resp. rate (!) 33, height 5' 3\" (1.6 m), weight 169 lb 1.5 oz (76.7 kg), SpO2 95%.  Physical Exam  Constitutional:       General: He is not in acute distress.  HENT:      Head: Normocephalic and atraumatic.      Nose: Nose normal.      Mouth/Throat:      Mouth: Mucous membranes are moist.   Eyes:      General: No scleral icterus.  Cardiovascular:      Rate and Rhythm: Normal rate.      Heart sounds:      No gallop.   Pulmonary:      Effort: No respiratory distress.      Breath sounds: No stridor. No wheezing, rhonchi or rales.   Abdominal:      General: Bowel sounds are normal. There is distension.      Palpations: Abdomen is soft.      Tenderness: There is no guarding or rebound.   Musculoskeletal:      Cervical back: Normal range of motion.      Right lower leg: No edema.      Left lower leg: No edema.   Skin:     General: Skin is dry.   Neurological:      General: No focal deficit present.      Mental Status: He is oriented to person, place, and time.         Results:   Lab Results   Component Value Date    WBC 10.4 2024    HGB 9.0 (L) 2024    HGB 9.0 (L) 2024    HCT 27.0 (L) 2024    HCT 27.0 (L) 2024    PLT 94.0 (L) 2024    CREATSERUM 1.39 (H) 2024    BUN  64 (H) 07/07/2024     07/07/2024    K 4.7 07/07/2024     07/07/2024    CO2 20.0 (L) 07/07/2024     (H) 07/07/2024    CA 7.5 (L) 07/07/2024    ALB 3.3 07/06/2024    ALKPHO 32 (L) 07/06/2024    BILT 0.6 07/06/2024    TP 5.3 (L) 07/06/2024    AST 22 07/06/2024    ALT <7 (L) 07/06/2024    PTT 33.7 07/06/2024    INR 1.25 (H) 07/06/2024    T4F 1.1 06/11/2024    TSH 9.211 (H) 05/08/2024     08/11/2015    PSA 0.39 06/14/2023    ESRML 23 (H) 06/24/2024    CRP <0.29 02/24/2020    MG 2.1 07/04/2024    CK 76 06/24/2024       XR ABDOMEN, OBSTRUCTIVE SERIES 3 VIEWS(CPT=74021)    Result Date: 7/6/2024  CONCLUSION:  1. Moderate to large colonic fecal burden suggests constipation.  There is suspected mild gaseous distention of the descending and sigmoid colon.  No definite small bowel obstruction. 2. No free intraperitoneal air. 3. Enteric tube tip projects at the expected location of the distal gastric body/antrum. 4. Lesser incidental findings as above.   Dictated by (CST): Justin Sanford MD on 7/06/2024 at 7:46 PM     Finalized by (CST): Justin Sanford MD on 7/06/2024 at 7:49 PM          XR ABDOMEN (1 VIEW) (CPT=74018)    Result Date: 7/6/2024  CONCLUSION:  1. On the final provided images, enteric tube tip projects at the expected location of the distal gastric body/antrum (well positioned). 2. Trace left apical pneumothorax questioned on same date examination from approximately 10 hours prior appears less conspicuous. 3. Stable streaky left basilar opacities, which likely represent atelectasis. 4. Post sternotomy with stable additional support apparatus as detailed.   Dictated by (CST): Justin Sanford MD on 7/06/2024 at 3:59 PM     Finalized by (CST): Justin Sanford MD on 7/06/2024 at 4:03 PM          XR CHEST AP PORTABLE  (CPT=71045)    Result Date: 7/6/2024  CONCLUSION:  1. Trace left apical pneumothorax, new from previous.  2. Suggestion of trace bilateral pleural effusions.  3. Basilar reticular  opacities are seen, likely reflecting atelectasis.  4. Postthoracotomy chest with stable cardiomegaly.  5. Additional support tubes and lines as above.    A preliminary report was issued by the [facility] teleradiology service. There are no major discrepancies.   Dictated by (CST): Lawrence Muse MD on 7/06/2024 at 7:53 AM     Finalized by (CST): Lawrence Muse MD on 7/06/2024 at 7:57 AM               Assessment & Plan:       1- CAD s/p CABG x3 with left arterial appendage clip  on 7/2/24  CXR clear no significant acute findings  Pulmonary status stable on RA      CT per [facility]   Pain management  ncentive spirometry       2-acute GI bleed likely upper with coffee-ground emesis and melena      Also ileus on obstructive series and clinically better with NG tube    S/p 4 units of PRBC transfusion    Lovenox subcu and Plavix and ASA on hold  PPI every 12 hours  GI following and likely for endoscopy today      3- TRISTAN   Related to GIB   D5.45 IV fluid and transfuse PRBC as needed      4-DM, HTN, HL  Statin and beta-blocker and as needed insulin     5-DVT prophylaxis  Lovenox subcu now on hold for GI bleed     D/w pt and staff                  Ary Tian MD  7/7/2024

## 2024-07-07 NOTE — PROGRESS NOTES
[facility] - CARDIOLOGY PROGRESS NOTE  Giuseppe Faustin Patient Status:  Inpatient    1945 MRN [id number]   Location [facility] 2W/SW Attending Gavin Crandall MD   Hosp Day # 4 PCP Joshua Faustin MD     Assessment / Plan:  78-year-old male admitted for outpatient angiogram due to accelerating anginal symptoms and abnormal stress test, found to have severe distal left main disease now status post bypass surgery.    Postop complicated by ileus and upper GI bleed      Telemetry sinus rhythm 70 to 80s.        Assessment:    CAD: S/p CABG x 3 with LIMA to LAD and SVGs to diagonal and right PDA  7/3/2024.  Had clipping of left atrial appendage.       Paroxysmal atrial fibrillation: Left atrial appendage clipped, remains sinus rhythm on amiodarone.  Continue    Suspected upper GI bleed  GI consult saw the patient difficulty putting NG tube due to ileus subsequently it was placed  Further management as per GI.  Aspirin and Plavix have been stopped.  Will need to resume aspirin prior to discharge.  Keep hemoglobin over 8   GI to reevaluate for endoscopy today      Anemia  As above    Chest tube management as per cardiac surgery plan for removal today.      Plan:  As above.   Remains critically ill.      Angel Knox MD  Interventional Cardiology  [facility] [place]  Critical care time 31 minutes discussed with family at length bedside.  -----------------------------------------        Objective:  /73   Pulse 93   Temp 97.7 °F (36.5 °C) (Temporal)   Resp 21   Ht 160 cm (5' 3\")   Wt 169 lb 1.5 oz (76.7 kg)   SpO2 99%   BMI 29.95 kg/m²     Temp (24hrs), Av.5 °F (36.4 °C), Min:96.8 °F (36 °C), Max:98.2 °F (36.8 °C)    Intake/Output Summary (Last 24 hours) at 2024 0928  Last data filed at 2024 0900  Gross per 24 hour   Intake 2237.35 ml   Output 2060 ml   Net 177.35 ml      General: More awake today.  NG tube in place.  HEENT: No focal deficits.  Pupils equal, reactive.  Neck: PA catheter in right neck.  Cardiac: Sternal bandage C/D/I. RRR, soft S1, S2  no murmur or rub  Lungs: Clear anterolaterally without wheezes, rales, rhonchi.    Abdomen: Soft, distended no peritoneal signs.  Extremities: Without clubbing, cyanosis or edema.  Peripheral pulses are 2+.  Skin: Warm and dry.     Labs:  Lab Results   Component Value Date    WBC 10.4 07/07/2024    HGB 9.0 07/07/2024    HGB 9.0 07/07/2024    HCT 27.0 07/07/2024    HCT 27.0 07/07/2024    PLT 94.0 07/07/2024       Lab Results   Component Value Date    INR 1.25 (H) 07/06/2024     Lab Results   Component Value Date     07/07/2024    K 4.7 07/07/2024     07/07/2024    CO2 20.0 07/07/2024    BUN 64 07/07/2024    CREATSERUM 1.39 07/07/2024     07/07/2024    CA 7.5 07/07/2024            Medications:     [Held by provider] colchicine  0.6 mg Oral Daily    pantoprazole  40 mg Intravenous Q12H    insulin degludec  45 Units Subcutaneous Daily    insulin aspart  1-7 Units Subcutaneous TID CC    sennosides  8.6 mg Oral BID    docusate sodium  100 mg Oral BID    [Held by provider] metoclopramide  10 mg Intravenous Q6H    metoprolol tartrate  25 mg Oral 2x Daily(Beta Blocker)    mupirocin  1 Application Nasal BID    chlorhexidine gluconate  15 mL Mouth/Throat BID    multivitamin  1 tablet Oral Daily    ascorbic acid  500 mg Oral TID    [Held by provider] enoxaparin  40 mg Subcutaneous Daily    [Held by provider] clopidogrel  75 mg Oral Daily    [Held by provider] aspirin  81 mg Oral Daily    [Transfer Hold] melatonin  10 mg Oral Nightly    [Transfer Hold] atorvastatin  10 mg Oral Nightly    [Transfer Hold] primidone  50 mg Oral BID      dextrose 5%-sodium chloride 0.45% 60 mL/hr at 07/07/24 0900    amiodarone 0.5 mg/min (07/07/24 0600)    sodium chloride 10 mL/hr at 07/03/24 1406    DOBUTamine Stopped (07/04/24 1241)     nitroGLYCERIN in dextrose 5% Stopped (07/03/24 1800)    norepinephrine Stopped (07/06/24 0200)

## 2024-07-07 NOTE — PLAN OF CARE
C/o lower abd pain & cramping @ the start of the shift. Bloody drainage noted via NGT. Bloody drainage decreasing, but started having blood stools @ 0100. Total of 4 bloody BM during the night which were Maroon in color.     Hgb 7.4 @ MN & 1 unit PRBC administered as per Dr. Castle. GI updated with patient status with no response as of this note.    Converted to Afib with 's-130's approx 0435. 2nd PRBC & Amio bolus ordered. AM labs drawn before 2nd PRBC initiated. Converted back to SR with HR 90's @ 0615. Remains on Amio gtt 0.5mg/hr & Strict NPO with NGT to LIS.     Problem: GASTROINTESTINAL - ADULT  Goal: Minimal or absence of nausea and vomiting  Description: INTERVENTIONS:  - Maintain adequate hydration with IV or PO as ordered and tolerated  - Nasogastric tube to low intermittent suction as ordered  - Evaluate effectiveness of ordered antiemetic medications  - Provide nonpharmacologic comfort measures as appropriate  - Advance diet as tolerated, if ordered  - Obtain nutritional consult as needed  - Evaluate fluid balance  Outcome: Not Progressing  Goal: Maintains or returns to baseline bowel function  Description: INTERVENTIONS:  - Assess bowel function  - Maintain adequate hydration with IV or PO as ordered and tolerated  - Evaluate effectiveness of GI medications  - Encourage mobilization and activity  - Obtain nutritional consult as needed  - Establish a toileting routine/schedule  - Consider collaborating with pharmacy to review patient's medication profile  Outcome: Not Progressing     Problem: CARDIOVASCULAR - ADULT  Goal: Maintains optimal cardiac output and hemodynamic stability  Description: INTERVENTIONS:  - Monitor vital signs, rhythm, and trends  - Monitor for bleeding, hypotension and signs of decreased cardiac output  - Evaluate effectiveness of vasoactive medications to optimize hemodynamic stability  - Monitor arterial and/or venous puncture sites for bleeding and/or hematoma  - Assess  quality of pulses, skin color and temperature  - Assess for signs of decreased coronary artery perfusion - ex. Angina  - Evaluate fluid balance, assess for edema, trend weights  Outcome: Progressing  Goal: Absence of cardiac arrhythmias or at baseline  Description: INTERVENTIONS:  - Continuous cardiac monitoring, monitor vital signs, obtain 12 lead EKG if indicated  - Evaluate effectiveness of antiarrhythmic and heart rate control medications as ordered  - Initiate emergency measures for life threatening arrhythmias  - Monitor electrolytes and administer replacement therapy as ordered  Outcome: Progressing     Problem: RESPIRATORY - ADULT  Goal: Achieves optimal ventilation and oxygenation  Description: INTERVENTIONS:  - Assess for changes in respiratory status  - Assess for changes in mentation and behavior  - Position to facilitate oxygenation and minimize respiratory effort  - Oxygen supplementation based on oxygen saturation or ABGs  - Provide Smoking Cessation handout, if applicable  - Encourage broncho-pulmonary hygiene including cough, deep breathe, Incentive Spirometry  - Assess the need for suctioning and perform as needed  - Assess and instruct to report SOB or any respiratory difficulty  - Respiratory Therapy support as indicated  - Manage/alleviate anxiety  - Monitor for signs/symptoms of CO2 retention  Outcome: Progressing      Trigger finger, right ring finger

## 2024-07-07 NOTE — PLAN OF CARE
Pulled patients chest tube and temporary pacing wires today. Went for an EGD found duodenal ulcers and esophagitis but no interventions. Trending HGB. Ng in place to low intermittent suction with bloody drainage. Patient had loose maroon bowel movements. Vital signs are stable and patient remains on amio because patient remains npo.    Problem: Patient Centered Care  Goal: Patient preferences are identified and integrated in the patient's plan of care  Description: Interventions:  - What would you like us to know as we care for you?   - Provide timely, complete, and accurate information to patient/family  - Incorporate patient and family knowledge, values, beliefs, and cultural backgrounds into the planning and delivery of care  - Encourage patient/family to participate in care and decision-making at the level they choose  - Honor patient and family perspectives and choices  Outcome: Progressing     Problem: PAIN - ADULT  Goal: Verbalizes/displays adequate comfort level or patient's stated pain goal  Description: INTERVENTIONS:  - Encourage pt to monitor pain and request assistance  - Assess pain using appropriate pain scale  - Administer analgesics based on type and severity of pain and evaluate response  - Implement non-pharmacological measures as appropriate and evaluate response  - Consider cultural and social influences on pain and pain management  - Manage/alleviate anxiety  - Utilize distraction and/or relaxation techniques  - Monitor for opioid side effects  - Notify MD/LIP if interventions unsuccessful or patient reports new pain  - Anticipate increased pain with activity and pre-medicate as appropriate  Outcome: Progressing     Problem: SAFETY ADULT - FALL  Goal: Free from fall injury  Description: INTERVENTIONS:  - Assess pt frequently for physical needs  - Identify cognitive and physical deficits and behaviors that affect risk of falls.  - Wickes fall precautions as indicated by assessment.  - Educate  pt/family on patient safety including physical limitations  - Instruct pt to call for assistance with activity based on assessment  - Modify environment to reduce risk of injury  - Provide assistive devices as appropriate  - Consider OT/PT consult to assist with strengthening/mobility  - Encourage toileting schedule  Outcome: Progressing     Problem: CARDIOVASCULAR - ADULT  Goal: Maintains optimal cardiac output and hemodynamic stability  Description: INTERVENTIONS:  - Monitor vital signs, rhythm, and trends  - Monitor for bleeding, hypotension and signs of decreased cardiac output  - Evaluate effectiveness of vasoactive medications to optimize hemodynamic stability  - Monitor arterial and/or venous puncture sites for bleeding and/or hematoma  - Assess quality of pulses, skin color and temperature  - Assess for signs of decreased coronary artery perfusion - ex. Angina  - Evaluate fluid balance, assess for edema, trend weights  Outcome: Progressing  Goal: Absence of cardiac arrhythmias or at baseline  Description: INTERVENTIONS:  - Continuous cardiac monitoring, monitor vital signs, obtain 12 lead EKG if indicated  - Evaluate effectiveness of antiarrhythmic and heart rate control medications as ordered  - Initiate emergency measures for life threatening arrhythmias  - Monitor electrolytes and administer replacement therapy as ordered  Outcome: Progressing     Problem: RESPIRATORY - ADULT  Goal: Achieves optimal ventilation and oxygenation  Description: INTERVENTIONS:  - Assess for changes in respiratory status  - Assess for changes in mentation and behavior  - Position to facilitate oxygenation and minimize respiratory effort  - Oxygen supplementation based on oxygen saturation or ABGs  - Provide Smoking Cessation handout, if applicable  - Encourage broncho-pulmonary hygiene including cough, deep breathe, Incentive Spirometry  - Assess the need for suctioning and perform as needed  - Assess and instruct to report SOB  or any respiratory difficulty  - Respiratory Therapy support as indicated  - Manage/alleviate anxiety  - Monitor for signs/symptoms of CO2 retention  Outcome: Progressing     Problem: GASTROINTESTINAL - ADULT  Goal: Minimal or absence of nausea and vomiting  Description: INTERVENTIONS:  - Maintain adequate hydration with IV or PO as ordered and tolerated  - Nasogastric tube to low intermittent suction as ordered  - Evaluate effectiveness of ordered antiemetic medications  - Provide nonpharmacologic comfort measures as appropriate  - Advance diet as tolerated, if ordered  - Obtain nutritional consult as needed  - Evaluate fluid balance  Outcome: Progressing  Goal: Maintains or returns to baseline bowel function  Description: INTERVENTIONS:  - Assess bowel function  - Maintain adequate hydration with IV or PO as ordered and tolerated  - Evaluate effectiveness of GI medications  - Encourage mobilization and activity  - Obtain nutritional consult as needed  - Establish a toileting routine/schedule  - Consider collaborating with pharmacy to review patient's medication profile  Outcome: Progressing  Goal: Maintains adequate nutritional intake (undernourished)  Description: INTERVENTIONS:  - Monitor percentage of each meal consumed  - Identify factors contributing to decreased intake, treat as appropriate  - Assist with meals as needed  - Monitor I&O, WT and lab values  - Obtain nutritional consult as needed  - Optimize oral hygiene and moisture  - Encourage food from home; allow for food preferences  - Enhance eating environment  Outcome: Progressing  Goal: Achieves appropriate nutritional intake (bariatric)  Description: INTERVENTIONS:  - Monitor for over-consumption  - Identify factors contributing to increased intake, treat as appropriate  - Monitor I&O, WT and lab values  - Obtain nutritional consult as needed  - Evaluate psychosocial factors contributing to over-consumption  Outcome: Progressing     Problem:  GENITOURINARY - ADULT  Goal: Absence of urinary retention  Description: INTERVENTIONS:  - Assess patient’s ability to void and empty bladder  - Monitor intake/output and perform bladder scan as needed  - Follow urinary retention protocol/standard of care  - Consider collaborating with pharmacy to review patient's medication profile  - Implement strategies to promote bladder emptying  Outcome: Progressing

## 2024-07-07 NOTE — OR NURSING
Dr Elaine aware low b/ps   amiodarone temp held Yosef aguero aware  Sats good responsive and expelling sputum

## 2024-07-07 NOTE — PROGRESS NOTES
CV Surgery    NGT placed yesterday, KUB with good placement  Melanotic/bloody BM x2 overnight  So far, given 4U PRBC over last 24 hours.  Hgb responds to transfusion but does not hold.   Converted to afib again this AM, back to NSR with amiodarone bolus and blood    Sleeping but arousable  Afebrile, NSR 90s, SBP 130s, 95% on room air    RRR  Abdomen is distended but much softer today, not firm, no guarding, no rigidity  Not \"clammy\" today  No edema in legs    Cr 1.3    PLAN:  Holding ASA now, holding plavix for presumed GI bleed.  Will need to resume aspirin before discharge.    GI to re-evaluate for endoscopy today  Continue protonix 40mg IV BID  Q6hr H/H, transfuse for hgb less than 8 given s/p cardiac surgery/CABG  Continue NPO, NGT to LIS  Start maintenance fluids D50.45 at 60mL/hr while NPO  Amiodarone for afib, unable to anticoagulate given presument GI bleed, but his left atrial appendage has been clipped/amputated  Remove pacing wires today  Remove chest tubes today  Up in chair today as able, reduce pain meds  Plan of care discussed with bedside nurse    Brayan Castle MD  Cardiothoracic Surgery  [facility]

## 2024-07-07 NOTE — OPERATIVE REPORT
ESOPHAGOGASTRODUODENOSCOPY (EGD) REPORT    Giuseppe Faustin     1945 Age 78 year old   PCP Joshua Faustin MD Endoscopist Wen Bryan MD     Date of procedure: 24    Procedure: EGD w/ brush/wash    Pre-operative diagnosis: Melena, coffee ground emesis, acute blood loss anemia concerning for upper GI bleeding    Post-operative diagnosis: LA Class D esophagitis and ulceration, duodenal bulb ulcer, gastritis     Medications: MAC    Complications: none    Procedure:  Informed consent was obtained from the patient after the risks of the procedure were discussed, including but not limited to bleeding, perforation, aspiration, infection, or possibility of a missed lesion. After discussions of the risks/benefits and alternatives to this procedure, as well as the planned sedation, the patient was placed in the left lateral decubitus position and begun on continuous blood pressure pulse oximetry and EKG monitoring and this was maintained throughout the procedure. Once an adequate level of sedation was obtained a bite block was placed. Then the lubricated tip of the [id number] diagnostic video upper endoscope was inserted and advanced using direct visualization into the posterior pharynx and ultimately into the esophagus.    Complications: None    Findings:    An NGT was in place per the ICU team.    1. Esophagus: The squamocolumnar junction was noted at 35 cm and appeared irregular due to LA Class D circumferential esophagitis and clean based ulcerations. The diaphragmatic pinch was noted noted at 40 cm from the incisors. There was a 5 cm hiatal hernia. The esophageal mucosa appeared otherwise unremarkable.     2. Stomach: The stomach distended normally. Normal rugal folds were seen. The pylorus was patent. The gastric mucosa appeared moderately erythematous with expected mild NG trauma. Retroflexion revealed a normal fundus and a non-patulous cardia.     3. Duodenum: There was a large clean based  duodenal bulb ulcer located at the posterolateral wall and abutting the pylorus. The remainder of the duodenal appeared unremarkable.      There was no evidence of active or recent GI bleeding in the upper GI tract.       Impression:  1. LA Class D esophagitis and clean based esophageal ulcerations.   2. Large, clean based duodenal bulb ulcer located at the posterolateral wall and abutting the pylorus.   3.  There was no evidence of active or recent GI bleeding in the upper GI tract.      Recommend:  1. Recommend IV pantoprazole 40 mg BID.  2. Trend CBC q12. Goal Hgb per cardiology, platelets > 50, INR < 1.5 for now.   3. OK to advance diet to clears today.   4. Avoid NSAIDs if possible (besides ASA).  5. OK to resume Plavix.   6. H. Pylori stool antigen ordered.   7. Recommend repeat EGD in ~8-12 weeks to ensure healing of the ulcerations.    8. Management of the ileus per surgery.   9. Recommend abdominal x-ray to ensure adequate placement of the NGT.     >>>If tissue was sampled/removed and you have not received your pathology results either by phone or letter within 2 weeks, please call our office at [phone].    Specimens: none.     Blood loss: <1 ml

## 2024-07-07 NOTE — ANESTHESIA POSTPROCEDURE EVALUATION
Patient: Giuseppe Faustin    Procedure Summary       Date: 07/07/24 Room / Location: [facility] ENDOSCOPY 01 / EM ENDOSCOPY    Anesthesia Start: 1143 Anesthesia Stop: 1207    Procedure: ESOPHAGOGASTRODUODENOSCOPY (EGD) Diagnosis: (Esophagitis with ulceration duodenal bulb ulcer)    Surgeons: Wen Bryan MD Anesthesiologist: Jose Elaine MD    Anesthesia Type: MAC ASA Status: 3 - Emergent            Anesthesia Type: MAC    Vitals Value Taken Time   BP 82/43 07/07/24 1203   Temp  07/07/24 1207   Pulse 93 07/07/24 1203   Resp 22 07/07/24 1203   SpO2 100 % 07/07/24 1203       [facility] AN Post Evaluation:   Patient Evaluated in PACU  Patient Participation: complete - patient participated  Level of Consciousness: awake and alert  Pain Score: 0  Pain Management: adequate  Airway Patency:patent  Yes    Nausea/Vomiting: none  Cardiovascular Status: acceptable  Respiratory Status: acceptable  Postoperative Hydration acceptable      JOSE ELAINE MD  7/7/2024 12:07 PM

## 2024-07-07 NOTE — PROGRESS NOTES
[facility]     Gastroenterology Progress Note    Giuseppe Faustin Patient Status:  Inpatient    1945 MRN [id number]   Location [facility] 2W/SW Attending Delicia Strauss MD   Hosp Day # 4 PCP Joshua Faustin MD       Subjective:   Pt had bloody output from his NGT and has required blood to keep his hemoglobin up.  Hgb up to 10 after 3 units of blood in the past 24 hours.  He was given another unit of PRBCs and a recheck is planned now. Denies any other symptoms.        Objective:   Blood pressure 104/65, pulse 98, temperature 97.7 °F (36.5 °C), temperature source Temporal, resp. rate 17, height 5' 3\" (1.6 m), weight 169 lb 1.5 oz (76.7 kg), SpO2 94%. Body mass index is 29.95 kg/m².    GEN - Patient appears comfortable and in no acute discomfort  ENT - MMM, NGT in place  EYES - the sclera appears anicteric  CV - no edema  RESP -  No increased work of breathing  ABDOMEN - soft, non-tender exam in all quadrants without rigidity or guarding, non-distended, no abnormal bowel sounds noted, no masses are palpated  SKIN - No jaundice  NEURO - Alert and appropriate, and gross movements of extremities normal  PSYCH - normal affect, non-agitated      Assessment and Plan:   78 M with h/o CAD s/p CABG 7/3/24; GI consulted after he developed coffee ground emesis yesterday.  He is off pressors and hgb was down to 7.4 from a baseline of ~9-10, and he is requiring blood products to maintain a hgb of 9.  He has been in Afib intermittently but is currently in NSR.       Coffee ground emesis, acute blood loss anemia - hgb was down to 7.4 from a baseline of 9-10, and he is requiring blood products to maintain a hgb of 9.  Hgb is up to 10.5 after 3 units of blood in the past 24 hours.  He was given another unit of PRBCs and a recheck is planned later today.  Ddx is broad and includes PUD, dieulafoy, AVMs, etc.. Recommend EGD for further evaluation. No evidence of ileus noted on obstructive  series from yesterday and NGT is in place.  The procedure was discussed with the patient and his daughter per his request, and both are amenable.     L apical pneumothorax - noted on CXR yesterday.  Discussed with Dr. Tian and OK to proceed with EGD today.     Concern for ileus - obstructive series yesterday looks good besides evidence of constipation and bowel loops look OK.  NGT is in place.  Per surgery.      Recommend    - EGD with MAC today    - keep NPO    - IV PPI BID    - active type and screen, 2 large bore IVs in place    - further recs to follow    EGD consent: I have discussed the risks, benefits, and alternatives to upper endoscopy/enteroscopy with the patient/primary decision maker [who demonstrated understanding], including but not limited to the risks of bleeding, infection, pain, death, as well as the risks of anesthesia and perforation all leading to prolonged hospitalization, surgical intervention, or even death. I also specifically mentioned the miss rate of upper endoscopy of 5-10% in the best of all circumstances.  The patient has agreed to sign an informed consent and elected to proceed with procedure with possible intervention [i.e. polypectomy, stent placement, etc.] as indicated.      Wen Bryan MD  [facility] Gastroenterology      Results:     Lab Results   Component Value Date    WBC 10.4 07/07/2024    HGB 10.5 (L) 07/07/2024    HGB 10.5 (L) 07/07/2024    HCT 30.6 (L) 07/07/2024    PLT 94.0 (L) 07/07/2024    CREATSERUM 1.39 (H) 07/07/2024    BUN 64 (H) 07/07/2024     07/07/2024    K 4.7 07/07/2024     07/07/2024    CO2 20.0 (L) 07/07/2024     (H) 07/07/2024    CA 7.5 (L) 07/07/2024    ALB 3.3 07/06/2024    ALKPHO 32 (L) 07/06/2024    BILT 0.6 07/06/2024    TP 5.3 (L) 07/06/2024    AST 22 07/06/2024    ALT <7 (L) 07/06/2024    PTT 33.7 07/06/2024    INR 1.25 (H) 07/06/2024    T4F 1.1 06/11/2024    TSH 9.211 (H) 05/08/2024     08/11/2015    PSA 0.39  06/14/2023    ESRML 23 (H) 06/24/2024    CRP <0.29 02/24/2020    MG 2.1 07/04/2024    CK 76 06/24/2024       XR CHEST AP PORTABLE  (CPT=71045)    Result Date: 7/7/2024  CONCLUSION:   Minimal residual left apical pneumothorax, which has decreased in size when compared to 07/06/2024.  No significant change in the trace bilateral pleural effusions with associated bibasilar opacities.  Well-positioned enteric tube.  Remaining support devices are in unchanged positioning.  Redemonstrated postoperative changes from recent CABG.    Dictated by (CST): Neo Genao MD on 7/07/2024 at 10:17 AM     Finalized by (CST): Neo Genao MD on 7/07/2024 at 10:22 AM          XR ABDOMEN, OBSTRUCTIVE SERIES 3 VIEWS(CPT=74021)    Result Date: 7/6/2024  CONCLUSION:  1. Moderate to large colonic fecal burden suggests constipation.  There is suspected mild gaseous distention of the descending and sigmoid colon.  No definite small bowel obstruction. 2. No free intraperitoneal air. 3. Enteric tube tip projects at the expected location of the distal gastric body/antrum. 4. Lesser incidental findings as above.   Dictated by (CST): Justin Sanford MD on 7/06/2024 at 7:46 PM     Finalized by (CST): Justin Sanford MD on 7/06/2024 at 7:49 PM          XR ABDOMEN (1 VIEW) (CPT=74018)    Result Date: 7/6/2024  CONCLUSION:  1. On the final provided images, enteric tube tip projects at the expected location of the distal gastric body/antrum (well positioned). 2. Trace left apical pneumothorax questioned on same date examination from approximately 10 hours prior appears less conspicuous. 3. Stable streaky left basilar opacities, which likely represent atelectasis. 4. Post sternotomy with stable additional support apparatus as detailed.   Dictated by (CST): Justin Sanford MD on 7/06/2024 at 3:59 PM     Finalized by (CST): Justin Sanford MD on 7/06/2024 at 4:03 PM          XR CHEST AP PORTABLE  (CPT=71045)    Result Date: 7/6/2024  CONCLUSION:  1.  Trace left apical pneumothorax, new from previous.  2. Suggestion of trace bilateral pleural effusions.  3. Basilar reticular opacities are seen, likely reflecting atelectasis.  4. Postthoracotomy chest with stable cardiomegaly.  5. Additional support tubes and lines as above.    A preliminary report was issued by the [facility] Radiology teleradiology service. There are no major discrepancies.   Dictated by (CST): Lawrence Muse MD on 7/06/2024 at 7:53 AM     Finalized by (CST): Lawrence Muse MD on 7/06/2024 at 7:57 AM

## 2024-07-07 NOTE — ANESTHESIA PREPROCEDURE EVALUATION
Anesthesia PreOp Note    HPI:     Giuseppe Faustin is a 78 year old male who presents for preoperative consultation requested by: Wen Bryan MD    Date of Surgery: 7/7/2024    Procedure(s):  ESOPHAGOGASTRODUODENOSCOPY (EGD)  Indication: coffee ground emesis; anemia    Relevant Problems   No relevant active problems       NPO:  Last Liquid Consumption Date: 07/05/24  Last Liquid Consumption Time: 2000  Last Solid Consumption Date: 07/05/24  Last Solid Consumption Time: 2000  Last Liquid Consumption Date: 07/05/24          History Review:  Patient Active Problem List    Diagnosis Date Noted    Coffee ground emesis 07/06/2024    Abnormal stress test 07/03/2024    Elevated coronary artery calcium score 07/03/2024    SOB (shortness of breath) 07/03/2024    Primary osteoarthritis of both knees 06/18/2024    Coronary artery disease involving native coronary artery of native heart without angina pectoris 04/22/2024    Moderate persistent asthma without complication ([facility]) 03/04/2024    Age-related cataract of right eye 11/14/2023    CKD (chronic kidney disease) stage 3, GFR 30-59 ml/min ([facility]) 09/07/2023    Gastroesophageal reflux disease without esophagitis 09/07/2023    Hyperlipidemia associated with type 2 diabetes mellitus ([facility]) 09/07/2023    Hypertension associated with type 2 diabetes mellitus ([facility]) 09/07/2023    Type 2 diabetes mellitus with stage 3a chronic kidney disease, without long-term current use of insulin ([facility]) 07/15/2019    Hyperthyroidism 06/22/2018    Vocal cord paralysis 11/10/2016       Past Medical History:    Adenomatous colon polyp    Arthritis    Benign prostatic hypertrophy with urinary retention    Chronic cough    Chronic sinusitis    Esophageal reflux    Essential and other specified forms of tremor    Essential hypertension    Gall stones    High blood pressure    Hoarseness, chronic    HTN (hypertension)    Osteoarthrosis, localized, primary, knee, left    Osteoarthrosis, localized,  primary, knee, right    Osteoarthrosis, unspecified whether generalized or localized, unspecified site    Pain in joints    Painful swallowing    Personal history of colonic polyps    PONV (postoperative nausea and vomiting)    Prediabetes    borderline    Problems with swallowing    trouble with solid    Second degree hemorrhoids    Shortness of breath    Sleep apnea    no cpap    Sleep disturbance    Sputum production    Visual impairment    reading glasses    Vocal cord paralysis    Wheezing       Past Surgical History:   Procedure Laterality Date    Cholecystectomy  07/2015    Colonoscopy  10/2012    3 mm adenoma. repeat 5 yrs, Dr Sarabia    Colonoscopy,biopsy  10/22/2012    Procedure: COLONOSCOPY, POSSIBLE BIOPSY, POSSIBLE POLYPECTOMY 13948;  Surgeon: Joe Sarabia MD;  Location: [facility]    Cystoscopy,insert ureteral stent  9-1-15    TURP    Laparoscopic cholecystectomy N/A 7/10/2015    Procedure: LAPAROSCOPIC CHOLECYSTECTOMY;  Surgeon: Tania Schmidt MD;  Location:  MAIN OR    Other surgical history  9/1/15    CYSTOSCOPY, TRANS URETHRAL RESECTION OF PROSTATE    Patient documented not to have experienced any of the following events  10/22/2012    Procedure: COLONOSCOPY, POSSIBLE BIOPSY, POSSIBLE POLYPECTOMY 44959;  Surgeon: Joe Sarabia MD;  Location: [facility]    Patient withough preoperative order for iv antibiotic surgical site infection prophylaxis.  10/22/2012    Procedure: COLONOSCOPY, POSSIBLE BIOPSY, POSSIBLE POLYPECTOMY 20640;  Surgeon: Joe Sarabia MD;  Location: [facility]    Upper gi endoscopy,exam  09/2020       Medications Prior to Admission   Medication Sig Dispense Refill Last Dose    Propranolol HCl ER 80 MG Oral Capsule SR 24 Hr Take 1 capsule (80 mg total) by mouth daily. FOR BLOOD PRESSURE. 90 capsule 9 7/1/2024    telmisartan 80 MG Oral Tab Take 1 tablet (80 mg total) by mouth daily. FOR BLOOD PRESSURE. STOP ENALAPRIL DUE  TO COUGH. 90 tablet 9 7/1/2024    atorvastatin 10 MG Oral Tab Take 1 tablet (10 mg total) by mouth nightly. FOR CHOLESTEROL. 90 tablet 9 7/1/2024    aspirin ( ASPIRIN) 81 MG Oral Tab EC Take 1 tablet (81 mg total) by mouth daily. 90 tablet 9 7/2/2024 at 0630    Empagliflozin (JARDIANCE) 25 MG Oral Tab Take 1 tablet by mouth daily. FOR DIABETES. 90 tablet 9 7/1/2024    metFORMIN  MG Oral Tablet 24 Hr Take 1 tablet (750 mg total) by mouth 2 (two) times daily with meals. FOR DIABETES. 180 tablet 9 7/1/2024    pantoprazole 40 MG Oral Tab EC Take 1 tablet (40 mg total) by mouth before breakfast. 90 tablet 3 7/1/2024    fluticasone-salmeterol (ADVAIR DISKUS) 250-50 MCG/ACT Inhalation Aerosol Powder, Breath Activated Inhale 1 puff into the lungs every 12 (twelve) hours. 3 each 9 7/1/2024    primidone 50 MG Oral Tab Take 1 tablet (50 mg total) by mouth 2 (two) times daily. FOR TREMORS. 180 tablet 9 7/1/2024    methIMAzole 5 MG Oral Tab Take 1 tablet (5 mg total) by mouth daily. 90 tablet 0     Melatonin 10 MG Oral Tab Take 10 mg by mouth at bedtime. 90 tablet 1 Unknown    Glucose Blood (ONETOUCH VERIO) In Vitro Strip 1 each 4 (four) times daily before meals and nightly. Test before meals and nightly 400 strip 1     Blood Glucose Monitoring Suppl (ONETOUCH VERIO) w/Device Does not apply Kit 1 each 4 (four) times daily before meals and nightly. 1 kit 0      Current Facility-Administered Medications Ordered in Epic   Medication Dose Route Frequency Provider Last Rate Last Admin    dextrose 5%-sodium chloride 0.45% infusion   Intravenous Continuous Brayan Castle MD 60 mL/hr at 07/07/24 0900 New Bag at 07/07/24 0900    [Held by provider] colchicine tab 0.6 mg  0.6 mg Oral Daily Brayan Castle MD   0.6 mg at 07/06/24 0518    pantoprazole (Protonix) 40 mg in sodium chloride 0.9% PF 10 mL IV push  40 mg Intravenous Q12H Andra Graves MD   40 mg at 07/07/24 0636    ondansetron (Zofran) 4 MG/2ML injection 4 mg  4 mg  Intravenous Q6H PRN Andra Graves MD        lidocaine 2% topical sterile jelly 1 mL  1 mL Topical PRN Brayan Castle MD        amiodarone (Cordarone) 450 mg in dextrose 5% 250 mL infusion  0.5 mg/min Intravenous Continuous Brayan Castle MD 16.7 mL/hr at 07/07/24 0600 0.5 mg/min at 07/07/24 0600    insulin degludec (Tresiba) 100 units/mL flextouch 45 Units  45 Units Subcutaneous Daily Jacquelin Castillo, APRN   45 Units at 07/07/24 0812    insulin aspart (NovoLOG) 100 Units/mL FlexPen 1-7 Units  1-7 Units Subcutaneous TID CC Jacquelin Castillo, APRN   2 Units at 07/07/24 0812    sodium chloride 0.9% infusion   Intravenous Continuous Jacquelin Castillo, APRN 10 mL/hr at 07/03/24 1406 New Bag at 07/03/24 1406    melatonin tab 3 mg  3 mg Oral Nightly PRN Jacquelin Castillo R, APRN        sennosides (Senokot) tab 8.6 mg  8.6 mg Oral BID Jacquelin Castillo R, APRN   8.6 mg at 07/05/24 1944    docusate sodium (Colace) cap 100 mg  100 mg Oral BID Jacquelin Castillo R, APRN   100 mg at 07/05/24 1944    polyethylene glycol (PEG 3350) (Miralax) 17 g oral packet 17 g  17 g Oral Daily PRN Jacquelin Castillo R, APRN        bisacodyl (Dulcolax) 10 MG rectal suppository 10 mg  10 mg Rectal Daily PRN Jacquelin Castillo R, APRN        [Held by provider] metoclopramide (Reglan) 5 mg/mL injection 10 mg  10 mg Intravenous Q6H Jacquelin Castillo, APRN   10 mg at 07/07/24 0056    ondansetron (Zofran) 4 MG/2ML injection 4 mg  4 mg Intravenous Q6H PRN Jacquelin Castillo R, APRN   4 mg at 07/06/24 2345    DOBUTamine in dextrose 5% (Dobutrex) 500 mg/250mL infusion premix  2.5-20 mcg/kg/min (Dosing Weight) Intravenous Continuous PRN Jacquelin Castillo APRN   Stopped at 07/04/24 1241    nitroGLYCERIN in dextrose 5% 50 mg/250mL infusion premix  5-300 mcg/min Intravenous Continuous PRN Jacquelin Castillo APRN   Stopped at 07/03/24 1800    norepinephrine (Levophed) 4 mg/250mL infusion premix  0.5-30 mcg/min Intravenous Continuous PRN Jacquelin Castillo APRN   Stopped at 07/06/24  0200    metoprolol tartrate (Lopressor) tab 25 mg  25 mg Oral 2x Daily(Beta Blocker) Jacquelin Castillo R, APRN   25 mg at 07/06/24 0536    potassium chloride 20 mEq/100mL IVPB premix 20 mEq  20 mEq Intravenous PRN Anna, Jacquelin R, APRN        Or    potassium chloride 40 mEq/100mL IVPB premix (central line) 40 mEq  40 mEq Intravenous PRN Anna Jacquelin R, APRN        magnesium sulfate in dextrose 5% 1 g/100mL infusion premix 1 g  1 g Intravenous PRN Anna, Jacquelin R, APRN        magnesium sulfate in sterile water for injection 2 g/50mL IVPB premix 2 g  2 g Intravenous PRN Anna Jacquelin R, APRN        mupirocin (Bactroban) 2% nasal ointment 1 Application  1 Application Nasal BID Anna Jacquelin R, APRN   1 Application at 07/07/24 0809    chlorhexidine gluconate (Peridex) 0.12 % oral solution 15 mL  15 mL Mouth/Throat BID Jacquelin Castillo, APRN   15 mL at 07/07/24 0809    multivitamin (Tab-A-Ari/Beta Carotene) tab 1 tablet  1 tablet Oral Daily Jacquelin Castillo R, APRN   1 tablet at 07/05/24 0850    ascorbic acid (Vitamin C) tab 500 mg  500 mg Oral TID Jacquelin Castillo R, APRN   500 mg at 07/05/24 1943    acetaminophen (Tylenol) tab 650 mg  650 mg Oral Q4H PRN Jacquelin Castillo R, APRN        Or    HYDROcodone-acetaminophen (Norco) 5-325 MG per tab 1 tablet  1 tablet Oral Q4H PRN Anna Jacquelin R, APRN   1 tablet at 07/06/24 0227    Or    HYDROcodone-acetaminophen (Norco) 5-325 MG per tab 2 tablet  2 tablet Oral Q4H PRN Jacquelin Castillo R, APRN        morphINE PF 4 MG/ML injection 2 mg  2 mg Intravenous Q2H PRN Anna Jacquelin R, APRN        Or    morphINE PF 4 MG/ML injection 4 mg  4 mg Intravenous Q2H PRN Anna Jacquelin R, APRN   4 mg at 07/06/24 1437    [Held by provider] aspirin DR tab 81 mg  81 mg Oral Daily Jacquelin Castillo R, APRN   81 mg at 07/05/24 0850    glucose (Dex4) 15 GM/59ML oral liquid 15 g  15 g Oral Q15 Min PRN Jacquelin Castillo R, APRN        Or    glucose (Glutose) 40% oral gel 15 g  15 g Oral Q15 Min PRN  Sims, Jacquelin R, APRN        Or    glucose-vitamin C (Dex-4) chewable tab 4 tablet  4 tablet Oral Q15 Min PRN Anna, Jacquelin R, APRN        Or    dextrose 50% injection 50 mL  50 mL Intravenous Q15 Min PRN Anna, Jacquelin R, APRN        Or    glucose (Dex4) 15 GM/59ML oral liquid 30 g  30 g Oral Q15 Min PRN Anna, Jacquelin R, APRN        Or    glucose (Glutose) 40% oral gel 30 g  30 g Oral Q15 Min PRN Sims, Jacquelin R, APRN        Or    glucose-vitamin C (Dex-4) chewable tab 8 tablet  8 tablet Oral Q15 Min PRN Anna, Jacquelin R, APRN        [Transfer Hold] melatonin cap/tab 10 mg  10 mg Oral Nightly Jak Soria MD   10 mg at 24    [Transfer Hold] atorvastatin (Lipitor) tab 10 mg  10 mg Oral Nightly Jak Soria MD   10 mg at 24    [Transfer Hold] primidone (Mysoline) tab 50 mg  50 mg Oral BID Jak Soria MD   50 mg at 24     No current [facility]-ordered outpatient medications on file.       No Known Allergies    Family History   Problem Relation Age of Onset    Heart Attack Father     Hypertension Father      Social History     Socioeconomic History    Marital status:    Tobacco Use    Smoking status: Former     Current packs/day: 0.00     Average packs/day: 0.5 packs/day for 20.0 years (10.0 ttl pk-yrs)     Types: Cigarettes     Start date: 1964     Quit date: 1984     Years since quittin.0     Passive exposure: Past    Smokeless tobacco: Never   Vaping Use    Vaping status: Never Used   Substance and Sexual Activity    Alcohol use: No    Drug use: No   Other Topics Concern    Caffeine Concern Yes     Comment: 1 tea daily    Exercise Yes     Comment: daily gym       Available pre-op labs reviewed.  Lab Results   Component Value Date    WBC 10.4 2024    RBC 3.19 (L) 2024    HGB 10.5 (L) 2024    HGB 10.5 (L) 2024    HCT 30.6 (L) 2024    MCV 84.6 2024    MCH 28.2 2024    MCHC 33.3 2024    RDW 16.7 (H)  07/07/2024    PLT 94.0 (L) 07/07/2024     Lab Results   Component Value Date     07/07/2024    K 4.7 07/07/2024     07/07/2024    CO2 20.0 (L) 07/07/2024    BUN 64 (H) 07/07/2024    CREATSERUM 1.39 (H) 07/07/2024     (H) 07/07/2024    PGLU 189 (H) 07/07/2024    CA 7.5 (L) 07/07/2024     Lab Results   Component Value Date    INR 1.25 (H) 07/06/2024       Vital Signs:  Body mass index is 29.95 kg/m².   height is 1.6 m (5' 3\") and weight is 76.7 kg (169 lb 1.5 oz). His temporal temperature is 97.7 °F (36.5 °C). His blood pressure is 107/73 and his pulse is 93. His respiration is 21 and oxygen saturation is 99%.   Vitals:    07/07/24 0752 07/07/24 0800 07/07/24 0853 07/07/24 0900   BP: 124/77 113/71 131/66 107/73   Pulse: 94 96 94 93   Resp: 18 19 (!) 33 21   Temp:  97.7 °F (36.5 °C)     TempSrc:  Temporal     SpO2: 96% 94% 95% 99%   Weight:       Height:            Anesthesia Evaluation     Patient summary reviewed and Nursing notes reviewed    History of anesthetic complications (PONV)   Airway   Mallampati: II  TM distance: >3 FB  Neck ROM: full  Dental - Dentition appears grossly intact     Pulmonary - normal exam   (+) asthma, sleep apnea  Cardiovascular - normal exam  (+) hypertension well controlled, CAD, CABG/stent (CABG 7/3), dysrhythmias (Paroxysmal atrial fibrillation)    Neuro/Psych - negative ROS     GI/Hepatic/Renal    (+) GERD well controlled    Endo/Other    (+) diabetes mellitus type 2 well controlled  Abdominal  - normal exam                 Anesthesia Plan:   ASA:  3  Emergent    Plan:   MAC  Post-op Pain Management: Oral pain medication and IV analgesics  Informed Consent Plan and Risks Discussed With:  Patient and child/children      I have informed Giuseppe Faustin and his daughter of the nature of the anesthetic plan, benefits, risks including possible dental damage if relevant, major complications, and any alternative forms of anesthetic management.   All of the patient's  questions were answered to the best of my ability. The patient desires the anesthetic management as planned.  JOSE COLEMAN MD  7/7/2024 10:24 AM  Present on Admission:  **None**

## 2024-07-08 LAB
ANION GAP SERPL CALC-SCNC: 3 MMOL/L (ref 0–18)
BASOPHILS # BLD AUTO: 0.01 X10(3) UL (ref 0–0.2)
BASOPHILS NFR BLD AUTO: 0.1 %
BLOOD TYPE BARCODE: 5100
BUN BLD-MCNC: 60 MG/DL (ref 9–23)
BUN/CREAT SERPL: 51.3 (ref 10–20)
CALCIUM BLD-MCNC: 7.4 MG/DL (ref 8.7–10.4)
CHLORIDE SERPL-SCNC: 113 MMOL/L (ref 98–112)
CO2 SERPL-SCNC: 24 MMOL/L (ref 21–32)
CREAT BLD-MCNC: 1.17 MG/DL
DEPRECATED RDW RBC AUTO: 49.2 FL (ref 35.1–46.3)
EGFRCR SERPLBLD CKD-EPI 2021: 63 ML/MIN/1.73M2 (ref 60–?)
EOSINOPHIL # BLD AUTO: 0.03 X10(3) UL (ref 0–0.7)
EOSINOPHIL NFR BLD AUTO: 0.3 %
ERYTHROCYTE [DISTWIDTH] IN BLOOD BY AUTOMATED COUNT: 16.7 % (ref 11–15)
GLUCOSE BLD-MCNC: 128 MG/DL (ref 70–99)
GLUCOSE BLDC GLUCOMTR-MCNC: 100 MG/DL (ref 70–99)
GLUCOSE BLDC GLUCOMTR-MCNC: 113 MG/DL (ref 70–99)
GLUCOSE BLDC GLUCOMTR-MCNC: 133 MG/DL (ref 70–99)
GLUCOSE BLDC GLUCOMTR-MCNC: 139 MG/DL (ref 70–99)
GLUCOSE BLDC GLUCOMTR-MCNC: 140 MG/DL (ref 70–99)
HCT VFR BLD AUTO: 24.8 %
HGB BLD-MCNC: 8.3 G/DL
HGB BLD-MCNC: 9.2 G/DL
IMM GRANULOCYTES # BLD AUTO: 0.16 X10(3) UL (ref 0–1)
IMM GRANULOCYTES NFR BLD: 1.4 %
LYMPHOCYTES # BLD AUTO: 1.55 X10(3) UL (ref 1–4)
LYMPHOCYTES NFR BLD AUTO: 13.9 %
MCH RBC QN AUTO: 27.9 PG (ref 26–34)
MCHC RBC AUTO-ENTMCNC: 33.5 G/DL (ref 31–37)
MCV RBC AUTO: 83.5 FL
MONOCYTES # BLD AUTO: 1.25 X10(3) UL (ref 0.1–1)
MONOCYTES NFR BLD AUTO: 11.2 %
NEUTROPHILS # BLD AUTO: 8.18 X10 (3) UL (ref 1.5–7.7)
NEUTROPHILS # BLD AUTO: 8.18 X10(3) UL (ref 1.5–7.7)
NEUTROPHILS NFR BLD AUTO: 73.1 %
OSMOLALITY SERPL CALC.SUM OF ELEC: 309 MOSM/KG (ref 275–295)
PLATELET # BLD AUTO: 90 10(3)UL (ref 150–450)
PLATELETS.RETICULATED NFR BLD AUTO: 4.8 % (ref 0–7)
POTASSIUM SERPL-SCNC: 3.7 MMOL/L (ref 3.5–5.1)
RBC # BLD AUTO: 2.97 X10(6)UL
SODIUM SERPL-SCNC: 140 MMOL/L (ref 136–145)
UNIT VOLUME: 350 ML
WBC # BLD AUTO: 11.2 X10(3) UL (ref 4–11)

## 2024-07-08 PROCEDURE — 99233 SBSQ HOSP IP/OBS HIGH 50: CPT | Performed by: INTERNAL MEDICINE

## 2024-07-08 PROCEDURE — 99233 SBSQ HOSP IP/OBS HIGH 50: CPT | Performed by: HOSPITALIST

## 2024-07-08 RX ORDER — AMIODARONE HYDROCHLORIDE 200 MG/1
200 TABLET ORAL 3 TIMES DAILY
Status: DISCONTINUED | OUTPATIENT
Start: 2024-07-08 | End: 2024-07-10

## 2024-07-08 RX ORDER — INSULIN DEGLUDEC 100 U/ML
40 INJECTION, SOLUTION SUBCUTANEOUS DAILY
Status: DISCONTINUED | OUTPATIENT
Start: 2024-07-08 | End: 2024-07-09

## 2024-07-08 RX ORDER — ASCORBIC ACID 500 MG
500 TABLET ORAL DAILY
Status: DISCONTINUED | OUTPATIENT
Start: 2024-07-09 | End: 2024-07-11

## 2024-07-08 RX ORDER — SODIUM CHLORIDE 9 MG/ML
INJECTION, SOLUTION INTRAVENOUS ONCE
Status: DISCONTINUED | OUTPATIENT
Start: 2024-07-08 | End: 2024-07-10

## 2024-07-08 RX ORDER — ATORVASTATIN CALCIUM 10 MG/1
10 TABLET, FILM COATED ORAL NIGHTLY
Status: DISCONTINUED | OUTPATIENT
Start: 2024-07-08 | End: 2024-07-11

## 2024-07-08 RX ADMIN — INSULIN DEGLUDEC 40 UNITS: 100 INJECTION, SOLUTION SUBCUTANEOUS at 08:42:00

## 2024-07-08 RX ADMIN — ONDANSETRON 4 MG: 2 INJECTION INTRAMUSCULAR; INTRAVENOUS at 11:48:00

## 2024-07-08 RX ADMIN — POTASSIUM CHLORIDE 20 MEQ: 14.9 INJECTION INTRAVENOUS at 09:17:00

## 2024-07-08 RX ADMIN — Medication 25 MG: at 08:42:00

## 2024-07-08 RX ADMIN — CHLORHEXIDINE GLUCONATE ORAL RINSE 15 ML: 1.2 SOLUTION DENTAL at 08:42:00

## 2024-07-08 RX ADMIN — Medication 25 MG: at 17:48:00

## 2024-07-08 RX ADMIN — AMIODARONE HYDROCHLORIDE 200 MG: 200 TABLET ORAL at 17:48:00

## 2024-07-08 RX ADMIN — AMIODARONE HYDROCHLORIDE 200 MG: 200 TABLET ORAL at 20:46:00

## 2024-07-08 RX ADMIN — DOCUSATE SODIUM 100 MG: 100 CAPSULE, LIQUID FILLED ORAL at 20:46:00

## 2024-07-08 RX ADMIN — DEXTROSE MONOHYDRATE AND SODIUM CHLORIDE: 5; .45 INJECTION, SOLUTION INTRAVENOUS at 00:10:00

## 2024-07-08 RX ADMIN — AMIODARONE HYDROCHLORIDE 200 MG: 200 TABLET ORAL at 08:42:00

## 2024-07-08 RX ADMIN — SENNOSIDES 8.6 MG: 8.6 MG TABLET ORAL at 20:46:00

## 2024-07-08 RX ADMIN — CHLORHEXIDINE GLUCONATE ORAL RINSE 15 ML: 1.2 SOLUTION DENTAL at 20:50:00

## 2024-07-08 RX ADMIN — ATORVASTATIN CALCIUM 10 MG: 10 TABLET, FILM COATED ORAL at 20:46:00

## 2024-07-08 RX ADMIN — DOXEPIN HYDROCHLORIDE 1 TABLET: 50 CAPSULE ORAL at 08:42:00

## 2024-07-08 NOTE — PROGRESS NOTES
[facility] - CARDIOLOGY PROGRESS NOTE  Giuseppe Faustin Patient Status:  Inpatient    1945 MRN [id number]   Location [facility] 2W/SW Attending Gavin Crandall MD   Hosp Day # 5 PCP Joshua Faustin MD     Assessment / Plan:  78-year-old male admitted for outpatient angiogram due to accelerating anginal symptoms and abnormal stress test, found to have severe distal left main disease now status post bypass surgery.    Postop complicated by ileus and upper GI bleed      Telemetry sinus rhythm 70 to 80s.        Assessment:    CAD: S/p CABG x 3 with LIMA to LAD and SVGs to diagonal and right PDA  7/3/2024.  Had clipping of left atrial appendage.   Postop day #5.    Paroxysmal atrial fibrillation: Left atrial appendage clipped, now in atrial fibrillation continue p.o. amiodarone and metoprolol.    Upper GI bleed  Status post urgent EGD yesterday, coffee-ground emesis EGD showed esophagitis gastritis and duodenal bulb ulcer continue PPI.  NG tube has been removed.  aspirin and Plavix have been stopped.  Will need to resume aspirin prior to discharge.  Keep hemoglobin over 8     Postop volume overload.    Anemia  As above    Acute renal insufficiency improving creatinine down to 1.2.  Euvolemic no need for IV for Lasix today.      Plan:  As above.     L3, will follow    Angel Knox MD  Interventional Cardiology  [facility]     -----------------------------------------        Objective:  /63 (BP Location: Right arm)   Pulse (!) 121   Temp 98.2 °F (36.8 °C) (Temporal)   Resp 21   Ht 160 cm (5' 3\")   Wt 162 lb 14.7 oz (73.9 kg)   SpO2 97%   BMI 28.86 kg/m²     Temp (24hrs), Av.1 °F (36.7 °C), Min:97.6 °F (36.4 °C), Max:98.3 °F (36.8 °C)    Intake/Output Summary (Last 24 hours) at 2024 0928  Last data filed at 2024 0900  Gross per 24 hour   Intake 1854.2 ml    Output 1440 ml   Net 414.2 ml     Subjective feels well no events overnight.  Telemetry A-fib 80 to 90 bpm.      General: More awake today.  In good spirits wife bedside.  HEENT: No focal deficits.  Pupils equal, reactive.  Neck: PA catheter in right neck.  Cardiac: Sternal bandage C/D/I. RRR, soft S1, S2  no murmur or rub  Lungs: Clear anterolaterally without wheezes, rales, rhonchi.    Abdomen: Soft, distended no peritoneal signs.  Extremities: Without clubbing, cyanosis or edema.  Peripheral pulses are 2+.  Skin: Warm and dry.     Labs:  Lab Results   Component Value Date    WBC 11.2 07/08/2024    HGB 8.3 07/08/2024    HCT 24.8 07/08/2024    PLT 90.0 07/08/2024       Lab Results   Component Value Date    INR 1.25 (H) 07/06/2024     Lab Results   Component Value Date     07/08/2024    K 3.7 07/08/2024     07/08/2024    CO2 24.0 07/08/2024    BUN 60 07/08/2024    CREATSERUM 1.17 07/08/2024     07/08/2024    CA 7.4 07/08/2024            Medications:     [START ON 7/9/2024] ascorbic acid  500 mg Oral Daily    amiodarone  200 mg Oral TID    metoprolol tartrate  25 mg Oral 2x Daily(Beta Blocker)    sodium chloride   Intravenous Once    atorvastatin  10 mg Oral Nightly    insulin degludec  40 Units Subcutaneous Daily    pantoprazole  40 mg Intravenous Q12H    insulin aspart  1-7 Units Subcutaneous TID CC    sennosides  8.6 mg Oral BID    docusate sodium  100 mg Oral BID    [Held by provider] metoclopramide  10 mg Intravenous Q6H    chlorhexidine gluconate  15 mL Mouth/Throat BID    multivitamin  1 tablet Oral Daily    [Held by provider] aspirin  81 mg Oral Daily    [Transfer Hold] melatonin  10 mg Oral Nightly

## 2024-07-08 NOTE — PROGRESS NOTES
[facility]  part of [facility]    Progress Note    Giuseppe Faustin Patient Status:  Inpatient    1945 MRN [id number]   Location [facility] 2W/SW Attending Delicia Strauss MD   Hosp Day # 5 PCP Joshua Faustin MD       Subjective:     Constitutional:  Negative for fever.   Respiratory: Negative.     Cardiovascular: Negative.    Neurological: Negative.    Psychiatric/Behavioral: Negative.       Feeling better today  Still passing some melanic stool but no hematochezia or hematemesis  Denies abdominal pain  No chest pain  No shortness of breath  No fever  Objective:   Blood pressure 115/51, pulse 72, temperature (P) 97.7 °F (36.5 °C), temperature source (P) Temporal, resp. rate 23, height 5' 3\" (1.6 m), weight 162 lb 14.7 oz (73.9 kg), SpO2 99%.  Physical Exam  Constitutional:       General: He is not in acute distress.  HENT:      Head: Atraumatic.      Nose: Nose normal.      Mouth/Throat:      Mouth: Mucous membranes are moist.   Eyes:      General: No scleral icterus.  Cardiovascular:      Rate and Rhythm: Normal rate.      Heart sounds:      No gallop.   Pulmonary:      Effort: No respiratory distress.      Breath sounds: No stridor. No wheezing, rhonchi or rales.   Abdominal:      General: Abdomen is flat. Bowel sounds are normal. There is no distension.      Palpations: Abdomen is soft.      Tenderness: There is no guarding.   Musculoskeletal:      Cervical back: Normal range of motion.      Left lower leg: No edema.   Skin:     General: Skin is warm and dry.   Neurological:      Mental Status: He is oriented to person, place, and time.         Results:   Lab Results   Component Value Date    WBC 11.2 (H) 2024    HGB 8.3 (L) 2024    HCT 24.8 (L) 2024    PLT 90.0 (L) 2024    CREATSERUM 1.17 2024    BUN 60 (H) 2024     2024    K 3.7 2024     (H) 2024    CO2 24.0 2024     (H) 2024    CA  7.4 (L) 07/08/2024    ALB 3.3 07/06/2024    ALKPHO 32 (L) 07/06/2024    BILT 0.6 07/06/2024    TP 5.3 (L) 07/06/2024    AST 22 07/06/2024    ALT <7 (L) 07/06/2024    PTT 33.7 07/06/2024    INR 1.25 (H) 07/06/2024    T4F 1.1 06/11/2024    TSH 9.211 (H) 05/08/2024     08/11/2015    PSA 0.39 06/14/2023    ESRML 23 (H) 06/24/2024    CRP <0.29 02/24/2020    MG 2.1 07/04/2024    CK 76 06/24/2024       XR ABDOMEN (1 VIEW) (CPT=74018)    Result Date: 7/7/2024  CONCLUSION:   Enteric tube terminates in the proximal stomach with the side hole terminating near the GE junction, recommend advancement approximately 3 cm.  Mildly prominent loops of small bowel measuring up to 2.3 cm, which may reflect an ileus.  Lesser incidental findings described above.    Dictated by (CST): Neo Genao MD on 7/07/2024 at 3:56 PM     Finalized by (CST): Neo Genao MD on 7/07/2024 at 3:58 PM          XR CHEST AP PORTABLE  (CPT=71045)    Result Date: 7/7/2024  CONCLUSION:   Minimal residual left apical pneumothorax, which has decreased in size when compared to 07/06/2024.  No significant change in the trace bilateral pleural effusions with associated bibasilar opacities.  Well-positioned enteric tube.  Remaining support devices are in unchanged positioning.  Redemonstrated postoperative changes from recent CABG.    Dictated by (CST): Neo Genao MD on 7/07/2024 at 10:17 AM     Finalized by (CST): Neo Genao MD on 7/07/2024 at 10:22 AM          XR ABDOMEN, OBSTRUCTIVE SERIES 3 VIEWS(CPT=74021)    Result Date: 7/6/2024  CONCLUSION:  1. Moderate to large colonic fecal burden suggests constipation.  There is suspected mild gaseous distention of the descending and sigmoid colon.  No definite small bowel obstruction. 2. No free intraperitoneal air. 3. Enteric tube tip projects at the expected location of the distal gastric body/antrum. 4. Lesser incidental findings as above.   Dictated by (CST): Justin Sanford MD on 7/06/2024 at  7:46 PM     Finalized by (CST): Justin Sanford MD on 7/06/2024 at 7:49 PM               Assessment & Plan:       1- CAD s/p CABG x3 with left arterial appendage clip  on 7/2/24  Pulmonary status stable on RA and CXR clear      CT was pulled   Pain management  ncentive spirometry        2-acute upper GI bleed   EGD on 7/7 large duodenal ulcer  S/p 4 units of PRBC transfusion   HD stable and overall better       Lovenox subcu and Plavix and ASA on hold  PPI every 12 hours  GI following and likely for endoscopy today      3- TRISTAN   Better      4-DM, HTN, HL  Statin and beta-blocker and as needed insulin     5-DVT prophylaxis  Lovenox subcu now on hold for GI bleed     D/w pt and staff                  Ary Tian MD  7/8/2024

## 2024-07-08 NOTE — PROGRESS NOTES
[facility]     Gastroenterology Progress Note    Giuseppe Faustin Patient Status:  Inpatient    1945 MRN [id number]   Location [facility] 2W/SW Attending Delicia Strauss MD   Hosp Day # 5 PCP Joshua Faustin MD       Subjective:   Poor appetite. Tried some liquids and felt nauseous. No emesis. No abd pain. On black stool.    Objective:   Blood pressure 115/51, pulse 72, temperature (P) 97.7 °F (36.5 °C), temperature source (P) Temporal, resp. rate 23, height 5' 3\" (1.6 m), weight 162 lb 14.7 oz (73.9 kg), SpO2 99%. Body mass index is 28.86 kg/m².    General: awake, alert and oriented, no acute distress  HEENT: moist mucus membranes  PULM: no conversational dyspnea  CARDIOVASCULAR: regular rate and rhythm, the extremities are warm and well perfused  GI: soft, non-tender, non-distended, + BS, no rebound/guarding   EXTREMITIES: no edema, moving all extremities  SKIN: no visible rash  NEURO: appropriate and interactive    Assessment and Plan:   78 M with h/o CAD s/p CABG 7/3/24; GI consulted after he developed coffee ground emesis. Underwent EGD  showing LA class D ulcerated esophagitis and large clean based duodenal bulb ulcer.     - continue PPI BID x 8 weeks then daily  - advance diet as tolerated  - ok to resume asa and plavix if medically necessary      Sarahi Green MD  [facility] Gastroenterology      Results:     Lab Results   Component Value Date    WBC 11.2 (H) 2024    HGB 9.2 (L) 2024    HCT 24.8 (L) 2024    PLT 90.0 (L) 2024    CREATSERUM 1.17 2024    BUN 60 (H) 2024     2024    K 3.7 2024     (H) 2024    CO2 24.0 2024     (H) 2024    CA 7.4 (L) 2024    ALB 3.3 2024    ALKPHO 32 (L) 2024    BILT 0.6 2024    TP 5.3 (L) 2024    AST 22 2024    ALT <7 (L) 2024    PTT 33.7 2024    INR 1.25 (H) 2024    T4F 1.1 2024    TSH  9.211 (H) 05/08/2024     08/11/2015    PSA 0.39 06/14/2023    ESRML 23 (H) 06/24/2024    CRP <0.29 02/24/2020    MG 2.1 07/04/2024    CK 76 06/24/2024       XR ABDOMEN (1 VIEW) (CPT=74018)    Result Date: 7/7/2024  CONCLUSION:   Enteric tube terminates in the proximal stomach with the side hole terminating near the GE junction, recommend advancement approximately 3 cm.  Mildly prominent loops of small bowel measuring up to 2.3 cm, which may reflect an ileus.  Lesser incidental findings described above.    Dictated by (CST): Neo Genao MD on 7/07/2024 at 3:56 PM     Finalized by (CST): Neo Genao MD on 7/07/2024 at 3:58 PM          XR CHEST AP PORTABLE  (CPT=71045)    Result Date: 7/7/2024  CONCLUSION:   Minimal residual left apical pneumothorax, which has decreased in size when compared to 07/06/2024.  No significant change in the trace bilateral pleural effusions with associated bibasilar opacities.  Well-positioned enteric tube.  Remaining support devices are in unchanged positioning.  Redemonstrated postoperative changes from recent CABG.    Dictated by (CST): Neo Genao MD on 7/07/2024 at 10:17 AM     Finalized by (CST): Neo Genao MD on 7/07/2024 at 10:22 AM          XR ABDOMEN, OBSTRUCTIVE SERIES 3 VIEWS(CPT=74021)    Result Date: 7/6/2024  CONCLUSION:  1. Moderate to large colonic fecal burden suggests constipation.  There is suspected mild gaseous distention of the descending and sigmoid colon.  No definite small bowel obstruction. 2. No free intraperitoneal air. 3. Enteric tube tip projects at the expected location of the distal gastric body/antrum. 4. Lesser incidental findings as above.   Dictated by (CST): Justin Sanford MD on 7/06/2024 at 7:46 PM     Finalized by (CST): Justin Sanford MD on 7/06/2024 at 7:49 PM

## 2024-07-08 NOTE — PROGRESS NOTES
Progress Note     Giuseppe Tavera Ramin Patient Status:  Inpatient    1945 MRN [id number]   Location [facility] 2W/SW Attending Sarita Franklin MD   Hosp Day # 4 PCP Joshua Faustin MD       Subjective:   S: bloody matter in NG tube overnight, melena x2, with hgb drop and multiple transfusions (x4 over 24 hrs)  Abdomen still distended, no flatus  Episode of afib this morning, now back in SR  S/p EGD  Very tired    Daughter and wife at the bedside      Review of Systems:   10 point ROS completed and was negative, except for pertinent positive and negatives stated in subjective.    Objective:   Vital signs:  Temp:  [96.8 °F (36 °C)-98.2 °F (36.8 °C)] 97.6 °F (36.4 °C)  Pulse:  [] 115  Resp:  [13-33] 20  BP: ()/(27-86) 113/65  SpO2:  [94 %-100 %] 97 %    Wt Readings from Last 6 Encounters:   24 169 lb 1.5 oz (76.7 kg)   24 165 lb (74.8 kg)   24 170 lb (77.1 kg)   24 170 lb (77.1 kg)   24 169 lb (76.7 kg)   24 171 lb (77.6 kg)         Physical Exam:    General: Resting in bed, NAD, NGT in place (darker, less bloody)        Respiratory: Clear to auscultation bilaterally. No wheezes. No rhonchi.  Cardiovascular: RRR  Abdomen: Soft, nontender,  distended, quiet  Neurologic: No new focal neurological deficits.   Musculoskeletal: Moves all extremities.  Extremities: mild edema.    Results:   Diagnostic Data:      Labs:    Labs Last 24 Hours:   BMP     CBC    Other     Na 139 Cl 110 BUN 64 Glu 192   Hb 9.5   PTT - Procal -   K 4.7 CO2 20.0 Cr 1.39   WBC 10.4 >< PLT 94.0  INR - CRP -   Renal Lytes Endo    Hct 30.6   Trop - D dim -   eGFR - Ca 7.5 POC Gluc  190    LFT   pBNP - Lactic -   eGFR AA - PO4 - A1c -   AST - APk - Prot -  LDL -     Mg - TSH -   ALT - T denton - Alb -        COVID-19 Lab Results    COVID-19  Lab Results   Component Value Date    COVID19 Not Detected 2021    COVID19 Not Detected 2020    COVID19 Not Detected 10/05/2020        Pro-Calcitonin  No results for input(s): \"PCT\" in the last 168 hours.    Cardiac  No results for input(s): \"TROP\", \"PBNP\" in the last 168 hours.    Creatinine Kinase  No results for input(s): \"CK\" in the last 168 hours.    Inflammatory Markers  No results for input(s): \"CRP\", \"OMAIRA\", \"LDH\", \"DDIMER\" in the last 168 hours.    Imaging: Imaging data reviewed in Epic.    Medications:    amiodarone  150 mg Intravenous Once    [Held by provider] colchicine  0.6 mg Oral Daily    pantoprazole  40 mg Intravenous Q12H    insulin degludec  45 Units Subcutaneous Daily    insulin aspart  1-7 Units Subcutaneous TID CC    sennosides  8.6 mg Oral BID    docusate sodium  100 mg Oral BID    [Held by provider] metoclopramide  10 mg Intravenous Q6H    metoprolol tartrate  25 mg Oral 2x Daily(Beta Blocker)    mupirocin  1 Application Nasal BID    chlorhexidine gluconate  15 mL Mouth/Throat BID    multivitamin  1 tablet Oral Daily    ascorbic acid  500 mg Oral TID    [Held by provider] aspirin  81 mg Oral Daily    [Transfer Hold] melatonin  10 mg Oral Nightly    [Transfer Hold] atorvastatin  10 mg Oral Nightly    [Transfer Hold] primidone  50 mg Oral BID       Assessment & Plan:   ASSESSMENT / PLAN:     CAD. S/p CABG x 3 with LIMA to LAD and SVGs to diagonal and right PDA on 7/3. Edi extubation  -CV surgery and cardiology following  -Activity as tolerated  -off pressors, insulin gtt     #Paroxysmal Afib  S/p Left atrial appendage clip   - cardiology following, pt started on amiodarone gtt  - cont to monitor    Suspected acute upper GIB   Ileus  Acute posthemorrhagic anemia  -GI consulted, seen by Dr. Bryan  -NGT placed  -PPI bid  -s/p PRBC x 4 within 24 hrs   -Trend hemoglobin  -antiplatelets on hold  -now s/p EGD showin. LA Class D esophagitis and clean based esophageal ulcerations.   2. Large, clean based duodenal bulb ulcer located at the posterolateral wall and abutting the pylorus.   3.  There was no evidence of  active or recent GI bleeding in the upper GI tract.    -->H. Pylori stool antigen ordered.   -->needs to repeat EGD in ~8-12 weeks to ensure healing of the ulcerations.   -abd XR today     Bladder neck contracture.  Patient seen by urology in the OR.  Cystoscopy assisted Delong catheter placement.  History of TURP  -Keep Delong for duration of hospitalization  -Keep Delong after discharge as well (per Dr Blanchard)  -Outpatient urology follow-up     Other problems  Hypertension  Dyslipidemia  GERD  Obstructive sleep apnea  Osteoarthritis  Type 2 diabetes  History of kidney stones  Essential tremor  CKD stage II     dvt prophylaxis: sc lovenox  code status: full code  dispo: anticipate home with HH once medically optimized         MDM: High complexity- severe exacerbation of chronic illness posing a threat to life. IV meds requiring close inpatient monitoring.           Supplementary Documentation:

## 2024-07-08 NOTE — PROGRESS NOTES
Progress Note     Giuseppe Tavera Faustin Patient Status:  Inpatient    1945 MRN [id number]   Location [facility] 2W/SW Attending Sarita Franklin MD   Hosp Day # 5 PCP Joshua Faustin MD       Subjective:   S:  NG tube removed this morning, melena overnight  Abdomen softer, less distended   On/off afib   Very tired     wife at the bedside      Review of Systems:   10 point ROS completed and was negative, except for pertinent positive and negatives stated in subjective.    Objective:   Vital signs:  Temp:  [97.6 °F (36.4 °C)-98.3 °F (36.8 °C)] 97.6 °F (36.4 °C)  Pulse:  [] 105  Resp:  [14-24] 21  BP: ()/(43-74) 89/58  SpO2:  [94 %-100 %] 98 %    Wt Readings from Last 6 Encounters:   24 162 lb 14.7 oz (73.9 kg)   24 165 lb (74.8 kg)   24 170 lb (77.1 kg)   24 170 lb (77.1 kg)   24 169 lb (76.7 kg)   24 171 lb (77.6 kg)         Physical Exam:    General: Resting in bed, NAD         Respiratory: Clear to auscultation bilaterally. No wheezes. No rhonchi.  Cardiovascular: RRR  Abdomen: Soft, nontender,  less distended, +BS  Neurologic: No new focal neurological deficits.   Musculoskeletal: Moves all extremities.  Extremities: mild edema.    Results:   Diagnostic Data:      Labs:    Labs Last 24 Hours:   BMP     CBC    Other     Na 140 Cl 113 BUN 60 Glu 128   Hb 8.3   PTT - Procal -   K 3.7 CO2 24.0 Cr 1.17   WBC 11.2 >< PLT 90.0  INR - CRP -   Renal Lytes Endo    Hct 24.8   Trop - D dim -   eGFR - Ca 7.4 POC Gluc  139    LFT   pBNP - Lactic -   eGFR AA - PO4 - A1c -   AST - APk - Prot -  LDL -     Mg - TSH -   ALT - T denton - Alb -        COVID-19 Lab Results    COVID-19  Lab Results   Component Value Date    COVID19 Not Detected 2021    COVID19 Not Detected 2020    COVID19 Not Detected 10/05/2020       Pro-Calcitonin  No results for input(s): \"PCT\" in the last 168 hours.    Cardiac  No results for input(s): \"TROP\", \"PBNP\" in the last 168  hours.    Creatinine Kinase  No results for input(s): \"CK\" in the last 168 hours.    Inflammatory Markers  No results for input(s): \"CRP\", \"OMAIRA\", \"LDH\", \"DDIMER\" in the last 168 hours.    Imaging: Imaging data reviewed in Epic.    Medications:    [START ON 2024] ascorbic acid  500 mg Oral Daily    amiodarone  200 mg Oral TID    metoprolol tartrate  25 mg Oral 2x Daily(Beta Blocker)    sodium chloride   Intravenous Once    atorvastatin  10 mg Oral Nightly    insulin degludec  40 Units Subcutaneous Daily    pantoprazole  40 mg Intravenous Q12H    insulin aspart  1-7 Units Subcutaneous TID CC    sennosides  8.6 mg Oral BID    docusate sodium  100 mg Oral BID    [Held by provider] metoclopramide  10 mg Intravenous Q6H    chlorhexidine gluconate  15 mL Mouth/Throat BID    multivitamin  1 tablet Oral Daily    [Held by provider] aspirin  81 mg Oral Daily    [Transfer Hold] melatonin  10 mg Oral Nightly       Assessment & Plan:   ASSESSMENT / PLAN:     CAD. S/p CABG x 3 with LIMA to LAD and SVGs to diagonal and right PDA on 7/3. Edi extubation  -CV surgery and cardiology following  -Activity as tolerated  -off pressors, insulin gtt     #Paroxysmal Afib  S/p Left atrial appendage clip   - cardiology following, pt started on amiodarone gtt  - cont to monitor    Suspected acute upper GIB   Ileus  Acute posthemorrhagic anemia  -GI consulted, seen by Dr. Bryan  -NGT placed  -PPI bid  -s/p PRBC x 4 within 24 hrs initially, hgb~8.3 today -->getting PRBC x1  -Trend hemoglobin  -antiplatelets on hold  -now s/p EGD showin. LA Class D esophagitis and clean based esophageal ulcerations.   2. Large, clean based duodenal bulb ulcer located at the posterolateral wall and abutting the pylorus.   3.  There was no evidence of active or recent GI bleeding in the upper GI tract.    -->H. Pylori stool antigen ordered.   -->needs to repeat EGD in ~8-12 weeks to ensure healing of the ulcerations.   -abd XR on   reviewed  -7/8-->NGT removed    Bladder neck contracture.  Patient seen by urology in the OR.  Cystoscopy assisted Delong catheter placement.  History of TURP  -Keep Delong for duration of hospitalization  -Keep Delong after discharge as well (per Dr Blanchard)  -Outpatient urology follow-up     Other problems  Hypertension  Dyslipidemia  GERD  Obstructive sleep apnea  Osteoarthritis  Type 2 diabetes  History of kidney stones  Essential tremor  CKD stage II     dvt prophylaxis: sc lovenox  code status: full code  dispo: anticipate home with HH once medically optimized         MDM: High complexity- severe exacerbation of chronic illness posing a threat to life. IV meds requiring close inpatient monitoring.           Supplementary Documentation:

## 2024-07-08 NOTE — CM/SW NOTE
Was not medically ready over the weekend.  Updates sent to  referrals.  CM will follow-up for choice on 7/9/24.    Morenita ENGLANDA BSN RN CRRN   RN Case Manager  [phone]

## 2024-07-08 NOTE — PROGRESS NOTES
[facility]  part of [facility]    Progress Note    Giuseppe Faustin Patient Status:  Inpatient    1945 MRN [id number]   Location [facility] 2W/SW Attending Delicia Strauss MD   Hosp Day # 5 PCP Joshua Faustin MD     Subjective:  In bed on exam, no acute events noted overnight.  Had 1 dark tarry stool overnight, Hgb 8.3 this a.m.  Currently denies: Nausea, CP, SOB, dizziness, or palpitations.  Family currently visiting at bedside.    Objective:  /55 (BP Location: Right arm)   Pulse 112   Temp 98.3 °F (36.8 °C) (Temporal)   Resp 17   Ht 5' 3\" (1.6 m)   Wt 162 lb 14.7 oz (73.9 kg)   SpO2 95%   BMI 28.86 kg/m²     Temp (24hrs), Av.9 °F (36.6 °C), Min:97.6 °F (36.4 °C), Max:98.3 °F (36.8 °C)  Telemetry-a fib    Intake/Output:    Intake/Output Summary (Last 24 hours) at 2024 0744  Last data filed at 2024 0600  Gross per 24 hour   Intake 2229.6 ml   Output 1600 ml   Net 629.6 ml       Wt Readings from Last 3 Encounters:   24 162 lb 14.7 oz (73.9 kg)   24 165 lb (74.8 kg)   24 170 lb (77.1 kg)       Allergies:  No Known Allergies    Labs:  Lab Results   Component Value Date    WBC 11.2 2024    HGB 8.3 2024    HCT 24.8 2024    PLT 90.0 2024    CREATSERUM 1.17 2024    BUN 60 2024     2024    K 3.7 2024     2024    CO2 24.0 2024     2024    CA 7.4 2024       Physical Exam:  Blood pressure 107/55, pulse 112, temperature 98.3 °F (36.8 °C), temperature source Temporal, resp. rate 17, height 5' 3\" (1.6 m), weight 162 lb 14.7 oz (73.9 kg), SpO2 95%.  General: NAD  Neck: RIJ cordis  Lungs: Diminished bases, slightly coarse  Heart: Irregular, irregular, S1, S2  Abdomen: Soft, round, NT/ND, BS+x4, + flatus/+ BM  Extremities: Warm, dry, trace generalized edema  Pulses: 2+ bilat DP  Skin: sternotomy stable -dressing CDI, L SVG sites CDI  Neurological:  AAOx3,  arben RALPH    Assessment/Plan:  S/p CABG x 3 (LIMA-LAD, SVG-diagonal/PDA), TYE clip POD # 5  Encourage pulmonary toilet - IS 10x/hour.  Stable on RA.  CXR 7/7 reviewed-small residual left apical PTX noted-improving.  PA/LAT CXR ordered in a.m.  Remains hemodynamically stable off vasoactive agents-transition to PCU status, hospitalist already following.  May remove Cordis today after transfusion.  S/p EGD yesterday secondary to melena/coffee-ground emesis/revealed esophagitis/gastritis/duodenal bulb ulcer-continue PPI, okay to DC NGT today, start clear liquids and advance as tolerated.  Will continue to hold ASA/Plavix today per Dr. Castle.  Postop anemia secondary to above-Hgb 8.3, transfuse 1 RBC today, follow serial H/H.  PAF-start p.o. BB today, increase as tolerated, start p.o. Amio and DC drip-needs improved rate control, management per cardiology.  S/p TYE clip -no AC currently secondary to GIB  Pain meds as needed, wean off as tolerated  Increase activity - OOB to chair tid , PT/OT consulted, ambulate in hallway.  Shower tomorrow  DVT prevention - SCDs/Lovenox held with GIB, check with GI about resuming  Expected postop volume overload - monitor daily weights, I/Os, lasix prn  Cystoscopy assisted Delong catheter placement.  History of TURP -Keep Delong duration of hospitalization, and after discharge as well (per Dr Blanchard), f/u with urology post d/c  Insulin coverage per protocol -history DM preop, make adjustments accordingly.  Anticipate resuming home regimen at or near DC.    Discharge planning:  Patient has supportive family.  Anticipate DC home once medically stable with VNA/home PT.  Continue postop CV DC education.  Appreciate CM/SW to assist with DC planning.    Plan of care discussed with pt, bedside RN, family, and rounding CV Surgeon: Dr. Tin Acosta, APRN  7/8/2024  2236

## 2024-07-08 NOTE — PLAN OF CARE
Pt more comfortable overnight. No more bloody drainage via NGT. 1 tarry stool during the night. No blood/clots noted. In & out of Afib during the night, but primarily Afib with 's-110's. Fatigued/drowsy.     Problem: CARDIOVASCULAR - ADULT  Goal: Absence of cardiac arrhythmias or at baseline  Description: INTERVENTIONS:  - Continuous cardiac monitoring, monitor vital signs, obtain 12 lead EKG if indicated  - Evaluate effectiveness of antiarrhythmic and heart rate control medications as ordered  - Initiate emergency measures for life threatening arrhythmias  - Monitor electrolytes and administer replacement therapy as ordered  Outcome: Not Progressing     Problem: PAIN - ADULT  Goal: Verbalizes/displays adequate comfort level or patient's stated pain goal  Description: INTERVENTIONS:  - Encourage pt to monitor pain and request assistance  - Assess pain using appropriate pain scale  - Administer analgesics based on type and severity of pain and evaluate response  - Implement non-pharmacological measures as appropriate and evaluate response  - Consider cultural and social influences on pain and pain management  - Manage/alleviate anxiety  - Utilize distraction and/or relaxation techniques  - Monitor for opioid side effects  - Notify MD/LIP if interventions unsuccessful or patient reports new pain  - Anticipate increased pain with activity and pre-medicate as appropriate  Outcome: Progressing     Problem: SAFETY ADULT - FALL  Goal: Free from fall injury  Description: INTERVENTIONS:  - Assess pt frequently for physical needs  - Identify cognitive and physical deficits and behaviors that affect risk of falls.  - [place] fall precautions as indicated by assessment.  - Educate pt/family on patient safety including physical limitations  - Instruct pt to call for assistance with activity based on assessment  - Modify environment to reduce risk of injury  - Provide assistive devices as appropriate  - Consider OT/PT  consult to assist with strengthening/mobility  - Encourage toileting schedule  Outcome: Progressing     Problem: CARDIOVASCULAR - ADULT  Goal: Maintains optimal cardiac output and hemodynamic stability  Description: INTERVENTIONS:  - Monitor vital signs, rhythm, and trends  - Monitor for bleeding, hypotension and signs of decreased cardiac output  - Evaluate effectiveness of vasoactive medications to optimize hemodynamic stability  - Monitor arterial and/or venous puncture sites for bleeding and/or hematoma  - Assess quality of pulses, skin color and temperature  - Assess for signs of decreased coronary artery perfusion - ex. Angina  - Evaluate fluid balance, assess for edema, trend weights  Outcome: Progressing     Problem: RESPIRATORY - ADULT  Goal: Achieves optimal ventilation and oxygenation  Description: INTERVENTIONS:  - Assess for changes in respiratory status  - Assess for changes in mentation and behavior  - Position to facilitate oxygenation and minimize respiratory effort  - Oxygen supplementation based on oxygen saturation or ABGs  - Provide Smoking Cessation handout, if applicable  - Encourage broncho-pulmonary hygiene including cough, deep breathe, Incentive Spirometry  - Assess the need for suctioning and perform as needed  - Assess and instruct to report SOB or any respiratory difficulty  - Respiratory Therapy support as indicated  - Manage/alleviate anxiety  - Monitor for signs/symptoms of CO2 retention  Outcome: Progressing     Problem: GASTROINTESTINAL - ADULT  Goal: Minimal or absence of nausea and vomiting  Description: INTERVENTIONS:  - Maintain adequate hydration with IV or PO as ordered and tolerated  - Nasogastric tube to low intermittent suction as ordered  - Evaluate effectiveness of ordered antiemetic medications  - Provide nonpharmacologic comfort measures as appropriate  - Advance diet as tolerated, if ordered  - Obtain nutritional consult as needed  - Evaluate fluid balance  Outcome:  Progressing  Goal: Maintains or returns to baseline bowel function  Description: INTERVENTIONS:  - Assess bowel function  - Maintain adequate hydration with IV or PO as ordered and tolerated  - Evaluate effectiveness of GI medications  - Encourage mobilization and activity  - Obtain nutritional consult as needed  - Establish a toileting routine/schedule  - Consider collaborating with pharmacy to review patient's medication profile  Outcome: Progressing

## 2024-07-08 NOTE — PLAN OF CARE
1 u prbcs given. Up to chair. Poor appetite and poor motivation. Education given to patient and family. Cordis removed without complications. FC remains in place per Urology. Pain managed with current regimen    Problem: PAIN - ADULT  Goal: Verbalizes/displays adequate comfort level or patient's stated pain goal  Description: INTERVENTIONS:  - Encourage pt to monitor pain and request assistance  - Assess pain using appropriate pain scale  - Administer analgesics based on type and severity of pain and evaluate response  - Implement non-pharmacological measures as appropriate and evaluate response  - Consider cultural and social influences on pain and pain management  - Manage/alleviate anxiety  - Utilize distraction and/or relaxation techniques  - Monitor for opioid side effects  - Notify MD/LIP if interventions unsuccessful or patient reports new pain  - Anticipate increased pain with activity and pre-medicate as appropriate  Outcome: Progressing     Problem: CARDIOVASCULAR - ADULT  Goal: Maintains optimal cardiac output and hemodynamic stability  Description: INTERVENTIONS:  - Monitor vital signs, rhythm, and trends  - Monitor for bleeding, hypotension and signs of decreased cardiac output  - Evaluate effectiveness of vasoactive medications to optimize hemodynamic stability  - Monitor arterial and/or venous puncture sites for bleeding and/or hematoma  - Assess quality of pulses, skin color and temperature  - Assess for signs of decreased coronary artery perfusion - ex. Angina  - Evaluate fluid balance, assess for edema, trend weights  Outcome: Progressing     Problem: RESPIRATORY - ADULT  Goal: Achieves optimal ventilation and oxygenation  Description: INTERVENTIONS:  - Assess for changes in respiratory status  - Assess for changes in mentation and behavior  - Position to facilitate oxygenation and minimize respiratory effort  - Oxygen supplementation based on oxygen saturation or ABGs  - Provide Smoking Cessation  handout, if applicable  - Encourage broncho-pulmonary hygiene including cough, deep breathe, Incentive Spirometry  - Assess the need for suctioning and perform as needed  - Assess and instruct to report SOB or any respiratory difficulty  - Respiratory Therapy support as indicated  - Manage/alleviate anxiety  - Monitor for signs/symptoms of CO2 retention  Outcome: Progressing     Problem: GENITOURINARY - ADULT  Goal: Absence of urinary retention  Description: INTERVENTIONS:  - Assess patient’s ability to void and empty bladder  - Monitor intake/output and perform bladder scan as needed  - Follow urinary retention protocol/standard of care  - Consider collaborating with pharmacy to review patient's medication profile  - Implement strategies to promote bladder emptying  Outcome: Progressing     Problem: CARDIOVASCULAR - ADULT  Goal: Absence of cardiac arrhythmias or at baseline  Description: INTERVENTIONS:  - Continuous cardiac monitoring, monitor vital signs, obtain 12 lead EKG if indicated  - Evaluate effectiveness of antiarrhythmic and heart rate control medications as ordered  - Initiate emergency measures for life threatening arrhythmias  - Monitor electrolytes and administer replacement therapy as ordered  Outcome: Not Progressing     Problem: GASTROINTESTINAL - ADULT  Goal: Minimal or absence of nausea and vomiting  Description: INTERVENTIONS:  - Maintain adequate hydration with IV or PO as ordered and tolerated  - Nasogastric tube to low intermittent suction as ordered  - Evaluate effectiveness of ordered antiemetic medications  - Provide nonpharmacologic comfort measures as appropriate  - Advance diet as tolerated, if ordered  - Obtain nutritional consult as needed  - Evaluate fluid balance  Outcome: Not Progressing  Goal: Maintains or returns to baseline bowel function  Description: INTERVENTIONS:  - Assess bowel function  - Maintain adequate hydration with IV or PO as ordered and tolerated  - Evaluate  effectiveness of GI medications  - Encourage mobilization and activity  - Obtain nutritional consult as needed  - Establish a toileting routine/schedule  - Consider collaborating with pharmacy to review patient's medication profile  Outcome: Not Progressing  Goal: Maintains adequate nutritional intake (undernourished)  Description: INTERVENTIONS:  - Monitor percentage of each meal consumed  - Identify factors contributing to decreased intake, treat as appropriate  - Assist with meals as needed  - Monitor I&O, WT and lab values  - Obtain nutritional consult as needed  - Optimize oral hygiene and moisture  - Encourage food from home; allow for food preferences  - Enhance eating environment  Outcome: Not Progressing

## 2024-07-09 ENCOUNTER — APPOINTMENT (OUTPATIENT)
Dept: GENERAL RADIOLOGY | Facility: HOSPITAL | Age: 79
End: 2024-07-09
Attending: NURSE PRACTITIONER
Payer: MEDICARE

## 2024-07-09 LAB
ANION GAP SERPL CALC-SCNC: 5 MMOL/L (ref 0–18)
ANTIBODY SCREEN: NEGATIVE
BLOOD TYPE BARCODE: 5100
BUN BLD-MCNC: 40 MG/DL (ref 9–23)
BUN/CREAT SERPL: 42.6 (ref 10–20)
CALCIUM BLD-MCNC: 8 MG/DL (ref 8.7–10.4)
CHLORIDE SERPL-SCNC: 111 MMOL/L (ref 98–112)
CO2 SERPL-SCNC: 24 MMOL/L (ref 21–32)
CREAT BLD-MCNC: 0.94 MG/DL
DEPRECATED RDW RBC AUTO: 51.5 FL (ref 35.1–46.3)
EGFRCR SERPLBLD CKD-EPI 2021: 82 ML/MIN/1.73M2 (ref 60–?)
ERYTHROCYTE [DISTWIDTH] IN BLOOD BY AUTOMATED COUNT: 17.6 % (ref 11–15)
GLUCOSE BLD-MCNC: 77 MG/DL (ref 70–99)
GLUCOSE BLDC GLUCOMTR-MCNC: 84 MG/DL (ref 70–99)
GLUCOSE BLDC GLUCOMTR-MCNC: 84 MG/DL (ref 70–99)
GLUCOSE BLDC GLUCOMTR-MCNC: 87 MG/DL (ref 70–99)
GLUCOSE BLDC GLUCOMTR-MCNC: 88 MG/DL (ref 70–99)
H PYLORI AG STL QL IA: NEGATIVE
HCT VFR BLD AUTO: 26.2 %
HEPARIN AB PPP QL PL AGG: NEGATIVE
HGB BLD-MCNC: 8.7 G/DL
MCH RBC QN AUTO: 28.6 PG (ref 26–34)
MCHC RBC AUTO-ENTMCNC: 33.2 G/DL (ref 31–37)
MCV RBC AUTO: 86.2 FL
OSMOLALITY SERPL CALC.SUM OF ELEC: 299 MOSM/KG (ref 275–295)
PLATELET # BLD AUTO: 87 10(3)UL (ref 150–450)
PLATELETS.RETICULATED NFR BLD AUTO: 4.6 % (ref 0–7)
POTASSIUM SERPL-SCNC: 3.8 MMOL/L (ref 3.5–5.1)
RBC # BLD AUTO: 3.04 X10(6)UL
RH BLOOD TYPE: POSITIVE
SODIUM SERPL-SCNC: 140 MMOL/L (ref 136–145)
UNIT VOLUME: 350 ML
WBC # BLD AUTO: 9.3 X10(3) UL (ref 4–11)

## 2024-07-09 PROCEDURE — 99233 SBSQ HOSP IP/OBS HIGH 50: CPT | Performed by: INTERNAL MEDICINE

## 2024-07-09 PROCEDURE — 71046 X-RAY EXAM CHEST 2 VIEWS: CPT | Performed by: NURSE PRACTITIONER

## 2024-07-09 PROCEDURE — 99233 SBSQ HOSP IP/OBS HIGH 50: CPT | Performed by: HOSPITALIST

## 2024-07-09 RX ORDER — SODIUM CHLORIDE 9 MG/ML
INJECTION, SOLUTION INTRAVENOUS ONCE
Status: DISCONTINUED | OUTPATIENT
Start: 2024-07-09 | End: 2024-07-10

## 2024-07-09 RX ORDER — INSULIN DEGLUDEC 100 U/ML
25 INJECTION, SOLUTION SUBCUTANEOUS DAILY
Status: DISCONTINUED | OUTPATIENT
Start: 2024-07-09 | End: 2024-07-10

## 2024-07-09 RX ADMIN — ACETAMINOPHEN 650 MG: 325 TABLET ORAL at 18:29:00

## 2024-07-09 RX ADMIN — INSULIN DEGLUDEC 25 UNITS: 100 INJECTION, SOLUTION SUBCUTANEOUS at 09:30:00

## 2024-07-09 RX ADMIN — ATORVASTATIN CALCIUM 10 MG: 10 TABLET, FILM COATED ORAL at 20:54:00

## 2024-07-09 RX ADMIN — DOXEPIN HYDROCHLORIDE 1 TABLET: 50 CAPSULE ORAL at 09:30:00

## 2024-07-09 RX ADMIN — AMIODARONE HYDROCHLORIDE 200 MG: 200 TABLET ORAL at 09:29:00

## 2024-07-09 RX ADMIN — Medication 25 MG: at 07:13:00

## 2024-07-09 RX ADMIN — ASCORBIC ACID 500 MG: 500 MG TABLET ORAL at 09:29:00

## 2024-07-09 RX ADMIN — AMIODARONE HYDROCHLORIDE 200 MG: 200 TABLET ORAL at 20:54:00

## 2024-07-09 RX ADMIN — CHLORHEXIDINE GLUCONATE ORAL RINSE 15 ML: 1.2 SOLUTION DENTAL at 09:30:00

## 2024-07-09 RX ADMIN — MELATONIN 3 MG: at 20:54:00

## 2024-07-09 RX ADMIN — AMIODARONE HYDROCHLORIDE 200 MG: 200 TABLET ORAL at 15:35:00

## 2024-07-09 RX ADMIN — Medication 25 MG: at 18:30:00

## 2024-07-09 RX ADMIN — ACETAMINOPHEN 650 MG: 325 TABLET ORAL at 10:18:00

## 2024-07-09 RX ADMIN — ONDANSETRON 4 MG: 2 INJECTION INTRAMUSCULAR; INTRAVENOUS at 09:29:00

## 2024-07-09 NOTE — PLAN OF CARE
Patient with poor motivation this AM.  Walked X3, Showered and patient feeling better.  1u PRBCs given.        Problem: PAIN - ADULT  Goal: Verbalizes/displays adequate comfort level or patient's stated pain goal  Description: INTERVENTIONS:  - Encourage pt to monitor pain and request assistance  - Assess pain using appropriate pain scale  - Administer analgesics based on type and severity of pain and evaluate response  - Implement non-pharmacological measures as appropriate and evaluate response  - Consider cultural and social influences on pain and pain management  - Manage/alleviate anxiety  - Utilize distraction and/or relaxation techniques  - Monitor for opioid side effects  - Notify MD/LIP if interventions unsuccessful or patient reports new pain  - Anticipate increased pain with activity and pre-medicate as appropriate  Outcome: Progressing     Problem: SAFETY ADULT - FALL  Goal: Free from fall injury  Description: INTERVENTIONS:  - Assess pt frequently for physical needs  - Identify cognitive and physical deficits and behaviors that affect risk of falls.  - [place] fall precautions as indicated by assessment.  - Educate pt/family on patient safety including physical limitations  - Instruct pt to call for assistance with activity based on assessment  - Modify environment to reduce risk of injury  - Provide assistive devices as appropriate  - Consider OT/PT consult to assist with strengthening/mobility  - Encourage toileting schedule  Outcome: Progressing     Problem: CARDIOVASCULAR - ADULT  Goal: Maintains optimal cardiac output and hemodynamic stability  Description: INTERVENTIONS:  - Monitor vital signs, rhythm, and trends  - Monitor for bleeding, hypotension and signs of decreased cardiac output  - Evaluate effectiveness of vasoactive medications to optimize hemodynamic stability  - Monitor arterial and/or venous puncture sites for bleeding and/or hematoma  - Assess quality of pulses, skin color and  temperature  - Assess for signs of decreased coronary artery perfusion - ex. Angina  - Evaluate fluid balance, assess for edema, trend weights  Outcome: Progressing  Goal: Absence of cardiac arrhythmias or at baseline  Description: INTERVENTIONS:  - Continuous cardiac monitoring, monitor vital signs, obtain 12 lead EKG if indicated  - Evaluate effectiveness of antiarrhythmic and heart rate control medications as ordered  - Initiate emergency measures for life threatening arrhythmias  - Monitor electrolytes and administer replacement therapy as ordered  Outcome: Progressing     Problem: RESPIRATORY - ADULT  Goal: Achieves optimal ventilation and oxygenation  Description: INTERVENTIONS:  - Assess for changes in respiratory status  - Assess for changes in mentation and behavior  - Position to facilitate oxygenation and minimize respiratory effort  - Oxygen supplementation based on oxygen saturation or ABGs  - Provide Smoking Cessation handout, if applicable  - Encourage broncho-pulmonary hygiene including cough, deep breathe, Incentive Spirometry  - Assess the need for suctioning and perform as needed  - Assess and instruct to report SOB or any respiratory difficulty  - Respiratory Therapy support as indicated  - Manage/alleviate anxiety  - Monitor for signs/symptoms of CO2 retention  Outcome: Progressing     Problem: GASTROINTESTINAL - ADULT  Goal: Minimal or absence of nausea and vomiting  Description: INTERVENTIONS:  - Maintain adequate hydration with IV or PO as ordered and tolerated  - Nasogastric tube to low intermittent suction as ordered  - Evaluate effectiveness of ordered antiemetic medications  - Provide nonpharmacologic comfort measures as appropriate  - Advance diet as tolerated, if ordered  - Obtain nutritional consult as needed  - Evaluate fluid balance  Outcome: Progressing  Goal: Maintains or returns to baseline bowel function  Description: INTERVENTIONS:  - Assess bowel function  - Maintain adequate  hydration with IV or PO as ordered and tolerated  - Evaluate effectiveness of GI medications  - Encourage mobilization and activity  - Obtain nutritional consult as needed  - Establish a toileting routine/schedule  - Consider collaborating with pharmacy to review patient's medication profile  Outcome: Progressing     Problem: GENITOURINARY - ADULT  Goal: Absence of urinary retention  Description: INTERVENTIONS:  - Assess patient’s ability to void and empty bladder  - Monitor intake/output and perform bladder scan as needed  - Follow urinary retention protocol/standard of care  - Consider collaborating with pharmacy to review patient's medication profile  - Implement strategies to promote bladder emptying  Outcome: Progressing

## 2024-07-09 NOTE — CM/SW NOTE
Anticipated therapy need: Home Therapy.    List given for the second time to wife.  Requested choice by Wednesday so HH agency can be reserved.    Morenita Blackwell MBA BSN RN CRRN   RN Case Manager  [phone]

## 2024-07-09 NOTE — PROGRESS NOTES
Progress Note     Giuseppe Faustin Patient Status:  Inpatient    1945 MRN [id number]   Location [facility] 2W/SW Attending Sarita Franklin MD   Hosp Day # 6 PCP Joshua Faustin MD       Subjective:   S: Still very weak, tired, but more energy today  Abdomen softer, less distended   On/off afib   Getting another transfusion   wife and family at the bedside      Review of Systems:   10 point ROS completed and was negative, except for pertinent positive and negatives stated in subjective.    Objective:   Vital signs:  Temp:  [97.4 °F (36.3 °C)-98.3 °F (36.8 °C)] 98 °F (36.7 °C)  Pulse:  [63-89] 70  Resp:  [16-24] 21  BP: ()/() 125/48  SpO2:  [84 %-99 %] 99 %    Wt Readings from Last 6 Encounters:   24 170 lb 13.7 oz (77.5 kg)   24 165 lb (74.8 kg)   24 170 lb (77.1 kg)   24 170 lb (77.1 kg)   24 169 lb (76.7 kg)   24 171 lb (77.6 kg)         Physical Exam:    General: Resting in chair NAD         Respiratory: Clear to auscultation bilaterally. No wheezes. No rhonchi.  Cardiovascular: RRR  Abdomen: Soft, nontender,  less distended, +BS  Neurologic: No new focal neurological deficits.   Musculoskeletal: Moves all extremities.  Extremities: mild edema.    Results:   Diagnostic Data:      Labs:    Labs Last 24 Hours:   BMP     CBC    Other     Na 140 Cl 111 BUN 40 Glu 77   Hb 8.7   PTT - Procal -   K 3.8 CO2 24.0 Cr 0.94   WBC 9.3 >< PLT 87.0  INR - CRP -   Renal Lytes Endo    Hct 26.2   Trop - D dim -   eGFR - Ca 8.0 POC Gluc  87    LFT   pBNP - Lactic -   eGFR AA - PO4 - A1c -   AST - APk - Prot -  LDL -     Mg - TSH -   ALT - T denton - Alb -        COVID-19 Lab Results    COVID-19  Lab Results   Component Value Date    COVID19 Not Detected 2021    COVID19 Not Detected 2020    COVID19 Not Detected 10/05/2020       Pro-Calcitonin  No results for input(s): \"PCT\" in the last 168 hours.    Cardiac  No results for input(s): \"TROP\", \"PBNP\" in the last  168 hours.    Creatinine Kinase  No results for input(s): \"CK\" in the last 168 hours.    Inflammatory Markers  No results for input(s): \"CRP\", \"OMAIRA\", \"LDH\", \"DDIMER\" in the last 168 hours.    Imaging: Imaging data reviewed in Epic.    Medications:    sodium chloride   Intravenous Once    insulin degludec  25 Units Subcutaneous Daily    ascorbic acid  500 mg Oral Daily    amiodarone  200 mg Oral TID    metoprolol tartrate  25 mg Oral 2x Daily(Beta Blocker)    sodium chloride   Intravenous Once    atorvastatin  10 mg Oral Nightly    pantoprazole  40 mg Intravenous Q12H    insulin aspart  1-7 Units Subcutaneous TID CC    sennosides  8.6 mg Oral BID    docusate sodium  100 mg Oral BID    chlorhexidine gluconate  15 mL Mouth/Throat BID    multivitamin  1 tablet Oral Daily    aspirin  81 mg Oral Daily       Assessment & Plan:   ASSESSMENT / PLAN:     CAD. S/p CABG x 3 with LIMA to LAD and SVGs to diagonal and right PDA on 7/3. Edi extubation  -CV surgery and cardiology following  -Activity as tolerated  -off pressors, insulin gtt     #Paroxysmal Afib  S/p Left atrial appendage clip   - cardiology following, pt started on amiodarone gtt  - cont to monitor    Suspected acute upper GIB   Ileus  Acute posthemorrhagic anemia  -GI consulted, seen by Dr. Bryan  -NGT placed  -PPI bid  -s/p PRBC x 4 within 24 hrs initially, hgb~8.3 on  s/p 1 PRBC, this am hgb~8.7-->getting PRBC x1  -Trend hemoglobin  -antiplatelets on hold  -now s/p EGD showin. LA Class D esophagitis and clean based esophageal ulcerations.   2. Large, clean based duodenal bulb ulcer located at the posterolateral wall and abutting the pylorus.   3.  There was no evidence of active or recent GI bleeding in the upper GI tract.    -->H. Pylori stool antigen ordered.   -->needs to repeat EGD in ~8-12 weeks to ensure healing of the ulcerations.   -abd XR on  reviewed  --->NGT removed  -Tolerates advancing diet  -HIPA negative    Bladder neck  contracture.  Patient seen by urology in the OR.  Cystoscopy assisted Delong catheter placement.  History of TURP  -Keep Delong for duration of hospitalization  -Keep Delong after discharge as well (per Dr Blanchard)  -Outpatient urology follow-up     Other problems  Hypertension  Dyslipidemia  GERD  Obstructive sleep apnea  Osteoarthritis  Type 2 diabetes  History of kidney stones  Essential tremor  CKD stage II     dvt prophylaxis: sc lovenox  code status: full code  dispo: anticipate home with HH once medically optimized         MDM: High complexity- severe exacerbation of chronic illness posing a threat to life. IV meds requiring close inpatient monitoring.           Supplementary Documentation:

## 2024-07-09 NOTE — PLAN OF CARE
Mr. Faustin rested well overnight. Before bed he was able to ambulate with the walker and 2 assist to the bathroom. This a.m. he was slow to get out of bed and onto the standing scale, but he has good strength with his legs when he stands. His son spent the night and was helpful to keep Mr. Faustin calm and comfortable.    Problem: Patient Centered Care  Goal: Patient preferences are identified and integrated in the patient's plan of care  Description: Interventions:  - What would you like us to know as we care for you? My son thinks I need to use a cane when I am walking, even at home!   - Provide timely, complete, and accurate information to patient/family  - Incorporate patient and family knowledge, values, beliefs, and cultural backgrounds into the planning and delivery of care  - Encourage patient/family to participate in care and decision-making at the level they choose  - Honor patient and family perspectives and choices  Outcome: Progressing     Problem: PAIN - ADULT  Goal: Verbalizes/displays adequate comfort level or patient's stated pain goal  Description: INTERVENTIONS:  - Encourage pt to monitor pain and request assistance  - Assess pain using appropriate pain scale  - Administer analgesics based on type and severity of pain and evaluate response  - Implement non-pharmacological measures as appropriate and evaluate response  - Consider cultural and social influences on pain and pain management  - Manage/alleviate anxiety  - Utilize distraction and/or relaxation techniques  - Monitor for opioid side effects  - Notify MD/LIP if interventions unsuccessful or patient reports new pain  - Anticipate increased pain with activity and pre-medicate as appropriate  Outcome: Progressing     Problem: SAFETY ADULT - FALL  Goal: Free from fall injury  Description: INTERVENTIONS:  - Assess pt frequently for physical needs  - Identify cognitive and physical deficits and behaviors that affect risk of falls.  - Eva fall  precautions as indicated by assessment.  - Educate pt/family on patient safety including physical limitations  - Instruct pt to call for assistance with activity based on assessment  - Modify environment to reduce risk of injury  - Provide assistive devices as appropriate  - Consider OT/PT consult to assist with strengthening/mobility  - Encourage toileting schedule  Outcome: Progressing     Problem: CARDIOVASCULAR - ADULT  Goal: Maintains optimal cardiac output and hemodynamic stability  Description: INTERVENTIONS:  - Monitor vital signs, rhythm, and trends  - Monitor for bleeding, hypotension and signs of decreased cardiac output  - Evaluate effectiveness of vasoactive medications to optimize hemodynamic stability  - Monitor arterial and/or venous puncture sites for bleeding and/or hematoma  - Assess quality of pulses, skin color and temperature  - Assess for signs of decreased coronary artery perfusion - ex. Angina  - Evaluate fluid balance, assess for edema, trend weights  Outcome: Progressing  Goal: Absence of cardiac arrhythmias or at baseline  Description: INTERVENTIONS:  - Continuous cardiac monitoring, monitor vital signs, obtain 12 lead EKG if indicated  - Evaluate effectiveness of antiarrhythmic and heart rate control medications as ordered  - Initiate emergency measures for life threatening arrhythmias  - Monitor electrolytes and administer replacement therapy as ordered  Outcome: Progressing     Problem: RESPIRATORY - ADULT  Goal: Achieves optimal ventilation and oxygenation  Description: INTERVENTIONS:  - Assess for changes in respiratory status  - Assess for changes in mentation and behavior  - Position to facilitate oxygenation and minimize respiratory effort  - Oxygen supplementation based on oxygen saturation or ABGs  - Provide Smoking Cessation handout, if applicable  - Encourage broncho-pulmonary hygiene including cough, deep breathe, Incentive Spirometry  - Assess the need for suctioning and  perform as needed  - Assess and instruct to report SOB or any respiratory difficulty  - Respiratory Therapy support as indicated  - Manage/alleviate anxiety  - Monitor for signs/symptoms of CO2 retention  Outcome: Progressing     Problem: GASTROINTESTINAL - ADULT  Goal: Minimal or absence of nausea and vomiting  Description: INTERVENTIONS:  - Maintain adequate hydration with IV or PO as ordered and tolerated  - Nasogastric tube to low intermittent suction as ordered  - Evaluate effectiveness of ordered antiemetic medications  - Provide nonpharmacologic comfort measures as appropriate  - Advance diet as tolerated, if ordered  - Obtain nutritional consult as needed  - Evaluate fluid balance  Outcome: Progressing  Goal: Maintains or returns to baseline bowel function  Description: INTERVENTIONS:  - Assess bowel function  - Maintain adequate hydration with IV or PO as ordered and tolerated  - Evaluate effectiveness of GI medications  - Encourage mobilization and activity  - Obtain nutritional consult as needed  - Establish a toileting routine/schedule  - Consider collaborating with pharmacy to review patient's medication profile  Outcome: Not Progressing  Goal: Maintains adequate nutritional intake (undernourished)  Description: INTERVENTIONS:  - Monitor percentage of each meal consumed  - Identify factors contributing to decreased intake, treat as appropriate  - Assist with meals as needed  - Monitor I&O, WT and lab values  - Obtain nutritional consult as needed  - Optimize oral hygiene and moisture  - Encourage food from home; allow for food preferences  - Enhance eating environment  Outcome: Not Progressing     Problem: GENITOURINARY - ADULT  Goal: Absence of urinary retention  Description: INTERVENTIONS:  - Assess patient’s ability to void and empty bladder  - Monitor intake/output and perform bladder scan as needed  - Follow urinary retention protocol/standard of care  - Consider collaborating with pharmacy to  review patient's medication profile  - Implement strategies to promote bladder emptying  Outcome: Progressing

## 2024-07-09 NOTE — PROGRESS NOTES
[facility]  part of [facility]    Progress Note    Giuseppe Faustin Patient Status:  Inpatient    1945 MRN [id number]   Location [facility] 2W/SW Attending Delicia Strauss MD   Hosp Day # 6 PCP Joshua Faustin MD         Subjective:   Subjective:  Patient was seen and examined  Comfortable on room air with no chest pain or shortness of breath or cough  Denies abdominal pain or vomiting  Mild dark stool yesterday with no active bowel with stool no hematochezia  No fever or chills  Denies any chest pain  Generalized fatigue  Objective:   Blood pressure (!) 131/101, pulse 87, temperature (P) 98 °F (36.7 °C), temperature source (P) Temporal, resp. rate 20, height 5' 3\" (1.6 m), weight 170 lb 13.7 oz (77.5 kg), SpO2 94%.  Physical Exam  Constitutional:       General: He is not in acute distress.  HENT:      Head: Atraumatic.   Eyes:      General: No scleral icterus.  Cardiovascular:      Rate and Rhythm: Normal rate.      Pulses: Normal pulses.   Pulmonary:      Effort: No respiratory distress.      Breath sounds: No stridor. No wheezing, rhonchi or rales.   Chest:      Chest wall: No tenderness.   Abdominal:      General: Abdomen is flat. Bowel sounds are normal. There is no distension.      Palpations: Abdomen is soft.      Tenderness: There is no guarding.   Musculoskeletal:      Cervical back: Normal range of motion.      Right lower leg: No edema.      Left lower leg: No edema.   Skin:     General: Skin is dry.   Neurological:      General: No focal deficit present.      Mental Status: He is oriented to person, place, and time.         Results:   Lab Results   Component Value Date    WBC 9.3 2024    HGB 8.7 (L) 2024    HCT 26.2 (L) 2024    PLT 87.0 (L) 2024    CREATSERUM 0.94 2024    BUN 40 (H) 2024     2024    K 3.8 2024     2024    CO2 24.0 2024    GLU 77 2024    CA 8.0 (L) 2024    ALB  3.3 07/06/2024    ALKPHO 32 (L) 07/06/2024    BILT 0.6 07/06/2024    TP 5.3 (L) 07/06/2024    AST 22 07/06/2024    ALT <7 (L) 07/06/2024    PTT 33.7 07/06/2024    INR 1.25 (H) 07/06/2024    T4F 1.1 06/11/2024    TSH 9.211 (H) 05/08/2024     08/11/2015    PSA 0.39 06/14/2023    ESRML 23 (H) 06/24/2024    CRP <0.29 02/24/2020    MG 2.1 07/04/2024    CK 76 06/24/2024       XR ABDOMEN (1 VIEW) (CPT=74018)    Result Date: 7/7/2024  CONCLUSION:   Enteric tube terminates in the proximal stomach with the side hole terminating near the GE junction, recommend advancement approximately 3 cm.  Mildly prominent loops of small bowel measuring up to 2.3 cm, which may reflect an ileus.  Lesser incidental findings described above.    Dictated by (CST): Neo Genao MD on 7/07/2024 at 3:56 PM     Finalized by (CST): Neo Genao MD on 7/07/2024 at 3:58 PM               Assessment & Plan:       1- CAD s/p CABG x3 with left arterial appendage clip on 7/2/24  Pulmonary status stable on RA and CXR clear      CT was pulled   Pain management  ncentive spirometry        2-acute upper GI bleed   EGD on 7/7 large duodenal ulcer  S/p total 5 units of PRBC transfusion   HD stable and overall better         Lovenox subcu and Plavix and ASA on hold  PPI every 12 hours       3- TRISTAN   Better      4-DM, HTN, HL  Statin and beta-blocker and as needed insulin     5-DVT prophylaxis  Lovenox subcu now on hold for GI bleed               Ary Tian MD  7/9/2024

## 2024-07-09 NOTE — PROGRESS NOTES
[facility] - CARDIOLOGY PROGRESS NOTE  Giuseppe Faustin Patient Status:  Inpatient    1945 MRN [id number]   Location [facility] 2W/SW Attending Gavin Crandall MD   Hosp Day # 6 PCP Joshua Faustin MD     Assessment / Plan:  78-year-old male admitted for outpatient angiogram due to accelerating anginal symptoms and abnormal stress test, found to have severe distal left main disease now status post bypass surgery.    Postop complicated by ileus and upper GI bleed      Telemetry sinus rhythm 70 to 80s.        Assessment:    CAD: S/p CABG x 3 with LIMA to LAD and SVGs to diagonal and right PDA  7/3/2024.  Had clipping of left atrial appendage.       Paroxysmal atrial fibrillation: Left atrial appendage clipped, now in atrial fibrillation continue p.o. amiodarone and metoprolol.    Upper GI bleed:  Status post urgent EGD yesterday, coffee-ground emesis EGD showed esophagitis gastritis and duodenal bulb ulcer continue PPI.  NG tube has been removed.  Aspirin and Plavix have been stopped.  Will need to resume aspirin prior to discharge.  Keep hemoglobin over 8     Acute renal insufficiency improving creatinine down to 1.2.  Euvolemic no need for IV for Lasix today.      Plan:  Routine postoperative management, stable from a cardiac standpoint.  Disposition per JESUS Hyman MD  Interventional Cardiology  [facility] [place]     -----------------------------------------        Objective:  /54   Pulse 65   Temp 98.3 °F (36.8 °C) (Temporal)   Resp 20   Ht 63\"   Wt 170 lb 13.7 oz (77.5 kg)   SpO2 96%   BMI 30.27 kg/m²     Temp (24hrs), Av.7 °F (36.5 °C), Min:97.2 °F (36.2 °C), Max:98.3 °F (36.8 °C)    Intake/Output Summary (Last 24 hours) at 2024 0849  Last data filed at 2024 2200  Gross per 24 hour   Intake 935 ml   Output 1165 ml   Net -230 ml     Subjective feels  well no events overnight.  Telemetry A-fib 80 to 90 bpm.      General: More awake today.  In good spirits wife bedside.  HEENT: No focal deficits.  Pupils equal, reactive.  Neck: PA catheter in right neck.  Cardiac: Sternal bandage C/D/I. RRR, soft S1, S2  no murmur or rub  Lungs: Clear anterolaterally without wheezes, rales, rhonchi.    Abdomen: Soft, distended no peritoneal signs.  Extremities: Without clubbing, cyanosis or edema.  Peripheral pulses are 2+.  Skin: Warm and dry.     Labs:  Lab Results   Component Value Date    WBC 9.3 07/09/2024    HGB 8.7 07/09/2024    HCT 26.2 07/09/2024    PLT 87.0 07/09/2024       Lab Results   Component Value Date    INR 1.25 (H) 07/06/2024     Lab Results   Component Value Date     07/09/2024    K 3.8 07/09/2024     07/09/2024    CO2 24.0 07/09/2024    BUN 40 07/09/2024    CREATSERUM 0.94 07/09/2024    GLU 77 07/09/2024    CA 8.0 07/09/2024            Medications:     sodium chloride   Intravenous Once    ascorbic acid  500 mg Oral Daily    amiodarone  200 mg Oral TID    metoprolol tartrate  25 mg Oral 2x Daily(Beta Blocker)    sodium chloride   Intravenous Once    atorvastatin  10 mg Oral Nightly    insulin degludec  40 Units Subcutaneous Daily    pantoprazole  40 mg Intravenous Q12H    insulin aspart  1-7 Units Subcutaneous TID CC    sennosides  8.6 mg Oral BID    docusate sodium  100 mg Oral BID    chlorhexidine gluconate  15 mL Mouth/Throat BID    multivitamin  1 tablet Oral Daily    [Held by provider] aspirin  81 mg Oral Daily    [Transfer Hold] melatonin  10 mg Oral Nightly

## 2024-07-09 NOTE — PROGRESS NOTES
[facility] [place]     Gastroenterology Progress Note    Giuseppe Faustin Patient Status:  Inpatient    1945 MRN [id number]   Location [facility] 2W/SW Attending Delicia Strauss MD   Hosp Day # 6 PCP Joshua Faustin MD       Subjective:   Appetite improving, tolerating full liqs. Denies abd pain, n/v. Had few smears of dark stool.     Objective:   Blood pressure (!) 131/101, pulse 87, temperature (P) 98 °F (36.7 °C), temperature source (P) Temporal, resp. rate 20, height 5' 3\" (1.6 m), weight 170 lb 13.7 oz (77.5 kg), SpO2 94%. Body mass index is 30.27 kg/m².    General: awake, alert and oriented, no acute distress  HEENT: moist mucus membranes  PULM: no conversational dyspnea  CARDIOVASCULAR: regular rate and rhythm, the extremities are warm and well perfused  GI: soft, non-tender, non-distended, + BS, no rebound/guarding   EXTREMITIES: no edema, moving all extremities  SKIN: no visible rash  NEURO: appropriate and interactive    Assessment and Plan:   78 M with h/o CAD s/p CABG 7/3/24; GI consulted after he developed coffee ground emesis. Underwent EGD  showing LA class D ulcerated esophagitis and large clean based duodenal bulb ulcer.     - continue PPI BID x 8 weeks then daily  - advance diet as tolerated  - ok to resume asa and plavix if medically necessary      GI will sign off, please call with questions    Sarahi Green MD  [facility] Gastroenterology      Results:     Lab Results   Component Value Date    WBC 9.3 2024    HGB 8.7 (L) 2024    HCT 26.2 (L) 2024    PLT 87.0 (L) 2024    CREATSERUM 0.94 2024    BUN 40 (H) 2024     2024    K 3.8 2024     2024    CO2 24.0 2024    GLU 77 2024    CA 8.0 (L) 2024    ALB 3.3 2024    ALKPHO 32 (L) 2024    BILT 0.6 2024    TP 5.3 (L) 2024    AST 22 2024    ALT <7 (L) 2024    PTT 33.7 2024    INR 1.25 (H)  07/06/2024    T4F 1.1 06/11/2024    TSH 9.211 (H) 05/08/2024     08/11/2015    PSA 0.39 06/14/2023    ESRML 23 (H) 06/24/2024    CRP <0.29 02/24/2020    MG 2.1 07/04/2024    CK 76 06/24/2024       XR CHEST PA + LAT CHEST (CPT=71046)    Result Date: 7/9/2024  CONCLUSION: No radiographic evidence of acute cardiopulmonary abnormality.    Dictated by (CST): Nkiita Good MD on 7/09/2024 at 12:51 PM     Finalized by (CST): Nikita Good MD on 7/09/2024 at 12:53 PM          XR ABDOMEN (1 VIEW) (CPT=74018)    Result Date: 7/7/2024  CONCLUSION:   Enteric tube terminates in the proximal stomach with the side hole terminating near the GE junction, recommend advancement approximately 3 cm.  Mildly prominent loops of small bowel measuring up to 2.3 cm, which may reflect an ileus.  Lesser incidental findings described above.    Dictated by (CST): Neo Genao MD on 7/07/2024 at 3:56 PM     Finalized by (CST): Neo Genao MD on 7/07/2024 at 3:58 PM

## 2024-07-09 NOTE — DIETARY NOTE
BRIEF DIETITIAN NOTE     Pt re-screened for LOS (length of stay). Pt is overall at no nutritional risk. No weight loss. PO intakes are not being recorded consistently. Only 1 PO meals recorded for entire 7 days of admission, so unable to properly assess nutritional intakes. Per chart notes, pt described with poor appetite w/nausea. S/p EGD showing ulcerated esophagitis and duodenal bulb ulcer per GI MD note. Ok to advance diet as tolerated. Will add ONS to encourage intakes while appetite it down. Will monitor and f/u per protocol. Please consult RD if further nutrition intervention is needed.     Percent Meals Eaten (last 6 days)       Date/Time Percent Meals Eaten (%)    07/07/24 0800 0 %     Percent Meals Eaten (%): NPO at 07/07/24 0800    07/07/24 1300 0 %     Percent Meals Eaten (%): NPO at 07/07/24 1300    07/08/24 1400 50 %             Patient Weight(s) for the past 336 hrs:   Weight   07/09/24 0700 77.5 kg (170 lb 13.7 oz)   07/08/24 0600 73.9 kg (162 lb 14.7 oz)   07/07/24 0600 76.7 kg (169 lb 1.5 oz)   07/06/24 0600 79.6 kg (175 lb 7.8 oz)   07/05/24 0600 78.3 kg (172 lb 9.9 oz)   07/04/24 0600 75.5 kg (166 lb 7.2 oz)   07/03/24 0500 73.8 kg (162 lb 11.2 oz)   07/02/24 1515 71.9 kg (158 lb 8.2 oz)   06/25/24 1023 74.8 kg (165 lb)        Wt Readings from Last 6 Encounters:   07/09/24 77.5 kg (170 lb 13.7 oz)   06/18/24 74.8 kg (165 lb)   03/12/24 77.1 kg (170 lb)   03/04/24 77.1 kg (170 lb)   02/01/24 76.7 kg (169 lb)   01/02/24 77.6 kg (171 lb)        F/u per protocol or as appropriate.       Yamini Dozier RD, LDN  Clinical Dietitian  P: [phone]

## 2024-07-09 NOTE — SLP NOTE
SPEECH DAILY NOTE - INPATIENT    ASSESSMENT & PLAN   ASSESSMENT  PPE REQUIRED. THIS THERAPIST WORE GLOVES, DROPLET MASK, AND GOGGLES FOR DURATION OF EVALUATION. HANDS WASHED UPON ENTRANCE/EXIT.    SLP f/u for ongoing dysphagia tx/meal assessment per recommendations of easy to chew/thin per BSE. RN reports pt tolerates diet and medication well with no overt clinical s/s aspiration, however, pt with decreased appetite and nervous to eat due to nausea.     Pt positioned upright in bedside chair with family at bedside. Pt afebrile, tolerating room air with oxygen status 97 prior to the start of oral trials. SLP reviewed aspiration precautions and safe swallowing compensatory strategies with the patient. Safe swallow guidelines remain written on the white board in purple. Diet recommendations remain on the whiteboard in the room. Patient and/or family v/u. Pt with baseline hiccups and throat clearing prior to oral intake. Son provided 1:1 feed assistance, pt tolerates soft solids and thin liquids via open cup with no overt clinical signs/symptoms of aspiration. Oxygen status remained >96 t/o the entire session. Respiratory Rate WFL.      PLAN: SLP to f/u x2 meal assessments, monitor CXR, and VFSS if clinically warranted.     Diet Recommendations - Solids: Soft/ Easy to chew  Diet Recommendations - Liquids: Thin Liquids    Compensatory Strategies Recommended: No straws;Slow rate;Alternate consistencies;Small bites and sips  Aspiration Precautions: Upright position;Slow rate;Small bites and sips;No straw  Medication Administration Recommendations: Whole in puree    Patient Experiencing Pain: No                Treatment Plan  Treatment Plan/Recommendations: Aspiration precautions    Interdisciplinary Communication: Discussed with RN  Recommendations posted at bedside            GOALS  Goal #1 The patient will tolerate easy to chew consistency and thin liquids without overt signs or symptoms of aspiration with 100 % accuracy  over 2 session(s).  In Progress   Goal #2 The patient/family/caregiver will demonstrate understanding and implementation of aspiration precautions and swallow strategies independently over 3 session(s).    In Progress   Goal #3 The patient will tolerate trial upgrade of regular consistency and thin liquids without overt signs or symptoms of aspiration with 100 % accuracy over 3 session(s).  In Progress   Goal #4 The patient will utilize compensatory strategies as outlined by  BSSE (clinical evaluation) including Slow rate, Small bites, Small sips, Alternate liquids/solids, No straws, Upright 90 degrees with minimal assistance 100 % of the time across 2 sessions.    In Progress     FOLLOW UP  Follow Up Needed (Documentation Required): Yes  SLP Follow-up Date: 07/10/24  Number of Visits to Meet Established Goals: 2    Session: 1 following BSSE    If you have any questions, please contact RACHEL Shen M.S. CCC-SLP  Speech Language Pathologist  Phone Number Ext. [phone]

## 2024-07-09 NOTE — OCCUPATIONAL THERAPY NOTE
OCCUPATIONAL THERAPY EVALUATION - INPATIENT     Room Number: 230/230-A  Evaluation Date: 7/9/2024  Type of Evaluation: Initial  Presenting Problem: CABG x 3 on 7/2, upper GI bleed    Physician Order: IP Consult to Occupational Therapy  Reason for Therapy: ADL/IADL Dysfunction and Discharge Planning    OCCUPATIONAL THERAPY ASSESSMENT   Patient is a 78 year old male admitted 7/2/2024 for CABG x 3 on 7/2, upper GI bleed.  Prior to admission, patient's baseline is independent with ADLs and functional mobility, driving.  Patient is currently functioning below baseline with ADLs and functional mobility.  Patient is requiring minimal assist as a result of the following impairments: limited reach, pain, endurance. Occupational Therapy will continue to follow for duration of hospitalization.    Patient will benefit from continued skilled OT Services at discharge to promote functional independence and safety with additional support and return home with home health OT    PLAN  OT Treatment Plan: Balance activities;Energy conservation/work simplification techniques;ADL training;Functional transfer training;Endurance training;Equipment eval/education;Compensatory technique education  OT Device Recommendations: TBD    OCCUPATIONAL THERAPY MEDICAL/SOCIAL HISTORY   Problem List  Active Problems:    Abnormal stress test    Elevated coronary artery calcium score    SOB (shortness of breath)    Coffee ground emesis    Melena    Upper GI bleed    Acute blood loss anemia    HOME SITUATION  Type of Home: [facility]  Home Layout: One level  Lives With: Spouse (family live 40 miles away)  Drives: Yes  Patient Regularly Uses: None    SUBJECTIVE  \"I'm tired\"     OCCUPATIONAL THERAPY EXAMINATION    OBJECTIVE  Precautions: Sternal; Cardiac  Fall Risk: Standard fall risk    PAIN ASSESSMENT  Rating: Unable to rate  Location: sternum (w/ transitional movements)  Management Techniques: Activity promotion; Body mechanics    ACTIVITY TOLERANCE  Room  air    COGNITION  WFL    RANGE OF MOTION   Upper extremity ROM is within functional limits     STRENGTH ASSESSMENT  Upper extremity strength is within functional limits     COORDINATION  Gross Motor: WFL   Fine Motor: WFL     ACTIVITIES OF DAILY LIVING ASSESSMENT  AM-PAC ‘6-Clicks’ Inpatient Daily Activity Short Form  How much help from another person does the patient currently need…  -   Putting on and taking off regular lower body clothing?: A Lot  -   Bathing (including washing, rinsing, drying)?: A Little  -   Toileting, which includes using toilet, bedpan or urinal? : A Little  -   Putting on and taking off regular upper body clothing?: A Little  -   Taking care of personal grooming such as brushing teeth?: A Little  -   Eating meals?: A Little    AM-PAC Score:  Score: 17  Approx Degree of Impairment: 50.11%  Standardized Score (AM-PAC Scale): 37.26  CMS Modifier (G-Code): CK    FUNCTIONAL TRANSFER ASSESSMENT  Sit to Stand: Chair  Chair: Minimal Assist  Cues to reinforce sternal precautions    BED MOBILITY  Supine to Sit : Not tested    FUNCTIONAL ADL ASSESSMENT  Grooming Seated: Independent    Skilled Therapy Provided: RN cleared pt for participation in occupational therapy session, which was completed in collaboration with physical therapy. Upon arrival, pt was supine in bed and agreeable to activity. Pt's spouse was present during session. Pt benefited from additional time, verbal cues, encouragement to maximize participation. Pt benefited from frequent cues to reinforce sternal precautions.     EDUCATION PROVIDED  Patient: Role of Occupational Therapy; Plan of Care; Functional Transfer Techniques; Compensatory ADL Techniques; Other (sternal precautions)  Patient's Response to Education: Verbalized Understanding; Returned Demonstration    The patient's Approx Degree of Impairment: 50.11% has been calculated based on documentation in the [facility] '6 clicks' Inpatient Daily Activity Short Form.  Research supports  that patients with this level of impairment may benefit from HHOT.  Final disposition will be made by interdisciplinary medical team.     Patient End of Session: Up in chair;Needs met;Call light within reach;RN aware of session/findings;Family present    OT Goals  Patients self stated goal is: home     Patient will complete functional transfer with Mod I  Comment:     Patient will complete toileting with Mod I  Comment:     Patient will tolerate standing for 4 minutes in prep for adls with Mod I   Comment:    Patient will complete item retrieval with Mod I   Comment:          Goals  on: 24  Frequency: 3-5x/w    Patient Evaluation Complexity Level:   Occupational Profile/Medical History MODERATE - Expanded review of history including review of medical or therapy record   Specific performance deficits impacting engagement in ADL/IADL MODERATE  3 - 5 performance deficits   Client Assessment/Performance Deficits MODERATE - Comorbidities and min to mod modifications of tasks    Clinical Decision Making MODERATE - Analysis of occupational profile, detailed assessments, several treatment options    Overall Complexity MODERATE     OT Session Time: 15 minutes  Self-Care Home Management: 15 minutes

## 2024-07-09 NOTE — PHYSICAL THERAPY NOTE
PHYSICAL THERAPY EVALUATION - INPATIENT     Room Number: 230/230-A  Evaluation Date: 7/9/2024  Type of Evaluation: Initial   Physician Order: PT Eval and Treat    Presenting Problem: CABG ileus     Reason for Therapy: Mobility Dysfunction and Discharge Planning    PHYSICAL THERAPY ASSESSMENT   Patient is a 78 year old male admitted 7/2/2024 for CABG x 3.  Prior to admission, patient's baseline is independent in ADL's and ambulation with no assistive device.  Patient is currently functioning below baseline with bed mobility, transfers, and gait.  Patient is requiring contact guard assist as a result of the following impairments: decreased functional strength and medical status.  Physical Therapy will continue to follow for duration of hospitalization.    Patient will benefit from continued skilled PT Services at discharge to promote functional independence and safety with additional support and return home with home health PT.    PLAN  PT Treatment Plan: Bed mobility;Body mechanics;Patient education;Gait training;Transfer training;Balance training  Rehab Potential : Good  Frequency (Obs): 5x/week    PHYSICAL THERAPY MEDICAL/SOCIAL HISTORY   Problem List  Active Problems:    Abnormal stress test    Elevated coronary artery calcium score    SOB (shortness of breath)    Coffee ground emesis    Melena    Upper GI bleed    Acute blood loss anemia    HOME SITUATION  Home Situation  [facility]  Home Layout: One level  Stairs to Enter : 0  Railing: No  Stairs to Bedroom: 0  Railing: No  Lives With: Spouse (family live 40 miles away)  Drives: Yes  Patient Owned Equipment: None  Patient Regularly Uses: None     SUBJECTIVE  \"I have no pain, not now\"    PHYSICAL THERAPY EXAMINATION   OBJECTIVE  Precautions: Sternal;Cardiac  Fall Risk: Standard fall risk    WEIGHT BEARING RESTRICTION  Weight Bearing Restriction: None                PAIN ASSESSMENT  Rating: Unable to rate          COGNITION  Overall Cognitive Status:  WFL  - within functional limits    RANGE OF MOTION AND STRENGTH ASSESSMENT  Lower extremity ROM is within functional limits  BLE WNL  Lower extremity strength is within functional limits  BLE WNL    BALANCE  Static Sitting: Good  Dynamic Sitting: Fair +  Static Standing: Fair  Dynamic Standing: Fair -    AM-PAC '6-Clicks' INPATIENT SHORT FORM - BASIC MOBILITY  How much difficulty does the patient currently have...  Patient Difficulty: Turning over in bed (including adjusting bedclothes, sheets and blankets)?: A Little   Patient Difficulty: Sitting down on and standing up from a chair with arms (e.g., wheelchair, bedside commode, etc.): A Little   Patient Difficulty: Moving from lying on back to sitting on the side of the bed?: A Little   How much help from another person does the patient currently need...   Help from Another: Moving to and from a bed to a chair (including a wheelchair)?: A Little   Help from Another: Need to walk in hospital room?: A Little   Help from Another: Climbing 3-5 steps with a railing?: A Little     AM-PAC Score:  Raw Score: 18   Approx Degree of Impairment: 46.58%   Standardized Score (AM-PAC Scale): 43.63   CMS Modifier (G-Code): CK    FUNCTIONAL ABILITY STATUS  Functional Mobility/Gait Assessment  Gait Assistance: Contact guard assist  Distance (ft): 100ft  Assistive Device: Rolling walker  Rolling:  not tested   Supine to Sit:  not tested   Sit to Supine:  not tested   Sit to Stand: minimal assist    Exercise/Education Provided:  Body mechanics  Gait training  Transfer training    The patient's Approx Degree of Impairment: 46.58% has been calculated based on documentation in the [facility] '6 clicks' Inpatient Basic Mobility Short Form.  Research supports that patients with this level of impairment may benefit from home with home health. Patient received seated in bedside chair, agreeable to physical therapy evaluation. Patient on room air. Patient oxygen sat 94% and heart rate 65, blood  pressure 110/59. Patient currently with CGA for mobility during the session with rolling walker. Patient with one slight loss of balance with CGA for recovery. Patient educated in sternal precautions, will need rolling walker for home, and will need to use rolling walker. Education with patient provided verbally and via demonstration on physical therapy plan of care, physiological benefits of out of bed mobility, and sternal precautions. Next session anticipate to progress transfers and gait.    Patient history and/or personal factors that may impact the plan of care include home accessibility concerns and unwillingness to use rolling walker. Based on the physical therapy examination of the noted systems and functional activity/participation limitations, the patient presentation is evolving given the patient presents with surgical precautions/limitations, demonstrates worsening of previously stable condition, and demonstrates worsening of co-morbidities.   Final disposition will be made by interdisciplinary medical team.    Patient End of Session: Up in chair;Needs met;Call light within reach;RN aware of session/findings;All patient questions and concerns addressed;Family present    CURRENT GOALS  Goals to be met by: 7/20/24  Patient Goal Patient's self-stated goal is: to go home   Goal #1 Patient is able to demonstrate supine - sit EOB @ level: supervision     Goal #1   Current Status    Goal #2 Patient is able to demonstrate transfers Sit to/from Stand at assistance level: supervision with walker - rolling     Goal #2  Current Status    Goal #3 Patient is able to ambulate 100 feet with assist device: walker - rolling at assistance level: supervision   Goal #3   Current Status    Goal #4    Goal #4   Current Status    Goal #5 Patient to demonstrate independence with home activity/exercise instructions provided to patient in preparation for discharge.   Goal #5   Current Status    Goal #6    Goal #6  Current Status       Patient Evaluation Complexity Level:  History Moderate - 1 or 2 personal factors and/or co-morbidities   Examination of body systems Moderate - addressing a total of 3 or more elements   Clinical Presentation  Moderate - Evolving   Clinical Decision Making  Moderate Complexity     Gait Training: 10 minutes  Therapeutic Activity:  13 minutes

## 2024-07-09 NOTE — PAYOR COMM NOTE
--------------  REQUEST FOR RECONSIDERATION    CONTINUED STAY REVIEW    Payor: [employer]  Subscriber #:  [id number]  Authorization Number: [id number]   and   [id number]    7/3:    Preoperative Diagnosis: Atherosclerosis of native coronary artery of native heart with other form of angina pectoris (HCC) [I25.118]     Anginal equivalent  Multivessel coronary artery disease with critical left main stenosis  Paroxysmal atrial fibrillation     Postoperative Diagnosis: Atherosclerosis of native coronary artery of native heart with other form of angina pectoris (HCC) [I25.118]     Anginal equivalent  Multivessel coronary artery disease with critical left main stenosis  Paroxysmal atrial fibrillation      Procedures:   Median sternotomy  Urgent coronary artery bypass grafting x 3  Left internal mammary artery to left anterior descending artery  Reverse saphenous vein graft to diagonal artery  Reverse saphenous vein graft to posterior descending artery  Endoscopic vein harvesting, greater saphenous vein, left lower extremity  Placement of 40 mm atrial clip  Placement of Delong catheter by Dr. Blanchard     7/7:    ESOPHAGOGASTRODUODENOSCOPY (EGD) REPORT      Procedure: EGD w/ brush/wash     Pre-operative diagnosis: Melena, coffee ground emesis, acute blood loss anemia concerning for upper GI bleeding     Post-operative diagnosis: LA Class D esophagitis and ulceration, duodenal bulb ulcer, gastritis        7/8:    CV SURGERY:    In bed on exam, no acute events noted overnight.  Had 1 dark tarry stool overnight, Hgb 8.3 this a.m.  Currently denies: Nausea, CP, SOB, dizziness, or palpitations.  Family currently visiting at bedside.     Objective:  /55 (BP Location: Right arm)   Pulse 112   Temp 98.3 °F (36.8 °C) (Temporal)   Resp 17   Ht 5' 3\" (1.6 m)   Wt 162 lb 14.7 oz (73.9 kg)   SpO2 95%   BMI 28.86 kg/m²      Lab Results   Component Value Date     WBC 11.2 07/08/2024     HGB 8.3 07/08/2024     HCT 24.8 07/08/2024      PLT 90.0 07/08/2024     CREATSERUM 1.17 07/08/2024     BUN 60 07/08/2024      07/08/2024     K 3.7 07/08/2024      07/08/2024     CO2 24.0 07/08/2024      07/08/2024     CA 7.4 07/08/2024       Physical Exam:  Blood pressure 107/55, pulse 112, temperature 98.3 °F (36.8 °C), temperature source Temporal, resp. rate 17, height 5' 3\" (1.6 m), weight 162 lb 14.7 oz (73.9 kg), SpO2 95%.  General: NAD  Neck: RIJ cordis  Lungs: Diminished bases, slightly coarse  Heart: Irregular, irregular, S1, S2  Abdomen: Soft, round, NT/ND, BS+x4, + flatus/+ BM  Extremities: Warm, dry, trace generalized edema  Pulses: 2+ bilat DP  Skin: sternotomy stable -dressing CDI, L SVG sites CDI  Neurological:  AAOx3, MAEW, weak     Assessment/Plan:  S/p CABG x 3 (LIMA-LAD, SVG-diagonal/PDA), TYE clip POD # 5  Encourage pulmonary toilet - IS 10x/hour.  Stable on RA.  CXR 7/7 reviewed-small residual left apical PTX noted-improving.  PA/LAT CXR ordered in a.m.  Remains hemodynamically stable off vasoactive agents-transition to PCU status, hospitalist already following.  May remove Cordis today after transfusion.  S/p EGD yesterday secondary to melena/coffee-ground emesis/revealed esophagitis/gastritis/duodenal bulb ulcer-continue PPI, okay to DC NGT today, start clear liquids and advance as tolerated.  Will continue to hold ASA/Plavix today per Dr. Castle.  Postop anemia secondary to above-Hgb 8.3, transfuse 1 RBC today, follow serial H/H.  PAF-start p.o. BB today, increase as tolerated, start p.o. Amio and DC drip-needs improved rate control, management per cardiology.  S/p TYE clip -no AC currently secondary to GIB  Pain meds as needed, wean off as tolerated  Increase activity - OOB to chair tid , PT/OT consulted, ambulate in hallway.  Shower tomorrow  DVT prevention - SCDs/Lovenox held with GIB, check with GI about resuming  Expected postop volume overload - monitor daily weights, I/Os, lasix prn  Cystoscopy assisted Delong  catheter placement.  History of TURP -Keep Delong duration of hospitalization, and after discharge as well (per Dr Blanchard), f/u with urology post d/c  Insulin coverage per protocol -history DM preop, make adjustments accordingly.  Anticipate resuming home regimen at or near DC.     Discharge planning:  Patient has supportive family.  Anticipate DC home once medically stable with VNA/home PT.  Continue postop CV DC education.  Appreciate CM/SW to assist with DC planning.    CARDS:  Assessment / Plan:  78-year-old male admitted for outpatient angiogram due to accelerating anginal symptoms and abnormal stress test, found to have severe distal left main disease now status post bypass surgery.     Postop complicated by ileus and upper GI bleed      Telemetry sinus rhythm 70 to 80s.      Assessment:     CAD: S/p CABG x 3 with LIMA to LAD and SVGs to diagonal and right PDA  7/3/2024.  Had clipping of left atrial appendage.   Postop day #5.     Paroxysmal atrial fibrillation: Left atrial appendage clipped, now in atrial fibrillation continue p.o. amiodarone and metoprolol.     Upper GI bleed  Status post urgent EGD yesterday, coffee-ground emesis EGD showed esophagitis gastritis and duodenal bulb ulcer continue PPI.  NG tube has been removed.  aspirin and Plavix have been stopped.  Will need to resume aspirin prior to discharge.  Keep hemoglobin over 8      Postop volume overload.     Anemia  As above     Acute renal insufficiency improving creatinine down to 1.2.  Euvolemic no need for IV for Lasix today.          MEDICATIONS ADMINISTERED IN LAST 1 DAY:  amiodarone (Pacerone) tab 200 mg       Date Action Dose Route User    7/9/2024 0929 Given 200 mg Oral Candace Briseno RN    7/8/2024 2046 Given 200 mg Oral Renata Moon, RN    7/8/2024 1748 Given 200 mg Oral Candace Briseno RN       docusate sodium (Colace) cap 100 mg       Date Action Dose Route User    7/8/2024 2046 Given 100 mg Oral Renata Moon, MARIBELL           insulin degludec (Tresiba) 100 units/mL flextouch 25 Units       Date Action Dose Route User    7/9/2024 0930 Given 25 Units Subcutaneous (Left Upper Arm) Candace Briseno RN          metoprolol tartrate (Lopressor) tab 25 mg       Date Action Dose Route User    7/9/2024 0713 Given 25 mg Oral Renata Moon RN    7/8/2024 1748 Given 25 mg Oral Candace Briseno RN          ondansetron (Zofran) 4 MG/2ML injection 4 mg       Date Action Dose Route User    7/9/2024 0929 Given 4 mg Intravenous Candace Briseno RN    7/8/2024 1148 Given 4 mg Intravenous Candace Briseno RN          pantoprazole (Protonix) 40 mg in sodium chloride 0.9% PF 10 mL IV push       Date Action Dose Route User    7/9/2024 0713 Given 40 mg Intravenous Renata Moon RN    7/8/2024 1748 Given 40 mg Intravenous Candace Briseno RN         Blood Transfusion Record       Product Unit Status Volume Start End            Transfuse RBC     0397  24  [id number]  [id number] Completed 07/08/24 1155 395 mL 07/08/24 0922 07/08/24 1154     [facility]  24  [id number]  [id number] Completed 07/07/24 0854 342 mL 07/07/24 0551 07/07/24 0853       24  [id number]  [id number] Completed 07/07/24 0439 414.58 mL 07/07/24 0056 07/07/24 0415       24  [id number]  [id number] Completed 07/06/24 1851 297 mL 07/06/24 1727 07/06/24 1850       24  [id number]  [id number] Completed 07/06/24 1027 418 mL 07/06/24 0800 07/06/24 1027       24  [id number]  [id number] Completed 07/04/24 1655 340 mL 07/04/24 1218 07/04/24 1400

## 2024-07-09 NOTE — PROGRESS NOTES
[facility]  part of [facility]    Progress Note    Giuseppe Faustin Patient Status:  Inpatient    1945 MRN [id number]   Location [facility] 2W/SW Attending Delicia Strauss MD   Hosp Day # 6 PCP Joshua Faustin MD     Subjective:  Pt sitting in the chair. He c/o mild sternal incision discomfort/denies SOB. Assisted pt to wheelchair for CXR this AM. His son just arrived at bedside.     Objective:  /54   Pulse 65   Temp 98.3 °F (36.8 °C) (Temporal)   Resp 20   Ht 5' 3\" (1.6 m)   Wt 170 lb 13.7 oz (77.5 kg)   SpO2 96%   BMI 30.27 kg/m²     Temp (24hrs), Av.7 °F (36.5 °C), Min:97.2 °F (36.2 °C), Max:98.3 °F (36.8 °C)      Intake/Output:    Intake/Output Summary (Last 24 hours) at 2024 0903  Last data filed at 2024 2200  Gross per 24 hour   Intake 935 ml   Output 1150 ml   Net -215 ml       Wt Readings from Last 3 Encounters:   24 170 lb 13.7 oz (77.5 kg)   24 165 lb (74.8 kg)   24 170 lb (77.1 kg)       Allergies:  No Known Allergies    Labs:  Lab Results   Component Value Date    WBC 9.3 2024    HGB 8.7 2024    HCT 26.2 2024    PLT 87.0 2024    CREATSERUM 0.94 2024    BUN 40 2024     2024    K 3.8 2024     2024    CO2 24.0 2024    GLU 77 2024    CA 8.0 2024       Physical Exam:  Blood pressure 112/54, pulse 65, temperature 98.3 °F (36.8 °C), temperature source Temporal, resp. rate 20, height 5' 3\" (1.6 m), weight 170 lb 13.7 oz (77.5 kg), SpO2 96%.  General: NAD  Neck: No JVD  Lungs: Clear bilaterally   Heart: RRR, S1, S2-SR  Abdomen: Soft/Round, NT/ND, BS+x 4/+Flatus/+BM  Extremities: Warm, dry, trace LE edema bilat  Pulses: 1+ bilat DP  Skin: sternotomy incision drsg:CDI/Left leg incision NEIDA=CDI   Neurological:  AAOx3, MAEW- weak     Assessment/Plan:  S/P CABG x 3/TYE CLIP POD # 6  Encourage pulm toilet: IS. Currently on room air lane well.  PA/LAT  CXR today pending  Pain meds as needed  Increase activity-OOB to chair and ambulate in hallway. Pt very weak. Awaiting PT/OT to eval/treat. May shower today   Scds prophylaxis DVT prevention. No Lovenox due to decreased plts-trending downward & post op GI bleed- send HIPA  Intra op Cystoscopy per Urology for bladder neck contracture/dilation and damian placement. Damian remains. Hx: TURP. Plan for outpt FU w/Urology for follow mgt. Will be discharged w/damian.  Post op GI Bleed w/EGD on 7/7 per GI for coffee ground emesis=esophagitis/duodenal bulb ulcer/gastritis- following recs  Post op anemia w/clinical significance due to post op GI bleed. Transfusions given- plan for another unit PRBCs today. Pt stable. Plan to resume ASA today. No Plavix.   Expected post op volume overload- Lasix PRN   Post op AF-currently SR. Pt considered high risk for AF post op. On Amio/Beta-blocker. TYE clipped- no AC secondary to recent GI bleed  Nutrition w/clear liquids-may advance as lane per GI   Hx: DM. Insulin adjustments PRN. Resume home regimen at/near discharge  Discharge planning: pt lives w/his wife. Has supportive children. Anticipate needing rehab however awaiting PT to eval/treat. Asked SW/ to assist w/planning.     Plan of care discussed w/patient/son/RN and CV Surgeon: Dr. Tin Castillo, APRN  7/9/2024  9:03 AM

## 2024-07-09 NOTE — CARDIAC REHAB
Cardiac Rehab Phase I    Activity:   Chair: Yes   Ambulation: Yes   Assistive Device: Walker   Distance: 100 feet   Assistance needed: No   Patient tolerated activity: Well.   Shower Date: 7/9/24 Tolerated Shower Activity Well.    Education:  Handouts provided and reviewed: Heart Surgery Binder. :Referred to Cardiac Rehabilitation Phase 2.  Patient instructed on: I.S. / Acapella, Cough and Deep Breathe, Incision Care, Infection Prevention, and Pain Scale Instruction.     Diet: Healthy Cardiac diet reviewed.    Disease Process: Disease process reviewed.    Weight Monitoring: Instructed on daily weights.    I.S. and/or Acapella: Patient instructed on incentive spirometer and/or acapella with good return demonstration.    Infection: Reviewed signs and symptoms of infection. Infection prevention and when to notify MD.

## 2024-07-10 LAB
ANION GAP SERPL CALC-SCNC: 6 MMOL/L (ref 0–18)
BASOPHILS # BLD AUTO: 0.01 X10(3) UL (ref 0–0.2)
BASOPHILS NFR BLD AUTO: 0.1 %
BLOOD TYPE BARCODE: 5100
BUN BLD-MCNC: 26 MG/DL (ref 9–23)
BUN/CREAT SERPL: 27.4 (ref 10–20)
CALCIUM BLD-MCNC: 8 MG/DL (ref 8.7–10.4)
CHLORIDE SERPL-SCNC: 112 MMOL/L (ref 98–112)
CO2 SERPL-SCNC: 23 MMOL/L (ref 21–32)
CREAT BLD-MCNC: 0.95 MG/DL
DEPRECATED RDW RBC AUTO: 49.1 FL (ref 35.1–46.3)
EGFRCR SERPLBLD CKD-EPI 2021: 81 ML/MIN/1.73M2 (ref 60–?)
EOSINOPHIL # BLD AUTO: 0.13 X10(3) UL (ref 0–0.7)
EOSINOPHIL NFR BLD AUTO: 1.7 %
ERYTHROCYTE [DISTWIDTH] IN BLOOD BY AUTOMATED COUNT: 17.4 % (ref 11–15)
ERYTHROCYTE [SEDIMENTATION RATE] IN BLOOD: 11 MM/HR
GLUCOSE BLD-MCNC: 68 MG/DL (ref 70–99)
GLUCOSE BLDC GLUCOMTR-MCNC: 127 MG/DL (ref 70–99)
GLUCOSE BLDC GLUCOMTR-MCNC: 129 MG/DL (ref 70–99)
GLUCOSE BLDC GLUCOMTR-MCNC: 143 MG/DL (ref 70–99)
GLUCOSE BLDC GLUCOMTR-MCNC: 165 MG/DL (ref 70–99)
GLUCOSE BLDC GLUCOMTR-MCNC: 68 MG/DL (ref 70–99)
GLUCOSE BLDC GLUCOMTR-MCNC: 83 MG/DL (ref 70–99)
HCT VFR BLD AUTO: 31.2 %
HGB BLD-MCNC: 10.5 G/DL
IMM GRANULOCYTES # BLD AUTO: 0.08 X10(3) UL (ref 0–1)
IMM GRANULOCYTES NFR BLD: 1.1 %
LYMPHOCYTES # BLD AUTO: 1.51 X10(3) UL (ref 1–4)
LYMPHOCYTES NFR BLD AUTO: 20 %
MAGNESIUM SERPL-MCNC: 2.2 MG/DL (ref 1.6–2.6)
MCH RBC QN AUTO: 28.9 PG (ref 26–34)
MCHC RBC AUTO-ENTMCNC: 33.7 G/DL (ref 31–37)
MCV RBC AUTO: 86 FL
MONOCYTES # BLD AUTO: 1.08 X10(3) UL (ref 0.1–1)
MONOCYTES NFR BLD AUTO: 14.3 %
NEUTROPHILS # BLD AUTO: 4.74 X10 (3) UL (ref 1.5–7.7)
NEUTROPHILS # BLD AUTO: 4.74 X10(3) UL (ref 1.5–7.7)
NEUTROPHILS NFR BLD AUTO: 62.8 %
OSMOLALITY SERPL CALC.SUM OF ELEC: 295 MOSM/KG (ref 275–295)
PLATELET # BLD AUTO: 103 10(3)UL (ref 150–450)
POTASSIUM SERPL-SCNC: 3.7 MMOL/L (ref 3.5–5.1)
RBC # BLD AUTO: 3.63 X10(6)UL
SODIUM SERPL-SCNC: 141 MMOL/L (ref 136–145)
UNIT VOLUME: 350 ML
WBC # BLD AUTO: 7.6 X10(3) UL (ref 4–11)

## 2024-07-10 PROCEDURE — 99232 SBSQ HOSP IP/OBS MODERATE 35: CPT | Performed by: INTERNAL MEDICINE

## 2024-07-10 PROCEDURE — 99233 SBSQ HOSP IP/OBS HIGH 50: CPT | Performed by: INTERNAL MEDICINE

## 2024-07-10 RX ORDER — INSULIN DEGLUDEC 100 U/ML
20 INJECTION, SOLUTION SUBCUTANEOUS DAILY
Status: DISCONTINUED | OUTPATIENT
Start: 2024-07-10 | End: 2024-07-10

## 2024-07-10 RX ORDER — AMIODARONE HYDROCHLORIDE 200 MG/1
400 TABLET ORAL 2 TIMES DAILY
Status: DISCONTINUED | OUTPATIENT
Start: 2024-07-10 | End: 2024-07-11

## 2024-07-10 RX ORDER — AMIODARONE HYDROCHLORIDE 200 MG/1
200 TABLET ORAL ONCE
Status: COMPLETED | OUTPATIENT
Start: 2024-07-10 | End: 2024-07-10

## 2024-07-10 RX ADMIN — AMIODARONE HYDROCHLORIDE 200 MG: 200 TABLET ORAL at 09:06:00

## 2024-07-10 RX ADMIN — AMIODARONE HYDROCHLORIDE 400 MG: 200 TABLET ORAL at 22:21:00

## 2024-07-10 RX ADMIN — SENNOSIDES 8.6 MG: 8.6 MG TABLET ORAL at 22:22:00

## 2024-07-10 RX ADMIN — DOXEPIN HYDROCHLORIDE 1 TABLET: 50 CAPSULE ORAL at 09:05:00

## 2024-07-10 RX ADMIN — Medication 25 MG: at 04:43:00

## 2024-07-10 RX ADMIN — DOCUSATE SODIUM 100 MG: 100 CAPSULE, LIQUID FILLED ORAL at 22:22:00

## 2024-07-10 RX ADMIN — ATORVASTATIN CALCIUM 10 MG: 10 TABLET, FILM COATED ORAL at 22:22:00

## 2024-07-10 RX ADMIN — AMIODARONE HYDROCHLORIDE 200 MG: 200 TABLET ORAL at 10:11:00

## 2024-07-10 RX ADMIN — ASPIRIN 81 MG: 81 TABLET ORAL at 09:06:00

## 2024-07-10 RX ADMIN — ASCORBIC ACID 500 MG: 500 MG TABLET ORAL at 09:05:00

## 2024-07-10 RX ADMIN — MELATONIN 3 MG: at 22:22:00

## 2024-07-10 NOTE — HOME CARE LIAISON
Received referral via [employer] for Home Health services. Spoke w/ patient, wife, and daughter Armando who is agreeable with [facility]. Contact information placed on AVS.

## 2024-07-10 NOTE — PROGRESS NOTES
[facility]  part of [facility]     Hospitalist Progress Note     Giuseppe Sellerssarah Dianael Patient Status:  Inpatient    1945 MRN [id number]   Location [facility] 2W/SW Attending Brenden Quiles MD   Hosp Day # 7 PCP Joshua Faustin MD     Chief Complaint: Chest pain    Subjective:     Patient seen sitting in chair.  Family at bedside.  Patient denies acute events overnight.  Patient said he was up and moving with RN today.  Had some mild shortness of breath and increased heart rate after walking, but quickly resolved after rest.  Denied current chest pain.    Objective:      Vital signs:  Vitals:    07/10/24 0600 07/10/24 0800 07/10/24 1000 07/10/24 1100   BP: 116/69 111/54 106/53 109/70   BP Location: Right arm Right arm Right arm Right arm   Pulse: 102 100 105 (!) 128   Resp: 19 17 17 (!) 28   Temp:  96.9 °F (36.1 °C)     TempSrc:  Temporal     SpO2: 95% 99% 93% 99%   Weight: 170 lb 3.1 oz (77.2 kg)      Height:           Intake/Output Summary (Last 24 hours) at 7/10/2024 1302  Last data filed at 7/10/2024 0940  Gross per 24 hour   Intake 600 ml   Output 825 ml   Net -225 ml           Physical Exam:    GENERAL:  Awake and alert, in no acute distress.  HEART: Irregular rhythm, mild tachycardia  LUNGS:  Air entry was minimally decreased.  No crackles or wheezes   ABDOMEN: Soft and non-tender.    PSYCHIATRIC: Normal mood    Diagnostic Data:    Labs:    Recent Labs   Lab 24  0625 24  1131 24  0506 24  1444 24  0402 07/10/24  0449   WBC  --    < > 11.2*  --  9.3 7.6   HGB  --    < > 8.3* 9.2* 8.7* 10.5*   MCV  --    < > 83.5  --  86.2 86.0   PLT  --    < > 90.0*  --  87.0* 103.0*   INR 1.25*  --   --   --   --   --     < > = values in this interval not displayed.       Recent Labs   Lab 24  0746 24  0445 24  0509 24  0506 24  0402 07/10/24  0421   * 182*   < > 128* 77 68*   BUN 24* 39*   < > 60* 40* 26*   CREATSERUM 1.26  1.15   < > 1.17 0.94 0.95   CA 8.3* 7.6*   < > 7.4* 8.0* 8.0*   ALB 3.7 3.3  --   --   --   --     136   < > 140 140 141   K 4.5 4.8   < > 3.7 3.8 3.7    108   < > 113* 111 112   CO2 22.0 21.0   < > 24.0 24.0 23.0   ALKPHO 34* 32*  --   --   --   --    AST 31 22  --   --   --   --    ALT <7* <7*  --   --   --   --    BILT 0.7 0.6  --   --   --   --    TP 5.8 5.3*  --   --   --   --     < > = values in this interval not displayed.           Estimated Creatinine Clearance: 50.7 mL/min (based on SCr of 0.95 mg/dL).    Recent Labs   Lab 07/06/24  0625   PTP 16.5*   INR 1.25*            COVID-19  Lab Results   Component Value Date    COVID19 Not Detected 12/29/2021    COVID19 Not Detected 11/07/2020    COVID19 Not Detected 10/05/2020       Pro-Calcitonin  No results for input(s): \"PCT\" in the last 168 hours.    Cardiac  No results for input(s): \"TROP\", \"PBNP\" in the last 168 hours.    Inflammatory Markers  No results for input(s): \"CRP\", \"OMAIRA\", \"LDH\", \"DDIMER\" in the last 168 hours.    Culture:  No results found for this visit on 07/02/24.    XR CHEST PA + LAT CHEST (CPT=71046)    Result Date: 7/9/2024  CONCLUSION: No radiographic evidence of acute cardiopulmonary abnormality.    Dictated by (CST): Nikita Good MD on 7/09/2024 at 12:51 PM     Finalized by (CST): Nikita Good MD on 7/09/2024 at 12:53 PM          XR ABDOMEN (1 VIEW) (CPT=74018)    Result Date: 7/7/2024  CONCLUSION:   Enteric tube terminates in the proximal stomach with the side hole terminating near the GE junction, recommend advancement approximately 3 cm.  Mildly prominent loops of small bowel measuring up to 2.3 cm, which may reflect an ileus.  Lesser incidental findings described above.    Dictated by (CST): Neo Genao MD on 7/07/2024 at 3:56 PM     Finalized by (CST): Neo Genao MD on 7/07/2024 at 3:58 PM                 Medications:    amiodarone  400 mg Oral BID    metoprolol tartrate  37.5 mg Oral 2x Daily(Beta Blocker)     ascorbic acid  500 mg Oral Daily    atorvastatin  10 mg Oral Nightly    pantoprazole  40 mg Intravenous Q12H    insulin aspart  1-7 Units Subcutaneous TID CC    sennosides  8.6 mg Oral BID    docusate sodium  100 mg Oral BID    multivitamin  1 tablet Oral Daily    aspirin  81 mg Oral Daily       Assessment & Plan:       CAD.   -S/p CABG x 3 with LIMA to LAD and SVGs to diagonal and right PDA on 7/3.   -CV surgery and cardiology following  -Activity as tolerated  -off pressors, insulin gtt     Paroxysmal Afib  S/p Left atrial appendage clip   - cardiology following, pt started on amiodarone gtt, transitioned to PO Amiodarone   -Continue metoprolol, dosing increased  -No AC at this time due to bleed as below  - cont to monitor     Suspected acute upper GIB   Ileus  Acute posthemorrhagic anemia  -GI consulted, seen by Dr. Bryan  -PPI bid  -s/p PRBC x 4 within 24 hrs initially, hgb~8.3 on  s/p 1 PRBC, hgb~8.7  -Trend hemoglobin  -antiplatelets initially held, now restarted on ASA 81  -s/p EGD showin. LA Class D esophagitis and clean based esophageal ulcerations.   2. Large, clean based duodenal bulb ulcer located at the posterolateral wall and abutting the pylorus.   3.  There was no evidence of active or recent GI bleeding in the upper GI tract.    -->H. Pylori stool antigen ordered.   -->needs to repeat EGD in ~8-12 weeks to ensure healing of the ulcerations.   -abd XR on  reviewed  --->NGT removed  -Tolerates advancing diet  -HIPA negative       Bladder neck contracture.  Patient seen by urology in the OR.  Cystoscopy assisted Delong catheter placement.  History of TURP  -Keep Delong for duration of hospitalization  -Keep Delong after discharge as well (per Dr Blanchard)  -Outpatient urology follow-up     Other problems  Hypertension  Dyslipidemia  GERD  Obstructive sleep apnea  Osteoarthritis  Type 2 diabetes  History of kidney stones  Essential tremor  CKD stage II          Plan of care discussed with  patient and family at bedside . Discussed management/test result(s) with Rn     Quality:  DVT Prophylaxis: SCDs  CODE status: Full  Estimated date of discharge: TBD  Discharge is dependent on: clinical stability    53 minutes spent discussing with other providers, examining patient, obtaining history, reviewing medical records, interpreting and communicating test results/imaging, ordering tests/medications, discussing plan of care and documenting information.      Brenden Quiles MD          This note was prepared using Dragon Medical voice recognition dictation software. As a result errors may occur. When identified these errors have been corrected. While every attempt is made to correct errors during dictation discrepancies may still exist

## 2024-07-10 NOTE — CM/SW NOTE
Daughter Armando called CM and is choosing [facility] as HH provider.  Questions answered by CM surrounding HH services.  Armando's number is [phone].    Morenita Blackwell MBA BSN RN CRRN   RN Case Manager  [phone]

## 2024-07-10 NOTE — SPIRITUAL CARE NOTE
Spiritual Care Visit Note    Patient Name: Giuseppe Faustin Date of Spiritual Care Visit: 07/10/24   : 1945 Primary Dx: <principal problem not specified>       Referred By: Referral From: , LOS    Spiritual Care Taxonomy:    Intended Effects: Aligning care plan with patient's values    Methods: Offer support    Interventions: Explain  role;Acknowledge current situation    Visit Type/Summary:     received referral from SYEDA yeager. Writer visited with patient and family at bedside.      - Spiritual Care: Provided information regarding how to contact Spiritual Care and left a Spiritual Care information card.  remains available as needed for follow up.    Spiritual Care support can be requested via an [facility] consult. For urgent/immediate needs, please contact the On Call  at: [place]: ext [phone]    Chaplain Khadra.

## 2024-07-10 NOTE — PROGRESS NOTES
[facility] - CARDIOLOGY PROGRESS NOTE      Giuseppe Faustin Patient Status:  Inpatient    1945 MRN [id number]   Location [facility] 2W/SW Attending Brenden Quiles MD   Hosp Day # 7 PCP Joshua Faustin MD         Assessment and Plan:   Assessment:     CAD: S/p CABG x 3 with LIMA to LAD and SVGs to diagonal and right PDA  7/3/2024.  Had clipping of left atrial appendage.        Paroxysmal atrial fibrillation: Left atrial appendage clipped, now in atrial fibrillation continue p.o. amiodarone and metoprolol.     Upper GI bleed:  Status post urgent EGD yesterday, coffee-ground emesis EGD showed esophagitis gastritis and duodenal bulb ulcer continue PPI.  NG tube has been removed.  Aspirin and Plavix have been stopped.  Will need to resume aspirin prior to discharge.  Keep hemoglobin over 8      Acute renal insufficiency improving creatinine down to 1.2.  Euvolemic no need for IV for Lasix today.        Plan:  With recurrent A-fib increase amiodarone to 400 twice daily and metoprolol to 37.5 twice daily.  If A-fib persist this afternoon and pressure stable can increase metoprolol to 50 twice daily.  No need for systemic anticoagulation with appendage clip.   With recurrent A-fib check sed rate.  If abnormal consider limited echo.  Once rhythm stable discharge soon possibly tomorrow.  Continue baby aspirin as tolerated.  Follow with GI.        Subjective:   Giuseppe Faustin is a(n) 78 year old male back in atrial fibrillation this morning.  No new chest pain or shortness of breath.    Objective:   Blood pressure 116/69, pulse 102, temperature 96.7 °F (35.9 °C), temperature source Temporal, resp. rate 19, height 160 cm (5' 3\"), weight 170 lb 3.1 oz (77.2 kg), SpO2 95%.  Intake/Output:        Last 24 hours:   Intake/Output Summary (Last 24 hours) at 7/10/2024 1117  Last data filed at 7/10/2024 0940  Gross per 24 hour   Intake 938 ml   Output 825  ml   Net 113 ml      This shift: I/O this shift:  In: -   Out: 200 [Urine:200]    Exam  Gen: Comfortable, in no acute distress,    Psych.alert and oriented x3  HEENT: atraumatic, normal conjunctiva, moist mucosa  Neck:supple,no JVD, no bruits  Lungs: clear to ascultation, normal respiratory effort  Cardiac: irregular rate and rhythm, nl S1,S2, no pathologic murmur  Abd: Abdomen soft, nontender, nondistended,  bowel sounds present  Ext:  no clubbing, no cyanosis,no edema  Neuro: no focal deficits  Skin: no rashes or lesions        Scheduled Meds:    amiodarone  400 mg Oral BID    metoprolol tartrate  37.5 mg Oral 2x Daily(Beta Blocker)    metoprolol tartrate  12.5 mg Oral 2x Daily(Beta Blocker)    sodium chloride   Intravenous Once    ascorbic acid  500 mg Oral Daily    sodium chloride   Intravenous Once    atorvastatin  10 mg Oral Nightly    pantoprazole  40 mg Intravenous Q12H    insulin aspart  1-7 Units Subcutaneous TID CC    sennosides  8.6 mg Oral BID    docusate sodium  100 mg Oral BID    multivitamin  1 tablet Oral Daily    aspirin  81 mg Oral Daily       Results:     Lab Results   Component Value Date    WBC 7.6 07/10/2024    HGB 10.5 (L) 07/10/2024    HCT 31.2 (L) 07/10/2024    .0 (L) 07/10/2024    CREATSERUM 0.95 07/10/2024    BUN 26 (H) 07/10/2024     07/10/2024    K 3.7 07/10/2024     07/10/2024    CO2 23.0 07/10/2024    GLU 68 (L) 07/10/2024    CA 8.0 (L) 07/10/2024    ALB 3.3 07/06/2024    ALKPHO 32 (L) 07/06/2024    BILT 0.6 07/06/2024    TP 5.3 (L) 07/06/2024    AST 22 07/06/2024    ALT <7 (L) 07/06/2024    PTT 33.7 07/06/2024    INR 1.25 (H) 07/06/2024    T4F 1.1 06/11/2024    TSH 9.211 (H) 05/08/2024     08/11/2015    PSA 0.39 06/14/2023    ESRML 23 (H) 06/24/2024    CRP <0.29 02/24/2020    MG 2.2 07/10/2024    CK 76 06/24/2024       XR CHEST PA + LAT CHEST (CPT=71046)    Result Date: 7/9/2024  CONCLUSION: No radiographic evidence of acute cardiopulmonary abnormality.     Dictated by (CST): Nikita Good MD on 7/09/2024 at 12:51 PM     Finalized by (CST): Nikita Good MD on 7/09/2024 at 12:53 PM                 Harry Hilliard MD  [facility] [place] [facility]  7/10/2024

## 2024-07-10 NOTE — PLAN OF CARE
Ambulated halls, minimal BARAHONA  Sternal care s/p shower  Plan for discharge on Thursday  Son at bedside, discharge instruction and home care reinforced    *in afib throughout shift, rate controlled, po amio as scheduled. Mild elevation with ambulation    Problem: Patient Centered Care  Goal: Patient preferences are identified and integrated in the patient's plan of care  Description: Interventions:  - What would you like us to know as we care for you? My son thinks I need to use a cane when I am walking, even at home!   - Provide timely, complete, and accurate information to patient/family  - Incorporate patient and family knowledge, values, beliefs, and cultural backgrounds into the planning and delivery of care  - Encourage patient/family to participate in care and decision-making at the level they choose  - Honor patient and family perspectives and choices  Outcome: Progressing     Problem: PAIN - ADULT  Goal: Verbalizes/displays adequate comfort level or patient's stated pain goal  Description: INTERVENTIONS:  - Encourage pt to monitor pain and request assistance  - Assess pain using appropriate pain scale  - Administer analgesics based on type and severity of pain and evaluate response  - Implement non-pharmacological measures as appropriate and evaluate response  - Consider cultural and social influences on pain and pain management  - Manage/alleviate anxiety  - Utilize distraction and/or relaxation techniques  - Monitor for opioid side effects  - Notify MD/LIP if interventions unsuccessful or patient reports new pain  - Anticipate increased pain with activity and pre-medicate as appropriate  Outcome: Progressing     Problem: SAFETY ADULT - FALL  Goal: Free from fall injury  Description: INTERVENTIONS:  - Assess pt frequently for physical needs  - Identify cognitive and physical deficits and behaviors that affect risk of falls.  - Ruso fall precautions as indicated by assessment.  - Educate pt/family on  patient safety including physical limitations  - Instruct pt to call for assistance with activity based on assessment  - Modify environment to reduce risk of injury  - Provide assistive devices as appropriate  - Consider OT/PT consult to assist with strengthening/mobility  - Encourage toileting schedule  Outcome: Progressing     Problem: CARDIOVASCULAR - ADULT  Goal: Maintains optimal cardiac output and hemodynamic stability  Description: INTERVENTIONS:  - Monitor vital signs, rhythm, and trends  - Monitor for bleeding, hypotension and signs of decreased cardiac output  - Evaluate effectiveness of vasoactive medications to optimize hemodynamic stability  - Monitor arterial and/or venous puncture sites for bleeding and/or hematoma  - Assess quality of pulses, skin color and temperature  - Assess for signs of decreased coronary artery perfusion - ex. Angina  - Evaluate fluid balance, assess for edema, trend weights  Outcome: Progressing  Goal: Absence of cardiac arrhythmias or at baseline  Description: INTERVENTIONS:  - Continuous cardiac monitoring, monitor vital signs, obtain 12 lead EKG if indicated  - Evaluate effectiveness of antiarrhythmic and heart rate control medications as ordered  - Initiate emergency measures for life threatening arrhythmias  - Monitor electrolytes and administer replacement therapy as ordered  Outcome: Progressing     Problem: RESPIRATORY - ADULT  Goal: Achieves optimal ventilation and oxygenation  Description: INTERVENTIONS:  - Assess for changes in respiratory status  - Assess for changes in mentation and behavior  - Position to facilitate oxygenation and minimize respiratory effort  - Oxygen supplementation based on oxygen saturation or ABGs  - Provide Smoking Cessation handout, if applicable  - Encourage broncho-pulmonary hygiene including cough, deep breathe, Incentive Spirometry  - Assess the need for suctioning and perform as needed  - Assess and instruct to report SOB or any  respiratory difficulty  - Respiratory Therapy support as indicated  - Manage/alleviate anxiety  - Monitor for signs/symptoms of CO2 retention  Outcome: Progressing     Problem: GASTROINTESTINAL - ADULT  Goal: Minimal or absence of nausea and vomiting  Description: INTERVENTIONS:  - Maintain adequate hydration with IV or PO as ordered and tolerated  - Nasogastric tube to low intermittent suction as ordered  - Evaluate effectiveness of ordered antiemetic medications  - Provide nonpharmacologic comfort measures as appropriate  - Advance diet as tolerated, if ordered  - Obtain nutritional consult as needed  - Evaluate fluid balance  Outcome: Progressing  Goal: Maintains or returns to baseline bowel function  Description: INTERVENTIONS:  - Assess bowel function  - Maintain adequate hydration with IV or PO as ordered and tolerated  - Evaluate effectiveness of GI medications  - Encourage mobilization and activity  - Obtain nutritional consult as needed  - Establish a toileting routine/schedule  - Consider collaborating with pharmacy to review patient's medication profile  Outcome: Progressing  Goal: Maintains adequate nutritional intake (undernourished)  Description: INTERVENTIONS:  - Monitor percentage of each meal consumed  - Identify factors contributing to decreased intake, treat as appropriate  - Assist with meals as needed  - Monitor I&O, WT and lab values  - Obtain nutritional consult as needed  - Optimize oral hygiene and moisture  - Encourage food from home; allow for food preferences  - Enhance eating environment  Outcome: Progressing  Goal: Achieves appropriate nutritional intake (bariatric)  Description: INTERVENTIONS:  - Monitor for over-consumption  - Identify factors contributing to increased intake, treat as appropriate  - Monitor I&O, WT and lab values  - Obtain nutritional consult as needed  - Evaluate psychosocial factors contributing to over-consumption  Outcome: Progressing     Problem: GENITOURINARY -  ADULT  Goal: Absence of urinary retention  Description: INTERVENTIONS:  - Assess patient’s ability to void and empty bladder  - Monitor intake/output and perform bladder scan as needed  - Follow urinary retention protocol/standard of care  - Consider collaborating with pharmacy to review patient's medication profile  - Implement strategies to promote bladder emptying  Outcome: Progressing

## 2024-07-10 NOTE — SLP NOTE
SPEECH DAILY NOTE - INPATIENT    ASSESSMENT & PLAN   ASSESSMENT  PPE REQUIRED. THIS THERAPIST WORE GLOVES, DROPLET MASK, AND GOGGLES FOR DURATION OF EVALUATION. HANDS WASHED UPON ENTRANCE/EXIT.    SLP f/u for ongoing dysphagia tx/meal assessment per recommendations of easy to chew/thin per BSE. RN reports pt tolerates diet and medication well with no overt clinical s/s aspiration. Pt expressed he feels better about his swallowing abilities this AM.     Pt positioned upright in bedside chair. Pt afebrile, tolerating room air with oxygen status 96 prior to the start of oral trials. SLP reviewed aspiration precautions and safe swallowing compensatory strategies with the patient. Safe swallow guidelines remain written on the white board in purple. Diet recommendations remain on the whiteboard in the room. Patient v/u. Provided minimal assistance, pt tolerates thin liquids via open cup and small medications whole one at a time with water with no overt clinical signs/symptoms of aspiration. Pt declined pills crushed in applesauce. Pt with OVERT signs/symptoms of aspiration observed with large pill whole with water as evidenced by immediate coughing/choking episode. Pt declined all solid PO trials. Oxygen status remained >95 t/o the entire session. Oral/buccal cavities clear of residue following trials. Recommend medications administered crushed in puree to reduce risk of aspiration.     PLAN: SLP to f/u x1-2 meal assessments, monitor CXR, and VFSS if clinically warranted.     Diet Recommendations - Solids: Soft/ Easy to chew  Diet Recommendations - Liquids: Thin Liquids    Compensatory Strategies Recommended: No straws;Slow rate;Alternate consistencies;Small bites and sips  Aspiration Precautions: Upright position;Slow rate;Small bites and sips;No straw  Medication Administration Recommendations: Crushed in puree    Patient Experiencing Pain: No      Treatment Plan  Treatment Plan/Recommendations: Aspiration  precautions    Interdisciplinary Communication: Discussed with RN  Recommendations posted at bedside            GOALS  Goal #1 The patient will tolerate easy to chew consistency and thin liquids without overt signs or symptoms of aspiration with 100 % accuracy over 2 session(s).  In Progress   Goal #2 The patient/family/caregiver will demonstrate understanding and implementation of aspiration precautions and swallow strategies independently over 3 session(s).     In Progress   Goal #3 The patient will tolerate trial upgrade of regular consistency and thin liquids without overt signs or symptoms of aspiration with 100 % accuracy over 3 session(s).  Not addresses 7/10   Goal #4 The patient will utilize compensatory strategies as outlined by  BSSE (clinical evaluation) including Slow rate, Small bites, Small sips, Alternate liquids/solids, No straws, Upright 90 degrees with minimal assistance 100 % of the time across 2 sessions.     In Progress        FOLLOW UP  Follow Up Needed (Documentation Required): Yes  SLP Follow-up Date: 07/11/24  Number of Visits to Meet Established Goals:  (1-2)    Session: 2 following BSSE    If you have any questions, please contact RACHEL Shen M.S. CCC-SLP  Speech Language Pathologist  Phone Number [phone]

## 2024-07-10 NOTE — PLAN OF CARE
Scheduled Amio & Metoprolol modified by Cards for Afib. Ambulated in hallway x4. Potential discharge tomorrow. Maintained patient safety.   Problem: Patient Centered Care  Goal: Patient preferences are identified and integrated in the patient's plan of care  Description: Interventions:  - What would you like us to know as we care for you? My son thinks I need to use a cane when I am walking, even at home!   - Provide timely, complete, and accurate information to patient/family  - Incorporate patient and family knowledge, values, beliefs, and cultural backgrounds into the planning and delivery of care  - Encourage patient/family to participate in care and decision-making at the level they choose  - Honor patient and family perspectives and choices  Outcome: Progressing     Problem: PAIN - ADULT  Goal: Verbalizes/displays adequate comfort level or patient's stated pain goal  Description: INTERVENTIONS:  - Encourage pt to monitor pain and request assistance  - Assess pain using appropriate pain scale  - Administer analgesics based on type and severity of pain and evaluate response  - Implement non-pharmacological measures as appropriate and evaluate response  - Consider cultural and social influences on pain and pain management  - Manage/alleviate anxiety  - Utilize distraction and/or relaxation techniques  - Monitor for opioid side effects  - Notify MD/LIP if interventions unsuccessful or patient reports new pain  - Anticipate increased pain with activity and pre-medicate as appropriate  Outcome: Progressing     Problem: SAFETY ADULT - FALL  Goal: Free from fall injury  Description: INTERVENTIONS:  - Assess pt frequently for physical needs  - Identify cognitive and physical deficits and behaviors that affect risk of falls.  - [place] fall precautions as indicated by assessment.  - Educate pt/family on patient safety including physical limitations  - Instruct pt to call for assistance with activity based on  assessment  - Modify environment to reduce risk of injury  - Provide assistive devices as appropriate  - Consider OT/PT consult to assist with strengthening/mobility  - Encourage toileting schedule  Outcome: Progressing     Problem: CARDIOVASCULAR - ADULT  Goal: Maintains optimal cardiac output and hemodynamic stability  Description: INTERVENTIONS:  - Monitor vital signs, rhythm, and trends  - Monitor for bleeding, hypotension and signs of decreased cardiac output  - Evaluate effectiveness of vasoactive medications to optimize hemodynamic stability  - Monitor arterial and/or venous puncture sites for bleeding and/or hematoma  - Assess quality of pulses, skin color and temperature  - Assess for signs of decreased coronary artery perfusion - ex. Angina  - Evaluate fluid balance, assess for edema, trend weights  Outcome: Progressing  Goal: Absence of cardiac arrhythmias or at baseline  Description: INTERVENTIONS:  - Continuous cardiac monitoring, monitor vital signs, obtain 12 lead EKG if indicated  - Evaluate effectiveness of antiarrhythmic and heart rate control medications as ordered  - Initiate emergency measures for life threatening arrhythmias  - Monitor electrolytes and administer replacement therapy as ordered  Outcome: Progressing     Problem: RESPIRATORY - ADULT  Goal: Achieves optimal ventilation and oxygenation  Description: INTERVENTIONS:  - Assess for changes in respiratory status  - Assess for changes in mentation and behavior  - Position to facilitate oxygenation and minimize respiratory effort  - Oxygen supplementation based on oxygen saturation or ABGs  - Provide Smoking Cessation handout, if applicable  - Encourage broncho-pulmonary hygiene including cough, deep breathe, Incentive Spirometry  - Assess the need for suctioning and perform as needed  - Assess and instruct to report SOB or any respiratory difficulty  - Respiratory Therapy support as indicated  - Manage/alleviate anxiety  - Monitor for  signs/symptoms of CO2 retention  Outcome: Progressing     Problem: GASTROINTESTINAL - ADULT  Goal: Minimal or absence of nausea and vomiting  Description: INTERVENTIONS:  - Maintain adequate hydration with IV or PO as ordered and tolerated  - Nasogastric tube to low intermittent suction as ordered  - Evaluate effectiveness of ordered antiemetic medications  - Provide nonpharmacologic comfort measures as appropriate  - Advance diet as tolerated, if ordered  - Obtain nutritional consult as needed  - Evaluate fluid balance  Outcome: Progressing  Goal: Maintains or returns to baseline bowel function  Description: INTERVENTIONS:  - Assess bowel function  - Maintain adequate hydration with IV or PO as ordered and tolerated  - Evaluate effectiveness of GI medications  - Encourage mobilization and activity  - Obtain nutritional consult as needed  - Establish a toileting routine/schedule  - Consider collaborating with pharmacy to review patient's medication profile  Outcome: Progressing  Goal: Maintains adequate nutritional intake (undernourished)  Description: INTERVENTIONS:  - Monitor percentage of each meal consumed  - Identify factors contributing to decreased intake, treat as appropriate  - Assist with meals as needed  - Monitor I&O, WT and lab values  - Obtain nutritional consult as needed  - Optimize oral hygiene and moisture  - Encourage food from home; allow for food preferences  - Enhance eating environment  Outcome: Progressing  Goal: Achieves appropriate nutritional intake (bariatric)  Description: INTERVENTIONS:  - Monitor for over-consumption  - Identify factors contributing to increased intake, treat as appropriate  - Monitor I&O, WT and lab values  - Obtain nutritional consult as needed  - Evaluate psychosocial factors contributing to over-consumption  Outcome: Progressing     Problem: GENITOURINARY - ADULT  Goal: Absence of urinary retention  Description: INTERVENTIONS:  - Assess patient’s ability to void  and empty bladder  - Monitor intake/output and perform bladder scan as needed  - Follow urinary retention protocol/standard of care  - Consider collaborating with pharmacy to review patient's medication profile  - Implement strategies to promote bladder emptying  Outcome: Progressing

## 2024-07-10 NOTE — PROGRESS NOTES
[facility]  part of [facility]     Progress Note    Giuseppe Adilsonaldosarah Faustin Patient Status:  Inpatient    1945 MRN [id number]   Location [facility] 2W/SW Attending Brenden Quiles MD   Hosp Day # 7 PCP Joshua Faustin MD       Subjective:   Patient seen and examined.  Denies significant dyspnea.  Pain well-controlled    Objective:   Blood pressure 93/53, pulse 104, temperature 97.6 °F (36.4 °C), temperature source Temporal, resp. rate 22, height 5' 3\" (1.6 m), weight 170 lb 3.1 oz (77.2 kg), SpO2 95%.  Intake/Output:   Last 3 shifts: I/O last 3 completed shifts:  In: 1358 [P.O.:1020; Blood:338]  Out:  [Urine:]   This shift: I/O this shift:  In: -   Out: 200 [Urine:200]     Vent Settings:      Hemodynamic parameters (last 24 hours):      Scheduled Meds:   Current Facility-Administered Medications   Medication Dose Route Frequency    amiodarone (Pacerone) tab 400 mg  400 mg Oral BID    metoprolol tartrate (Lopressor) partial tab 37.5 mg  37.5 mg Oral 2x Daily(Beta Blocker)    ascorbic acid (Vitamin C) tab 500 mg  500 mg Oral Daily    atorvastatin (Lipitor) tab 10 mg  10 mg Oral Nightly    pantoprazole (Protonix) 40 mg in sodium chloride 0.9% PF 10 mL IV push  40 mg Intravenous Q12H    ondansetron (Zofran) 4 MG/2ML injection 4 mg  4 mg Intravenous Q6H PRN    lidocaine 2% topical sterile jelly 1 mL  1 mL Topical PRN    insulin aspart (NovoLOG) 100 Units/mL FlexPen 1-7 Units  1-7 Units Subcutaneous TID CC    melatonin tab 3 mg  3 mg Oral Nightly PRN    sennosides (Senokot) tab 8.6 mg  8.6 mg Oral BID    docusate sodium (Colace) cap 100 mg  100 mg Oral BID    polyethylene glycol (PEG 3350) (Miralax) 17 g oral packet 17 g  17 g Oral Daily PRN    bisacodyl (Dulcolax) 10 MG rectal suppository 10 mg  10 mg Rectal Daily PRN    potassium chloride 20 mEq/100mL IVPB premix 20 mEq  20 mEq Intravenous PRN    Or    potassium chloride 40 mEq/100mL IVPB premix (central line) 40 mEq  40 mEq  Intravenous PRN    magnesium sulfate in dextrose 5% 1 g/100mL infusion premix 1 g  1 g Intravenous PRN    magnesium sulfate in sterile water for injection 2 g/50mL IVPB premix 2 g  2 g Intravenous PRN    multivitamin (Tab-A-Ari/Beta Carotene) tab 1 tablet  1 tablet Oral Daily    acetaminophen (Tylenol) tab 650 mg  650 mg Oral Q4H PRN    Or    HYDROcodone-acetaminophen (Norco) 5-325 MG per tab 1 tablet  1 tablet Oral Q4H PRN    Or    HYDROcodone-acetaminophen (Norco) 5-325 MG per tab 2 tablet  2 tablet Oral Q4H PRN    aspirin DR tab 81 mg  81 mg Oral Daily    glucose (Dex4) 15 GM/59ML oral liquid 15 g  15 g Oral Q15 Min PRN    Or    glucose (Glutose) 40% oral gel 15 g  15 g Oral Q15 Min PRN    Or    glucose-vitamin C (Dex-4) chewable tab 4 tablet  4 tablet Oral Q15 Min PRN    Or    dextrose 50% injection 50 mL  50 mL Intravenous Q15 Min PRN    Or    glucose (Dex4) 15 GM/59ML oral liquid 30 g  30 g Oral Q15 Min PRN    Or    glucose (Glutose) 40% oral gel 30 g  30 g Oral Q15 Min PRN    Or    glucose-vitamin C (Dex-4) chewable tab 8 tablet  8 tablet Oral Q15 Min PRN       Continuous Infusions:     Physical Exam  Constitutional: no acute distress  Eyes: PERRL  ENT: nares pateint  Neck: supple, no JVD  Cardio: RRR, S1 S2  Respiratory: clear to auscultation bilaterally, no wheezing, rales, rhonchi, crackles  GI: abdomen soft, non tender, active bowel sounds, no organomegaly  Extremities: no clubbing, cyanosis, edema  Neurologic: no gross motor deficits  Skin: warm, dry      Results:     Lab Results   Component Value Date    WBC 7.6 07/10/2024    HGB 10.5 07/10/2024    HCT 31.2 07/10/2024    .0 07/10/2024    CREATSERUM 0.95 07/10/2024    BUN 26 07/10/2024     07/10/2024    K 3.7 07/10/2024     07/10/2024    CO2 23.0 07/10/2024    GLU 68 07/10/2024    CA 8.0 07/10/2024    ESRML 11 07/10/2024    MG 2.2 07/10/2024       XR CHEST PA + LAT CHEST (CPT=71046)    Result Date: 7/9/2024  CONCLUSION: No  radiographic evidence of acute cardiopulmonary abnormality.    Dictated by (CST): Nikita Good MD on 7/09/2024 at 12:51 PM     Finalized by (CST): Nikita Good MD on 7/09/2024 at 12:53 PM                 Assessment   1.  Coronary artery disease status post CABG  2.  GI bleed, resolved  3.  Acute kidney injury, improved  4.  Diabetes mellitus  5.  Hypertension  6.  Hyperlipidemia     Plan   -Patient's status post CABG x 3 on 7/2/2024.  -Patient found to have evidence of acute GI bleed status post 5 units PRBC transfusion EGD on 7/7/2024 with large duodenal ulcer  -Plavix and aspirin on hold at this time  -Insulin for diabetes management  -Up in chair.  Incentive spirometry  -DVT prophylaxis: Carol Key DO  Pulmonary Critical Care Medicine  [facility]

## 2024-07-10 NOTE — PROGRESS NOTES
[facility]  part of [facility]    Progress Note    Giuseppe Faustin Patient Status:  Inpatient    1945 MRN [id number]   Location [facility] 2W/SW Attending Delicia Strauss MD   Hosp Day # 7 PCP Joshua Faustin MD     Subjective:  OOB in chair on exam, back in A-fib overnight, rates 110s.  Incisional pain well-controlled.  He currently denies: CP, SOB, dizziness, or palpitations.  Ambulated 4 times in stevens yesterday.  Dark tarry stool overnight, s/p 1 RBC yesterday-Hgb stable this a.m. 10.5.    Objective:  /69 (BP Location: Right arm)   Pulse 102   Temp 96.7 °F (35.9 °C) (Temporal)   Resp 19   Ht 5' 3\" (1.6 m)   Wt 170 lb 3.1 oz (77.2 kg)   SpO2 95%   BMI 30.15 kg/m²     Temp (24hrs), Av.5 °F (36.4 °C), Min:96.7 °F (35.9 °C), Max:98 °F (36.7 °C)  [id number]    Intake/Output:    Intake/Output Summary (Last 24 hours) at 7/10/2024 0922  Last data filed at 7/10/2024 0400  Gross per 24 hour   Intake 938 ml   Output 625 ml   Net 313 ml       Wt Readings from Last 3 Encounters:   07/10/24 170 lb 3.1 oz (77.2 kg)   24 165 lb (74.8 kg)   24 170 lb (77.1 kg)       Allergies:  No Known Allergies    Labs:  Lab Results   Component Value Date    WBC 7.6 07/10/2024    HGB 10.5 07/10/2024    HCT 31.2 07/10/2024    .0 07/10/2024    CREATSERUM 0.95 07/10/2024    BUN 26 07/10/2024     07/10/2024    K 3.7 07/10/2024     07/10/2024    CO2 23.0 07/10/2024    GLU 68 07/10/2024    CA 8.0 07/10/2024    MG 2.2 07/10/2024       Physical Exam:  Blood pressure 116/69, pulse 102, temperature 96.7 °F (35.9 °C), temperature source Temporal, resp. rate 19, height 5' 3\" (1.6 m), weight 170 lb 3.1 oz (77.2 kg), SpO2 95%.  General: NAD  Neck: No JVD  Lungs: Clear bilaterally   Heart: RRR, S1, S2  Abdomen: Soft/Round, NT/ND, BS+x 4/+Flatus/+BM  Extremities: Warm, dry, no edema noted bilat LEs  Pulses: 1+ bilat DP  Skin: sternotomy incision drsg:CDI/Left leg  incision NEIDA=CDI   Neurological:  AAOx3, MAEW    Assessment/Plan:  S/P CABG x 3/TYE CLIP POD # 7  Encourage pulm toilet: IS. Stable RA. PA/LAT CXR reviewed 7/9.  Pain meds as needed  Increase activity-OOB to chair and ambulate in hallway QID.  PT/OT, showered yesterday  Scds prophylaxis DVT prevention. No Lovenox due to decreased plts/GIB -plt improved today 103, HIPA neg  Intra op Cystoscopy per Urology for bladder neck contracture/dilation and damian placement. Damian remains. Hx: TURP. Plan for outpt FU w/Urology for follow mgt. Will be discharged w/damian.  Post op GI Bleed w/EGD on 7/7 per GI for coffee ground emesis=esophagitis/duodenal bulb ulcer/gastritis- following recs  Post op anemia w/clinical significance due to post op GI bleed. Total 6 PRBC  received, most recent 7/9. Pt stable. Asa 81mg resumed 7/9, no Plavix.  Expected post op volume overload- Lasix PRN   Post op AF-cback in AF last night, rates 100-120. On Amio/Beta-blocker. TYE clipped- no AC secondary to recent GI bleed  Tolerating soft diet -advance as tolerated  Hx: DM. Insulin adjustments PRN. GCS 60s this am, d/c basal, continue corrective scale prn. Resume home regimen at/near discharge    Discharge planning: pt lives w/his wife. Has supportive children. Manoj d/c home tomorrow with [facility]/Home PT if remains medically stable. Asked SW/ to assist w/planning.     Plan of care discussed w/patient/RN and CV Surgeon: Dr. Tin Acosta APN  7/10/24  1002

## 2024-07-11 ENCOUNTER — TELEPHONE (OUTPATIENT)
Dept: SURGERY | Facility: CLINIC | Age: 79
End: 2024-07-11

## 2024-07-11 VITALS
DIASTOLIC BLOOD PRESSURE: 60 MMHG | HEART RATE: 79 BPM | RESPIRATION RATE: 22 BRPM | SYSTOLIC BLOOD PRESSURE: 123 MMHG | BODY MASS INDEX: 30.16 KG/M2 | HEIGHT: 63 IN | WEIGHT: 170.19 LBS | TEMPERATURE: 97 F | OXYGEN SATURATION: 97 %

## 2024-07-11 LAB
ANION GAP SERPL CALC-SCNC: 4 MMOL/L (ref 0–18)
BUN BLD-MCNC: 18 MG/DL (ref 9–23)
BUN/CREAT SERPL: 20 (ref 10–20)
CALCIUM BLD-MCNC: 8.3 MG/DL (ref 8.7–10.4)
CHLORIDE SERPL-SCNC: 111 MMOL/L (ref 98–112)
CO2 SERPL-SCNC: 25 MMOL/L (ref 21–32)
CREAT BLD-MCNC: 0.9 MG/DL
DEPRECATED RDW RBC AUTO: 49.2 FL (ref 35.1–46.3)
EGFRCR SERPLBLD CKD-EPI 2021: 87 ML/MIN/1.73M2 (ref 60–?)
ERYTHROCYTE [DISTWIDTH] IN BLOOD BY AUTOMATED COUNT: 17.7 % (ref 11–15)
GLUCOSE BLD-MCNC: 86 MG/DL (ref 70–99)
GLUCOSE BLDC GLUCOMTR-MCNC: 170 MG/DL (ref 70–99)
GLUCOSE BLDC GLUCOMTR-MCNC: 88 MG/DL (ref 70–99)
HCT VFR BLD AUTO: 30.7 %
HGB BLD-MCNC: 10.2 G/DL
MCH RBC QN AUTO: 28.3 PG (ref 26–34)
MCHC RBC AUTO-ENTMCNC: 33.2 G/DL (ref 31–37)
MCV RBC AUTO: 85.3 FL
OSMOLALITY SERPL CALC.SUM OF ELEC: 291 MOSM/KG (ref 275–295)
PLATELET # BLD AUTO: 147 10(3)UL (ref 150–450)
POTASSIUM SERPL-SCNC: 3.5 MMOL/L (ref 3.5–5.1)
RBC # BLD AUTO: 3.6 X10(6)UL
SODIUM SERPL-SCNC: 140 MMOL/L (ref 136–145)
WBC # BLD AUTO: 6 X10(3) UL (ref 4–11)

## 2024-07-11 PROCEDURE — 99239 HOSP IP/OBS DSCHRG MGMT >30: CPT | Performed by: INTERNAL MEDICINE

## 2024-07-11 PROCEDURE — 99232 SBSQ HOSP IP/OBS MODERATE 35: CPT | Performed by: INTERNAL MEDICINE

## 2024-07-11 RX ORDER — ASCORBIC ACID 500 MG
500 TABLET ORAL DAILY
Qty: 30 TABLET | Refills: 0 | Status: SHIPPED | OUTPATIENT
Start: 2024-07-11

## 2024-07-11 RX ORDER — HYDROCODONE BITARTRATE AND ACETAMINOPHEN 5; 325 MG/1; MG/1
1 TABLET ORAL EVERY 6 HOURS PRN
Qty: 30 TABLET | Refills: 0 | Status: SHIPPED | OUTPATIENT
Start: 2024-07-11

## 2024-07-11 RX ORDER — ENOXAPARIN SODIUM 100 MG/ML
40 INJECTION SUBCUTANEOUS DAILY
Status: DISCONTINUED | OUTPATIENT
Start: 2024-07-11 | End: 2024-07-11

## 2024-07-11 RX ORDER — DOXEPIN HYDROCHLORIDE 50 MG/1
1 CAPSULE ORAL DAILY
Qty: 30 TABLET | Refills: 0 | Status: SHIPPED | OUTPATIENT
Start: 2024-07-11

## 2024-07-11 RX ORDER — METOPROLOL TARTRATE 37.5 MG/1
37.5 TABLET, FILM COATED ORAL
Qty: 60 TABLET | Refills: 3 | Status: SHIPPED | OUTPATIENT
Start: 2024-07-11

## 2024-07-11 RX ORDER — PANTOPRAZOLE SODIUM 40 MG/1
40 TABLET, DELAYED RELEASE ORAL
Qty: 60 TABLET | Refills: 0 | Status: SHIPPED | OUTPATIENT
Start: 2024-07-11 | End: 2024-08-10

## 2024-07-11 RX ORDER — AMIODARONE HYDROCHLORIDE 200 MG/1
TABLET ORAL
Qty: 48 TABLET | Refills: 0 | Status: SHIPPED | OUTPATIENT
Start: 2024-07-11 | End: 2024-07-30

## 2024-07-11 RX ADMIN — ASCORBIC ACID 500 MG: 500 MG TABLET ORAL at 09:26:00

## 2024-07-11 RX ADMIN — DOXEPIN HYDROCHLORIDE 1 TABLET: 50 CAPSULE ORAL at 09:26:00

## 2024-07-11 RX ADMIN — AMIODARONE HYDROCHLORIDE 400 MG: 200 TABLET ORAL at 09:26:00

## 2024-07-11 RX ADMIN — ASPIRIN 81 MG: 81 TABLET ORAL at 09:26:00

## 2024-07-11 NOTE — PROGRESS NOTES
Patient okay to discharge home from all disciplines. PIV removed. Delong to remain and to be discharged with per Urology who will reassess at follow-up appointment. Discussed discharge information, restrictions, medications, and follow-up appointments with patient and wife who acknowledge their understanding. Wheelchair to lobby. Maintained patient safety.

## 2024-07-11 NOTE — PLAN OF CARE
Patient is awake/alert, at ease and appropriate no acute changes overnight. Continues to be in afib heart increases with ambulation to 130 range and decreases after he sits. Denies chest pain or work of breathing is on room air. Abdomen is round,  poor appetite and is taking meds with applesauce. Safety measures in place, call light within reach. Skin care done this morning including damian care.  Problem: CARDIOVASCULAR - ADULT  Goal: Maintains optimal cardiac output and hemodynamic stability  Description: INTERVENTIONS:  - Monitor vital signs, rhythm, and trends  - Monitor for bleeding, hypotension and signs of decreased cardiac output  - Evaluate effectiveness of vasoactive medications to optimize hemodynamic stability  - Monitor arterial and/or venous puncture sites for bleeding and/or hematoma  - Assess quality of pulses, skin color and temperature  - Assess for signs of decreased coronary artery perfusion - ex. Angina  - Evaluate fluid balance, assess for edema, trend weights  Outcome: Progressing  Goal: Absence of cardiac arrhythmias or at baseline  Description: INTERVENTIONS:  - Continuous cardiac monitoring, monitor vital signs, obtain 12 lead EKG if indicated  - Evaluate effectiveness of antiarrhythmic and heart rate control medications as ordered  - Initiate emergency measures for life threatening arrhythmias  - Monitor electrolytes and administer replacement therapy as ordered  Outcome: Not Progressing     Problem: RESPIRATORY - ADULT  Goal: Achieves optimal ventilation and oxygenation  Description: INTERVENTIONS:  - Assess for changes in respiratory status  - Assess for changes in mentation and behavior  - Position to facilitate oxygenation and minimize respiratory effort  - Oxygen supplementation based on oxygen saturation or ABGs  - Provide Smoking Cessation handout, if applicable  - Encourage broncho-pulmonary hygiene including cough, deep breathe, Incentive Spirometry  - Assess the need for  suctioning and perform as needed  - Assess and instruct to report SOB or any respiratory difficulty  - Respiratory Therapy support as indicated  - Manage/alleviate anxiety  - Monitor for signs/symptoms of CO2 retention  Outcome: Progressing     Problem: SAFETY ADULT - FALL  Goal: Free from fall injury  Description: INTERVENTIONS:  - Assess pt frequently for physical needs  - Identify cognitive and physical deficits and behaviors that affect risk of falls.  - [place] fall precautions as indicated by assessment.  - Educate pt/family on patient safety including physical limitations  - Instruct pt to call for assistance with activity based on assessment  - Modify environment to reduce risk of injury  - Provide assistive devices as appropriate  - Consider OT/PT consult to assist with strengthening/mobility  - Encourage toileting schedule  Outcome: Progressing     Problem: GASTROINTESTINAL - ADULT  Goal: Minimal or absence of nausea and vomiting  Description: INTERVENTIONS:  - Maintain adequate hydration with IV or PO as ordered and tolerated  - Nasogastric tube to low intermittent suction as ordered  - Evaluate effectiveness of ordered antiemetic medications  - Provide nonpharmacologic comfort measures as appropriate  - Advance diet as tolerated, if ordered  - Obtain nutritional consult as needed  - Evaluate fluid balance  Outcome: Progressing

## 2024-07-11 NOTE — CM/SW NOTE
07/11/24 1000   Discharge disposition   Expected discharge disposition Home-Health   Post Acute Care Provider [facility]   Discharge transportation Private car     Pt discussed during nursing rounds. Pt is stable for CA today. MD CA order entered. [facility] will provide RN and therapy services at CA, liaison Rosa notified of CA home today. Pt's friend will provide transport at CA.    Plan: Home w/spouse with [facility] today.    / to remain available for support and/or discharge planning.     BRANDO PelletierN    [phone]

## 2024-07-11 NOTE — PROGRESS NOTES
[facility]  part of [facility]    Progress Note    Giuseppe Faustin Patient Status:  Inpatient    1945 MRN [id number]   Location [facility] 2W/SW Attending Brenden Quiles MD   Hosp Day # 8 PCP Joshua Faustin MD     Subjective:  Pt sitting in the chair. He has no complaints and hopes to be able to go home today. He is currently SR on tele. No visitors at bedside.     Objective:  /73 (BP Location: Right arm)   Pulse 114   Temp 98.4 °F (36.9 °C) (Temporal)   Resp 20   Ht 5' 3\" (1.6 m)   Wt 170 lb 3.1 oz (77.2 kg)   SpO2 95%   BMI 30.15 kg/m²     Temp (24hrs), Av °F (36.7 °C), Min:96.9 °F (36.1 °C), Max:98.7 °F (37.1 °C)      Intake/Output:    Intake/Output Summary (Last 24 hours) at 2024 0753  Last data filed at 2024 0600  Gross per 24 hour   Intake 400 ml   Output 1080 ml   Net -680 ml       Wt Readings from Last 3 Encounters:   07/10/24 170 lb 3.1 oz (77.2 kg)   24 165 lb (74.8 kg)   24 170 lb (77.1 kg)       Allergies:  No Known Allergies    Labs:  Lab Results   Component Value Date    WBC 6.0 2024    HGB 10.2 2024    HCT 30.7 2024    .0 2024    CREATSERUM 0.90 2024    BUN 18 2024     2024    K 3.5 2024     2024    CO2 25.0 2024    GLU 86 2024    CA 8.3 2024       Physical Exam:  Blood pressure 104/73, pulse 114, temperature 98.4 °F (36.9 °C), temperature source Temporal, resp. rate 20, height 5' 3\" (1.6 m), weight 170 lb 3.1 oz (77.2 kg), SpO2 95%.  General: NAD  Neck: No JVD  Lungs: Clear bilaterally   Heart: RRR, S1, S2  Abdomen: Soft/Round, NT/ND, BS+x 4/+Flatus/+BM   Extremities: Warm, dry, Left trace LE edema-suspect due to recent EVH/Right no LE edema  Pulses: 1+ bilat DP  Skin: sternotomy incision NEIDA=CDI/Left leg incision NEIDA=CDI   Neurological:  AAOx3, MAEW    Assessment/Plan:  S/P CABG x 3/TYE CLIP POD # 8  Encourage pulm toilet: IS.  Continues on room air- lane well.    Pain meds as needed  Increase activity-OOB to chair and ambulate in hallway. Working w/PT/OT. Improving strength.    Scds prophylaxis DVT prevention. Plts trending upward. HIPA negative. Holding Lovenox due to recent GI bleed.   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 Damian remains. Hx: TURP. Plan for outpt FU w/Urology for follow mgt. Will be discharged w/damian.  Post op GI Bleed w/EGD on 7/7 per GI for coffee ground emesis=esophagitis/duodenal bulb ulcer/gastritis- following recs  Post op anemia w/clinical significance due to post op GI bleed. Post op transfusions given. Hb stable. ASA resumed 7/9. No Plavix due to recent GI bleed.   Expected post op volume overload- Lasix PRN   Post op AF-currently SR. Pt considered high risk for AF post op. On Amio/Beta-blocker. TYE clipped- no AC secondary to recent GI bleed  Nutrition: advancing diet as lane per GI   Hx: DM. Insulin adjustments PRN. Resume home regimen at/near discharge  Discharge planning: pt lives w/his wife. Has supportive children. His strength has improved over past several days. Able to go home w/HH visits including RN/PT/OT.   Ready today from our standpoint. FU appt made for pt  Written and verbal info provided to pt regarding recovery care including incision and FU care.  Will review [place]  prior to narcotic prescription written      Plan of care discussed w/patient/RN and CV Surgeon: Dr. Tin Castillo, APRN  7/11/2024  7:53 AM

## 2024-07-11 NOTE — PHYSICAL THERAPY NOTE
PHYSICAL THERAPY TREATMENT NOTE - INPATIENT     Room Number: 230/230-A       Presenting Problem: CABG ileus       Problem List  Active Problems:    Abnormal stress test    Elevated coronary artery calcium score    SOB (shortness of breath)    Coffee ground emesis    Melena    Upper GI bleed    Acute blood loss anemia      PHYSICAL THERAPY ASSESSMENT   Patient demonstrates good  progress this session, goals  remain in progress.      Patient is requiring supervision as a result of the following impairments: medical status.     Patient continues to function near baseline with transfers and gait.  Contributing factors to remaining limitations include medical status.  Next session anticipate patient to progress bed mobility, transfers, and gait.  Physical Therapy will continue to follow patient for duration of hospitalization.    Patient continues to benefit from continued skilled PT services: at discharge to promote prior level of function and safety with additional support and return home with home health PT.    PLAN  PT Treatment Plan: Bed mobility;Body mechanics;Patient education;Gait training;Transfer training;Balance training  Frequency (Obs): 3-5x/week    SUBJECTIVE  \"I will have my family at home with me\"    OBJECTIVE  Precautions: Sternal;Cardiac    PAIN ASSESSMENT   Rating: Unable to rate          BALANCE  Static Sitting: Good  Dynamic Sitting: Fair +  Static Standing: Fair  Dynamic Standing: Fair -    AM-PAC '6-Clicks' INPATIENT SHORT FORM - BASIC MOBILITY  How much difficulty does the patient currently have...  Patient Difficulty: Turning over in bed (including adjusting bedclothes, sheets and blankets)?: A Little   Patient Difficulty: Sitting down on and standing up from a chair with arms (e.g., wheelchair, bedside commode, etc.): A Little   Patient Difficulty: Moving from lying on back to sitting on the side of the bed?: A Little   How much help from another person does the patient currently need...   Help  from Another: Moving to and from a bed to a chair (including a wheelchair)?: A Little   Help from Another: Need to walk in hospital room?: A Little   Help from Another: Climbing 3-5 steps with a railing?: A Little     AM-PAC Score:  Raw Score: 18   Approx Degree of Impairment: 46.58%   Standardized Score (AM-PAC Scale): 43.63   CMS Modifier (G-Code): CK    FUNCTIONAL ABILITY STATUS  Functional Mobility/Gait Assessment  Gait Assistance: Supervision  Distance (ft): 200ft  Assistive Device: Rolling walker  Rolling:  not tested   Supine to Sit:  not tested   Sit to Supine:  not tested   Sit to Stand: contact guard assist    Skilled Therapy Provided: Patient on room air. Patient able to follow sternal precautions while moving throughout the session. Patient able to ambulate about 200ft with rolling walker with SBA. Patient with CGA for sit to/from stand transfers, remembered to ask for his heart pillow to avoid use of arms. Patient will have assist when home, will need rolling walker. The patient's Approx Degree of Impairment: 46.58% has been calculated based on documentation in the [facility] '6 clicks' Inpatient Daily Activity Short Form.  Research supports that patients with this level of impairment may benefit from home with home health.  Patient received seated in bedside chair, agreeable to physical therapy. Education with patient provided verbally on physical therapy plan of care and physiological benefits of out of bed mobility. Patient with good carryover during session. Anticipated therapy needs remain appropriate based on the patient's performance, personal factors, and remaining functional impairments. Final disposition will be made by interdisciplinary medical team. Oxygen sat 98% and heart rate 69, blood pressure 96/58.     Patient End of Session: Up in chair;Needs met;Call light within reach;RN aware of session/findings;All patient questions and concerns addressed    CURRENT GOALS   Goals to be met by:  24  Patient Goal Patient's self-stated goal is: to go home   Goal #1 Patient is able to demonstrate supine - sit EOB @ level: supervision      Goal #1   Current Status Not tested    Goal #2 Patient is able to demonstrate transfers Sit to/from Stand at assistance level: supervision with walker - rolling      Goal #2  Current Status CGA    Goal #3 Patient is able to ambulate 100 feet with assist device: walker - rolling at assistance level: supervision   Goal #3   Current Status 200ft with rolling walker SBA   Goal #4     Goal #4   Current Status     Goal #5 Patient to demonstrate independence with home activity/exercise instructions provided to patient in preparation for discharge.   Goal #5   Current Status In progress   Goal #6     Goal #6  Current Status       Gait Trainin minutes

## 2024-07-11 NOTE — OCCUPATIONAL THERAPY NOTE
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    Patient is requiring SBA as a result of the following impairments: limited reach, limited ROM - sternal precautions.    Patient continues to function below baseline with ADLs and functional mobility.  Next session anticipate patient to progress LE dressing , functional transfers, pacing.  Occupational Therapy will continue to follow patient for duration of hospitalization.    Patient continues to benefit from continued skilled OT services: at discharge to promote prior level of function and safety with additional support and return home with home health OT.     PLAN  OT Treatment Plan: Balance activities;Energy conservation/work simplification techniques;ADL training;Functional transfer training;Endurance training;Equipment eval/education;Compensatory technique education  OT Device Recommendations: TBD    SUBJECTIVE  \"My family will be helpful\"     OBJECTIVE  Precautions: Sternal;Cardiac    WEIGHT BEARING RESTRICTION     PAIN ASSESSMENT  Rating: Unable to rate  Location: sternum (w/ transitional movements)  Management Techniques: Activity promotion; Body mechanics    ACTIVITY TOLERANCE  Pulse: 71        BP: 96/58             O2 SATURATIONS  Oxygen Therapy  SPO2% on Room Air at Rest: 98  SPO2% Ambulation on Oxygen: 98    ACTIVITIES OF DAILY LIVING ASSESSMENT  AM-PAC ‘6-Clicks’ Inpatient Daily Activity Short Form  How much help from another person does the patient currently need…  -   Putting on and taking off regular lower body clothing?: A Little  -   Bathing  (including washing, rinsing, drying)?: A Little  -   Toileting, which includes using toilet, bedpan or urinal? : A Little  -   Putting on and taking off regular upper body clothing?: A Little  -   Taking care of personal grooming such as brushing teeth?: A Little  -   Eating meals?: None    AM-PAC Score:  Score: 19  Approx Degree of Impairment: 42.8%  Standardized Score (AM-PAC Scale): 40.22  CMS Modifier (G-Code): CK    FUNCTIONAL TRANSFER ASSESSMENT  Sit to Stand: Chair  Chair: Stand-by Assist    BED MOBILITY  Supine to Sit : Not tested    FUNCTIONAL ADL ASSESSMENT  Grooming Seated: Independent  UB Dressing Seated: Moderate Assist  LB Dressing Seated: Stand-by Assist    Skilled Therapy Provided: RN cleared pt for participation in occupational therapy session, which was completed in collaboration with physical therapy. Upon arrival, pt was seated in bedside chair and agreeable to activity. No family was present during session.. Pt benefited from additional time, verbal cues, RW to maximize participation.    Reviewed with patient sternal precautions with demonstration provided --then pt practiced UE dressing and LE dressing with good carryover. Pt with good compliance of sternal precautions throughout session. Pt is on track for return home with family assist as needed.     EDUCATION PROVIDED  Patient: Role of Occupational Therapy; Plan of Care; Functional Transfer Techniques; Compensatory ADL Techniques; Other (sternal precautions)  Patient's Response to Education: Verbalized Understanding; Returned Demonstration    The patient's Approx Degree of Impairment: 42.8% has been calculated based on documentation in the [facility] '6 clicks' Inpatient Daily Activity Short Form.  Research supports that patients with this level of impairment may benefit from HHOT.  Final disposition will be made by interdisciplinary medical team.    Patient End of Session: Up in chair;Needs met;Call light within reach;RN aware of  session/findings    OT Goals:  Patients self stated goal is: home      Patient will complete functional transfer with Mod I  Comment: ongoing    Patient will complete toileting with Mod I  Comment: ongoing    Patient will tolerate standing for 4 minutes in prep for adls with Mod I   Comment: ongoing    Patient will complete item retrieval with Mod I   Comment: ongoing            Goals  on: 24  Frequency: 3-5x/w    OT Session Time: 25 minutes  Self-Care Home Management: 15 minutes  Therapeutic Activity: 10 minutes

## 2024-07-11 NOTE — DISCHARGE SUMMARY
[facility]  part of [facility]    Discharge Summary    Giuseppe Faustin Patient Status:  Inpatient    1945 MRN [id number]   Location [facility] 2W/SW Attending Brenden Quiles MD   Hosp Day # 8 PCP Joshua Faustin MD     Date of Admission: 2024     Date of Discharge: 24      Lace+ Score: 57  59-90 High Risk  29-58 Medium Risk  0-28   Low Risk.    TCM Follow-Up Recommendation:  LACE 29-58: Moderate Risk of readmission after discharge from the hospital.    DISCHARGE DX: Active Problems:    Abnormal stress test    Elevated coronary artery calcium score    SOB (shortness of breath)    Coffee ground emesis    Melena    Upper GI bleed    Acute blood loss anemia       The patient was seen and examined on day of discharge and this discharge summary is in conjunction with any daily progress note from day of discharge.    HPI per admitting physician: \"Patient is a 78-year-old [place] male with a recent history of dyspnea on exertion and had an outpatient CT scan calcium score which was elevated at 1200. He had a cardiac angiogram which showed distal left main to LAD and circumflex 95% and RCA 60% to 70%. Cardiovascular surgery consult was obtained, and patient consented for coronary artery bypass graft surgery tomorrow morning. \"    Hospital Course:     CAD.   -S/p CABG x 3 with LIMA to LAD and SVGs to diagonal and right PDA on 7/3.   -CV surgery and cardiology following, cleared for dc  -Follow up as outpt     Paroxysmal Afib  S/p Left atrial appendage clip   - cardiology following, pt started on amiodarone gtt, transitioned to PO Amiodarone   -Continue metoprolol  -Cardiology recs no need for chronic AC at this time   - cont to monitor     Suspected acute upper GIB   Ileus  Acute posthemorrhagic anemia  -GI consulted, seen by Dr. Bryan  -PPI bid  -s/p PRBC x 4 within 24 hrs initially, hgb~8.3 on  s/p 1 PRBC, hgb~8.7  -Trend hemoglobin  -antiplatelets initially  held, now restarted on ASA 81  -s/p EGD showin. LA Class D esophagitis and clean based esophageal ulcerations.   2. Large, clean based duodenal bulb ulcer located at the posterolateral wall and abutting the pylorus.   3.  There was no evidence of active or recent GI bleeding in the upper GI tract.    -->H. Pylori stool antigen ordered.   -->needs to repeat EGD in ~8-12 weeks to ensure healing of the ulcerations.   -abd XR on  reviewed  --->NGT removed  -Tolerated advancing diet  -HIPA negative        Bladder neck contracture.  Patient seen by urology in the OR.  Cystoscopy assisted Delong catheter placement.  History of TURP  -Keep Delong for duration of hospitalization  -Keep Delong after discharge as well (per Dr Blanchard)  -Outpatient urology follow-up     Other problems  Hypertension  Dyslipidemia  GERD  Obstructive sleep apnea  Osteoarthritis  Type 2 diabetes  History of kidney stones  Essential tremor  CKD stage II      Physical Exam:    Vitals:    24 0400 24 0600 24 0700 24 0800   BP: 112/64 104/73  96/58   BP Location: Right arm Right arm  Right arm   Pulse: 118 110 114 68   Resp: 22 19 20 20   Temp: 98.4 °F (36.9 °C)   96.9 °F (36.1 °C)   TempSrc: Temporal   Temporal   SpO2: 97% 95% 95% 97%   Weight:       Height:         Patient Weight for the past 72 hrs:   Weight   24 0700 170 lb 13.7 oz (77.5 kg)   07/10/24 0600 170 lb 3.1 oz (77.2 kg)       Intake/Output Summary (Last 24 hours) at 2024 1122  Last data filed at 2024 0936  Gross per 24 hour   Intake 400 ml   Output 1255 ml   Net -855 ml       GENERAL:  Awake and alert, in no acute distress.  HEART: Irregular rhythm, regular rate  LUNGS:  Air entry was minimally decreased.  No crackles or wheezes   ABDOMEN: Soft and non-tender.    PSYCHIATRIC: Normal mood    CULTURE:   No results found for this visit on 24.    IMAGING STUDIES: SOME MAY NEED FOLLOW UP WITH PCP   XR CHEST PA + LAT CHEST  (CPT=71046)    Result Date: 7/9/2024  CONCLUSION: No radiographic evidence of acute cardiopulmonary abnormality.    Dictated by (CST): Nikita Good MD on 7/09/2024 at 12:51 PM     Finalized by (CST): Nikita Good MD on 7/09/2024 at 12:53 PM          XR ABDOMEN (1 VIEW) (CPT=74018)    Result Date: 7/7/2024  CONCLUSION:   Enteric tube terminates in the proximal stomach with the side hole terminating near the GE junction, recommend advancement approximately 3 cm.  Mildly prominent loops of small bowel measuring up to 2.3 cm, which may reflect an ileus.  Lesser incidental findings described above.    Dictated by (CST): Neo Genao MD on 7/07/2024 at 3:56 PM     Finalized by (CST): Neo Genao MD on 7/07/2024 at 3:58 PM          XR CHEST AP PORTABLE  (CPT=71045)    Result Date: 7/7/2024  CONCLUSION:   Minimal residual left apical pneumothorax, which has decreased in size when compared to 07/06/2024.  No significant change in the trace bilateral pleural effusions with associated bibasilar opacities.  Well-positioned enteric tube.  Remaining support devices are in unchanged positioning.  Redemonstrated postoperative changes from recent CABG.    Dictated by (CST): Neo Genao MD on 7/07/2024 at 10:17 AM     Finalized by (CST): Neo Genao MD on 7/07/2024 at 10:22 AM          XR ABDOMEN, OBSTRUCTIVE SERIES 3 VIEWS(CPT=74021)    Result Date: 7/6/2024  CONCLUSION:  1. Moderate to large colonic fecal burden suggests constipation.  There is suspected mild gaseous distention of the descending and sigmoid colon.  No definite small bowel obstruction. 2. No free intraperitoneal air. 3. Enteric tube tip projects at the expected location of the distal gastric body/antrum. 4. Lesser incidental findings as above.   Dictated by (CST): Justin Sanford MD on 7/06/2024 at 7:46 PM     Finalized by (CST): Justin Sanford MD on 7/06/2024 at 7:49 PM          XR ABDOMEN (1 VIEW) (CPT=74018)    Result Date:  7/6/2024  CONCLUSION:  1. On the final provided images, enteric tube tip projects at the expected location of the distal gastric body/antrum (well positioned). 2. Trace left apical pneumothorax questioned on same date examination from approximately 10 hours prior appears less conspicuous. 3. Stable streaky left basilar opacities, which likely represent atelectasis. 4. Post sternotomy with stable additional support apparatus as detailed.   Dictated by (CST): Justin Sanford MD on 7/06/2024 at 3:59 PM     Finalized by (CST): Justin Sanford MD on 7/06/2024 at 4:03 PM          XR CHEST AP PORTABLE  (CPT=71045)    Result Date: 7/6/2024  CONCLUSION:  1. Trace left apical pneumothorax, new from previous.  2. Suggestion of trace bilateral pleural effusions.  3. Basilar reticular opacities are seen, likely reflecting atelectasis.  4. Postthoracotomy chest with stable cardiomegaly.  5. Additional support tubes and lines as above.    A preliminary report was issued by the [facility] teleradiology service. There are no major discrepancies.   Dictated by (CST): Lawrence Muse MD on 7/06/2024 at 7:53 AM     Finalized by (CST): Lawrence Muse MD on 7/06/2024 at 7:57 AM          XR CHEST AP PORTABLE  (CPT=71045)    Result Date: 7/4/2024  CONCLUSION:   Lines and tubes in place as described.  Trace pulmonary vascular redistribution without edema, unchanged.    Dictated by (CST): Ton Titus MD on 7/04/2024 at 9:16 AM     Finalized by (CST): Ton Titus MD on 7/04/2024 at 9:17 AM          XR CHEST AP PORTABLE  (CPT=71045)    Result Date: 7/3/2024  CONCLUSION:  1.  Post CABG. 2.  Post Frederick-Sandi catheter placement with tip in the pulmonary outflow.  The distal end of the tube is slightly coiled on the view obtained.  Recommend adjustment in position.  Other lines and tubes are in gross satisfactory/stable position. 3.  Trace/small left pleural effusion.  Left basal pulmonary opacity most compatible with atelectasis.   Exam  discussed with Jacquelin Castillo on 7/3/24 at 2:00 p.m.   Dictated by (CST): Gregory Stratton MD on 7/03/2024 at 1:55 PM     Finalized by (CST): Gregory Stratton MD on 7/03/2024 at 2:02 PM          US CAROTID DOPPLER BILAT - DIAG IMG (CPT=93880)    Result Date: 7/2/2024  CONCLUSION:   No evidence of bilateral hemodynamically significant stenosis.  Antegrade bilateral vertebral arteries.    Dictated by (CST): Abdirizak Encinas MD on 7/02/2024 at 6:43 PM     Finalized by (CST): Abdirizak Encinas MD on 7/02/2024 at 6:44 PM           LABS :     Lab Results   Component Value Date    WBC 6.0 07/11/2024    HGB 10.2 (L) 07/11/2024    HCT 30.7 (L) 07/11/2024    .0 (L) 07/11/2024    CREATSERUM 0.90 07/11/2024    BUN 18 07/11/2024     07/11/2024    K 3.5 07/11/2024     07/11/2024    CO2 25.0 07/11/2024    GLU 86 07/11/2024    CA 8.3 (L) 07/11/2024    ALB 3.3 07/06/2024    ALKPHO 32 (L) 07/06/2024    BILT 0.6 07/06/2024    TP 5.3 (L) 07/06/2024    AST 22 07/06/2024    ALT <7 (L) 07/06/2024    PTT 33.7 07/06/2024    INR 1.25 (H) 07/06/2024    T4F 1.1 06/11/2024    TSH 9.211 (H) 05/08/2024     08/11/2015    PSA 0.39 06/14/2023    ESRML 11 07/10/2024    CRP <0.29 02/24/2020    MG 2.2 07/10/2024    CK 76 06/24/2024       Recent Labs   Lab 07/07/24  0509 07/07/24  0951 07/08/24  0506 07/08/24  1444 07/09/24  0402 07/10/24  0449 07/11/24  0401   RBC 3.19*  --  2.97*  --  3.04* 3.63* 3.60*   HGB 9.0*  9.0*   < > 8.3*   < > 8.7* 10.5* 10.2*   HCT 27.0*  27.0*   < > 24.8*  --  26.2* 31.2* 30.7*   MCV 84.6  --  83.5  --  86.2 86.0 85.3   MCH 28.2  --  27.9  --  28.6 28.9 28.3   MCHC 33.3  --  33.5  --  33.2 33.7 33.2   RDW 16.7*  --  16.7*  --  17.6* 17.4* 17.7*   NEPRELIM 7.83*  --  8.18*  --   --  4.74  --    WBC 10.4  --  11.2*  --  9.3 7.6 6.0   PLT 94.0*  --  90.0*  --  87.0* 103.0* 147.0*    < > = values in this interval not displayed.     Recent Labs   Lab 07/05/24  0746 07/06/24  0445 07/07/24  0509 07/09/24  0402  07/10/24  0421 07/11/24  0401   * 182*   < > 77 68* 86   BUN 24* 39*   < > 40* 26* 18   CREATSERUM 1.26 1.15   < > 0.94 0.95 0.90   CA 8.3* 7.6*   < > 8.0* 8.0* 8.3*   ALB 3.7 3.3  --   --   --   --     136   < > 140 141 140   K 4.5 4.8   < > 3.8 3.7 3.5    108   < > 111 112 111   CO2 22.0 21.0   < > 24.0 23.0 25.0   ALKPHO 34* 32*  --   --   --   --    AST 31 22  --   --   --   --    ALT <7* <7*  --   --   --   --    BILT 0.7 0.6  --   --   --   --    TP 5.8 5.3*  --   --   --   --     < > = values in this interval not displayed.     Lab Results   Component Value Date    INR 1.25 (H) 07/06/2024    INR 1.45 (H) 07/03/2024    INR 1.03 07/02/2024       Disposition: Discharge to Home    Condition at Discharge: Stable     Discharge Medications:      Discharge Medications        START taking these medications        Instructions Prescription details   amiodarone 200 MG Tabs  Commonly known as: Pacerone  Start taking on: July 11, 2024      Take 2 tablets (400 mg total) by mouth 2 (two) times daily for 5 days, THEN 1 tablet (200 mg total) 2 (two) times daily for 14 days.   Stop taking on: July 30, 2024  Quantity: 48 tablet  Refills: 0     ascorbic acid 500 MG Tabs  Commonly known as: Vitamin C      Take 1 tablet (500 mg total) by mouth daily.   Quantity: 30 tablet  Refills: 0     HYDROcodone-acetaminophen 5-325 MG Tabs  Commonly known as: Norco      Take 1 tablet by mouth every 6 (six) hours as needed for Pain.   Quantity: 30 tablet  Refills: 0     Metoprolol Tartrate 37.5 MG Tabs      Take 37.5 mg by mouth 2x Daily(Beta Blocker).   Quantity: 60 tablet  Refills: 3     multivitamin Tabs      Take 1 tablet by mouth daily.   Quantity: 30 tablet  Refills: 0            CHANGE how you take these medications        Instructions Prescription details   pantoprazole 40 MG Tbec  Commonly known as: Protonix  What changed: when to take this      Take 1 tablet (40 mg total) by mouth 2 (two) times daily before meals.    Quantity: 60 tablet  Refills: 0            CONTINUE taking these medications        Instructions Prescription details   aspirin 81 MG Tbec  Commonly known as: KP Aspirin      Take 1 tablet (81 mg total) by mouth daily.   Quantity: 90 tablet  Refills: 9     atorvastatin 10 MG Tabs  Commonly known as: Lipitor      Take 1 tablet (10 mg total) by mouth nightly. FOR CHOLESTEROL.   Quantity: 90 tablet  Refills: 9     fluticasone-salmeterol 250-50 MCG/ACT Aepb  Commonly known as: Advair Diskus      Inhale 1 puff into the lungs every 12 (twelve) hours.   Quantity: 3 each  Refills: 9     Jardiance 25 MG Tabs  Generic drug: Empagliflozin      Take 1 tablet by mouth daily. FOR DIABETES.   Quantity: 90 tablet  Refills: 9     Melatonin 10 MG Tabs      Take 10 mg by mouth at bedtime.   Quantity: 90 tablet  Refills: 1     metFORMIN  MG Tb24  Commonly known as: Glucophage XR      Take 1 tablet (750 mg total) by mouth 2 (two) times daily with meals. FOR DIABETES.   Quantity: 180 tablet  Refills: 9     methIMAzole 5 MG Tabs  Commonly known as: Tapazole      Take 1 tablet (5 mg total) by mouth daily.   Quantity: 90 tablet  Refills: 0     OneTouch Verio Strp      1 each 4 (four) times daily before meals and nightly. Test before meals and nightly   Quantity: 400 strip  Refills: 1     OneTouch Verio w/Device Kit      1 each 4 (four) times daily before meals and nightly.   Quantity: 1 kit  Refills: 0     primidone 50 MG Tabs  Commonly known as: Mysoline      Take 1 tablet (50 mg total) by mouth 2 (two) times daily. FOR TREMORS.   Quantity: 180 tablet  Refills: 9     Propranolol HCl ER 80 MG Cp24  Commonly known as: INDERAL LA      Take 1 capsule (80 mg total) by mouth daily. FOR BLOOD PRESSURE.   Quantity: 90 capsule  Refills: 9     telmisartan 80 MG Tabs  Commonly known as: Micardis      Take 1 tablet (80 mg total) by mouth daily. FOR BLOOD PRESSURE. STOP ENALAPRIL DUE TO COUGH.   Quantity: 90 tablet  Refills: 9               Where  to Get Your Medications        These medications were sent to [place] [address] [place] [facility] [address] [phone], [phone]  [address] [facility] [address]      Phone: [phone]   amiodarone 200 MG Tabs  ascorbic acid 500 MG Tabs  HYDROcodone-acetaminophen 5-325 MG Tabs  Metoprolol Tartrate 37.5 MG Tabs  multivitamin Tabs  pantoprazole 40 MG Tbec         Follow up Visits  Brayan Castle MD  [address]  [address]  [facility] [id number] [phone]    Follow up on 7/24/2024  hospital follow up @1:00PM    David Hyman MD  [address]  [facility] [id number] [phone]    Follow up on 7/31/2024  hospital follow up @1:00PM    Joshua Faustin MD  [address] [id number] [phone]    Follow up on 8/5/2024  hospital follow up @1:20PM    Hortencia Rock APRN  [address]  [facility] [id number] [phone]    Follow up on 7/18/2024  Hospital follow-up appointment @ 2pm    Joshua Faustin MD    Consultants         Provider   Role Specialty     Sarahi Green MD      Consulting Physician GASTROENTEROLOGY     Ary Tian MD      Consulting Physician PULMONARY DISEASES     Omari Key DO      Consulting Physician PULMONARY DISEASES     Jak Soria MD      Consulting Physician HOSPITALIST     Brayan Castle MD      Consulting Physician Surgery, Thoracic              Other Discharge Instructions:       Discharge Instructions         CARDIAC SURGERY DISCHARGE INSTRUCTIONS  Shower daily and clean your surgical incisions with soap and water then swab with Betadine 2 x day until your follow up office visit.  Do not drive for one month  Do not lift/push/pull greater than 10 lbs for 3 months   Do not soak (submerge) your incision in water such as tub/pool/etc for one month  Continue Apirin 81mg daily, do not take Advil, Ibuprofen, Motrin, NSAIDs due to recent gastritis/bleed    Sometimes managing your health at home  requires assistance.  The [facility] team has recognized your preference to use [facility].  They can be reached by phone at [phone].  The fax number for your reference is [phone].  A representative from the home health agency will contact you or your family to schedule your first visit.               ----------------------------------------------------  36 MIN SPENT ON THIS DC   Brenden Quiles MD    7/11/2024

## 2024-07-11 NOTE — SLP NOTE
SPEECH DAILY NOTE - INPATIENT    ASSESSMENT & PLAN   ASSESSMENT    Proper PPE worn. Hands sanitized upon entrance/exit Pt room.       Pt alert, afebrile and on room air.Pt seen for swallow analysis per BSE recommendations (after consulting with RN). Spouse present. Pt agreed to participate. Pt's preferred method of learning verbal. Pt upright in chair; observed with current diet of soft ETC/thin liquids for monitoring diet tolerance. Additionally, Pt seen with solid trials for candidacy of diet upgrade. Swallowing precautions/strategies discussed; Pt able to self-cue for execution. Functional bite reflex/mastication on solft ETC and solids. Lingual skills functional for bolus control all consistencies. No significant oral retention. Pharyngeal response appeared to trigger within 1-2 sec per hyolaryngeal elevation to completion (functional rise/strength per palpation). No overt CSA on solid nor thin liquids. Diet upgraded to solids. Collaborated with RN regarding Pt's swallowing plan of care. Per RN, Pt with good tolerance of current diet. No report of difficulty taking meds. Sp02 ~95% during this session. Call light within Pt's reach upon SLP discharge from room.      CXR 7/09/24:  CONCLUSION: No radiographic evidence of acute cardiopulmonary abnormality.     PLAN: Pt is discharged from skilled swallowing intervention secondary to functional swallowing skills on least restrictive safest diet, family education completed and goal achievement.          Diet Recommendations - Solids: Regular  Diet Recommendations - Liquids: Thin Liquids    Compensatory Strategies Recommended: No straws;Slow rate;Alternate consistencies;Small bites and sips  Aspiration Precautions: Upright position;Slow rate;Small bites and sips;No straw  Medication Administration Recommendations:  (per MD)    Patient Experiencing Pain: No  Treatment Plan  Treatment Plan/Recommendations: Aspiration precautions    Interdisciplinary Communication:  Discussed with RN    GOALS  Goal #1 The patient will tolerate easy to chew consistency and thin liquids without overt signs or symptoms of aspiration with 100 % accuracy over 2 session(s).    No CSA on current diet.       GOAL MET     Goal #2 The patient/family/caregiver will demonstrate understanding and implementation of aspiration precautions and swallow strategies independently over 3 session(s).     Swallow education completed with spouse; v/u.    GOAL MET     Goal #3 The patient will tolerate trial upgrade of regular consistency and thin liquids without overt signs or symptoms of aspiration with 100 % accuracy over 3 session(s).    Diet upgraded to solid consistency.    GOAL MET     Goal #4 The patient will utilize compensatory strategies as outlined by  BSSE (clinical evaluation) including Slow rate, Small bites, Small sips, Alternate liquids/solids, No straws, Upright 90 degrees with minimal assistance 100 % of the time across 2 sessions.     Pt self-cued for use of swallowing precautions.    GOAL MET     FOLLOW UP  Follow Up Needed (Documentation Required): No  SLP Follow-up Date: 07/11/24  Number of Visits to Meet Established Goals: 3    Session: 3    If you have any questions, please contact   Maria De Jesus Graham M.S. [facility]/SLP  Speech-Language Pathologist  [facility]  #[id number]

## 2024-07-11 NOTE — PROGRESS NOTES
[facility]  part of [facility]     Progress Note    Giuseppe Adilsonaldosarah Faustin Patient Status:  Inpatient    1945 MRN [id number]   Location [facility] 2W/SW Attending Brenden Quiles MD   Hosp Day # 8 PCP Jsohua Faustin MD       Subjective:   Patient seen and examined.  Denies significant dyspnea.  Pain well-controlled.  Tolerated some ambulation yesterday    Objective:   Blood pressure 96/58, pulse 68, temperature 96.9 °F (36.1 °C), temperature source Temporal, resp. rate 20, height 5' 3\" (1.6 m), weight 170 lb 3.1 oz (77.2 kg), SpO2 97%.  Intake/Output:   Last 3 shifts: I/O last 3 completed shifts:  In: 640 [P.O.:640]  Out: 1705 [Urine:1705]   This shift: I/O this shift:  In: -   Out: 375 [Urine:375]     Vent Settings:      Hemodynamic parameters (last 24 hours):      Scheduled Meds:   Current Facility-Administered Medications   Medication Dose Route Frequency    amiodarone (Pacerone) tab 400 mg  400 mg Oral BID    metoprolol tartrate (Lopressor) partial tab 37.5 mg  37.5 mg Oral 2x Daily(Beta Blocker)    ascorbic acid (Vitamin C) tab 500 mg  500 mg Oral Daily    atorvastatin (Lipitor) tab 10 mg  10 mg Oral Nightly    pantoprazole (Protonix) 40 mg in sodium chloride 0.9% PF 10 mL IV push  40 mg Intravenous Q12H    ondansetron (Zofran) 4 MG/2ML injection 4 mg  4 mg Intravenous Q6H PRN    lidocaine 2% topical sterile jelly 1 mL  1 mL Topical PRN    insulin aspart (NovoLOG) 100 Units/mL FlexPen 1-7 Units  1-7 Units Subcutaneous TID CC    melatonin tab 3 mg  3 mg Oral Nightly PRN    sennosides (Senokot) tab 8.6 mg  8.6 mg Oral BID    docusate sodium (Colace) cap 100 mg  100 mg Oral BID    polyethylene glycol (PEG 3350) (Miralax) 17 g oral packet 17 g  17 g Oral Daily PRN    bisacodyl (Dulcolax) 10 MG rectal suppository 10 mg  10 mg Rectal Daily PRN    potassium chloride 20 mEq/100mL IVPB premix 20 mEq  20 mEq Intravenous PRN    Or    potassium chloride 40 mEq/100mL IVPB premix (central  line) 40 mEq  40 mEq Intravenous PRN    magnesium sulfate in dextrose 5% 1 g/100mL infusion premix 1 g  1 g Intravenous PRN    magnesium sulfate in sterile water for injection 2 g/50mL IVPB premix 2 g  2 g Intravenous PRN    multivitamin (Tab-A-Ari/Beta Carotene) tab 1 tablet  1 tablet Oral Daily    acetaminophen (Tylenol) tab 650 mg  650 mg Oral Q4H PRN    Or    HYDROcodone-acetaminophen (Norco) 5-325 MG per tab 1 tablet  1 tablet Oral Q4H PRN    Or    HYDROcodone-acetaminophen (Norco) 5-325 MG per tab 2 tablet  2 tablet Oral Q4H PRN    aspirin DR tab 81 mg  81 mg Oral Daily    glucose (Dex4) 15 GM/59ML oral liquid 15 g  15 g Oral Q15 Min PRN    Or    glucose (Glutose) 40% oral gel 15 g  15 g Oral Q15 Min PRN    Or    glucose-vitamin C (Dex-4) chewable tab 4 tablet  4 tablet Oral Q15 Min PRN    Or    dextrose 50% injection 50 mL  50 mL Intravenous Q15 Min PRN    Or    glucose (Dex4) 15 GM/59ML oral liquid 30 g  30 g Oral Q15 Min PRN    Or    glucose (Glutose) 40% oral gel 30 g  30 g Oral Q15 Min PRN    Or    glucose-vitamin C (Dex-4) chewable tab 8 tablet  8 tablet Oral Q15 Min PRN       Continuous Infusions:     Physical Exam  Constitutional: no acute distress  Eyes: PERRL  ENT: nares pateint  Neck: supple, no JVD  Cardio: RRR, S1 S2  Respiratory: clear to auscultation bilaterally, no wheezing, rales, rhonchi, crackles  GI: abdomen soft, non tender, active bowel sounds, no organomegaly  Extremities: no clubbing, cyanosis, edema  Neurologic: no gross motor deficits  Skin: warm, dry      Results:     Lab Results   Component Value Date    WBC 6.0 07/11/2024    HGB 10.2 07/11/2024    HCT 30.7 07/11/2024    .0 07/11/2024    CREATSERUM 0.90 07/11/2024    BUN 18 07/11/2024     07/11/2024    K 3.5 07/11/2024     07/11/2024    CO2 25.0 07/11/2024    GLU 86 07/11/2024    CA 8.3 07/11/2024       No results found.          Assessment   1.  Coronary artery disease status post CABG  2.  GI bleed,  resolved  3.  Acute kidney injury, improved  4.  Diabetes mellitus  5.  Hypertension  6.  Hyperlipidemia     Plan   -Patient's status post CABG x 3 on 7/2/2024.  -Patient found to have evidence of acute GI bleed status post 5 units PRBC transfusion EGD on 7/7/2024 with large duodenal ulcer  -Plavix and aspirin on hold at this time  -Insulin for diabetes management  -Up in chair.  Incentive spirometry  -DVT prophylaxis: SCDs  -Discharge planning    Omari Key DO  Pulmonary Critical Care Medicine  [facility]

## 2024-07-11 NOTE — PROGRESS NOTES
[facility] - CARDIOLOGY PROGRESS NOTE      Giuseppe Faustin Patient Status:  Inpatient    1945 MRN [id number]   Location [facility] 2W/SW Attending Brenden Quiles MD   Hosp Day # 8 PCP Joshua Faustin MD         Assessment and Plan:   Assessment:     CAD: S/p CABG x 3 with LIMA to LAD and SVGs to diagonal and right PDA  7/3/2024.  Had clipping of left atrial appendage.        Paroxysmal atrial fibrillation: Left atrial appendage clipped, now in atrial fibrillation continue p.o. amiodarone and metoprolol.  Keep current dose unable to uptitrate due to borderline low blood pressure.  No need for chronic anticoagulation.  Continue amiodarone 400 twice daily for 5 days followed by 200 twice daily for 14 days     Upper GI bleed:  Status post urgent EGD, coffee-ground emesis EGD showed esophagitis gastritis and duodenal bulb ulcer continue PPI.  Continue aspirin 81 mg daily at home.  Did not resume Plavix     acute renal insufficiency now back to baseline.       Plan:  Discharge planning as per cardiac surgery      L3    Subjective:   Giuseppe Faustin is a(n) 78 year old male no complaints feels well wants to go home.    T 70-80.  Elemetry atrial fibrillation  Objective:   Blood pressure 96/58, pulse 68, temperature 96.9 °F (36.1 °C), temperature source Temporal, resp. rate 20, height 160 cm (5' 3\"), weight 170 lb 3.1 oz (77.2 kg), SpO2 97%.  Intake/Output:        Last 24 hours:   Intake/Output Summary (Last 24 hours) at 2024 0903  Last data filed at 2024 0600  Gross per 24 hour   Intake 400 ml   Output 1080 ml   Net -680 ml      This shift: No intake/output data recorded.    Exam  Gen: Comfortable, in no acute distress,    Psych.alert and oriented x3  HEENT: atraumatic, normal conjunctiva, moist mucosa  Neck:supple,no JVD, no bruits  Lungs: clear to ascultation, normal respiratory effort  Cardiac: irregular rate and rhythm, nl S1,S2,  no pathologic murmur  Abd: Abdomen soft, nontender, nondistended,  bowel sounds present  Ext:  no clubbing, no cyanosis,no edema  Neuro: no focal deficits  Skin: no rashes or lesions        Scheduled Meds:    amiodarone  400 mg Oral BID    metoprolol tartrate  37.5 mg Oral 2x Daily(Beta Blocker)    ascorbic acid  500 mg Oral Daily    atorvastatin  10 mg Oral Nightly    pantoprazole  40 mg Intravenous Q12H    insulin aspart  1-7 Units Subcutaneous TID CC    sennosides  8.6 mg Oral BID    docusate sodium  100 mg Oral BID    multivitamin  1 tablet Oral Daily    aspirin  81 mg Oral Daily       Results:     Lab Results   Component Value Date    WBC 6.0 07/11/2024    HGB 10.2 (L) 07/11/2024    HCT 30.7 (L) 07/11/2024    .0 (L) 07/11/2024    CREATSERUM 0.90 07/11/2024    BUN 18 07/11/2024     07/11/2024    K 3.5 07/11/2024     07/11/2024    CO2 25.0 07/11/2024    GLU 86 07/11/2024    CA 8.3 (L) 07/11/2024    ALB 3.3 07/06/2024    ALKPHO 32 (L) 07/06/2024    BILT 0.6 07/06/2024    TP 5.3 (L) 07/06/2024    AST 22 07/06/2024    ALT <7 (L) 07/06/2024    PTT 33.7 07/06/2024    INR 1.25 (H) 07/06/2024    T4F 1.1 06/11/2024    TSH 9.211 (H) 05/08/2024     08/11/2015    PSA 0.39 06/14/2023    ESRML 11 07/10/2024    CRP <0.29 02/24/2020    MG 2.2 07/10/2024    CK 76 06/24/2024       No results found.          Angel Knox MD  [facility]

## 2024-07-12 DIAGNOSIS — E11.65 TYPE 2 DIABETES MELLITUS WITH HYPERGLYCEMIA, WITHOUT LONG-TERM CURRENT USE OF INSULIN (HCC): ICD-10-CM

## 2024-07-12 PROBLEM — Z95.1 S/P CABG (CORONARY ARTERY BYPASS GRAFT): Status: ACTIVE | Noted: 2024-07-12

## 2024-07-12 RX ORDER — BLOOD-GLUCOSE METER
1 EACH MISCELLANEOUS
Qty: 1 KIT | Refills: 3 | Status: SHIPPED | OUTPATIENT
Start: 2024-07-12

## 2024-07-12 RX ORDER — BLOOD SUGAR DIAGNOSTIC
1 STRIP MISCELLANEOUS
Qty: 400 STRIP | Refills: 3 | Status: SHIPPED | OUTPATIENT
Start: 2024-07-12

## 2024-07-12 NOTE — TELEPHONE ENCOUNTER
Daja, RN - [facility] called, verified patient's Name and . States patient was discharged from the hospital yesterday, had open heart surgery. She noticed patient is on Jardiance and metformin but has no way of checking his blood sugar level, does not have a glucose machine and supplies. He will be seeing Dr. Faustin for non-[facility] hospital follow-up appointment on .    Chart reviewed. Prescription for glucose monitor and strips was sent on 23 when he was seen for annual wellness. She states patient is not aware that he had a prescription for that and did not pick it up from the pharmacy.     Medication pended to run through protocol.    Future Appointments   Date Time Provider Department Center   2024  1:00 PM Brayan Castle MD [facility]   2024  1:20 PM Joshua Faustin MD [facility] [place]

## 2024-07-12 NOTE — TELEPHONE ENCOUNTER
Refill passed per [facility] protocol.  Requested Prescriptions   Pending Prescriptions Disp Refills    Blood Glucose Monitoring Suppl (ONETOUCH VERIO) w/Device Does not apply Kit 1 kit 0     Si each 4 (four) times daily before meals and nightly.       Diabetic Supplies Protocol Passed - 2024  3:16 PM        Passed - In person appointment or virtual visit in the past 12 mos or appointment in next 3 mos     Recent Outpatient Visits              2 weeks ago Hearing loss, unspecified hearing loss type, unspecified laterality    [facility] Harry Sandoval DO    Office Visit    3 weeks ago Hypertension associated with type 2 diabetes mellitus (HCC)    [facility] Joshua Faustin MD    Office Visit    4 months ago Graves disease    [facility] Dheeraj Gonzalez MD    Office Visit    4 months ago Encounter for Medicare annual wellness exam    [facility] Joshua Faustin MD    Office Visit    5 months ago Graves disease    [facility] Dheeraj Gonzalez MD    Office Visit          Future Appointments         Provider Department Appt Notes    In 1 week Brayan Castle MD [facility] s/p CABG 7/3/24    In 3 weeks Joshua Faustin MD [facility] f/u, policy informed, appt made by Michela at [place]                      Glucose Blood (ONETOUCH VERIO) In Vitro Strip 400 strip 3     Si each 4 (four) times daily before meals and nightly. Test before meals and nightly       Diabetic Supplies Protocol Passed - 2024  3:16 PM        Passed - In person appointment or virtual visit in the past 12 mos or appointment in next 3 mos     Recent Outpatient Visits              2 weeks ago Hearing  loss, unspecified hearing loss type, unspecified laterality    [facility] Harry Sandoval DO    Office Visit    3 weeks ago Hypertension associated with type 2 diabetes mellitus (HCC)    [facility] Joshua Faustin MD    Office Visit    4 months ago Graves disease    [facility] Dheeraj Gonzalez MD    Office Visit    4 months ago Encounter for Medicare annual wellness exam    [facility] Joshua Faustin MD    Office Visit    5 months ago Graves disease    [facility] Dheeraj Gonzalez MD    Office Visit          Future Appointments         Provider Department Appt Notes    In 1 week Brayan Castle MD [facility] s/p CABG 7/3/24    In 3 weeks Joshua Faustin MD [facility] f/u, policy informed, appt made by Michela at [place]                      Lancets Does not apply Misc 400 each 3     Si each 4 times daily before meals and nightly. Test before meals and nightly.       Diabetic Supplies Protocol Passed - 2024  3:16 PM        Passed - In person appointment or virtual visit in the past 12 mos or appointment in next 3 mos     Recent Outpatient Visits              2 weeks ago Hearing loss, unspecified hearing loss type, unspecified laterality    [facility] Harry Sandoval DO    Office Visit    3 weeks ago Hypertension associated with type 2 diabetes mellitus (HCC)    [facility] Joshua Faustin MD    Office Visit    4 months ago Graves disease    [facility] Dheeraj Gonzalez MD    Office Visit    4 months ago Encounter for  Medicare annual wellness exam    [facility] Joshua Faustin MD    Office Visit    5 months ago Graves disease    [facility] Dheeraj Gonzalez MD    Office Visit          Future Appointments         Provider Department Appt Notes    In 1 week Brayan Castle MD [facility] s/p CABG 7/3/24    In 3 weeks Joshua Faustin MD Counts include 234 beds at the [facility] f/u, policy informed, appt made by Michela at [place]                       Recent Outpatient Visits              2 weeks ago Hearing loss, unspecified hearing loss type, unspecified laterality    [facility] Harry Sandoval DO    Office Visit    3 weeks ago Hypertension associated with type 2 diabetes mellitus (HCC)    [facility] Joshua Faustin MD    Office Visit    4 months ago Graves disease    [facility] Dheeraj Gonzalez MD    Office Visit    4 months ago Encounter for Medicare annual wellness exam    [facility] Joshua Faustin MD    Office Visit    5 months ago Graves disease    [facility] Dheeraj Gonzalez MD    Office Visit          Future Appointments         Provider Department Appt Notes    In 1 week Brayan Castle MD [facility] s/p CABG 7/3/24    In 3 weeks Joshua Faustin MD Counts include 234 beds at the [facility] f/u, policy informed, appt made by Michela at [place]

## 2024-07-12 NOTE — TELEPHONE ENCOUNTER
Refill passed per [facility] protocol.  Casi Clark RN12 minutes ago (3:15 PM)     MASOUD Farnsworth RN - [facility] called, verified patient's Name and . States patient was discharged from the hospital yesterday, had open heart surgery. She noticed patient is on Jardiance and metformin but has no way of checking his blood sugar level, does not have a glucose machine and supplies. He will be seeing Dr. Faustin for non-[facility] hospital follow-up appointment on .     Chart reviewed. Prescription for glucose monitor and strips was sent on 23 when he was seen for annual wellness. She states patient is not aware that he had a prescription for that and did not pick it up from the pharmacy.      Medication pended to run through protocol.             Please see message below for upcoming appointment.    Future Appointments                 2024  1:20 PM Joshua Faustin MD [facility]         Requested Prescriptions   Pending Prescriptions Disp Refills    Lancets Does not apply Misc 400 each 3     Si Lancet fingerstick route  4 times daily before meals and nightly. Test before meals and nightly.       Diabetic Supplies Protocol Passed - 2024  3:16 PM        Passed - In person appointment or virtual visit in the past 12 mos or appointment in next 3 mos     Recent Outpatient Visits              2 weeks ago Hearing loss, unspecified hearing loss type, unspecified laterality    [facility] Harry Sandoval DO    Office Visit    3 weeks ago Hypertension associated with type 2 diabetes mellitus (HCC)    [facility] Joshua Faustin MD    Office Visit    4 months ago Graves disease    [facility] Dheeraj Gonzalez MD    Office Visit    4 months ago Encounter for Medicare annual wellness exam    [facility] Joshua Faustin MD    Office Visit    5 months ago Graves disease    [facility] Dheeraj Gonzalez MD    Office Visit          Future Appointments         Provider Department Appt Notes    In 1 week Brayan Castle MD [facility] s/p CABG 7/3/24    In 3 weeks Joshua Faustin MD [facility] f/u, policy informed, appt made by Michela at [place]                     Signed Prescriptions Disp Refills    Blood Glucose Monitoring Suppl (ONETOUCH VERIO) w/Device Does not apply Kit 1 kit 3     Si kit 4 (four) times daily before meals and nightly.       Diabetic Supplies Protocol Passed - 2024  3:16 PM        Passed - In person appointment or virtual visit in the past 12 mos or appointment in next 3 mos     Recent Outpatient Visits              2 weeks ago Hearing loss, unspecified hearing loss type, unspecified laterality    [facility] Harry Sandoval DO    Office Visit    3 weeks ago Hypertension associated with type 2 diabetes mellitus (HCC)    [facility] Joshua Faustin MD    Office Visit    4 months ago Graves disease    [facility] Dheeraj Gonzalez MD    Office Visit    4 months ago Encounter for Medicare annual wellness exam    [facility] Joshua Faustin MD    Office Visit    5 months ago Graves disease    [facility] Dheeraj Gonzalez MD    Office Visit          Future Appointments         Provider Department Appt Notes    In 1 week Brayan Castle MD [facility] s/p CABG 7/3/24    In 3 weeks Joshua Faustin MD [facility] f/u, policy  informed, appt made by Michela at [place]                      Glucose Blood (ONETOUCH VERIO) In Vitro Strip 400 strip 3     Si strip by In Vitro route 4 (four) times daily before meals and nightly.       Diabetic Supplies Protocol Passed - 2024  3:16 PM        Passed - In person appointment or virtual visit in the past 12 mos or appointment in next 3 mos     Recent Outpatient Visits              2 weeks ago Hearing loss, unspecified hearing loss type, unspecified laterality    [facility] Harry Sandoval DO    Office Visit    3 weeks ago Hypertension associated with type 2 diabetes mellitus (HCC)    [facility] Joshua Faustin MD    Office Visit    4 months ago Graves disease    [facility] Dheeraj Gonzalez MD    Office Visit    4 months ago Encounter for Medicare annual wellness exam    [facility] Joshua Faustin MD    Office Visit    5 months ago Graves disease    [facility] Dheeraj Gonzalez MD    Office Visit          Future Appointments         Provider Department Appt Notes    In 1 week Brayan Castle MD [facility] s/p CABG 7/3/24    In 3 weeks [facility] MD [facility], policy informed, appt made by Michela at [place]                       Recent Outpatient Visits              2 weeks ago Hearing loss, unspecified hearing loss type, unspecified laterality    [facility] Harry Sandoval DO    Office Visit    3 weeks ago Hypertension associated with type 2 diabetes mellitus (HCC)    [facility] Joshua Faustin MD    Office Visit    4 months ago Graves disease     [facility] Dheeraj Gonzalez MD    Office Visit    4 months ago Encounter for Medicare annual wellness exam    [facility] Joshua Faustin MD    Office Visit    5 months ago Graves disease    [facility] Dheeraj Gonzalez MD    Office Visit          Future Appointments         Provider Department Appt Notes    In 1 week Brayan Castle MD [facility] s/p CABG 7/3/24    In 3 weeks Joshua Faustin MD [facility] f/u, policy informed, appt made by Michela at [place]

## 2024-07-12 NOTE — PAYOR COMM NOTE
--------------  DISCHARGE REVIEW    Payor: PEDRITO OLIVARES O  Subscriber #:  [id number]  Authorization Number: [id number]   and   [id number]    Admit date: 7/3/24  Admit time:   6:58 AM  Discharge Date: 2024  1:36 PM         [facility]  part of [facility]    Discharge Summary    Giuseppe Faustin Patient Status:  Inpatient    1945 MRN [id number]   Location [facility] 2W/SW Attending Brenden Quiles MD   Hosp Day # 8 PCP Joshua Faustin MD     Date of Admission: 2024     Date of Discharge: 24      Lace+ Score: 57  59-90 High Risk  29-58 Medium Risk  0-28   Low Risk.    TCM Follow-Up Recommendation:  LACE 29-58: Moderate Risk of readmission after discharge from the hospital.    DISCHARGE DX: Active Problems:    Abnormal stress test    Elevated coronary artery calcium score    SOB (shortness of breath)    Coffee ground emesis    Melena    Upper GI bleed    Acute blood loss anemia       The patient was seen and examined on day of discharge and this discharge summary is in conjunction with any daily progress note from day of discharge.    HPI per admitting physician: \"Patient is a 78-year-old [place]  male with a recent history of dyspnea on exertion and had an outpatient CT scan calcium score which was elevated at 1200. He had a cardiac angiogram which showed distal left main to LAD and circumflex 95% and RCA 60% to 70%. Cardiovascular surgery consult was obtained, and patient consented for coronary artery bypass graft surgery tomorrow morning. \"    Hospital Course:     CAD.   -S/p CABG x 3 with LIMA to LAD and SVGs to diagonal and right PDA on 7/3.   -CV surgery and cardiology following, cleared for dc  -Follow up as outpt     Paroxysmal Afib  S/p Left atrial appendage clip   - cardiology following, pt started on amiodarone gtt, transitioned to PO Amiodarone   -Continue metoprolol  -Cardiology recs no need for chronic AC at this time   - cont to monitor     Suspected  acute upper GIB   Ileus  Acute posthemorrhagic anemia  -GI consulted, seen by Dr. Bryan  -PPI bid  -s/p PRBC x 4 within 24 hrs initially, hgb~8.3 on  s/p 1 PRBC, hgb~8.7  -Trend hemoglobin  -antiplatelets initially held, now restarted on ASA 81  -s/p EGD showin. LA Class D esophagitis and clean based esophageal ulcerations.   2. Large, clean based duodenal bulb ulcer located at the posterolateral wall and abutting the pylorus.   3.  There was no evidence of active or recent GI bleeding in the upper GI tract.    -->H. Pylori stool antigen ordered.   -->needs to repeat EGD in ~8-12 weeks to ensure healing of the ulcerations.   -abd XR on  reviewed  --->NGT removed  -Tolerated advancing diet  -HIPA negative        Bladder neck contracture.  Patient seen by urology in the OR.  Cystoscopy assisted Delong catheter placement.  History of TURP  -Keep Delong for duration of hospitalization  -Keep Delong after discharge as well (per Dr Blanchard)  -Outpatient urology follow-up     Other problems  Hypertension  Dyslipidemia  GERD  Obstructive sleep apnea  Osteoarthritis  Type 2 diabetes  History of kidney stones  Essential tremor  CKD stage II      Physical Exam:    Vitals:    24 0400 24 0600 24 0700 24 0800   BP: 112/64 104/73  96/58   BP Location: Right arm Right arm  Right arm   Pulse: 118 110 114 68   Resp: 22 19 20 20   Temp: 98.4 °F (36.9 °C)   96.9 °F (36.1 °C)   TempSrc: Temporal   Temporal   SpO2: 97% 95% 95% 97%   Weight:       Height:         Patient Weight for the past 72 hrs:   Weight   24 0700 170 lb 13.7 oz (77.5 kg)   07/10/24 0600 170 lb 3.1 oz (77.2 kg)       Intake/Output Summary (Last 24 hours) at 2024 1122  Last data filed at 2024 0936  Gross per 24 hour   Intake 400 ml   Output 1255 ml   Net -855 ml       GENERAL:  Awake and alert, in no acute distress.  HEART: Irregular rhythm, regular rate  LUNGS:  Air entry was minimally decreased.  No crackles or  wheezes   ABDOMEN: Soft and non-tender.    PSYCHIATRIC: Normal mood    CULTURE:   No results found for this visit on 07/02/24.    IMAGING STUDIES: SOME MAY NEED FOLLOW UP WITH PCP   XR CHEST PA + LAT CHEST (CPT=71046)    Result Date: 7/9/2024  CONCLUSION: No radiographic evidence of acute cardiopulmonary abnormality.    Dictated by (CST): Nikita Good MD on 7/09/2024 at 12:51 PM     Finalized by (CST): Nikita Good MD on 7/09/2024 at 12:53 PM          XR ABDOMEN (1 VIEW) (CPT=74018)    Result Date: 7/7/2024  CONCLUSION:   Enteric tube terminates in the proximal stomach with the side hole terminating near the GE junction, recommend advancement approximately 3 cm.  Mildly prominent loops of small bowel measuring up to 2.3 cm, which may reflect an ileus.  Lesser incidental findings described above.    Dictated by (CST): Neo Genao MD on 7/07/2024 at 3:56 PM     Finalized by (CST): Neo Genao MD on 7/07/2024 at 3:58 PM          XR CHEST AP PORTABLE  (CPT=71045)    Result Date: 7/7/2024  CONCLUSION:   Minimal residual left apical pneumothorax, which has decreased in size when compared to 07/06/2024.  No significant change in the trace bilateral pleural effusions with associated bibasilar opacities.  Well-positioned enteric tube.  Remaining support devices are in unchanged positioning.  Redemonstrated postoperative changes from recent CABG.    Dictated by (CST): Neo Genao MD on 7/07/2024 at 10:17 AM     Finalized by (CST): Neo Genao MD on 7/07/2024 at 10:22 AM          XR ABDOMEN, OBSTRUCTIVE SERIES 3 VIEWS(CPT=74021)    Result Date: 7/6/2024  CONCLUSION:  1. Moderate to large colonic fecal burden suggests constipation.  There is suspected mild gaseous distention of the descending and sigmoid colon.  No definite small bowel obstruction. 2. No free intraperitoneal air. 3. Enteric tube tip projects at the expected location of the distal gastric body/antrum. 4. Lesser incidental findings as above.    Dictated by (CST): Justin Sanford MD on 7/06/2024 at 7:46 PM     Finalized by (CST): Justin Sanford MD on 7/06/2024 at 7:49 PM          XR ABDOMEN (1 VIEW) (CPT=74018)    Result Date: 7/6/2024  CONCLUSION:  1. On the final provided images, enteric tube tip projects at the expected location of the distal gastric body/antrum (well positioned). 2. Trace left apical pneumothorax questioned on same date examination from approximately 10 hours prior appears less conspicuous. 3. Stable streaky left basilar opacities, which likely represent atelectasis. 4. Post sternotomy with stable additional support apparatus as detailed.   Dictated by (CST): Justin Sanford MD on 7/06/2024 at 3:59 PM     Finalized by (CST): Justin Sanford MD on 7/06/2024 at 4:03 PM          XR CHEST AP PORTABLE  (CPT=71045)    Result Date: 7/6/2024  CONCLUSION:  1. Trace left apical pneumothorax, new from previous.  2. Suggestion of trace bilateral pleural effusions.  3. Basilar reticular opacities are seen, likely reflecting atelectasis.  4. Postthoracotomy chest with stable cardiomegaly.  5. Additional support tubes and lines as above.    A preliminary report was issued by the [facility] teleradiology service. There are no major discrepancies.   Dictated by (CST): Lawrence Muse MD on 7/06/2024 at 7:53 AM     Finalized by (CST): Lawrence Muse MD on 7/06/2024 at 7:57 AM          XR CHEST AP PORTABLE  (CPT=71045)    Result Date: 7/4/2024  CONCLUSION:   Lines and tubes in place as described.  Trace pulmonary vascular redistribution without edema, unchanged.    Dictated by (CST): Ton Titus MD on 7/04/2024 at 9:16 AM     Finalized by (CST): Ton Titus MD on 7/04/2024 at 9:17 AM          XR CHEST AP PORTABLE  (CPT=71045)    Result Date: 7/3/2024  CONCLUSION:  1.  Post CABG. 2.  Post Conejos-Sandi catheter placement with tip in the pulmonary outflow.  The distal end of the tube is slightly coiled on the view obtained.  Recommend adjustment in  position.  Other lines and tubes are in gross satisfactory/stable position. 3.  Trace/small left pleural effusion.  Left basal pulmonary opacity most compatible with atelectasis.   Exam discussed with Jacquelin Castillo on 7/3/24 at 2:00 p.m.   Dictated by (CST): Gregory Stratton MD on 7/03/2024 at 1:55 PM     Finalized by (CST): Gregory Stratton MD on 7/03/2024 at 2:02 PM          US CAROTID DOPPLER BILAT - DIAG IMG (CPT=93880)    Result Date: 7/2/2024  CONCLUSION:   No evidence of bilateral hemodynamically significant stenosis.  Antegrade bilateral vertebral arteries.    Dictated by (CST): Abdirizak Encinas MD on 7/02/2024 at 6:43 PM     Finalized by (CST): Abdirizak Encinas MD on 7/02/2024 at 6:44 PM           LABS :     Lab Results   Component Value Date    WBC 6.0 07/11/2024    HGB 10.2 (L) 07/11/2024    HCT 30.7 (L) 07/11/2024    .0 (L) 07/11/2024    CREATSERUM 0.90 07/11/2024    BUN 18 07/11/2024     07/11/2024    K 3.5 07/11/2024     07/11/2024    CO2 25.0 07/11/2024    GLU 86 07/11/2024    CA 8.3 (L) 07/11/2024    ALB 3.3 07/06/2024    ALKPHO 32 (L) 07/06/2024    BILT 0.6 07/06/2024    TP 5.3 (L) 07/06/2024    AST 22 07/06/2024    ALT <7 (L) 07/06/2024    PTT 33.7 07/06/2024    INR 1.25 (H) 07/06/2024    T4F 1.1 06/11/2024    TSH 9.211 (H) 05/08/2024     08/11/2015    PSA 0.39 06/14/2023    ESRML 11 07/10/2024    CRP <0.29 02/24/2020    MG 2.2 07/10/2024    CK 76 06/24/2024       Recent Labs   Lab 07/07/24  0509 07/07/24  0951 07/08/24  0506 07/08/24  1444 07/09/24  0402 07/10/24  0449 07/11/24  0401   RBC 3.19*  --  2.97*  --  3.04* 3.63* 3.60*   HGB 9.0*  9.0*   < > 8.3*   < > 8.7* 10.5* 10.2*   HCT 27.0*  27.0*   < > 24.8*  --  26.2* 31.2* 30.7*   MCV 84.6  --  83.5  --  86.2 86.0 85.3   MCH 28.2  --  27.9  --  28.6 28.9 28.3   MCHC 33.3  --  33.5  --  33.2 33.7 33.2   RDW 16.7*  --  16.7*  --  17.6* 17.4* 17.7*   NEPRELIM 7.83*  --  8.18*  --   --  4.74  --    WBC 10.4  --  11.2*  --  9.3  7.6 6.0   PLT 94.0*  --  90.0*  --  87.0* 103.0* 147.0*    < > = values in this interval not displayed.     Recent Labs   Lab 07/05/24  0746 07/06/24  0445 07/07/24  0509 07/09/24  0402 07/10/24  0421 07/11/24  0401   * 182*   < > 77 68* 86   BUN 24* 39*   < > 40* 26* 18   CREATSERUM 1.26 1.15   < > 0.94 0.95 0.90   CA 8.3* 7.6*   < > 8.0* 8.0* 8.3*   ALB 3.7 3.3  --   --   --   --     136   < > 140 141 140   K 4.5 4.8   < > 3.8 3.7 3.5    108   < > 111 112 111   CO2 22.0 21.0   < > 24.0 23.0 25.0   ALKPHO 34* 32*  --   --   --   --    AST 31 22  --   --   --   --    ALT <7* <7*  --   --   --   --    BILT 0.7 0.6  --   --   --   --    TP 5.8 5.3*  --   --   --   --     < > = values in this interval not displayed.     Lab Results   Component Value Date    INR 1.25 (H) 07/06/2024    INR 1.45 (H) 07/03/2024    INR 1.03 07/02/2024       Disposition: Discharge to Home    Condition at Discharge: Stable     Discharge Medications:      Discharge Medications        START taking these medications        Instructions Prescription details   amiodarone 200 MG Tabs  Commonly known as: Pacerone  Start taking on: July 11, 2024      Take 2 tablets (400 mg total) by mouth 2 (two) times daily for 5 days, THEN 1 tablet (200 mg total) 2 (two) times daily for 14 days.   Stop taking on: July 30, 2024  Quantity: 48 tablet  Refills: 0     ascorbic acid 500 MG Tabs  Commonly known as: Vitamin C      Take 1 tablet (500 mg total) by mouth daily.   Quantity: 30 tablet  Refills: 0     HYDROcodone-acetaminophen 5-325 MG Tabs  Commonly known as: Norco      Take 1 tablet by mouth every 6 (six) hours as needed for Pain.   Quantity: 30 tablet  Refills: 0     Metoprolol Tartrate 37.5 MG Tabs      Take 37.5 mg by mouth 2x Daily(Beta Blocker).   Quantity: 60 tablet  Refills: 3     multivitamin Tabs      Take 1 tablet by mouth daily.   Quantity: 30 tablet  Refills: 0            CHANGE how you take these medications        Instructions  Prescription details   pantoprazole 40 MG Tbec  Commonly known as: Protonix  What changed: when to take this      Take 1 tablet (40 mg total) by mouth 2 (two) times daily before meals.   Quantity: 60 tablet  Refills: 0            CONTINUE taking these medications        Instructions Prescription details   aspirin 81 MG Tbec  Commonly known as: KP Aspirin      Take 1 tablet (81 mg total) by mouth daily.   Quantity: 90 tablet  Refills: 9     atorvastatin 10 MG Tabs  Commonly known as: Lipitor      Take 1 tablet (10 mg total) by mouth nightly. FOR CHOLESTEROL.   Quantity: 90 tablet  Refills: 9     fluticasone-salmeterol 250-50 MCG/ACT Aepb  Commonly known as: Advair Diskus      Inhale 1 puff into the lungs every 12 (twelve) hours.   Quantity: 3 each  Refills: 9     Jardiance 25 MG Tabs  Generic drug: Empagliflozin      Take 1 tablet by mouth daily. FOR DIABETES.   Quantity: 90 tablet  Refills: 9     Melatonin 10 MG Tabs      Take 10 mg by mouth at bedtime.   Quantity: 90 tablet  Refills: 1     metFORMIN  MG Tb24  Commonly known as: Glucophage XR      Take 1 tablet (750 mg total) by mouth 2 (two) times daily with meals. FOR DIABETES.   Quantity: 180 tablet  Refills: 9     methIMAzole 5 MG Tabs  Commonly known as: Tapazole      Take 1 tablet (5 mg total) by mouth daily.   Quantity: 90 tablet  Refills: 0     OneTouch Verio Strp      1 each 4 (four) times daily before meals and nightly. Test before meals and nightly   Quantity: 400 strip  Refills: 1     OneTouch Verio w/Device Kit      1 each 4 (four) times daily before meals and nightly.   Quantity: 1 kit  Refills: 0     primidone 50 MG Tabs  Commonly known as: Mysoline      Take 1 tablet (50 mg total) by mouth 2 (two) times daily. FOR TREMORS.   Quantity: 180 tablet  Refills: 9     Propranolol HCl ER 80 MG Cp24  Commonly known as: INDERAL LA      Take 1 capsule (80 mg total) by mouth daily. FOR BLOOD PRESSURE.   Quantity: 90 capsule  Refills: 9     telmisartan 80  MG Tabs  Commonly known as: Micardis      Take 1 tablet (80 mg total) by mouth daily. FOR BLOOD PRESSURE. STOP ENALAPRIL DUE TO COUGH.   Quantity: 90 tablet  Refills: 9               Where to Get Your Medications        These medications were sent to [place] [address] [place] [facility] [address] [phone], [phone]  [address] [facility] [address]      Phone: [phone]   amiodarone 200 MG Tabs  ascorbic acid 500 MG Tabs  HYDROcodone-acetaminophen 5-325 MG Tabs  Metoprolol Tartrate 37.5 MG Tabs  multivitamin Tabs  pantoprazole 40 MG Tbec         Follow up Visits  Brayan Castle MD  [address]  [address]  [facility] [id number] [phone]    Follow up on 7/24/2024  hospital follow up @1:00PM    David Hyman MD  [address]  [facility] [id number] [phone]    Follow up on 7/31/2024  hospital follow up @1:00PM    Joshua Faustin MD  [address] [facility] [id number] [phone]    Follow up on 8/5/2024  hospital follow up @1:20PM    Hortencia Rock APRN  [address]  [facility] [id number] [phone]    Follow up on 7/18/2024  Hospital follow-up appointment @ 2pm    Joshua Faustin MD    Consultants         Provider   Role Specialty     Sarahi Green MD      Consulting Physician GASTROENTEROLOGY     Ary Tian MD      Consulting Physician PULMONARY DISEASES     Omari Key DO      Consulting Physician PULMONARY DISEASES     Jak Soria MD      Consulting Physician HOSPITALIST     Brayan Castle MD      Consulting Physician Surgery, Thoracic              Other Discharge Instructions:       Discharge Instructions         CARDIAC SURGERY DISCHARGE INSTRUCTIONS  Shower daily and clean your surgical incisions with soap and water then swab with Betadine 2 x day until your follow up office visit.  Do not drive for one month  Do not lift/push/pull greater than 10 lbs for 3 months   Do not soak (submerge) your incision in  water such as tub/pool/etc for one month  Continue Apirin 81mg daily, do not take Advil, Ibuprofen, Motrin, NSAIDs due to recent gastritis/bleed    Sometimes managing your health at home requires assistance.  The [facility] team has recognized your preference to use [facility].  They can be reached by phone at [phone].  The fax number for your reference is [phone].  A representative from the home health agency will contact you or your family to schedule your first visit.               ----------------------------------------------------  36 MIN SPENT ON THIS DC   Brenden Quiles MD    7/11/2024      Electronically signed by Brenden Quiles MD on 7/12/2024  8:43 AM         REVIEWER COMMENTS

## 2024-07-14 RX ORDER — LANCETS 30 GAUGE
EACH MISCELLANEOUS
Qty: 400 EACH | Refills: 3 | Status: SHIPPED | OUTPATIENT
Start: 2024-07-14

## 2024-07-14 NOTE — TELEPHONE ENCOUNTER
If eating normal meals he should not need to check blood sugars unless he feels unwell; jardiance + metformin does not typically cause hypoglycemia and insurance does not pay for diabetes test supplies anymore. If needed can  1 time test kit from any pharmacy. Home health nurse should be bringing some form of supplies as well.  I will sign but may get rejected.

## 2024-07-16 ENCOUNTER — TELEPHONE (OUTPATIENT)
Dept: MEDSURG UNIT | Facility: HOSPITAL | Age: 79
End: 2024-07-16

## 2024-07-16 ENCOUNTER — TELEPHONE (OUTPATIENT)
Dept: SURGERY | Facility: CLINIC | Age: 79
End: 2024-07-16

## 2024-07-16 NOTE — TELEPHONE ENCOUNTER
Spoke with patients friend Yvette, assisted in scheduling hospital follow up and voiding trial. Added patient onto nurse visit and  schedule. He confirmed and verbalized understanding.

## 2024-07-16 NOTE — TELEPHONE ENCOUNTER
Left message to call back.     Duplicate encounter. See Candace's message from below.     Candace Johnston, APRN5 days ago     Can we get this patient scheduled for a void trial (cayla and bao), as well as a f/u with Dr Blanchard? They don't need to be the same day.  Thank you,  Candace Johnston         Note

## 2024-07-19 ENCOUNTER — TELEPHONE (OUTPATIENT)
Dept: SURGERY | Facility: CLINIC | Age: 79
End: 2024-07-19

## 2024-07-19 NOTE — TELEPHONE ENCOUNTER
Per daughter asking if patient could be seen sooner than 8/6, states that day there is a transportation issue, also states patient is currently in hospital, has UTI and sepsis. Please call thank you.

## 2024-07-19 NOTE — TELEPHONE ENCOUNTER
Spoke with patients daughter. She states patient is currently admitted at [facility] in [place]. I advised her that they should see what the doctors in the hospital advise and they can give us a call when patient discharged. She states she would still like a sooner appointment.     I assisted in scheduling a sooner appointment. I advised daughter to let us know if anything changes. I also advised her that if there are any imaging or testing done at [facility] they should bring those reports with them as [facility] are not in care everywhere. She confirmed and verbalized understanding.     Future Appointments   Date Time Provider Department [place]   7/24/2024  1:00 PM Brayan Castle MD [facility]   7/30/2024 11:00 AM Kevin Blanchard MD [facility]   7/30/2024 11:40 AM [facility] UROLOGY NURSE [facility]   8/5/2024  1:20 PM Joshua Faustin MD [facility]

## 2024-07-26 NOTE — PLAN OF CARE
Patient is alert and oriented. Levophed weaned off at 0100. BP steady but will drop while moving. Able to get to the chair this morning with assist of three people. Emesis x2 during the night, zofran given. Patient is coughing up a lot of sputum, needing frequent suctioning. Norco utilized for incisional pain. Chest tubes patent. Urine averaging 35cc/hr.     Chest tube output overnight: 100cc, no air leak  Urine output overnight: 450cc    Problem: Patient Centered Care  Goal: Patient preferences are identified and integrated in the patient's plan of care  Description: Interventions:  - What would you like us to know as we care for you?   - Provide timely, complete, and accurate information to patient/family  - Incorporate patient and family knowledge, values, beliefs, and cultural backgrounds into the planning and delivery of care  - Encourage patient/family to participate in care and decision-making at the level they choose  - Honor patient and family perspectives and choices  Outcome: Progressing     Problem: PAIN - ADULT  Goal: Verbalizes/displays adequate comfort level or patient's stated pain goal  Description: INTERVENTIONS:  - Encourage pt to monitor pain and request assistance  - Assess pain using appropriate pain scale  - Administer analgesics based on type and severity of pain and evaluate response  - Implement non-pharmacological measures as appropriate and evaluate response  - Consider cultural and social influences on pain and pain management  - Manage/alleviate anxiety  - Utilize distraction and/or relaxation techniques  - Monitor for opioid side effects  - Notify MD/LIP if interventions unsuccessful or patient reports new pain  - Anticipate increased pain with activity and pre-medicate as appropriate  Outcome: Progressing     Problem: CARDIOVASCULAR - ADULT  Goal: Maintains optimal cardiac output and hemodynamic stability  Description: INTERVENTIONS:  - Monitor vital signs, rhythm, and trends  -  Monitor for bleeding, hypotension and signs of decreased cardiac output  - Evaluate effectiveness of vasoactive medications to optimize hemodynamic stability  - Monitor arterial and/or venous puncture sites for bleeding and/or hematoma  - Assess quality of pulses, skin color and temperature  - Assess for signs of decreased coronary artery perfusion - ex. Angina  - Evaluate fluid balance, assess for edema, trend weights  Outcome: Progressing     Problem: RESPIRATORY - ADULT  Goal: Achieves optimal ventilation and oxygenation  Description: INTERVENTIONS:  - Assess for changes in respiratory status  - Assess for changes in mentation and behavior  - Position to facilitate oxygenation and minimize respiratory effort  - Oxygen supplementation based on oxygen saturation or ABGs  - Provide Smoking Cessation handout, if applicable  - Encourage broncho-pulmonary hygiene including cough, deep breathe, Incentive Spirometry  - Assess the need for suctioning and perform as needed  - Assess and instruct to report SOB or any respiratory difficulty  - Respiratory Therapy support as indicated  - Manage/alleviate anxiety  - Monitor for signs/symptoms of CO2 retention  Outcome: Progressing      Average

## 2024-07-29 ENCOUNTER — TELEPHONE (OUTPATIENT)
Dept: SURGERY | Facility: CLINIC | Age: 79
End: 2024-07-29

## 2024-07-29 ENCOUNTER — PATIENT MESSAGE (OUTPATIENT)
Facility: LOCATION | Age: 79
End: 2024-07-29

## 2024-07-29 DIAGNOSIS — Z97.8 FOLEY CATHETER IN PLACE: Primary | ICD-10-CM

## 2024-07-29 NOTE — TELEPHONE ENCOUNTER
Afternoon,    Please make sure you have a referral in place for your Urology appointment on  7/30/24. This is needed to be seen and to have coverage for this appointment.     Please fax or bring with you   [phone]     Thank you

## 2024-07-30 ENCOUNTER — OFFICE VISIT (OUTPATIENT)
Dept: SURGERY | Facility: CLINIC | Age: 79
End: 2024-07-30

## 2024-07-30 DIAGNOSIS — N32.0 BLADDER NECK CONTRACTURE: Primary | ICD-10-CM

## 2024-07-30 PROCEDURE — 1111F DSCHRG MED/CURRENT MED MERGE: CPT | Performed by: UROLOGY

## 2024-07-30 PROCEDURE — 1159F MED LIST DOCD IN RCRD: CPT | Performed by: UROLOGY

## 2024-07-30 PROCEDURE — 99213 OFFICE O/P EST LOW 20 MIN: CPT | Performed by: UROLOGY

## 2024-07-30 NOTE — TELEPHONE ENCOUNTER
Referral placed for urology visit today and patient family notified referral placed.   Appt set at 11AM today    Patient or family to call us back if something more is needed.

## 2024-07-30 NOTE — PROGRESS NOTES
Giuseppe Faustin is a 78 year old male.    HPI:     Chief Complaint   Patient presents with    Follow - Up     Pt is here with wife and son to see what the next step in treatment for scar tissue in urinary tract. And voiding trial.       78-year-old male accompanied by his son and wife.  I was counseled in the operating room after the patient was under anesthesia in anticipation of coronary artery bypass grafting.  A Delong catheter could not be placed.  Bedside flexible cystoscopy demonstrated a bladder neck contracture.  He reportedly has a history of TURP in the remote past.  The bladder neck contracture was dilated at the bedside and a 12 [place] catheter was placed.  He was discharged after an uneventful postoperative recovery with the indwelling catheter in place.  He presented July 19 to [facility] with symptoms suggestive of urosepsis and was admitted for 3 days.  His Delong catheter was replaced at that time.  Son states that a voiding trial was done at the time and the patient was unable to void and as such the catheter was replaced.  No constipation.  No issues with the catheter at present.      HISTORY:  Past Medical History:    Adenomatous colon polyp    Arthritis    Benign prostatic hypertrophy with urinary retention    Chronic cough    Chronic sinusitis    Esophageal reflux    Essential and other specified forms of tremor    Essential hypertension    Gall stones    High blood pressure    Hoarseness, chronic    HTN (hypertension)    Osteoarthrosis, localized, primary, knee, left    Osteoarthrosis, localized, primary, knee, right    Osteoarthrosis, unspecified whether generalized or localized, unspecified site    Pain in joints    Painful swallowing    Personal history of colonic polyps    PONV (postoperative nausea and vomiting)    Prediabetes    borderline    Problems with swallowing    trouble with solid    Second degree hemorrhoids    Shortness of breath    Sleep apnea    no cpap     Sleep disturbance    Sputum production    Visual impairment    reading glasses    Vocal cord paralysis    Wheezing      Past Surgical History:   Procedure Laterality Date    Cabg      Cholecystectomy  2015    Colonoscopy  10/2012    3 mm adenoma. repeat 5 yrs, Dr Sarabia    Colonoscopy,biopsy  10/22/2012    Procedure: COLONOSCOPY, POSSIBLE BIOPSY, POSSIBLE POLYPECTOMY 57756;  Surgeon: Joe Sarabia MD;  Location: [facility]    Cystoscopy,insert ureteral stent  2015    TURP    Laparoscopic cholecystectomy N/A 07/10/2015    Procedure: LAPAROSCOPIC CHOLECYSTECTOMY;  Surgeon: Tania Schmidt MD;  Location:  MAIN OR    Other surgical history  2015    CYSTOSCOPY, TRANS URETHRAL RESECTION OF PROSTATE    Patient documented not to have experienced any of the following events  10/22/2012    Procedure: COLONOSCOPY, POSSIBLE BIOPSY, POSSIBLE POLYPECTOMY 46287;  Surgeon: Joe Sarabia MD;  Location: [facility]    Patient withough preoperative order for iv antibiotic surgical site infection prophylaxis.  10/22/2012    Procedure: COLONOSCOPY, POSSIBLE BIOPSY, POSSIBLE POLYPECTOMY 69737;  Surgeon: Joe Sarabia MD;  Location: [facility]    Upper gi endoscopy,exam  2020      Family History   Problem Relation Age of Onset    Heart Attack Father     Hypertension Father       Social History:   Social History     Socioeconomic History    Marital status:    Tobacco Use    Smoking status: Former     Current packs/day: 0.00     Average packs/day: 0.5 packs/day for 20.0 years (10.0 ttl pk-yrs)     Types: Cigarettes     Start date: 1964     Quit date: 1984     Years since quittin.1     Passive exposure: Past    Smokeless tobacco: Never   Vaping Use    Vaping status: Never Used   Substance and Sexual Activity    Alcohol use: No    Drug use: No   Other Topics Concern    Caffeine Concern Yes     Comment: 1 tea daily    Exercise Yes      Comment: daily gym   Social History Narrative    ** Merged History Encounter **             2 kids    Retired        Diet: vegetarian    Exercise: nothing formal    Sleep: ok    Stress: low     Social Determinants of Health     Food Insecurity: No Food Insecurity (7/2/2024)    Food Insecurity     Food Insecurity: Never true   Transportation Needs: No Transportation Needs (7/2/2024)    Transportation Needs     Lack of Transportation: No   Housing Stability: Low Risk  (7/2/2024)    Housing Stability     Housing Instability: No        Medications (Active prior to today's visit):  Current Outpatient Medications   Medication Sig Dispense Refill    Lancets Does not apply Misc 1 Lancet fingerstick route  4 times daily before meals and nightly. Test before meals and nightly. 400 each 3    Blood Glucose Monitoring Suppl (ONETOUCH VERIO) w/Device Does not apply Kit 1 kit 4 (four) times daily before meals and nightly. 1 kit 3    Glucose Blood (ONETOUCH VERIO) In Vitro Strip 1 strip by In Vitro route 4 (four) times daily before meals and nightly. 400 strip 3    ascorbic acid 500 MG Oral Tab Take 1 tablet (500 mg total) by mouth daily. 30 tablet 0    multivitamin Oral Tab Take 1 tablet by mouth daily. 30 tablet 0    HYDROcodone-acetaminophen 5-325 MG Oral Tab Take 1 tablet by mouth every 6 (six) hours as needed for Pain. 30 tablet 0    amiodarone 200 MG Oral Tab Take 2 tablets (400 mg total) by mouth 2 (two) times daily for 5 days, THEN 1 tablet (200 mg total) 2 (two) times daily for 14 days. 48 tablet 0    metoprolol tartrate 37.5 MG Oral Tab Take 37.5 mg by mouth 2x Daily(Beta Blocker). 60 tablet 3    pantoprazole 40 MG Oral Tab EC Take 1 tablet (40 mg total) by mouth 2 (two) times daily before meals. 60 tablet 0    Propranolol HCl ER 80 MG Oral Capsule SR 24 Hr Take 1 capsule (80 mg total) by mouth daily. FOR BLOOD PRESSURE. 90 capsule 9    telmisartan 80 MG Oral Tab Take 1 tablet (80 mg total) by mouth daily. FOR  BLOOD PRESSURE. STOP ENALAPRIL DUE TO COUGH. 90 tablet 9    atorvastatin 10 MG Oral Tab Take 1 tablet (10 mg total) by mouth nightly. FOR CHOLESTEROL. 90 tablet 9    aspirin (KP ASPIRIN) 81 MG Oral Tab EC Take 1 tablet (81 mg total) by mouth daily. 90 tablet 9    Empagliflozin (JARDIANCE) 25 MG Oral Tab Take 1 tablet by mouth daily. FOR DIABETES. 90 tablet 9    metFORMIN  MG Oral Tablet 24 Hr Take 1 tablet (750 mg total) by mouth 2 (two) times daily with meals. FOR DIABETES. 180 tablet 9    methIMAzole 5 MG Oral Tab Take 1 tablet (5 mg total) by mouth daily. 90 tablet 0    fluticasone-salmeterol (ADVAIR DISKUS) 250-50 MCG/ACT Inhalation Aerosol Powder, Breath Activated Inhale 1 puff into the lungs every 12 (twelve) hours. 3 each 9    primidone 50 MG Oral Tab Take 1 tablet (50 mg total) by mouth 2 (two) times daily. FOR TREMORS. 180 tablet 9    Melatonin 10 MG Oral Tab Take 10 mg by mouth at bedtime. 90 tablet 1       Allergies:  No Known Allergies      ROS:       PHYSICAL EXAM:   Delong catheter in place draining clear yellow urine.     ASSESSMENT/PLAN:   Assessment   Encounter Diagnosis   Name Primary?    Bladder neck contracture Yes       Recommend:  - He has an appointment tomorrow with his cardiologist.  Ideally, he would require cystoscopy under anesthesia and laser incision of bladder neck contracture.  If he can be cleared from a cardiac standpoint to proceed with this and stop potential anticoagulants or antiplatelet medications we can proceed in the next 2 to 3 weeks.  If cardiology deems that it is best to wait he would need to return on the 18th or 19th of next month to replace his Delong catheter.  Reviewed this plan with patient and his family members and they understand and agree.         Orders This Visit:  No orders of the defined types were placed in this encounter.      Meds This Visit:  Requested Prescriptions      No prescriptions requested or ordered in this encounter       Imaging &  Referrals:  None     7/30/2024  Kevin Blanchard MD

## 2024-08-07 ENCOUNTER — OFFICE VISIT (OUTPATIENT)
Facility: LOCATION | Age: 79
End: 2024-08-07

## 2024-08-07 ENCOUNTER — TELEPHONE (OUTPATIENT)
Dept: SURGERY | Facility: CLINIC | Age: 79
End: 2024-08-07

## 2024-08-07 VITALS
HEIGHT: 63 IN | WEIGHT: 138 LBS | DIASTOLIC BLOOD PRESSURE: 63 MMHG | OXYGEN SATURATION: 97 % | SYSTOLIC BLOOD PRESSURE: 99 MMHG | HEART RATE: 98 BPM | BODY MASS INDEX: 24.45 KG/M2

## 2024-08-07 DIAGNOSIS — K21.9 GASTROESOPHAGEAL REFLUX DISEASE WITHOUT ESOPHAGITIS: ICD-10-CM

## 2024-08-07 DIAGNOSIS — E11.69 HYPERLIPIDEMIA ASSOCIATED WITH TYPE 2 DIABETES MELLITUS (HCC): ICD-10-CM

## 2024-08-07 DIAGNOSIS — E11.22 TYPE 2 DIABETES MELLITUS WITH STAGE 3A CHRONIC KIDNEY DISEASE, WITHOUT LONG-TERM CURRENT USE OF INSULIN (HCC): Primary | ICD-10-CM

## 2024-08-07 DIAGNOSIS — Z95.1 S/P CABG (CORONARY ARTERY BYPASS GRAFT): ICD-10-CM

## 2024-08-07 DIAGNOSIS — I15.2 HYPERTENSION ASSOCIATED WITH TYPE 2 DIABETES MELLITUS (HCC): ICD-10-CM

## 2024-08-07 DIAGNOSIS — E11.59 HYPERTENSION ASSOCIATED WITH TYPE 2 DIABETES MELLITUS (HCC): ICD-10-CM

## 2024-08-07 DIAGNOSIS — Z01.818 PRE-OP TESTING: Primary | ICD-10-CM

## 2024-08-07 DIAGNOSIS — N32.0 BLADDER NECK CONTRACTURE: ICD-10-CM

## 2024-08-07 DIAGNOSIS — E78.5 HYPERLIPIDEMIA ASSOCIATED WITH TYPE 2 DIABETES MELLITUS (HCC): ICD-10-CM

## 2024-08-07 DIAGNOSIS — N18.31 TYPE 2 DIABETES MELLITUS WITH STAGE 3A CHRONIC KIDNEY DISEASE, WITHOUT LONG-TERM CURRENT USE OF INSULIN (HCC): Primary | ICD-10-CM

## 2024-08-07 DIAGNOSIS — R25.1 TREMORS OF NERVOUS SYSTEM: ICD-10-CM

## 2024-08-07 DIAGNOSIS — R33.9 URINARY RETENTION: ICD-10-CM

## 2024-08-07 PROBLEM — K92.0 COFFEE GROUND EMESIS: Status: RESOLVED | Noted: 2024-07-06 | Resolved: 2024-08-07

## 2024-08-07 PROBLEM — D62 ACUTE BLOOD LOSS ANEMIA: Status: RESOLVED | Noted: 2024-07-07 | Resolved: 2024-08-07

## 2024-08-07 PROBLEM — R94.39 ABNORMAL STRESS TEST: Status: RESOLVED | Noted: 2024-07-03 | Resolved: 2024-08-07

## 2024-08-07 PROBLEM — R93.1 ELEVATED CORONARY ARTERY CALCIUM SCORE: Status: RESOLVED | Noted: 2024-07-03 | Resolved: 2024-08-07

## 2024-08-07 PROBLEM — R06.02 SOB (SHORTNESS OF BREATH): Status: RESOLVED | Noted: 2024-07-03 | Resolved: 2024-08-07

## 2024-08-07 LAB — HEMOGLOBIN A1C: 5.7 % (ref 4.3–5.6)

## 2024-08-07 PROCEDURE — 3074F SYST BP LT 130 MM HG: CPT | Performed by: INTERNAL MEDICINE

## 2024-08-07 PROCEDURE — 1160F RVW MEDS BY RX/DR IN RCRD: CPT | Performed by: INTERNAL MEDICINE

## 2024-08-07 PROCEDURE — 99214 OFFICE O/P EST MOD 30 MIN: CPT | Performed by: INTERNAL MEDICINE

## 2024-08-07 PROCEDURE — 1111F DSCHRG MED/CURRENT MED MERGE: CPT | Performed by: INTERNAL MEDICINE

## 2024-08-07 PROCEDURE — 3078F DIAST BP <80 MM HG: CPT | Performed by: INTERNAL MEDICINE

## 2024-08-07 PROCEDURE — 3008F BODY MASS INDEX DOCD: CPT | Performed by: INTERNAL MEDICINE

## 2024-08-07 PROCEDURE — 83036 HEMOGLOBIN GLYCOSYLATED A1C: CPT | Performed by: INTERNAL MEDICINE

## 2024-08-07 PROCEDURE — 1159F MED LIST DOCD IN RCRD: CPT | Performed by: INTERNAL MEDICINE

## 2024-08-07 RX ORDER — PRIMIDONE 50 MG/1
50 TABLET ORAL 2 TIMES DAILY
Qty: 180 TABLET | Refills: 9 | Status: SHIPPED | OUTPATIENT
Start: 2024-08-07

## 2024-08-07 RX ORDER — EMPAGLIFLOZIN 25 MG/1
1 TABLET, FILM COATED ORAL DAILY
Qty: 90 TABLET | Refills: 9 | Status: SHIPPED | OUTPATIENT
Start: 2024-08-07

## 2024-08-07 RX ORDER — HYDROCODONE BITARTRATE AND ACETAMINOPHEN 5; 325 MG/1; MG/1
1 TABLET ORAL EVERY 6 HOURS PRN
Qty: 30 TABLET | Refills: 0 | Status: SHIPPED | OUTPATIENT
Start: 2024-08-07 | End: 2024-08-10

## 2024-08-07 RX ORDER — METFORMIN HYDROCHLORIDE 750 MG/1
750 TABLET, EXTENDED RELEASE ORAL 2 TIMES DAILY WITH MEALS
Qty: 180 TABLET | Refills: 9 | Status: SHIPPED | OUTPATIENT
Start: 2024-08-07

## 2024-08-07 RX ORDER — PANTOPRAZOLE SODIUM 40 MG/1
40 TABLET, DELAYED RELEASE ORAL
Qty: 180 TABLET | Refills: 4 | Status: SHIPPED | OUTPATIENT
Start: 2024-08-07

## 2024-08-07 RX ORDER — METOPROLOL TARTRATE 37.5 MG/1
37.5 TABLET, FILM COATED ORAL
Qty: 60 TABLET | Refills: 3 | Status: SHIPPED | OUTPATIENT
Start: 2024-08-07

## 2024-08-07 RX ORDER — TELMISARTAN 40 MG/1
40 TABLET ORAL NIGHTLY
Qty: 90 TABLET | Refills: 9 | Status: SHIPPED | OUTPATIENT
Start: 2024-08-07

## 2024-08-07 RX ORDER — ATORVASTATIN CALCIUM 40 MG/1
40 TABLET, FILM COATED ORAL NIGHTLY
Qty: 90 TABLET | Refills: 9 | Status: SHIPPED | OUTPATIENT
Start: 2024-08-07

## 2024-08-07 NOTE — PROGRESS NOTES
INTERNAL MEDICINE OFFICE NOTE     Patient ID: Giuseppe Faustin is a 78 year old male.  Today's Date: 08/07/24  Chief Complaint: Diabetes (Patient here for A1C Check )    Diabetes      1.  He has hypertension associated with type 2 diabetes, blood pressure is very well-controlled on telmisartan 80 mg and propranolol to the point of mild hypotension.   2.  He is dyslipidemia associated type 2 diabetes, status post CABG x 3 with left atrial appendage clipping, cardiology notes reviewed and appreciated, no complaints.  Unfortunately during this procedure they had difficulty inserting the Delong cath, he subsequently saw urology, he was seen at [facility] for urosepsis, I do not have access to these records, I reviewed the urology notes and apparently he does have a bladder neck contracture and there are plans to have this alleviated with urology.  3.  He has type 2 diabetes, his most recent A1c is 5.7, he is currently on metformin and Jardiance. Has been taking glipizide but this was stopped.     Meds reviewed and adjusted per cardio and my priopr recommendations.       Vitals:    08/07/24 1249   BP: 99/63   Pulse: 98   SpO2: 97%   Weight: 138 lb (62.6 kg)   Height: 5' 3\" (1.6 m)     body mass index is 24.45 kg/m².  BP Readings from Last 3 Encounters:   08/07/24 99/63   07/24/24 130/72   07/11/24 123/60     The ASCVD Risk score (Rubin DK, et al., 2019) failed to calculate for the following reasons:    The valid total cholesterol range is 130 to 320 mg/dL  Medications reviewed:  Current Outpatient Medications   Medication Sig Dispense Refill    Metoprolol Tartrate 37.5 MG Oral Tab Take 37.5 mg by mouth 2x Daily(Beta Blocker). 60 tablet 3    atorvastatin 40 MG Oral Tab Take 1 tablet (40 mg total) by mouth nightly. FOR CHOLESTEROL. 90 tablet 9    Empagliflozin (JARDIANCE) 25 MG Oral Tab Take 1 tablet by mouth daily. FOR DIABETES. 90 tablet 9    metFORMIN  MG Oral Tablet 24 Hr Take 1 tablet (750 mg total)  by mouth 2 (two) times daily with meals. FOR DIABETES. 180 tablet 9    primidone 50 MG Oral Tab Take 1 tablet (50 mg total) by mouth 2 (two) times daily. FOR TREMORS. 180 tablet 9    pantoprazole 40 MG Oral Tab EC Take 1 tablet (40 mg total) by mouth 2 (two) times daily before meals. 180 tablet 4    telmisartan 40 MG Oral Tab Take 1 tablet (40 mg total) by mouth nightly. FOR BLOOD PRESSURE. 90 tablet 9    HYDROcodone-acetaminophen 5-325 MG Oral Tab Take 1 tablet by mouth every 6 (six) hours as needed for Pain. 30 tablet 0    Lancets Does not apply Misc 1 Lancet fingerstick route  4 times daily before meals and nightly. Test before meals and nightly. 400 each 3    Blood Glucose Monitoring Suppl (ONETOUCH VERIO) w/Device Does not apply Kit 1 kit 4 (four) times daily before meals and nightly. 1 kit 3    Glucose Blood (ONETOUCH VERIO) In Vitro Strip 1 strip by In Vitro route 4 (four) times daily before meals and nightly. 400 strip 3    ascorbic acid 500 MG Oral Tab Take 1 tablet (500 mg total) by mouth daily. 30 tablet 0    multivitamin Oral Tab Take 1 tablet by mouth daily. 30 tablet 0    aspirin ( ASPIRIN) 81 MG Oral Tab EC Take 1 tablet (81 mg total) by mouth daily. 90 tablet 9    fluticasone-salmeterol (ADVAIR DISKUS) 250-50 MCG/ACT Inhalation Aerosol Powder, Breath Activated Inhale 1 puff into the lungs every 12 (twelve) hours. 3 each 9    methIMAzole 5 MG Oral Tab Take 1 tablet (5 mg total) by mouth daily. 90 tablet 0         Assessment & Plan    1. Type 2 diabetes mellitus with stage 3a chronic kidney disease, without long-term current use of insulin ([facility]) (Primary)  -     POC Glycohemoglobin [24572]  -     Atorvastatin Calcium; Take 1 tablet (40 mg total) by mouth nightly. FOR CHOLESTEROL.  Dispense: 90 tablet; Refill: 9  -     Jardiance; Take 1 tablet by mouth daily. FOR DIABETES.  Dispense: 90 tablet; Refill: 9  -     metFORMIN HCl ER; Take 1 tablet (750 mg total) by mouth 2 (two) times daily with meals. FOR  DIABETES.  Dispense: 180 tablet; Refill: 9  -     Telmisartan; Take 1 tablet (40 mg total) by mouth nightly. FOR BLOOD PRESSURE.  Dispense: 90 tablet; Refill: 9  2. Hyperlipidemia associated with type 2 diabetes mellitus (HCC)  -     Atorvastatin Calcium; Take 1 tablet (40 mg total) by mouth nightly. FOR CHOLESTEROL.  Dispense: 90 tablet; Refill: 9  3. Hypertension associated with type 2 diabetes mellitus (HCC)  -     Telmisartan; Take 1 tablet (40 mg total) by mouth nightly. FOR BLOOD PRESSURE.  Dispense: 90 tablet; Refill: 9  4. Tremors of nervous system  -     Primidone; Take 1 tablet (50 mg total) by mouth 2 (two) times daily. FOR TREMORS.  Dispense: 180 tablet; Refill: 9  5. Gastroesophageal reflux disease without esophagitis  -     Pantoprazole Sodium; Take 1 tablet (40 mg total) by mouth 2 (two) times daily before meals.  Dispense: 180 tablet; Refill: 4  6. S/P CABG (coronary artery bypass graft)  -     Metoprolol Tartrate; Take 37.5 mg by mouth 2x Daily(Beta Blocker).  Dispense: 60 tablet; Refill: 3  -     HYDROcodone-Acetaminophen; Take 1 tablet by mouth every 6 (six) hours as needed for Pain.  Dispense: 30 tablet; Refill: 0  7. Bladder neck contracture  Plan overall doing well today.  His A1c is improved, we will continue with Jardiance and metformin, we will stop glipizide.  For the cholesterol continue with atorvastatin but will increase to 40 mg for high intensity therapy however if he has side effects let us know right away.  In terms of the blood pressure he is hypotensive, reduce telmisartan down to 40 mg, continue with metoprolol, stop enalapril due to cough, stop propranolol due to dual beta-blocker therapy.  Monitor blood pressure and update me in about 2 to 3 weeks as long as he is normotensive we will plan to see him back in 3 months for diabetes recheck.    Follow Up: Return in about 3 months (around 11/7/2024) for DIABETES, POC A1C IN OFFICE, ARRIVE 15 MIN EARLY..         Objective: Results:    Physical Exam  Vitals and nursing note reviewed.   Constitutional:       General: He is not in acute distress.     Appearance: Normal appearance.   HENT:      Head: Normocephalic.      Right Ear: External ear normal.      Left Ear: External ear normal.   Eyes:      Extraocular Movements: Extraocular movements intact.      Conjunctiva/sclera: Conjunctivae normal.   Cardiovascular:      Rate and Rhythm: Normal rate and regular rhythm.      Pulses: Normal pulses.      Heart sounds: Normal heart sounds.   Pulmonary:      Effort: Pulmonary effort is normal. No respiratory distress.      Breath sounds: Normal breath sounds. No wheezing.   Abdominal:      General: Abdomen is flat. Bowel sounds are normal.      Tenderness: There is no abdominal tenderness.   Musculoskeletal:         General: Normal range of motion.      Cervical back: Normal range of motion and neck supple.   Skin:     Coloration: Skin is not jaundiced.   Neurological:      General: No focal deficit present.      Mental Status: He is alert and oriented to person, place, and time. Mental status is at baseline.   Psychiatric:         Mood and Affect: Mood normal.         Behavior: Behavior normal.        Reviewed:    Patient Active Problem List    Diagnosis    S/P CABG (coronary artery bypass graft)    Melena    Upper GI bleed    Primary osteoarthritis of both knees    Coronary artery disease involving native coronary artery of native heart without angina pectoris    Moderate persistent asthma without complication ([facility])    Age-related cataract of right eye    CKD (chronic kidney disease) stage 3, GFR 30-59 ml/min ([facility])    Gastroesophageal reflux disease without esophagitis    Hyperlipidemia associated with type 2 diabetes mellitus (HCC)    Hypertension associated with type 2 diabetes mellitus (HCC)    Type 2 diabetes mellitus with stage 3a chronic kidney disease, without long-term current use of insulin ([facility])    Hyperthyroidism    Vocal cord paralysis       No Known Allergies     Social History     Socioeconomic History    Marital status:    Tobacco Use    Smoking status: Former     Current packs/day: 0.00     Average packs/day: 0.5 packs/day for 20.0 years (10.0 ttl pk-yrs)     Types: Cigarettes     Start date: 1964     Quit date: 1984     Years since quittin.1     Passive exposure: Past    Smokeless tobacco: Never   Vaping Use    Vaping status: Never Used   Substance and Sexual Activity    Alcohol use: No    Drug use: No   Other Topics Concern    Caffeine Concern Yes     Comment: 1 tea daily    Exercise Yes     Comment: daily gym   Social History Narrative    ** Merged History Encounter **             2 kids    Retired        Diet: vegetarian    Exercise: nothing formal    Sleep: ok    Stress: low     Social Determinants of Health     Food Insecurity: No Food Insecurity (2024)    Food Insecurity     Food Insecurity: Never true   Transportation Needs: No Transportation Needs (2024)    Transportation Needs     Lack of Transportation: No   Housing Stability: Low Risk  (2024)    Housing Stability     Housing Instability: No      Review of Systems  All other systems negative unless otherwise stated in ROS or HPI above.       Joshua Faustin MD  Internal Medicine       Call office with any questions or seek emergency care if necessary.   Patient understands and agrees to follow directions and advice.      ----------------------------------------- PATIENT INSTRUCTIONS-----------------------------------------   .    There are no Patient Instructions on file for this visit.

## 2024-08-09 ENCOUNTER — TELEPHONE (OUTPATIENT)
Facility: LOCATION | Age: 79
End: 2024-08-09

## 2024-08-09 DIAGNOSIS — Z97.8 FOLEY CATHETER IN PLACE: ICD-10-CM

## 2024-08-09 DIAGNOSIS — N32.0 BLADDER NECK CONTRACTURE: Primary | ICD-10-CM

## 2024-08-09 NOTE — TELEPHONE ENCOUNTER
Per daughter calling to schedule a procedure. Per daughter patient's primary care physician gave the ok for procedure. Please advise

## 2024-08-09 NOTE — TELEPHONE ENCOUNTER
Jahaira, pharmacist - [facility] called, verified patient's Name and . States hydrocodone 5-325 mg dose is back ordered from the . She wants to know if primary care provider wants to increase the dose to 7.5-325 mg which they have in stock, or substitute with Tylenol with codeine or tramadol instead.    Dr. Faustin please advise.

## 2024-08-10 RX ORDER — HYDROCODONE BITARTRATE AND ACETAMINOPHEN 7.5; 325 MG/1; MG/1
1 TABLET ORAL EVERY 6 HOURS PRN
Qty: 28 TABLET | Refills: 0 | Status: SHIPPED | OUTPATIENT
Start: 2024-08-10

## 2024-08-13 NOTE — TELEPHONE ENCOUNTER
patients family is calling to schedule surgery. Can you please send me request. Looks like patient was seen by cardiology and was cleared.

## 2024-08-14 ENCOUNTER — TELEPHONE (OUTPATIENT)
Facility: LOCATION | Age: 79
End: 2024-08-14

## 2024-08-14 NOTE — TELEPHONE ENCOUNTER
[facility] Order # [id number] Received and Placed in Dr. Faustin Folder for Review and Sign

## 2024-08-14 NOTE — TELEPHONE ENCOUNTER
Prior authorization for jardiance was done through [employer]. It can take 1-5 business days for a decision to come back

## 2024-08-14 NOTE — TELEPHONE ENCOUNTER
[facility] Order # [id number] Fax and Success on 08/14/24, Sending to Scanning

## 2024-08-14 NOTE — TELEPHONE ENCOUNTER
Per cardiology office of Dr. Hyman, patient's son reached out asking for them to reach out to urology office to have surgery scheduled, office of Dr. Hyman asking to please call son to schedule surgery. Please advise thank you.

## 2024-08-14 NOTE — TELEPHONE ENCOUNTER
Patient's son asking if surgery can be done on 8/16 when he has cath change. Patient's son states Fridays work too. Please call.

## 2024-08-15 NOTE — TELEPHONE ENCOUNTER
Patients son called wanting to  schedule surgery.  can you please send me a Surgical Request. Also family is asking if you would be able do surgery on a Friday after clinic hours. Please let me know.

## 2024-08-16 ENCOUNTER — NURSE ONLY (OUTPATIENT)
Dept: SURGERY | Facility: CLINIC | Age: 79
End: 2024-08-16
Payer: MEDICARE

## 2024-08-16 VITALS — SYSTOLIC BLOOD PRESSURE: 119 MMHG | HEART RATE: 75 BPM | DIASTOLIC BLOOD PRESSURE: 75 MMHG

## 2024-08-16 DIAGNOSIS — N35.919 STRICTURE OF MALE URETHRA, UNSPECIFIED STRICTURE TYPE: Primary | ICD-10-CM

## 2024-08-16 PROCEDURE — 51702 INSERT TEMP BLADDER CATH: CPT | Performed by: UROLOGY

## 2024-08-16 PROCEDURE — 3074F SYST BP LT 130 MM HG: CPT | Performed by: UROLOGY

## 2024-08-16 PROCEDURE — 3078F DIAST BP <80 MM HG: CPT | Performed by: UROLOGY

## 2024-08-16 NOTE — TELEPHONE ENCOUNTER
- see 8/16 NV note, pt here today for cath change, as directed by AMBAR in 7/30 OV.    - per Granddaughter, Caterina, and pt's son Oksar, pt has been cleared for surgery by a Dr. Joshua Faustin, and family wishes pt to be scheduled asap.  I explained that Dr. Blanchard would have to receive communication from Dr. Faustin that pt cleared, but that I would forward the information to Dr. Blanchard, and he could reach out.    - Granddaughter also mentioned that a  (She was uncertain of which) states pt should have catheter removed next week.  I stated that I would ask Dr. Blanchard on direction re: next steps with catheter.  I explained that someone would reach out to them re: both, and they acknowledged understanding of all, and denied further questions.  - routed to [facility], surgical scheduling and urostaff

## 2024-08-16 NOTE — PROGRESS NOTES
- per Dr. Blanchard, pt presents in wheelchair for monthly cath changed as directed at [facility] (see note below).  Pt accompanied by spouse and granddaughter Caterina.    - Pt identity verified with name & . Procedure explained.  - Pt positions self on exam table; draped for privacy to lower sweats to mid-thigh; disconnect/discard stat lock/tubing/left legbag; aspirate 9ml from balloon & slowly removed 16 Fr coude catheter; prep penis with betadine; instill 2% Lido-gel 2-3min for pain management; insert 16Fr coude catheter; connect to night bag per pt request; secure with stat-lock to top of right thigh; pt assist X1 from exam table.  - S/w granddaughter caterina and via phone to son, per both, pt has been cleared for anesthesia by Dr. Joshua Faustin, and they would like to proceed with surgery.  They also stated that pt was directed by their dr. (They were uncertain of the name) to have catheter removed next week.  I explained that I would send a message to Dr. Blanchard asking for next steps regarding the catheter and also about the clearance for anesthesia, but that Dr. Blanchard would need to speak to Dr. Faustin or receive communication from Dr. Faustin that pt cleared for sx.  Both acknowledged understanding of all .  - pt redressed, with assist x 2, pt transferred back to wheelchair and brought to lobby.  - see 24 schedule surgery TE, routed the above questions/information to [facility], surgical scheduling and urostaff.      [facility]  note  Recommend:  - He has an appointment tomorrow with his cardiologist.  Ideally, he would require cystoscopy under anesthesia and laser incision of bladder neck contracture.  If he can be cleared from a cardiac standpoint to proceed with this and stop potential anticoagulants or antiplatelet medications we can proceed in the next 2 to 3 weeks.  If cardiology deems that it is best to wait he would need to return on the  or  of next month to replace his Delong catheter.  Reviewed this plan with  patient and his family members and they understand and agree.

## 2024-08-20 NOTE — TELEPHONE ENCOUNTER
I have contacted Mr.Patel benny Schmitt and we have scheduled surgery date for 9/4/2024 at 10:15 am with . Patient son was given a Nurse visit appointment for 8/27/2024 for Urine Culture specimen form Catheter per [facility]. In nurse visit notes I have asked for Nurses to remind family to have labs done same day after nurse visit. Patients son was also informed that Mr. Giuseppe Faustin needs Cardiac Clearance per [facility].     Surgery Scheduled   September 4, 2024 at 10:15 am with    At the [facility]    Your have an appointment for 8/27/2024 at  office with the Nurses only to collect a urine sample from the Delong Catheter. After your Nurse visit you are asked to go down to the Lab to get blood work that needs to be completed 7-10 days prior to surgery    Lastly you are in need to get a Cardiac Clearance and to me able to Hold Aspirin 7 days prior to surgery. You must get this approved by cardiology all blood thinners even if they are low dose they must be held.

## 2024-08-20 NOTE — TELEPHONE ENCOUNTER
Please schedule him as follows:  Diagnosis: Bladder neck contracture, urinary retention    Procedure: Cystoscopy, incision of bladder neck contracture  Time: 1 hour  Anesthesia: General  Antibiotics on-call to the operating room: Ancef 2 g IV on-call to the OR  Preoperative labs: Urine culture which will need to be obtained from her catheter, CBC, BMP    He requires preoperative cardiac clearance as he recently had open heart surgery.

## 2024-08-22 RX ORDER — MELATONIN
1000 DAILY
COMMUNITY

## 2024-08-22 NOTE — TELEPHONE ENCOUNTER
Closed   8/14/2024  6:37 PM  Close reason: Other   Note from payer: The payer will call or fax you with the PA outcome when it has been determined.   Payer: [employer] e[employer] Off Ramp Case ID: [id number]

## 2024-08-26 ENCOUNTER — TELEPHONE (OUTPATIENT)
Facility: LOCATION | Age: 79
End: 2024-08-26

## 2024-08-26 NOTE — TELEPHONE ENCOUNTER
MARIBELL Oliva from [facility] called to let  know patient will potentially be discharged from home health week of September 9th.  Patient is waiting for call back from cardiac rehab as to when he can start rehab.

## 2024-08-27 ENCOUNTER — NURSE ONLY (OUTPATIENT)
Dept: SURGERY | Facility: CLINIC | Age: 79
End: 2024-08-27

## 2024-08-27 ENCOUNTER — LAB ENCOUNTER (OUTPATIENT)
Dept: LAB | Facility: HOSPITAL | Age: 79
End: 2024-08-27
Attending: UROLOGY
Payer: MEDICARE

## 2024-08-27 DIAGNOSIS — N39.0 RECURRENT UTI: Primary | ICD-10-CM

## 2024-08-27 LAB
ANION GAP SERPL CALC-SCNC: 5 MMOL/L (ref 0–18)
BASOPHILS # BLD AUTO: 0.03 X10(3) UL (ref 0–0.2)
BASOPHILS NFR BLD AUTO: 0.5 %
BUN BLD-MCNC: 15 MG/DL (ref 9–23)
BUN/CREAT SERPL: 12.5 (ref 10–20)
CALCIUM BLD-MCNC: 9.5 MG/DL (ref 8.7–10.4)
CHLORIDE SERPL-SCNC: 102 MMOL/L (ref 98–112)
CO2 SERPL-SCNC: 28 MMOL/L (ref 21–32)
CREAT BLD-MCNC: 1.2 MG/DL
DEPRECATED RDW RBC AUTO: 52.2 FL (ref 35.1–46.3)
EGFRCR SERPLBLD CKD-EPI 2021: 61 ML/MIN/1.73M2 (ref 60–?)
EOSINOPHIL # BLD AUTO: 0.17 X10(3) UL (ref 0–0.7)
EOSINOPHIL NFR BLD AUTO: 2.6 %
ERYTHROCYTE [DISTWIDTH] IN BLOOD BY AUTOMATED COUNT: 16.3 % (ref 11–15)
FASTING STATUS PATIENT QL REPORTED: NO
GLUCOSE BLD-MCNC: 187 MG/DL (ref 70–99)
HCT VFR BLD AUTO: 34.8 %
HGB BLD-MCNC: 11.3 G/DL
IMM GRANULOCYTES # BLD AUTO: 0.02 X10(3) UL (ref 0–1)
IMM GRANULOCYTES NFR BLD: 0.3 %
LYMPHOCYTES # BLD AUTO: 1.63 X10(3) UL (ref 1–4)
LYMPHOCYTES NFR BLD AUTO: 25.1 %
MCH RBC QN AUTO: 28.3 PG (ref 26–34)
MCHC RBC AUTO-ENTMCNC: 32.5 G/DL (ref 31–37)
MCV RBC AUTO: 87.2 FL
MONOCYTES # BLD AUTO: 0.58 X10(3) UL (ref 0.1–1)
MONOCYTES NFR BLD AUTO: 8.9 %
NEUTROPHILS # BLD AUTO: 4.06 X10 (3) UL (ref 1.5–7.7)
NEUTROPHILS # BLD AUTO: 4.06 X10(3) UL (ref 1.5–7.7)
NEUTROPHILS NFR BLD AUTO: 62.6 %
OSMOLALITY SERPL CALC.SUM OF ELEC: 286 MOSM/KG (ref 275–295)
PLATELET # BLD AUTO: 224 10(3)UL (ref 150–450)
POTASSIUM SERPL-SCNC: 4.9 MMOL/L (ref 3.5–5.1)
RBC # BLD AUTO: 3.99 X10(6)UL
SODIUM SERPL-SCNC: 135 MMOL/L (ref 136–145)
WBC # BLD AUTO: 6.5 X10(3) UL (ref 4–11)

## 2024-08-27 PROCEDURE — 85025 COMPLETE CBC W/AUTO DIFF WBC: CPT | Performed by: UROLOGY

## 2024-08-27 PROCEDURE — 80048 BASIC METABOLIC PNL TOTAL CA: CPT | Performed by: UROLOGY

## 2024-08-27 PROCEDURE — 36415 COLL VENOUS BLD VENIPUNCTURE: CPT | Performed by: UROLOGY

## 2024-08-27 NOTE — PROGRESS NOTES
-Pt presents per w/c w/ wife & Dtr; identity verified with name & .  -Assist X1 onto exam table; draped for privacy to lower sweats to midthigh; damian/ tubing connection cleaned w/ alcohol wipe & ; allow urine from catheter to free-fall into specimen cup & cap; assist to redress & transfer to w/c.  -Encounter complete.

## 2024-08-28 ENCOUNTER — TELEPHONE (OUTPATIENT)
Dept: SURGERY | Facility: CLINIC | Age: 79
End: 2024-08-28

## 2024-08-28 RX ORDER — CEFADROXIL 500 MG/1
500 CAPSULE ORAL 2 TIMES DAILY
Qty: 10 CAPSULE | Refills: 0 | Status: SHIPPED | OUTPATIENT
Start: 2024-08-28 | End: 2024-09-02

## 2024-08-28 NOTE — TELEPHONE ENCOUNTER
Called patient, verified name and . I let patient know urine test results and he needs to start taking antibiotics sent to his pharmacy.   Patient agreed, verbalized understandings and has no further questions.

## 2024-09-04 ENCOUNTER — TELEPHONE (OUTPATIENT)
Dept: SURGERY | Facility: CLINIC | Age: 79
End: 2024-09-04

## 2024-09-04 ENCOUNTER — HOSPITAL ENCOUNTER (OUTPATIENT)
Facility: HOSPITAL | Age: 79
Setting detail: HOSPITAL OUTPATIENT SURGERY
Discharge: HOME | End: 2024-09-04
Attending: UROLOGY | Admitting: UROLOGY
Payer: MEDICARE

## 2024-09-04 ENCOUNTER — ANESTHESIA (OUTPATIENT)
Dept: SURGERY | Facility: HOSPITAL | Age: 79
End: 2024-09-04
Payer: MEDICARE

## 2024-09-04 ENCOUNTER — ANESTHESIA EVENT (OUTPATIENT)
Dept: SURGERY | Facility: HOSPITAL | Age: 79
End: 2024-09-04
Payer: MEDICARE

## 2024-09-04 VITALS
HEART RATE: 71 BPM | DIASTOLIC BLOOD PRESSURE: 70 MMHG | RESPIRATION RATE: 16 BRPM | BODY MASS INDEX: 27.64 KG/M2 | SYSTOLIC BLOOD PRESSURE: 128 MMHG | OXYGEN SATURATION: 96 % | HEIGHT: 63 IN | WEIGHT: 156 LBS | TEMPERATURE: 98 F

## 2024-09-04 DIAGNOSIS — R25.1 TREMORS OF NERVOUS SYSTEM: ICD-10-CM

## 2024-09-04 DIAGNOSIS — E05.90 HYPERTHYROIDISM: Chronic | ICD-10-CM

## 2024-09-04 LAB
GLUCOSE BLDC GLUCOMTR-MCNC: 100 MG/DL (ref 70–99)
GLUCOSE BLDC GLUCOMTR-MCNC: 119 MG/DL (ref 70–99)

## 2024-09-04 PROCEDURE — 52276 CYSTOSCOPY AND TREATMENT: CPT | Performed by: UROLOGY

## 2024-09-04 PROCEDURE — 0T7C8ZZ DILATION OF BLADDER NECK, VIA NATURAL OR ARTIFICIAL OPENING ENDOSCOPIC: ICD-10-PCS | Performed by: UROLOGY

## 2024-09-04 RX ORDER — NICOTINE POLACRILEX 4 MG
30 LOZENGE BUCCAL
Status: DISCONTINUED | OUTPATIENT
Start: 2024-09-04 | End: 2024-09-04

## 2024-09-04 RX ORDER — NICOTINE POLACRILEX 4 MG
15 LOZENGE BUCCAL
Status: DISCONTINUED | OUTPATIENT
Start: 2024-09-04 | End: 2024-09-04

## 2024-09-04 RX ORDER — SODIUM CHLORIDE, SODIUM LACTATE, POTASSIUM CHLORIDE, CALCIUM CHLORIDE 600; 310; 30; 20 MG/100ML; MG/100ML; MG/100ML; MG/100ML
INJECTION, SOLUTION INTRAVENOUS CONTINUOUS
Status: DISCONTINUED | OUTPATIENT
Start: 2024-09-04 | End: 2024-09-04

## 2024-09-04 RX ORDER — ONDANSETRON 2 MG/ML
INJECTION INTRAMUSCULAR; INTRAVENOUS AS NEEDED
Status: DISCONTINUED | OUTPATIENT
Start: 2024-09-04 | End: 2024-09-04 | Stop reason: SURG

## 2024-09-04 RX ORDER — LIDOCAINE HYDROCHLORIDE 10 MG/ML
INJECTION, SOLUTION EPIDURAL; INFILTRATION; INTRACAUDAL; PERINEURAL AS NEEDED
Status: DISCONTINUED | OUTPATIENT
Start: 2024-09-04 | End: 2024-09-04 | Stop reason: SURG

## 2024-09-04 RX ORDER — SULFAMETHOXAZOLE/TRIMETHOPRIM 800-160 MG
1 TABLET ORAL 2 TIMES DAILY
Qty: 6 TABLET | Refills: 0 | Status: SHIPPED | OUTPATIENT
Start: 2024-09-06 | End: 2024-09-09

## 2024-09-04 RX ORDER — DEXTROSE MONOHYDRATE 25 G/50ML
50 INJECTION, SOLUTION INTRAVENOUS
Status: DISCONTINUED | OUTPATIENT
Start: 2024-09-04 | End: 2024-09-04

## 2024-09-04 RX ORDER — ONDANSETRON 2 MG/ML
4 INJECTION INTRAMUSCULAR; INTRAVENOUS EVERY 6 HOURS PRN
Status: DISCONTINUED | OUTPATIENT
Start: 2024-09-04 | End: 2024-09-04

## 2024-09-04 RX ORDER — METOPROLOL TARTRATE 25 MG/1
25 TABLET, FILM COATED ORAL ONCE AS NEEDED
Status: DISCONTINUED | OUTPATIENT
Start: 2024-09-04 | End: 2024-09-04 | Stop reason: HOSPADM

## 2024-09-04 RX ORDER — METOCLOPRAMIDE HYDROCHLORIDE 5 MG/ML
10 INJECTION INTRAMUSCULAR; INTRAVENOUS EVERY 8 HOURS PRN
Status: DISCONTINUED | OUTPATIENT
Start: 2024-09-04 | End: 2024-09-04

## 2024-09-04 RX ORDER — NALOXONE HYDROCHLORIDE 0.4 MG/ML
0.08 INJECTION, SOLUTION INTRAMUSCULAR; INTRAVENOUS; SUBCUTANEOUS AS NEEDED
Status: DISCONTINUED | OUTPATIENT
Start: 2024-09-04 | End: 2024-09-04

## 2024-09-04 RX ORDER — LIDOCAINE HYDROCHLORIDE 20 MG/ML
JELLY TOPICAL AS NEEDED
Status: DISCONTINUED | OUTPATIENT
Start: 2024-09-04 | End: 2024-09-04 | Stop reason: HOSPADM

## 2024-09-04 RX ORDER — ACETAMINOPHEN 500 MG
1000 TABLET ORAL ONCE
Status: COMPLETED | OUTPATIENT
Start: 2024-09-04 | End: 2024-09-04

## 2024-09-04 RX ADMIN — ONDANSETRON 4 MG: 2 INJECTION INTRAMUSCULAR; INTRAVENOUS at 13:59:00

## 2024-09-04 RX ADMIN — SODIUM CHLORIDE, SODIUM LACTATE, POTASSIUM CHLORIDE, CALCIUM CHLORIDE: 600; 310; 30; 20 INJECTION, SOLUTION INTRAVENOUS at 14:26:00

## 2024-09-04 RX ADMIN — LIDOCAINE HYDROCHLORIDE 50 MG: 10 INJECTION, SOLUTION EPIDURAL; INFILTRATION; INTRACAUDAL; PERINEURAL at 13:54:00

## 2024-09-04 RX ADMIN — SODIUM CHLORIDE, SODIUM LACTATE, POTASSIUM CHLORIDE, CALCIUM CHLORIDE: 600; 310; 30; 20 INJECTION, SOLUTION INTRAVENOUS at 14:03:00

## 2024-09-04 NOTE — TELEPHONE ENCOUNTER
Urology staff,  This patient needs a nurse visit in 1 week to remove his Delong catheter.  He does not need to be filled just remove his catheter.  I need to see him in the office for follow-up in 1 month.  Please call the patient and arrange.

## 2024-09-04 NOTE — INTERVAL H&P NOTE
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  I discussed with the patient and/or legal representative the potential benefits, risks and side effects of this procedure; the likelihood of the patient achieving goals; and potential problems that might occur during recuperation.  I discussed reasonable alternatives to the procedure, including risks, benefits and side effects related to the alternatives and risks related to not receiving this procedure.  We will proceed with procedure as planned.

## 2024-09-04 NOTE — ANESTHESIA POSTPROCEDURE EVALUATION
Patient: Giuseppe Tavera Faustin    Procedure Summary       Date: 09/04/24 Room / Location: [facility] MAIN OR  / [facility] MAIN OR    Anesthesia Start: 1349 Anesthesia Stop: 1440    Procedure: Cystoscopy, incision of bladder neck contracture (Urethra) Diagnosis:       Urinary retention      Bladder neck contracture      (Urinary retention [R33.9]Bladder neck contracture [N32.0])    Surgeons: Kevin Blanchard MD Anesthesiologist: Les Ayala MD    Anesthesia Type: general ASA Status: 4            Anesthesia Type: general    Vitals Value Taken Time   /70 09/04/24 1447   Temp 98.8 °F (37.1 °C) 09/04/24 1437   Pulse 74 09/04/24 1453   Resp 15 09/04/24 1453   SpO2 96 % 09/04/24 1453   Vitals shown include unfiled device data.    EM AN Post Evaluation:   Patient Evaluated in PACU  Level of Consciousness: awake and awake and alert  Pain Score: 0  Pain Management: adequateYes    Nausea/Vomiting: none  Cardiovascular Status: acceptable, blood pressure returned to baseline and hemodynamically stable  Respiratory Status: acceptable  Postoperative Hydration acceptable  Comments: Wide awake, voice intact, minimal pain.  No recall.  Smiling.  Vitals stable.  No apparent complications from anesthesia.      Les Ayala MD  9/4/2024 2:53 PM

## 2024-09-04 NOTE — TELEPHONE ENCOUNTER
Pharmacy is requesting refill    Propranolol HCl ER 80MG oral capsule extended release 24HR (Inderal LA)

## 2024-09-04 NOTE — ANESTHESIA PREPROCEDURE EVALUATION
Anesthesia PreOp Note    HPI:     Giuseppe Faustin is a 79 year old male who presents for preoperative consultation requested by: Kevin Blanchard MD    Date of Surgery: 9/4/2024    Procedure(s):  Cystoscopy, incision of bladder neck contracture  Indication: Urinary retention [R33.9]  Bladder neck contracture [N32.0]    Relevant Problems   No relevant active problems       NPO:  Last Liquid Consumption Date: 09/04/24  Last Liquid Consumption Time: 0630 (water with meds)  Last Solid Consumption Date: 09/03/24  Last Solid Consumption Time: 1830  Last Liquid Consumption Date: 09/04/24          History Review:  Patient Active Problem List    Diagnosis Date Noted    S/P CABG (coronary artery bypass graft) 07/12/2024    Melena 07/07/2024    Upper GI bleed 07/07/2024    Primary osteoarthritis of both knees 06/18/2024    Coronary artery disease involving native coronary artery of native heart without angina pectoris 04/22/2024    Moderate persistent asthma without complication ([facility]) 03/04/2024    Age-related cataract of right eye 11/14/2023    CKD (chronic kidney disease) stage 3, GFR 30-59 ml/min ([facility]) 09/07/2023    Gastroesophageal reflux disease without esophagitis 09/07/2023    Hyperlipidemia associated with type 2 diabetes mellitus ([facility]) 09/07/2023    Hypertension associated with type 2 diabetes mellitus ([facility]) 09/07/2023    Type 2 diabetes mellitus with stage 3a chronic kidney disease, without long-term current use of insulin ([facility]) 07/15/2019    Hyperthyroidism 06/22/2018    Vocal cord paralysis 11/10/2016       Past Medical History:    Adenomatous colon polyp    Arthritis    Benign prostatic hypertrophy with urinary retention    Cataract    Chronic cough    Chronic sinusitis    Diabetes ([facility])    Esophageal reflux    Essential and other specified forms of tremor    Essential hypertension    Gall stones    High blood pressure    High cholesterol    History of stomach ulcers    Hoarseness, chronic    HTN (hypertension)     Osteoarthrosis, localized, primary, knee, left    Osteoarthrosis, localized, primary, knee, right    Osteoarthrosis, unspecified whether generalized or localized, unspecified site    Pain in joints    Painful swallowing    Personal history of colonic polyps    PONV (postoperative nausea and vomiting)    Problems with swallowing    trouble with solid    Second degree hemorrhoids    Shortness of breath    Sleep disturbance    Sputum production    Visual impairment    reading glasses    Vocal cord paralysis    Wheezing       Past Surgical History:   Procedure Laterality Date    Cabg      Cholecystectomy  07/2015    Colonoscopy  10/2012    3 mm adenoma. repeat 5 yrs, Dr Sarabia    Colonoscopy,biopsy  10/22/2012    Procedure: COLONOSCOPY, POSSIBLE BIOPSY, POSSIBLE POLYPECTOMY 55547;  Surgeon: Joe Sarabia MD;  Location: [facility]    Cystoscopy,insert ureteral stent  09/01/2015    TURP    Laparoscopic cholecystectomy N/A 07/10/2015    Procedure: LAPAROSCOPIC CHOLECYSTECTOMY;  Surgeon: Tania Schmidt MD;  Location:  MAIN OR    Other surgical history  09/01/2015    CYSTOSCOPY, TRANS URETHRAL RESECTION OF PROSTATE    Patient documented not to have experienced any of the following events  10/22/2012    Procedure: COLONOSCOPY, POSSIBLE BIOPSY, POSSIBLE POLYPECTOMY 37309;  Surgeon: Joe Sarabia MD;  Location: [facility]    Patient withough preoperative order for iv antibiotic surgical site infection prophylaxis.  10/22/2012    Procedure: COLONOSCOPY, POSSIBLE BIOPSY, POSSIBLE POLYPECTOMY 86361;  Surgeon: Joe Sarabia MD;  Location: [facility]    Upper gi endoscopy,exam  09/2020       Medications Prior to Admission   Medication Sig Dispense Refill Last Dose    Multiple Vitamins-Minerals (CENTRUM SILVER 50+MEN OR) Take by mouth daily.   9/3/2024 at 0730    cholecalciferol (VITAMIN D3) 125 MCG (5000 UT) Oral Cap Take 1 capsule (5,000 Units total) by mouth  daily.   9/3/2024 at 0730    cyanocobalamin 1000 MCG Oral Tab Take 1 tablet (1,000 mcg total) by mouth daily.   9/3/2024 at 1930    Metoprolol Tartrate 37.5 MG Oral Tab Take 37.5 mg by mouth 2x Daily(Beta Blocker). 60 tablet 3 9/3/2024 at 1930    atorvastatin 40 MG Oral Tab Take 1 tablet (40 mg total) by mouth nightly. FOR CHOLESTEROL. (Patient taking differently: Take 10 mg by mouth nightly. FOR CHOLESTEROL.) 90 tablet 9 2024 at 0630    metFORMIN  MG Oral Tablet 24 Hr Take 1 tablet (750 mg total) by mouth 2 (two) times daily with meals. FOR DIABETES. 180 tablet 9 2024    primidone 50 MG Oral Tab Take 1 tablet (50 mg total) by mouth 2 (two) times daily. FOR TREMORS. 180 tablet 9 2024 at 0630    pantoprazole 40 MG Oral Tab EC Take 1 tablet (40 mg total) by mouth 2 (two) times daily before meals. 180 tablet 4 9/3/2024 at 1930    telmisartan 40 MG Oral Tab Take 1 tablet (40 mg total) by mouth nightly. FOR BLOOD PRESSURE. (Patient taking differently: Take 2 tablets (80 mg total) by mouth nightly. FOR BLOOD PRESSURE.) 90 tablet 9 2024 at 0630    ascorbic acid 500 MG Oral Tab Take 1 tablet (500 mg total) by mouth daily. 30 tablet 0 9/3/2024 at 0730    aspirin ( ASPIRIN) 81 MG Oral Tab EC Take 1 tablet (81 mg total) by mouth daily. 90 tablet 9 24    sulfamethoxazole-trimethoprim -160 MG Oral Tab per tablet Take 1 tablet by mouth 2 (two) times daily for 7 days. 14 tablet 0 2024    [] cefadroxil 500 MG Oral Cap Take 1 capsule (500 mg total) by mouth 2 (two) times daily for 10 doses. 10 capsule 0 2024    HYDROcodone-acetaminophen (NORCO) 7.5-325 MG Oral Tab Take 1 tablet by mouth every 6 (six) hours as needed for Pain. 28 tablet 0 More than a month     Current Facility-Administered Medications Ordered in Epic   Medication Dose Route Frequency Provider Last Rate Last Admin    lactated ringers infusion   Intravenous Continuous Badwan, Khalid, MD 20 mL/hr at 24 1057 New  Bag at 24 1057    metoprolol tartrate (Lopressor) tab 25 mg  25 mg Oral Once PRN Kevin Blanchard MD        ceFAZolin (Ancef) 2g in 10mL IV syringe premix  2 g Intravenous Once Kevin Blanchard MD         No current [facility]-ordered outpatient medications on file.       No Known Allergies    Family History   Problem Relation Age of Onset    Heart Attack Father     Hypertension Father      Social History     Socioeconomic History    Marital status:    Tobacco Use    Smoking status: Former     Current packs/day: 0.00     Average packs/day: 0.5 packs/day for 20.0 years (10.0 ttl pk-yrs)     Types: Cigarettes     Start date: 1964     Quit date: 1984     Years since quittin.2     Passive exposure: Past    Smokeless tobacco: Never   Vaping Use    Vaping status: Never Used   Substance and Sexual Activity    Alcohol use: Never    Drug use: Never   Other Topics Concern    Caffeine Concern Yes     Comment: 1 tea daily    Exercise Yes     Comment: daily gym       Available pre-op labs reviewed.  Lab Results   Component Value Date    WBC 6.5 2024    RBC 3.99 2024    HGB 11.3 (L) 2024    HCT 34.8 (L) 2024    MCV 87.2 2024    MCH 28.3 2024    MCHC 32.5 2024    RDW 16.3 (H) 2024    .0 2024     Lab Results   Component Value Date     (L) 2024    K 4.9 2024     2024    CO2 28.0 2024    BUN 15 2024    CREATSERUM 1.20 2024     (H) 2024    PGLU 119 (H) 2024    CA 9.5 2024          Vital Signs:  Body mass index is 27.63 kg/m².   height is 1.6 m (5' 3\") and weight is 70.8 kg (156 lb). His oral temperature is 97.8 °F (36.6 °C). His blood pressure is 148/76 and his pulse is 61. His respiration is 16 and oxygen saturation is 100%.   Vitals:    24 1059 24 1036   BP:  148/76   Pulse:  61   Resp:  16   Temp:  97.8 °F (36.6 °C)   TempSrc:  Oral   SpO2:  100%   Weight: 63.5 kg (140  lb) 70.8 kg (156 lb)   Height: 1.6 m (5' 3\") 1.6 m (5' 3\")        Anesthesia Evaluation     Patient summary reviewed and Nursing notes reviewed    History of anesthetic complications   Airway   Mallampati: III  TM distance: <3 FB  Neck ROM: full  Dental      Pulmonary - normal exam    breath sounds clear to auscultation  (+) asthma, shortness of breath  Cardiovascular   Exercise tolerance: poor  (+) hypertension, CAD, murmur    ECG reviewed  Rhythm: regular  Rate: normal    Neuro/Psych      GI/Hepatic/Renal    (+) GERD    Endo/Other    (+) diabetes mellitus  Abdominal      Other findings: Voice intact            Anesthesia Plan:   ASA:  4  Plan:   General  Airway:  LMA  Post-op Pain Management: IV analgesics  Plan Comments: BS = 119, h/o vocal cord paralysis from 7/3/2024 CABG, voice intact today.  Can barely walk. 2 steps max.  Informed Consent Plan and Risks Discussed With:  Patient and child/children  Use of Blood Products Discussed With:  Patient and child/children      I have informed Giuseppe Faustin and/or legal guardian or family member of the nature of the anesthetic plan, benefits, risks including possible dental damage if relevant, major complications, and any alternative forms of anesthetic management.   All of the patient's questions were answered to the best of my ability. The patient desires the anesthetic management as planned.  Les Ayala MD  9/4/2024 1:13 PM  Present on Admission:  **None**

## 2024-09-04 NOTE — DISCHARGE INSTRUCTIONS
My office will arrange follow-up for him in 1 week to remove his Delong catheter.  Instructions for the patient to resume his aspirin tomorrow if the urine is clear.  If it continues to be bloody, he should hold it for 1 additional day till Friday  He should complete the 7-day course of Bactrim that I prescribed on August 29 and take 3 additional days that I provided him now in continuation.    Slight burning on urination may be experienced with the first few urinations.  Scant blood may appear in the urine and will clear in a few days.  Drinking water and clear liquids is encouraged.    If you have a catheter:  Your catheter remains in place because a balloon close to the catheter tip was inflated with fluid immediately after the catheter’s insertion.  If you have been instructed to remove your catheter at home by your doctor, cut the catheter at the short Y (see black line on photo below). After all the water is drained, gently pull the catheter out. If you have any questions or if you feel pain or resistance when removing the catheter STOP and call your doctor.  After removing the catheter,  it is normal to experience some stinging or burning with urination.    Call your doctor for:  Difficulty in urination, inability to urinate, excessive bleeding/discoloration  Severe pain not controlled by pain medication.      Persistent nausea and vomiting.                        Temperature over 101 F.   Skin rash and general body itching.  Other concerns or questions not addressed in these instructions.    Follow any additional instructions given by the surgeon.   IN CASE OF EMERGENCY GO TO THE NEAREST EMERGENCY ROOM.       [facility] HOME CARE INSTRUCTIONS: POST-OP ANESTHESIA  The medication that you received for sedation or general anesthesia can last up to 24 hours. Your judgment and reflexes may be altered, even if you feel like your normal self.      We Recommend:   Do not drive any motor vehicle or bicycle   Avoid mowing  the lawn, playing sports, or working with power tools/applicances (power saws, electric knives or mixers)   That you have someone stay with you on your first night home   Do not drink alcohol or take sleeping pills or tranquilizers   Do not sign legal documents within 24 hours of your procedure   If you had a nerve block for your surgery, take extra care not to put any pressure on your arm or hand for 24 hours    It is normal:  For you to have a sore throat if you had a breathing tube during surgery (while you were asleep!). The sore throat should get better within 48 hours. You can gargle with warm salt water (1/2 tsp in 4 oz warm water) or use a throat lozenge for comfort  To feel muscle aches or soreness especially in the abdomen, chest or neck. The achy feeling should go away in the next 24 hours  To feel weak, sleepy or \"wiped out\". Your should start feeling better in the next 24 hours.   To experience mild discomforts such as sore lip or tongue, headache, cramps, gas pains or a bloated feeling in your abdomen.   To experience mild back pain or soreness for a day or two if you had spinal or epidural anesthesia.   If you had laparoscopic surgery, to feel shoulder pain or discomfort on the day of surgery.   For some patients to have nausea after surgery/anesthesia    If you feel nausea or experience vomiting:   Try to move around less.   Eat less than usual or drink only liquids until the next morning   Nausea should resolve in about 24 hours    If you have a problem when you are at home:    Call your surgeons office     Discharge Instructions: After Your Surgery  You’ve just had surgery. During surgery, you were given medicine called anesthesia to keep you relaxed and free of pain. After surgery, you may have some pain or nausea. This is common. Here are some tips for feeling better and getting well after surgery.   Going home  Your healthcare provider will show you how to take care of yourself when you go home.  They'll also answer your questions. Have an adult family member or friend drive you home. For the first 24 hours after your surgery:   Don't drive or use heavy equipment.  Don't make important decisions or sign legal papers.  Take medicines as directed.  Don't drink alcohol.  Have someone stay with you, if needed. They can watch for problems and help keep you safe.  Be sure to go to all follow-up visits with your healthcare provider. And rest after your surgery for as long as your provider tells you to.   Coping with pain  If you have pain after surgery, pain medicine will help you feel better. Take it as directed, before pain becomes severe. Also, ask your healthcare provider or pharmacist about other ways to control pain. This might be with heat, ice, or relaxation. And follow any other instructions your surgeon or nurse gives you.      Stay on schedule with your medicine.      Tips for taking pain medicine  To get the best relief possible, remember these points:   Pain medicines can upset your stomach. Taking them with a little food may help.  Most pain relievers taken by mouth need at least 20 to 30 minutes to start to work.  Don't wait till your pain becomes severe before you take your medicine. Try to time your medicine so that you can take it before starting an activity. This might be before you get dressed, go for a walk, or sit down for dinner.  Constipation is a common side effect of some pain medicines. Call your healthcare provider before taking any medicines such as laxatives or stool softeners to help ease constipation. Also ask if you should skip any foods. Drinking lots of fluids and eating foods such as fruits and vegetables that are high in fiber can also help. Remember, don't take laxatives unless your surgeon has prescribed them.  Drinking alcohol and taking pain medicine can cause dizziness and slow your breathing. It can even be deadly. Don't drink alcohol while taking pain medicine.  Pain  medicine can make you react more slowly to things. Don't drive or run machinery while taking pain medicine.  Your healthcare provider may tell you to take acetaminophen to help ease your pain. Ask them how much you're supposed to take each day. Acetaminophen or other pain relievers may interact with your prescription medicines or other over-the-counter (OTC) medicines. Some prescription medicines have acetaminophen and other ingredients in them. Using both prescription and OTC acetaminophen for pain can cause you to accidentally overdose. Read the labels on your OTC medicines with care. This will help you to clearly know the list of ingredients, how much to take, and any warnings. It may also help you not take too much acetaminophen. If you have questions or don't understand the information, ask your pharmacist or healthcare provider to explain it to you before you take the OTC medicine.   Managing nausea  Some people have an upset stomach (nausea) after surgery. This is often because of anesthesia, pain, or pain medicine, less movement of food in the stomach, or the stress of surgery. These tips will help you handle nausea and eat healthy foods as you get better. If you were on a special food plan before surgery, ask your healthcare provider if you should follow it while you get better. Check with your provider on how your eating should progress. It may depend on the surgery you had. These general tips may help:   Don't push yourself to eat. Your body will tell you when to eat and how much.  Start off with clear liquids and soup. They're easier to digest.  Next try semi-solid foods as you feel ready. These include mashed potatoes, applesauce, and gelatin.  Slowly move to solid foods. Don’t eat fatty, rich, or spicy foods at first.  Don't force yourself to have 3 large meals a day. Instead eat smaller amounts more often.  Take pain medicines with a small amount of solid food, such as crackers or toast. This helps  prevent nausea.  When to call your healthcare provider  Call your healthcare provider right away if you have any of these:   You still have too much pain, or the pain gets worse, after taking the medicine. The medicine may not be strong enough. Or there may be a complication from the surgery.  You feel too sleepy, dizzy, or groggy. The medicine may be too strong.  Side effects such as nausea or vomiting. Your healthcare provider may advise taking other medicines to .  Skin changes such as rash, itching, or hives. This may mean you have an allergic reaction. Your provider may advise taking other medicines.  The incision looks different (for instance, part of it opens up).  Bleeding or fluid leaking from the incision site, and weren't told to expect that.  Fever of 100.4°F (38°C) or higher, or as directed by your provider.  Call 911  Call 911 right away if you have:   Trouble breathing  Facial swelling     If you have obstructive sleep apnea   You were given anesthesia medicine during surgery to keep you comfortable and free of pain. After surgery, you may have more apnea spells because of this medicine and other medicines you were given. The spells may last longer than normal.    At home:  Keep using the continuous positive airway pressure (CPAP) device when you sleep. Unless your healthcare provider tells you not to, use it when you sleep, day or night. CPAP is a common device used to treat obstructive sleep apnea.  Talk with your provider before taking any pain medicine, muscle relaxants, or sedatives. Your provider will tell you about the possible dangers of taking these medicines.  Contact your provider if your sleeping changes a lot even when taking medicines as directed.  [employer] last reviewed this educational content on 10/1/2021  © 7543-5983 [employer]. All rights reserved. This information is not intended as a substitute for professional medical care. Always follow your healthcare  professional's instructions.

## 2024-09-04 NOTE — H&P
78-year-old male accompanied by his son and wife.  I was counseled in the operating room after the patient was under anesthesia in anticipation of coronary artery bypass grafting.  A Delong catheter could not be placed.  Bedside flexible cystoscopy demonstrated a bladder neck contracture.  He reportedly has a history of TURP in the remote past.  The bladder neck contracture was dilated at the bedside and a 12 [place] catheter was placed.  He was discharged after an uneventful postoperative recovery with the indwelling catheter in place.  He presented July 19 to [facility] with symptoms suggestive of urosepsis and was admitted for 3 days.  His Delong catheter was replaced at that time.  Son states that a voiding trial was done at the time and the patient was unable to void and as such the catheter was replaced.  No constipation.  No issues with the catheter at present.        HISTORY:  Past Medical History       Past Medical History:    Adenomatous colon polyp    Arthritis    Benign prostatic hypertrophy with urinary retention    Chronic cough    Chronic sinusitis    Esophageal reflux    Essential and other specified forms of tremor    Essential hypertension    Gall stones    High blood pressure    Hoarseness, chronic    HTN (hypertension)    Osteoarthrosis, localized, primary, knee, left    Osteoarthrosis, localized, primary, knee, right    Osteoarthrosis, unspecified whether generalized or localized, unspecified site    Pain in joints    Painful swallowing    Personal history of colonic polyps    PONV (postoperative nausea and vomiting)    Prediabetes     borderline    Problems with swallowing     trouble with solid    Second degree hemorrhoids    Shortness of breath    Sleep apnea     no cpap    Sleep disturbance    Sputum production    Visual impairment     reading glasses    Vocal cord paralysis    Wheezing         Past Surgical History         Past Surgical History:   Procedure Laterality Date     Cabg        Cholecystectomy   2015    Colonoscopy   10/2012     3 mm adenoma. repeat 5 yrs, Dr Sarabia    Colonoscopy,biopsy   10/22/2012     Procedure: COLONOSCOPY, POSSIBLE BIOPSY, POSSIBLE POLYPECTOMY 59055;  Surgeon: Joe Sarabia MD;  Location: [facility]    Cystoscopy,insert ureteral stent   2015     TURP    Laparoscopic cholecystectomy N/A 07/10/2015     Procedure: LAPAROSCOPIC CHOLECYSTECTOMY;  Surgeon: Tania Schmidt MD;  Location:  MAIN OR    Other surgical history   2015     CYSTOSCOPY, TRANS URETHRAL RESECTION OF PROSTATE    Patient documented not to have experienced any of the following events   10/22/2012     Procedure: COLONOSCOPY, POSSIBLE BIOPSY, POSSIBLE POLYPECTOMY 62975;  Surgeon: Joe Sarabia MD;  Location: [facility]    Patient withough preoperative order for iv antibiotic surgical site infection prophylaxis.   10/22/2012     Procedure: COLONOSCOPY, POSSIBLE BIOPSY, POSSIBLE POLYPECTOMY 20863;  Surgeon: Joe Sarabia MD;  Location: [facility]    Upper gi endoscopy,exam   2020         Family History         Family History   Problem Relation Age of Onset    Heart Attack Father      Hypertension Father           Social History:   Short Social Hx on File   Social History            Socioeconomic History    Marital status:    Tobacco Use    Smoking status: Former       Current packs/day: 0.00       Average packs/day: 0.5 packs/day for 20.0 years (10.0 ttl pk-yrs)       Types: Cigarettes       Start date: 1964       Quit date: 1984       Years since quittin.1       Passive exposure: Past    Smokeless tobacco: Never   Vaping Use    Vaping status: Never Used   Substance and Sexual Activity    Alcohol use: No    Drug use: No   Other Topics Concern    Caffeine Concern Yes       Comment: 1 tea daily    Exercise Yes       Comment: daily gym   Social History Narrative     ** Merged History Encounter **                 2 kids     Retired           Diet: vegetarian     Exercise: nothing formal     Sleep: ok     Stress: low      Social Determinants of Health           Food Insecurity: No Food Insecurity (7/2/2024)     Food Insecurity      Food Insecurity: Never true   Transportation Needs: No Transportation Needs (7/2/2024)     Transportation Needs      Lack of Transportation: No   Housing Stability: Low Risk  (7/2/2024)     Housing Stability      Housing Instability: No            Medications (Active prior to today's visit):  Current Medications          Current Outpatient Medications   Medication Sig Dispense Refill    Lancets Does not apply Misc 1 Lancet fingerstick route  4 times daily before meals and nightly. Test before meals and nightly. 400 each 3    Blood Glucose Monitoring Suppl (ONETOUCH VERIO) w/Device Does not apply Kit 1 kit 4 (four) times daily before meals and nightly. 1 kit 3    Glucose Blood (ONETOUCH VERIO) In Vitro Strip 1 strip by In Vitro route 4 (four) times daily before meals and nightly. 400 strip 3    ascorbic acid 500 MG Oral Tab Take 1 tablet (500 mg total) by mouth daily. 30 tablet 0    multivitamin Oral Tab Take 1 tablet by mouth daily. 30 tablet 0    HYDROcodone-acetaminophen 5-325 MG Oral Tab Take 1 tablet by mouth every 6 (six) hours as needed for Pain. 30 tablet 0    amiodarone 200 MG Oral Tab Take 2 tablets (400 mg total) by mouth 2 (two) times daily for 5 days, THEN 1 tablet (200 mg total) 2 (two) times daily for 14 days. 48 tablet 0    metoprolol tartrate 37.5 MG Oral Tab Take 37.5 mg by mouth 2x Daily(Beta Blocker). 60 tablet 3    pantoprazole 40 MG Oral Tab EC Take 1 tablet (40 mg total) by mouth 2 (two) times daily before meals. 60 tablet 0    Propranolol HCl ER 80 MG Oral Capsule SR 24 Hr Take 1 capsule (80 mg total) by mouth daily. FOR BLOOD PRESSURE. 90 capsule 9    telmisartan 80 MG Oral Tab Take 1 tablet (80 mg total) by mouth daily. FOR BLOOD PRESSURE. STOP  ENALAPRIL DUE TO COUGH. 90 tablet 9    atorvastatin 10 MG Oral Tab Take 1 tablet (10 mg total) by mouth nightly. FOR CHOLESTEROL. 90 tablet 9    aspirin (KP ASPIRIN) 81 MG Oral Tab EC Take 1 tablet (81 mg total) by mouth daily. 90 tablet 9    Empagliflozin (JARDIANCE) 25 MG Oral Tab Take 1 tablet by mouth daily. FOR DIABETES. 90 tablet 9    metFORMIN  MG Oral Tablet 24 Hr Take 1 tablet (750 mg total) by mouth 2 (two) times daily with meals. FOR DIABETES. 180 tablet 9    methIMAzole 5 MG Oral Tab Take 1 tablet (5 mg total) by mouth daily. 90 tablet 0    fluticasone-salmeterol (ADVAIR DISKUS) 250-50 MCG/ACT Inhalation Aerosol Powder, Breath Activated Inhale 1 puff into the lungs every 12 (twelve) hours. 3 each 9    primidone 50 MG Oral Tab Take 1 tablet (50 mg total) by mouth 2 (two) times daily. FOR TREMORS. 180 tablet 9    Melatonin 10 MG Oral Tab Take 10 mg by mouth at bedtime. 90 tablet 1            Allergies:  Allergies   No Known Allergies           ROS:   Reviewed and otherwise normal     PHYSICAL EXAM:   Delong catheter in place draining clear yellow urine.  No apparent distress  Technologically grossly  Abdomen soft nontender nondistended  Phallus and testicle exams both unremarkable.  ASSESSMENT/PLAN:      Assessment       Encounter Diagnosis   Name Primary?    Bladder neck contracture Yes         Recommend:  - He has an appointment tomorrow with his cardiologist.  Ideally, he would require cystoscopy under anesthesia and laser incision of bladder neck contracture.  If he can be cleared from a cardiac standpoint to proceed with this and stop potential anticoagulants or antiplatelet medications we can proceed in the next 2 to 3 weeks.  If cardiology deems that it is best to wait he would need to return on the 18th or 19th of next month to replace his Delong catheter.  Reviewed this plan with patient and his family members and they understand and agree.

## 2024-09-04 NOTE — ANESTHESIA PROCEDURE NOTES
Airway  Date/Time: 9/4/2024 1:56 PM  Urgency: elective    Airway not difficult    General Information and Staff    Patient location during procedure: OR  Anesthesiologist: Les Ayala MD  Performed: anesthesiologist   Performed by: Les Ayala MD  Authorized by: Les Ayala MD      Indications and Patient Condition  Indications for airway management: anesthesia  Spontaneous ventilation: present  Sedation level: deep  Preoxygenated: yes  Patient position: sniffing  Mask difficulty assessment: 1 - vent by mask    Final Airway Details  Final airway type: supraglottic airway      Successful airway: classic  Size 3       Number of attempts at approach: 1    Additional Comments  Titrated induction, deeper with sevo, breathing spontaneously, LMA x 1 atraumatic and very gently

## 2024-09-05 NOTE — TELEPHONE ENCOUNTER
Spoke with patient and assisted with two appointments 1st nurse visit for damian removal 9/11 at 9 40am and a 1 month follow up with  10/8 at 11:30 am patient understood and call ended.

## 2024-09-06 RX ORDER — PROPRANOLOL HYDROCHLORIDE 80 MG/1
80 CAPSULE, EXTENDED RELEASE ORAL DAILY
Qty: 90 CAPSULE | Refills: 9 | OUTPATIENT
Start: 2024-09-06

## 2024-09-09 ENCOUNTER — TELEPHONE (OUTPATIENT)
Facility: LOCATION | Age: 79
End: 2024-09-09

## 2024-09-09 NOTE — TELEPHONE ENCOUNTER
Response to cancel request received from pharmacy.  Received: 3 days ago  Interface, [facility]  [facility] Refills  The pharmacy received the electronic cancel request, but could not cancel the prescription. Additional follow up tasks may be necessary based on the pharmacy response noted below.    Pharmacy Note: Prescription not found. Contact Pharmacy by other means          Pharmacy    [facility] [address] [place] [id number] [phone], [phone]   [address] [facility] [address]   Phone: [phone] Fax: [phone]   Hours: Not open 24 hours     Outpatient Medication Detail     Disp Refills Start End    Propranolol HCl ER 80 MG Oral Capsule SR 24 Hr (Discontinued) 90 capsule 9 6/18/2024 9/6/2024    Sig - Route: Take 1 capsule (80 mg total) by mouth daily. FOR BLOOD PRESSURE. - Oral    Sent to pharmacy as: Propranolol HCl ER 80 MG Oral Capsule Extended Release 24 Hour (INDERAL LA)    Reason for Discontinue:  Stop This Medication    E-Prescribing Status: Receipt confirmed by pharmacy (6/18/2024  1:17 PM CDT)    E-Cancel Status: Request denied by pharmacy (9/6/2024  4:28 PM CDT)       E-Cancel Status Note: Prescription not found. Contact Pharmacy by other means      Order Providers    Prescribing Provider Encounter Provider   Joshua Faustin MD Patel, Krunal, MD     Medication Notes         Graham Patel CPhT   9/6/2024  4:28 PM  discontinued at office visit on 08/07/2024              Associated Diagnoses    Hyperthyroidism      Tremors of nervous system        Encounter    View Encounter              This Order Has Been Discontinued    Order Status Reason By On   Discontinued  Stop This Medication Graham Patel CPhT 9/6/24 4210

## 2024-09-10 ENCOUNTER — NURSE ONLY (OUTPATIENT)
Dept: SURGERY | Facility: CLINIC | Age: 79
End: 2024-09-10

## 2024-09-10 NOTE — PROGRESS NOTES
- pt presents with use of a walker, I called pt into the exam room and introduced myself and verified the spelling of his last name and his .   - I explained that [facility]'s orders are to remove his damian cath today explaining the process of a decath and pt agreed to proceed.  -  Pt positioned himself on the exam table and lowering his bottom clothing to his ankles. I then placed him in a supine position.  I detached the leg bag and extension tubing from the damian cath, removed the cath from the STAT lock and also removed the STAT lock from his upper inner Rt thigh. I deflated the damian cath balloon of its contents and I gently and slowly removed the damian cath. Pt tolerated this well.   - I explained to pt that once he gets home he should only drink as he usually would with meals and when he is thirsty and not force fluids in case he does not  urinate. I also informed him that urinary retention can be in the form of no urine output at all over an extended period or small amts of urine production very frequently with eventual feeling of bladder pressure and discomfort. I asked that he call the office by 3 pm to report if he has not urinated at all.  Pt verbalized understanding and compliance.   - Pt redressed and I escorted him to the lobby.  - encounter complete

## 2024-10-09 ENCOUNTER — TELEPHONE (OUTPATIENT)
Dept: SURGERY | Facility: CLINIC | Age: 79
End: 2024-10-09

## 2024-10-09 NOTE — TELEPHONE ENCOUNTER
Late entry :      Pt came late to his 11:30  appointment  on.10/8/24 . Pt arrived at 12:00 and was rescheduled  to 10/14/24

## 2024-10-14 ENCOUNTER — OFFICE VISIT (OUTPATIENT)
Dept: SURGERY | Facility: CLINIC | Age: 79
End: 2024-10-14

## 2024-10-14 DIAGNOSIS — N35.919 STRICTURE OF MALE URETHRA, UNSPECIFIED STRICTURE TYPE: Primary | ICD-10-CM

## 2024-10-14 PROBLEM — J44.89 ASTHMA WITH COPD (CHRONIC OBSTRUCTIVE PULMONARY DISEASE) (HCC): Chronic | Status: ACTIVE | Noted: 2024-10-14

## 2024-10-14 PROBLEM — D69.6 THROMBOCYTOPENIA: Chronic | Status: ACTIVE | Noted: 2024-10-14

## 2024-10-14 PROBLEM — D69.6 THROMBOCYTOPENIA (HCC): Chronic | Status: ACTIVE | Noted: 2024-10-14

## 2024-10-14 PROCEDURE — 51798 US URINE CAPACITY MEASURE: CPT | Performed by: UROLOGY

## 2024-10-14 PROCEDURE — 99213 OFFICE O/P EST LOW 20 MIN: CPT | Performed by: UROLOGY

## 2024-10-14 RX ORDER — SOLIFENACIN SUCCINATE 10 MG/1
10 TABLET, FILM COATED ORAL DAILY
Qty: 90 TABLET | Refills: 3 | Status: SHIPPED | OUTPATIENT
Start: 2024-10-14

## 2024-10-14 NOTE — PROGRESS NOTES
Giuseppe Faustin is a 79 year old male.    HPI:     Chief Complaint   Patient presents with    Follow - Up     Post op follow up, pt states he noticed after surgery he had urine frequency.       79-year-old male status post cystoscopy, holmium laser incision of bladder neck contracture September 4, 2024.  Overall doing well.  Reports a good force of stream.  Complains of frequency nocturia x 6-7.  Denies any dysuria or gross hematuria.  No constipation.  Denies excessive caffeine or alcohol intake.      HISTORY:  Past Medical History:    Adenomatous colon polyp    Arthritis    Benign prostatic hypertrophy with urinary retention    Cataract    Chronic cough    Chronic sinusitis    Diabetes (HCC)    Esophageal reflux    Essential and other specified forms of tremor    Essential hypertension    Gall stones    High blood pressure    High cholesterol    History of stomach ulcers    Hoarseness, chronic    HTN (hypertension)    Osteoarthrosis, localized, primary, knee, left    Osteoarthrosis, localized, primary, knee, right    Osteoarthrosis, unspecified whether generalized or localized, unspecified site    Pain in joints    Painful swallowing    Personal history of colonic polyps    PONV (postoperative nausea and vomiting)    Problems with swallowing    trouble with solid    Second degree hemorrhoids    Shortness of breath    Sleep disturbance    Sputum production    Visual impairment    reading glasses    Vocal cord paralysis    Wheezing      Past Surgical History:   Procedure Laterality Date    Cabg      Cholecystectomy  07/2015    Colonoscopy  10/2012    3 mm adenoma. repeat 5 yrs, Dr Sarabia    Colonoscopy,biopsy  10/22/2012    Procedure: COLONOSCOPY, POSSIBLE BIOPSY, POSSIBLE POLYPECTOMY 11460;  Surgeon: Joe Sarabia MD;  [place] [facility]    Cystoscopy,insert ureteral stent  09/01/2015    TURP    Laparoscopic cholecystectomy N/A 07/10/2015    Procedure: LAPAROSCOPIC CHOLECYSTECTOMY;   Surgeon: Tania Schmidt MD;  Location:  MAIN OR    Other surgical history  2015    CYSTOSCOPY, TRANS URETHRAL RESECTION OF PROSTATE    Patient documented not to have experienced any of the following events  10/22/2012    Procedure: COLONOSCOPY, POSSIBLE BIOPSY, POSSIBLE POLYPECTOMY 68281;  Surgeon: Joe Sarabia MD;  Location: [facility]    Patient withough preoperative order for iv antibiotic surgical site infection prophylaxis.  10/22/2012    Procedure: COLONOSCOPY, POSSIBLE BIOPSY, POSSIBLE POLYPECTOMY 86984;  Surgeon: Joe Sarabia MD;  Location: [facility]    Upper gi endoscopy,exam  2020      Family History   Problem Relation Age of Onset    Heart Attack Father     Hypertension Father       Social History:   Social History     Socioeconomic History    Marital status:    Tobacco Use    Smoking status: Former     Current packs/day: 0.00     Average packs/day: 0.5 packs/day for 20.0 years (10.0 ttl pk-yrs)     Types: Cigarettes     Start date: 1964     Quit date: 1984     Years since quittin.3     Passive exposure: Past    Smokeless tobacco: Never   Vaping Use    Vaping status: Never Used   Substance and Sexual Activity    Alcohol use: Never    Drug use: Never   Other Topics Concern    Caffeine Concern Yes     Comment: 1 tea daily    Exercise Yes     Comment: daily gym   Social History Narrative    ** Merged History Encounter **             2 kids    Retired        Diet: vegetarian    Exercise: nothing formal    Sleep: ok    Stress: low     Social Drivers of Health     Food Insecurity: No Food Insecurity (2024)    Food Insecurity     Food Insecurity: Never true   Transportation Needs: No Transportation Needs (2024)    Transportation Needs     Lack of Transportation: No   Housing Stability: Low Risk  (2024)    Housing Stability     Housing Instability: No        Medications (Active prior to today's visit):  Current  Outpatient Medications   Medication Sig Dispense Refill    Solifenacin Succinate (VESICARE) 10 MG Oral Tab Take 1 tablet (10 mg total) by mouth daily. 90 tablet 3    Multiple Vitamins-Minerals (CENTRUM SILVER 50+MEN OR) Take by mouth daily.      cholecalciferol (VITAMIN D3) 125 MCG (5000 UT) Oral Cap Take 1 capsule (5,000 Units total) by mouth daily.      cyanocobalamin 1000 MCG Oral Tab Take 1 tablet (1,000 mcg total) by mouth daily.      HYDROcodone-acetaminophen (NORCO) 7.5-325 MG Oral Tab Take 1 tablet by mouth every 6 (six) hours as needed for Pain. 28 tablet 0    Metoprolol Tartrate 37.5 MG Oral Tab Take 37.5 mg by mouth 2x Daily(Beta Blocker). 60 tablet 3    atorvastatin 40 MG Oral Tab Take 1 tablet (40 mg total) by mouth nightly. FOR CHOLESTEROL. (Patient taking differently: Take 10 mg by mouth nightly. FOR CHOLESTEROL.) 90 tablet 9    metFORMIN  MG Oral Tablet 24 Hr Take 1 tablet (750 mg total) by mouth 2 (two) times daily with meals. FOR DIABETES. 180 tablet 9    primidone 50 MG Oral Tab Take 1 tablet (50 mg total) by mouth 2 (two) times daily. FOR TREMORS. 180 tablet 9    pantoprazole 40 MG Oral Tab EC Take 1 tablet (40 mg total) by mouth 2 (two) times daily before meals. 180 tablet 4    telmisartan 40 MG Oral Tab Take 1 tablet (40 mg total) by mouth nightly. FOR BLOOD PRESSURE. (Patient taking differently: Take 2 tablets (80 mg total) by mouth nightly. FOR BLOOD PRESSURE.) 90 tablet 9    ascorbic acid 500 MG Oral Tab Take 1 tablet (500 mg total) by mouth daily. 30 tablet 0    aspirin ( ASPIRIN) 81 MG Oral Tab EC Take 1 tablet (81 mg total) by mouth daily. 90 tablet 9       Allergies:  Allergies[1]      ROS:       PHYSICAL EXAM:   Unable to provide a urine sample in the office.  He states he voided about 15 to 20 minutes ago.  Bladder scan for postvoid residual volume 86 mL     ASSESSMENT/PLAN:   Assessment   Encounter Diagnosis   Name Primary?    Stricture of male urethra, unspecified stricture  type [N35.919] Yes       Recommend:  - Will treat him empirically with Solifenacin 10 mg daily.  Side effects reviewed.  - Return to clinic in 6 weeks to assess response.         Orders This Visit:  No orders of the defined types were placed in this encounter.      Meds This Visit:  Requested Prescriptions     Signed Prescriptions Disp Refills    Solifenacin Succinate (VESICARE) 10 MG Oral Tab 90 tablet 3     Sig: Take 1 tablet (10 mg total) by mouth daily.       Imaging & Referrals:  None     10/14/2024  Kevin Blanchard MD               [1] No Known Allergies

## 2024-12-16 ENCOUNTER — LAB ENCOUNTER (OUTPATIENT)
Dept: LAB | Age: 79
End: 2024-12-16
Attending: INTERNAL MEDICINE
Payer: MEDICARE

## 2024-12-16 ENCOUNTER — OFFICE VISIT (OUTPATIENT)
Facility: LOCATION | Age: 79
End: 2024-12-16

## 2024-12-16 VITALS
HEIGHT: 63 IN | HEART RATE: 71 BPM | BODY MASS INDEX: 28.35 KG/M2 | DIASTOLIC BLOOD PRESSURE: 70 MMHG | WEIGHT: 160 LBS | OXYGEN SATURATION: 98 % | SYSTOLIC BLOOD PRESSURE: 128 MMHG

## 2024-12-16 DIAGNOSIS — D64.9 LOW HEMOGLOBIN: ICD-10-CM

## 2024-12-16 DIAGNOSIS — Z13.29 SCREENING FOR ENDOCRINE, NUTRITIONAL, METABOLIC AND IMMUNITY DISORDER: ICD-10-CM

## 2024-12-16 DIAGNOSIS — Z13.0 SCREENING FOR ENDOCRINE, NUTRITIONAL, METABOLIC AND IMMUNITY DISORDER: ICD-10-CM

## 2024-12-16 DIAGNOSIS — Z13.228 SCREENING FOR ENDOCRINE, NUTRITIONAL, METABOLIC AND IMMUNITY DISORDER: ICD-10-CM

## 2024-12-16 DIAGNOSIS — M48.062 NEUROGENIC CLAUDICATION DUE TO LUMBAR SPINAL STENOSIS: Primary | ICD-10-CM

## 2024-12-16 DIAGNOSIS — E11.22 TYPE 2 DIABETES MELLITUS WITH STAGE 3A CHRONIC KIDNEY DISEASE, WITHOUT LONG-TERM CURRENT USE OF INSULIN (HCC): ICD-10-CM

## 2024-12-16 DIAGNOSIS — N18.31 TYPE 2 DIABETES MELLITUS WITH STAGE 3A CHRONIC KIDNEY DISEASE, WITHOUT LONG-TERM CURRENT USE OF INSULIN (HCC): ICD-10-CM

## 2024-12-16 DIAGNOSIS — Z13.21 SCREENING FOR ENDOCRINE, NUTRITIONAL, METABOLIC AND IMMUNITY DISORDER: ICD-10-CM

## 2024-12-16 LAB
ALBUMIN SERPL-MCNC: 4.6 G/DL (ref 3.2–4.8)
ALBUMIN/GLOB SERPL: 1.6 {RATIO} (ref 1–2)
ALP LIVER SERPL-CCNC: 68 U/L
ALT SERPL-CCNC: 14 U/L
ANION GAP SERPL CALC-SCNC: 7 MMOL/L (ref 0–18)
AST SERPL-CCNC: 23 U/L (ref ?–34)
BASOPHILS # BLD AUTO: 0.05 X10(3) UL (ref 0–0.2)
BASOPHILS NFR BLD AUTO: 0.7 %
BILIRUB SERPL-MCNC: 0.3 MG/DL (ref 0.2–1.1)
BUN BLD-MCNC: 16 MG/DL (ref 9–23)
BUN/CREAT SERPL: 12.2 (ref 10–20)
CALCIUM BLD-MCNC: 9.9 MG/DL (ref 8.7–10.4)
CHLORIDE SERPL-SCNC: 106 MMOL/L (ref 98–112)
CO2 SERPL-SCNC: 27 MMOL/L (ref 21–32)
CREAT BLD-MCNC: 1.31 MG/DL
DEPRECATED HBV CORE AB SER IA-ACNC: 7 NG/ML
DEPRECATED RDW RBC AUTO: 53.1 FL (ref 35.1–46.3)
EGFRCR SERPLBLD CKD-EPI 2021: 55 ML/MIN/1.73M2 (ref 60–?)
EOSINOPHIL # BLD AUTO: 0.16 X10(3) UL (ref 0–0.7)
EOSINOPHIL NFR BLD AUTO: 2.1 %
ERYTHROCYTE [DISTWIDTH] IN BLOOD BY AUTOMATED COUNT: 16 % (ref 11–15)
EST. AVERAGE GLUCOSE BLD GHB EST-MCNC: 157 MG/DL (ref 68–126)
FASTING STATUS PATIENT QL REPORTED: NO
GLOBULIN PLAS-MCNC: 2.9 G/DL (ref 2–3.5)
GLUCOSE BLD-MCNC: 134 MG/DL (ref 70–99)
HBA1C MFR BLD: 7.1 % (ref ?–5.7)
HCT VFR BLD AUTO: 33.7 %
HGB BLD-MCNC: 10.8 G/DL
IMM GRANULOCYTES # BLD AUTO: 0.02 X10(3) UL (ref 0–1)
IMM GRANULOCYTES NFR BLD: 0.3 %
IRON SATN MFR SERPL: 11 %
IRON SERPL-MCNC: 43 UG/DL
LYMPHOCYTES # BLD AUTO: 2.45 X10(3) UL (ref 1–4)
LYMPHOCYTES NFR BLD AUTO: 32.5 %
MCH RBC QN AUTO: 28.9 PG (ref 26–34)
MCHC RBC AUTO-ENTMCNC: 32 G/DL (ref 31–37)
MCV RBC AUTO: 90.1 FL
MONOCYTES # BLD AUTO: 0.58 X10(3) UL (ref 0.1–1)
MONOCYTES NFR BLD AUTO: 7.7 %
NEUTROPHILS # BLD AUTO: 4.28 X10 (3) UL (ref 1.5–7.7)
NEUTROPHILS # BLD AUTO: 4.28 X10(3) UL (ref 1.5–7.7)
NEUTROPHILS NFR BLD AUTO: 56.7 %
OSMOLALITY SERPL CALC.SUM OF ELEC: 293 MOSM/KG (ref 275–295)
PLATELET # BLD AUTO: 255 10(3)UL (ref 150–450)
POTASSIUM SERPL-SCNC: 4.6 MMOL/L (ref 3.5–5.1)
PROT SERPL-MCNC: 7.5 G/DL (ref 5.7–8.2)
RBC # BLD AUTO: 3.74 X10(6)UL
SODIUM SERPL-SCNC: 140 MMOL/L (ref 136–145)
TIBC SERPL-MCNC: 407 UG/DL (ref 250–425)
TRANSFERRIN SERPL-MCNC: 273 MG/DL (ref 215–365)
VIT B12 SERPL-MCNC: 1249 PG/ML (ref 211–911)
WBC # BLD AUTO: 7.5 X10(3) UL (ref 4–11)

## 2024-12-16 PROCEDURE — 3008F BODY MASS INDEX DOCD: CPT | Performed by: INTERNAL MEDICINE

## 2024-12-16 PROCEDURE — 85025 COMPLETE CBC W/AUTO DIFF WBC: CPT

## 2024-12-16 PROCEDURE — 3074F SYST BP LT 130 MM HG: CPT | Performed by: INTERNAL MEDICINE

## 2024-12-16 PROCEDURE — 83540 ASSAY OF IRON: CPT

## 2024-12-16 PROCEDURE — 82728 ASSAY OF FERRITIN: CPT

## 2024-12-16 PROCEDURE — 1160F RVW MEDS BY RX/DR IN RCRD: CPT | Performed by: INTERNAL MEDICINE

## 2024-12-16 PROCEDURE — 82607 VITAMIN B-12: CPT

## 2024-12-16 PROCEDURE — 80053 COMPREHEN METABOLIC PANEL: CPT

## 2024-12-16 PROCEDURE — 36415 COLL VENOUS BLD VENIPUNCTURE: CPT

## 2024-12-16 PROCEDURE — 99214 OFFICE O/P EST MOD 30 MIN: CPT | Performed by: INTERNAL MEDICINE

## 2024-12-16 PROCEDURE — 1159F MED LIST DOCD IN RCRD: CPT | Performed by: INTERNAL MEDICINE

## 2024-12-16 PROCEDURE — 3078F DIAST BP <80 MM HG: CPT | Performed by: INTERNAL MEDICINE

## 2024-12-16 PROCEDURE — 84466 ASSAY OF TRANSFERRIN: CPT

## 2024-12-16 PROCEDURE — 83036 HEMOGLOBIN GLYCOSYLATED A1C: CPT

## 2024-12-16 RX ORDER — PREDNISONE 20 MG/1
20 TABLET ORAL 2 TIMES DAILY WITH MEALS
Qty: 14 TABLET | Refills: 0 | Status: SHIPPED | OUTPATIENT
Start: 2024-12-16 | End: 2024-12-23

## 2024-12-16 RX ORDER — CYCLOBENZAPRINE HCL 5 MG
TABLET ORAL 3 TIMES DAILY PRN
Qty: 90 TABLET | Refills: 1 | Status: SHIPPED | OUTPATIENT
Start: 2024-12-16

## 2024-12-16 RX ORDER — PREGABALIN 50 MG/1
50 CAPSULE ORAL NIGHTLY
Qty: 90 CAPSULE | Refills: 0 | Status: SHIPPED | OUTPATIENT
Start: 2024-12-16

## 2024-12-16 NOTE — PATIENT INSTRUCTIONS
1.  I would like for you to start a course of prednisone, this is 20 mg twice daily for the next 5 to 7 days but after 3 days if you do not have much improvement you may stop.  2.  I am going to give you some cyclobenzaprine known as Flexeril, this is a muscle relaxer and it will make you very drowsy, I would recommend taking 1 to 2 tablets at night to help you sleep and then using it as needed during the day.  You can take 1/2 tablet i.e. 2.5 mg and this may provide some relief without making you tired, you may have to play around with this dose.  Cyclobenzaprine is not addictive, it is not a controlled substance, it does not affect your heart but it will make you feel very drowsy.    3.  I would like for you to get an x-ray of your lower spine and I would like for you to start with some physical therapy however I am also going to order an MRI of your lumbar spine.  If the insurance pays for the MRI and it is approved you do not need to do the physical therapy just yet.  If your insurance denies the MRI then they will require that you do physical therapy.    4.  What we are treating now is acute back pain treatment but there is a medication known as Lyrica i.e. pregabalin and this medication acts like this stop signal that your muscles need to slow them down.  I typically start with 50 mg nightly, we get you to sleep well and then we see how things go and titrate depending on your response.      Lets plan to start the muscle relaxer and the prednisone along with the Lyrica 50 mg nightly and then lets have you send me a [facility] message in 2 weeks with how you are doing and I will plan to see you back either office or video visit in 4 weeks to make further adjustments.    The maximum dose of Lyrica is 200 mg every 8 hours so please do not increase this without discussing with me but it is perfectly fine if 50 mg does not work after a couple days to try 100 mg nightly.

## 2024-12-16 NOTE — PROGRESS NOTES
INTERNAL MEDICINE OFFICE NOTE     Patient ID: Giuseppe Faustin is a 79 year old male.  Today's Date: 12/16/24  Chief Complaint: Back Pain  Back Pain      Pleasant 79-year-old gentleman presents for bilateral back and leg pain.  He states been going on for the past few months but is worsened over the past few weeks.  He has burning and numbness rating down the back of his thigh to his knee sometimes down to the back of his heels.  He does have some weakness when he stands up.  He is not having any bowel or urinary incontinence.  Denies any trauma.  He has type 2 diabetes, he is currently on metformin, he is due for repeat labs.,  Last A1c well-controlled at 5.7 his blood pressure is well-controlled on current medications.      Vitals:    12/16/24 1335   BP: 128/70   Pulse: 71   SpO2: 98%   Weight: 160 lb (72.6 kg)   Height: 5' 3\" (1.6 m)     body mass index is 28.34 kg/m².  BP Readings from Last 3 Encounters:   12/16/24 128/70   09/04/24 128/70   08/16/24 119/75     The ASCVD Risk score (Rubin AYERS, et al., 2019) failed to calculate for the following reasons:    The 2019 ASCVD risk score is only valid for ages 40 to 79  Medications reviewed:  Current Outpatient Medications   Medication Sig Dispense Refill    predniSONE 20 MG Oral Tab Take 1 tablet (20 mg total) by mouth 2 (two) times daily with meals for 7 days. 14 tablet 0    cyclobenzaprine 5 MG Oral Tab Take 1-2 tablets (5-10 mg total) by mouth 3 (three) times daily as needed for Muscle spasms. May cause sedation; no driving. 90 tablet 1    pregabalin (LYRICA) 50 MG Oral Cap Take 1 capsule (50 mg total) by mouth at bedtime. FOR NERVE PAIN. UPDATE ME VIA MYCHART IN 2 WEEKS WITH RESPONSE; SEE ME OFFICE/VIDEO IN 4 WEEKS FOR REFILL. 90 capsule 0    Solifenacin Succinate (VESICARE) 10 MG Oral Tab Take 1 tablet (10 mg total) by mouth daily. 90 tablet 3    Multiple Vitamins-Minerals (CENTRUM SILVER 50+MEN OR) Take by mouth daily.      cholecalciferol  (VITAMIN D3) 125 MCG (5000 UT) Oral Cap Take 1 capsule (5,000 Units total) by mouth daily.      cyanocobalamin 1000 MCG Oral Tab Take 1 tablet (1,000 mcg total) by mouth daily.      HYDROcodone-acetaminophen (NORCO) 7.5-325 MG Oral Tab Take 1 tablet by mouth every 6 (six) hours as needed for Pain. 28 tablet 0    Metoprolol Tartrate 37.5 MG Oral Tab Take 37.5 mg by mouth 2x Daily(Beta Blocker). 60 tablet 3    atorvastatin 40 MG Oral Tab Take 1 tablet (40 mg total) by mouth nightly. FOR CHOLESTEROL. (Patient taking differently: Take 10 mg by mouth nightly. FOR CHOLESTEROL.) 90 tablet 9    metFORMIN  MG Oral Tablet 24 Hr Take 1 tablet (750 mg total) by mouth 2 (two) times daily with meals. FOR DIABETES. 180 tablet 9    primidone 50 MG Oral Tab Take 1 tablet (50 mg total) by mouth 2 (two) times daily. FOR TREMORS. 180 tablet 9    pantoprazole 40 MG Oral Tab EC Take 1 tablet (40 mg total) by mouth 2 (two) times daily before meals. 180 tablet 4    telmisartan 40 MG Oral Tab Take 1 tablet (40 mg total) by mouth nightly. FOR BLOOD PRESSURE. (Patient taking differently: Take 2 tablets (80 mg total) by mouth nightly. FOR BLOOD PRESSURE.) 90 tablet 9    ascorbic acid 500 MG Oral Tab Take 1 tablet (500 mg total) by mouth daily. 30 tablet 0    aspirin (KP ASPIRIN) 81 MG Oral Tab EC Take 1 tablet (81 mg total) by mouth daily. (Patient not taking: Reported on 12/16/2024) 90 tablet 9         Assessment & Plan    1. Neurogenic claudication due to lumbar spinal stenosis (Primary)  -     predniSONE; Take 1 tablet (20 mg total) by mouth 2 (two) times daily with meals for 7 days.  Dispense: 14 tablet; Refill: 0  -     Cyclobenzaprine HCl; Take 1-2 tablets (5-10 mg total) by mouth 3 (three) times daily as needed for Muscle spasms. May cause sedation; no driving.  Dispense: 90 tablet; Refill: 1  -     XR LUMBAR SPINE (MIN 4 VIEWS) (CPT=72110); Future; Expected date: 12/16/2024  -     PHYSICAL THERAPY - INTERNAL  -     MRI SPINE  LUMBAR (CPT=72148); Future; Expected date: 12/16/2024  -     Pregabalin; Take 1 capsule (50 mg total) by mouth at bedtime. FOR NERVE PAIN. UPDATE ME VIA [employer] IN 2 WEEKS WITH RESPONSE; SEE ME OFFICE/VIDEO IN 4 WEEKS FOR REFILL.  Dispense: 90 capsule; Refill: 0  2. Low hemoglobin  -     Ferritin; Future; Expected date: 12/16/2024  -     Iron And Tibc; Future; Expected date: 12/16/2024  -     CBC With Differential With Platelet; Future; Expected date: 12/16/2024  3. Screening for endocrine, nutritional, metabolic and immunity disorder  -     Comp Metabolic Panel (14); Future; Expected date: 12/16/2024  -     Hemoglobin A1C; Future; Expected date: 12/16/2024  -     Ferritin; Future; Expected date: 12/16/2024  -     Iron And Tibc; Future; Expected date: 12/16/2024  -     Vitamin B12 with Reflex to MMA; Future; Expected date: 12/16/2024  -     CBC With Differential With Platelet; Future; Expected date: 12/16/2024  4. Type 2 diabetes mellitus with stage 3a chronic kidney disease, without long-term current use of insulin ([facility])  -     Microalb/Creat Ratio, Random Urine; Future; Expected date: 12/16/2024  Plan  Will get him a course of prednisone for the acute symptoms, Flexeril for the spasms and he is looking for more long-term control therefore we will start him on Lyrica pending physical therapy and MRI.  In addition we will check iron studies along with CBC due to his low hemoglobin levels.    Follow Up: No follow-ups on file..         Objective: Results:   Physical Exam  Vitals and nursing note reviewed.   Constitutional:       General: He is not in acute distress.     Appearance: Normal appearance.   HENT:      Head: Normocephalic.      Right Ear: External ear normal.      Left Ear: External ear normal.   Eyes:      Extraocular Movements: Extraocular movements intact.      Conjunctiva/sclera: Conjunctivae normal.   Pulmonary:      Effort: Pulmonary effort is normal.   Musculoskeletal:      Cervical back: Normal  range of motion and neck supple.      Lumbar back: Spasms and tenderness present. No bony tenderness. Decreased range of motion. Positive right straight leg raise test and positive left straight leg raise test.   Skin:     Coloration: Skin is not jaundiced.   Neurological:      General: No focal deficit present.      Mental Status: He is alert and oriented to person, place, and time. Mental status is at baseline.   Psychiatric:         Mood and Affect: Mood normal.         Behavior: Behavior normal.        Reviewed:    Patient Active Problem List    Diagnosis    Thrombocytopenia ([facility])    Asthma with COPD (chronic obstructive pulmonary disease) ([facility])    S/P CABG (coronary artery bypass graft)    Melena    Upper GI bleed    Primary osteoarthritis of both knees    Coronary artery disease involving native coronary artery of native heart without angina pectoris    Moderate persistent asthma without complication ([facility])    Age-related cataract of right eye    CKD (chronic kidney disease) stage 3, GFR 30-59 ml/min ([facility])    Gastroesophageal reflux disease without esophagitis    Hyperlipidemia associated with type 2 diabetes mellitus ([facility])    Hypertension associated with type 2 diabetes mellitus ([facility])    Type 2 diabetes mellitus with stage 3a chronic kidney disease, without long-term current use of insulin ([facility])    Hyperthyroidism    Vocal cord paralysis      Allergies[1]     Social History     Socioeconomic History    Marital status:    Tobacco Use    Smoking status: Former     Current packs/day: 0.00     Average packs/day: 0.5 packs/day for 20.0 years (10.0 ttl pk-yrs)     Types: Cigarettes     Start date: 1964     Quit date: 1984     Years since quittin.5     Passive exposure: Past    Smokeless tobacco: Never   Vaping Use    Vaping status: Never Used   Substance and Sexual Activity    Alcohol use: Never    Drug use: Never   Other Topics Concern    Caffeine Concern Yes     Comment: 1 tea daily     Exercise Yes     Comment: daily gym   Social History Narrative    ** Merged History Encounter **             2 kids    Retired        Diet: vegetarian    Exercise: nothing formal    Sleep: ok    Stress: low     Social Drivers of Health     Food Insecurity: No Food Insecurity (7/2/2024)    Food Insecurity     Food Insecurity: Never true   Transportation Needs: No Transportation Needs (7/2/2024)    Transportation Needs     Lack of Transportation: No   Housing Stability: Low Risk  (7/2/2024)    Housing Stability     Housing Instability: No      Review of Systems   Musculoskeletal:  Positive for back pain.     All other systems negative unless otherwise stated in ROS or HPI above.       Joshua Faustin MD  Internal Medicine       Call office with any questions or seek emergency care if necessary.   Patient understands and agrees to follow directions and advice.      ----------------------------------------- PATIENT INSTRUCTIONS-----------------------------------------   .    Patient Instructions   1.  I would like for you to start a course of prednisone, this is 20 mg twice daily for the next 5 to 7 days but after 3 days if you do not have much improvement you may stop.  2.  I am going to give you some cyclobenzaprine known as Flexeril, this is a muscle relaxer and it will make you very drowsy, I would recommend taking 1 to 2 tablets at night to help you sleep and then using it as needed during the day.  You can take 1/2 tablet i.e. 2.5 mg and this may provide some relief without making you tired, you may have to play around with this dose.  Cyclobenzaprine is not addictive, it is not a controlled substance, it does not affect your heart but it will make you feel very drowsy.    3.  I would like for you to get an x-ray of your lower spine and I would like for you to start with some physical therapy however I am also going to order an MRI of your lumbar spine.  If the insurance pays for the MRI and it is approved  you do not need to do the physical therapy just yet.  If your insurance denies the MRI then they will require that you do physical therapy.    4.  What we are treating now is acute back pain treatment but there is a medication known as Lyrica i.e. pregabalin and this medication acts like this stop signal that your muscles need to slow them down.  I typically start with 50 mg nightly, we get you to sleep well and then we see how things go and titrate depending on your response.      Lets plan to start the muscle relaxer and the prednisone along with the Lyrica 50 mg nightly and then lets have you send me a [employer] message in 2 weeks with how you are doing and I will plan to see you back either office or video visit in 4 weeks to make further adjustments.    The maximum dose of Lyrica is 200 mg every 8 hours so please do not increase this without discussing with me but it is perfectly fine if 50 mg does not work after a couple days to try 100 mg nightly.             [1] No Known Allergies

## 2024-12-17 DIAGNOSIS — D50.0 IRON DEFICIENCY ANEMIA DUE TO CHRONIC BLOOD LOSS: Primary | ICD-10-CM

## 2024-12-17 RX ORDER — CHOLECALCIFEROL (VITAMIN D3) 10(400)/ML
160 DROPS ORAL DAILY
Qty: 90 TABLET | Refills: 3 | Status: SHIPPED | OUTPATIENT
Start: 2024-12-17

## 2025-01-08 ENCOUNTER — EKG ENCOUNTER (OUTPATIENT)
Dept: LAB | Age: 80
End: 2025-01-08
Attending: INTERNAL MEDICINE
Payer: MEDICARE

## 2025-01-08 ENCOUNTER — LAB ENCOUNTER (OUTPATIENT)
Dept: LAB | Age: 80
End: 2025-01-08
Attending: INTERNAL MEDICINE
Payer: MEDICARE

## 2025-01-08 ENCOUNTER — TELEPHONE (OUTPATIENT)
Facility: LOCATION | Age: 80
End: 2025-01-08

## 2025-01-08 DIAGNOSIS — Z95.1 S/P CABG X 3: ICD-10-CM

## 2025-01-08 DIAGNOSIS — R07.89 CHEST DISCOMFORT: ICD-10-CM

## 2025-01-08 DIAGNOSIS — R07.89 CHEST DISCOMFORT: Primary | ICD-10-CM

## 2025-01-08 DIAGNOSIS — D50.0 IRON DEFICIENCY ANEMIA DUE TO CHRONIC BLOOD LOSS: ICD-10-CM

## 2025-01-08 LAB — TROPONIN I SERPL HS-MCNC: 4 NG/L

## 2025-01-08 PROCEDURE — 93010 ELECTROCARDIOGRAM REPORT: CPT | Performed by: INTERNAL MEDICINE

## 2025-01-08 PROCEDURE — 36415 COLL VENOUS BLD VENIPUNCTURE: CPT

## 2025-01-08 PROCEDURE — 93005 ELECTROCARDIOGRAM TRACING: CPT

## 2025-01-08 PROCEDURE — 84484 ASSAY OF TROPONIN QUANT: CPT

## 2025-01-09 LAB
ATRIAL RATE: 90 BPM
P AXIS: 12 DEGREES
P-R INTERVAL: 134 MS
Q-T INTERVAL: 334 MS
QRS DURATION: 86 MS
QTC CALCULATION (BEZET): 408 MS
R AXIS: 40 DEGREES
T AXIS: 40 DEGREES
VENTRICULAR RATE: 90 BPM

## 2025-01-23 DIAGNOSIS — K59.00 CONSTIPATION, UNSPECIFIED CONSTIPATION TYPE: ICD-10-CM

## 2025-01-23 DIAGNOSIS — E78.5 HYPERLIPIDEMIA ASSOCIATED WITH TYPE 2 DIABETES MELLITUS (HCC): ICD-10-CM

## 2025-01-23 DIAGNOSIS — I15.2 HYPERTENSION ASSOCIATED WITH TYPE 2 DIABETES MELLITUS (HCC): ICD-10-CM

## 2025-01-23 DIAGNOSIS — E11.69 HYPERLIPIDEMIA ASSOCIATED WITH TYPE 2 DIABETES MELLITUS (HCC): ICD-10-CM

## 2025-01-23 DIAGNOSIS — E05.00 GRAVES DISEASE: ICD-10-CM

## 2025-01-23 DIAGNOSIS — E05.90 HYPERTHYROIDISM: Chronic | ICD-10-CM

## 2025-01-23 DIAGNOSIS — E11.59 HYPERTENSION ASSOCIATED WITH TYPE 2 DIABETES MELLITUS (HCC): ICD-10-CM

## 2025-01-23 DIAGNOSIS — E11.9 TYPE 2 DIABETES MELLITUS WITHOUT COMPLICATION, WITHOUT LONG-TERM CURRENT USE OF INSULIN (HCC): ICD-10-CM

## 2025-01-23 DIAGNOSIS — E03.2 IATROGENIC HYPOTHYROIDISM: ICD-10-CM

## 2025-01-23 DIAGNOSIS — K21.9 GASTROESOPHAGEAL REFLUX DISEASE WITHOUT ESOPHAGITIS: ICD-10-CM

## 2025-01-23 DIAGNOSIS — E07.9 THYROID DISEASE: ICD-10-CM

## 2025-01-23 RX ORDER — METHIMAZOLE 5 MG/1
5 TABLET ORAL DAILY
Qty: 90 TABLET | Refills: 0 | OUTPATIENT
Start: 2025-01-23 | End: 2025-04-23

## 2025-01-23 NOTE — TELEPHONE ENCOUNTER
Endocrine refill protocol for medications for hypothyroidism and hyperthyroidism    Protocol Criteria:  FAILED Reason: Abnormal labs    If all below requirements are met, send a 90-day supply with 1 refill per provider protocol.    Verify appointment with Endocrinology completed in the last 12 months or scheduled in the next 6 months.    Normal TSH result in the past 12 months   Review recent telephone encounters and [employer] communications with patient to ensure a dose change has not occurred since last office visit that was not updated in the medication history list     Last completed office visit:3/12/2024 Dheeraj Gonzalez MD   Next scheduled Follow up: sent [employer]   Last TSH result:   TSH   Date Value Ref Range Status   05/08/2024 9.211 (H) 0.550 - 4.780 mIU/mL Final   09/10/2022 3.57 0.40 - 4.50 mIU/L Final   10/31/2012 2.550 0.450 - 4.500 uIU/mL Final

## 2025-01-23 NOTE — TELEPHONE ENCOUNTER
3/12/2024 last visit was too far . Please let pt know methimazole is a medication needs monitoring and adjustment   Please ask pt to get TSH labs and schedule an appt   thanks

## 2025-01-31 DIAGNOSIS — R25.1 TREMORS OF NERVOUS SYSTEM: ICD-10-CM

## 2025-01-31 DIAGNOSIS — K21.9 GASTROESOPHAGEAL REFLUX DISEASE WITHOUT ESOPHAGITIS: ICD-10-CM

## 2025-02-04 RX ORDER — PRIMIDONE 50 MG/1
50 TABLET ORAL 2 TIMES DAILY
Qty: 180 TABLET | Refills: 3 | Status: SHIPPED | OUTPATIENT
Start: 2025-02-04

## 2025-02-04 RX ORDER — PANTOPRAZOLE SODIUM 40 MG/1
40 TABLET, DELAYED RELEASE ORAL
Qty: 90 TABLET | Refills: 3 | Status: SHIPPED | OUTPATIENT
Start: 2025-02-04

## 2025-02-04 NOTE — TELEPHONE ENCOUNTER
Primidone 50mg: 15 month supply sent to [facility] pharmacy on 08/07/2024    Pantoprazole 40mg: 15 month supply sent to [facility] on 08/07/2024    [facility]

## 2025-02-04 NOTE — TELEPHONE ENCOUNTER
Refill passed per [facility] protocol.    Pantoprazole is on active medication list    Requested Prescriptions   Pending Prescriptions Disp Refills    PRIMIDONE 50 MG Oral Tab [Pharmacy Med Name: PRIMIDONE 50 MG TABLET] 180 tablet 3     Sig: Take 1 tablet (50 mg total) by mouth 2 (two) times daily. FOR TREMORS.       Neurology Medications Passed - 2/4/2025  9:47 AM        Passed - In person appointment or virtual visit in the past 6 mos or appointment in next 3 mos     Recent Outpatient Visits              1 month ago Neurogenic claudication due to lumbar spinal stenosis    [facility] Joshua Faustin MD    Office Visit    3 months ago Stricture of male urethra, unspecified stricture type [N35.919]    [facility] Kevin Blanchard MD    Office Visit    4 months ago     [facility]    Nurse Only    5 months ago Recurrent UTI    [facility]    Nurse Only    5 months ago Stricture of male urethra, unspecified stricture type [N35.919]    [facility]    Nurse Only          Future Appointments         Provider Department Appt Notes    In 1 month Dheeraj Gonzalez MD [facility]                     Passed - Medication is active on med list          PANTOPRAZOLE 40 MG Oral Tab EC [Pharmacy Med Name: PANTOPRAZOLE SOD DR 40 MG TAB] 90 tablet 3     Sig: TAKE 1 TABLET (40 MG TOTAL) BY MOUTH EVERY MORNING BEFORE BREAKFAST. FOR ACID REFLUX.       Gastrointestional Medication Protocol Failed - 2/4/2025  9:47 AM        Failed - Medication is active on med list        Passed - In person appointment or virtual visit in the past 12 mos or appointment in next 3 mos     Recent Outpatient Visits              1 month ago Neurogenic claudication due to lumbar spinal stenosis     [facility] Joshua Faustin MD    Office Visit    3 months ago Stricture of male urethra, unspecified stricture type [N35.919]    [facility], Kevin Hunter MD    Office Visit    4 months ago     [facility], [place]    Nurse Only    5 months ago Recurrent UTI    [facility] [place]    Nurse Only    5 months ago Stricture of male urethra, unspecified stricture type [N35.919]    [facility] [place]    Nurse Only          Future Appointments         Provider Department Appt Notes    In 1 month Dheeraj Gonzalez MD [facility]                        Future Appointments         Provider Department Appt Notes    In 1 month Dheeraj Gonzalez MD [facility]           Recent Outpatient Visits              1 month ago Neurogenic claudication due to lumbar spinal stenosis    [facility] Joshua Faustin MD    Office Visit    3 months ago Stricture of male urethra, unspecified stricture type [N35.919]    [facility] [place]    Office Visit    4 months ago     [facility] [place]    Nurse Only    5 months ago Recurrent UTI    [facility] [place]    Nurse Only    5 months ago Stricture of male urethra, unspecified stricture type [N35.919]    [facility], [place]    Nurse Only

## 2025-02-24 ENCOUNTER — OFFICE VISIT (OUTPATIENT)
Facility: LOCATION | Age: 80
End: 2025-02-24

## 2025-02-24 VITALS
RESPIRATION RATE: 20 BRPM | SYSTOLIC BLOOD PRESSURE: 116 MMHG | DIASTOLIC BLOOD PRESSURE: 57 MMHG | OXYGEN SATURATION: 98 % | HEART RATE: 80 BPM | HEIGHT: 63 IN | BODY MASS INDEX: 28 KG/M2

## 2025-02-24 DIAGNOSIS — I25.10 CORONARY ARTERY DISEASE INVOLVING NATIVE CORONARY ARTERY OF NATIVE HEART WITHOUT ANGINA PECTORIS: ICD-10-CM

## 2025-02-24 DIAGNOSIS — I15.2 HYPERTENSION ASSOCIATED WITH TYPE 2 DIABETES MELLITUS (HCC): ICD-10-CM

## 2025-02-24 DIAGNOSIS — N18.31 TYPE 2 DIABETES MELLITUS WITH STAGE 3A CHRONIC KIDNEY DISEASE, WITHOUT LONG-TERM CURRENT USE OF INSULIN (HCC): Primary | ICD-10-CM

## 2025-02-24 DIAGNOSIS — Z95.1 S/P CABG (CORONARY ARTERY BYPASS GRAFT): ICD-10-CM

## 2025-02-24 DIAGNOSIS — M48.062 NEUROGENIC CLAUDICATION DUE TO LUMBAR SPINAL STENOSIS: ICD-10-CM

## 2025-02-24 DIAGNOSIS — E11.59 HYPERTENSION ASSOCIATED WITH TYPE 2 DIABETES MELLITUS (HCC): ICD-10-CM

## 2025-02-24 DIAGNOSIS — E11.22 TYPE 2 DIABETES MELLITUS WITH STAGE 3A CHRONIC KIDNEY DISEASE, WITHOUT LONG-TERM CURRENT USE OF INSULIN (HCC): Primary | ICD-10-CM

## 2025-02-24 RX ORDER — PREGABALIN 50 MG/1
50 CAPSULE ORAL NIGHTLY
Qty: 90 CAPSULE | Refills: 1 | Status: SHIPPED | OUTPATIENT
Start: 2025-02-24

## 2025-02-24 RX ORDER — METOPROLOL TARTRATE 37.5 MG/1
37.5 TABLET ORAL
Qty: 60 TABLET | Refills: 3 | Status: SHIPPED | OUTPATIENT
Start: 2025-02-24

## 2025-02-24 NOTE — PATIENT INSTRUCTIONS
Forms will be sent to be completed, once completed this will be sent to your insurance company.  We have placed a referral for ophthalmology, please make an appointment with Dr. Long to have your diabetic eye exam completed.  Dr Faustin is no longer practicing with [facility]. Follow-up for your Medicare annual wellness visit with Dr. Case. Make appointment at your earliest convenience.

## 2025-02-24 NOTE — PROGRESS NOTES
INTERNAL MEDICINE OFFICE NOTE     Patient ID: Giuseppe Faustin is a 79 year old male.  Today's Date: 02/24/25  Chief Complaint: Forms Completion and Establish Care    HPI  Here to establish care and for forms completion of healthcare forms, chronic condition release of information form.  Patient has history of type 2 diabetes with stage III chronic kidney disease, hyperlipidemia, hypertension, coronary artery disease status post CABG.  Patient has no complaints today and is doing well on current therapies and treatments.  He takes telmisartan and metoprolol, blood pressure in clinic today is 116/57.  He was unable to provide urine for microalbumin at his last visit.  Patient also reports history of neurogenic claudication due to lumbar spinal stenosis, history of osteoarthritis.  He has been taking pregabalin 50 mg nightly with good results and pain control.      Vitals:    02/24/25 1353   BP: 116/57   Pulse: 80   Resp: 20   SpO2: 98%   Height: 5' 3\" (1.6 m)     body mass index is 28.34 kg/m².  BP Readings from Last 3 Encounters:   02/24/25 116/57   12/16/24 128/70   09/04/24 128/70     The ASCVD Risk score (Rubin DK, et al., 2019) failed to calculate for the following reasons:    The 2019 ASCVD risk score is only valid for ages 40 to 79  Medications reviewed:  Current Outpatient Medications   Medication Sig Dispense Refill    pregabalin (LYRICA) 50 MG Oral Cap Take 1 capsule (50 mg total) by mouth at bedtime. FOR NERVE PAIN. 90 capsule 1    Metoprolol Tartrate 37.5 MG Oral Tab Take 37.5 mg by mouth 2x Daily(Beta Blocker). 60 tablet 3    primidone 50 MG Oral Tab Take 1 tablet (50 mg total) by mouth 2 (two) times daily. FOR TREMORS. 180 tablet 3    pantoprazole 40 MG Oral Tab EC Take 1 tablet (40 mg total) by mouth every morning before breakfast. For Acid Reflux. 90 tablet 3    Ferrous Sulfate Dried ER (SLOW IRON) 160 (50 Fe) MG Oral Tab CR Take 160 mg by mouth daily. PURCHASE SLOW FE OVER THE  COUNTER IF NOT COVERED BY INSURANCE. 90 tablet 3    cyclobenzaprine 5 MG Oral Tab Take 1-2 tablets (5-10 mg total) by mouth 3 (three) times daily as needed for Muscle spasms. May cause sedation; no driving. 90 tablet 1    Multiple Vitamins-Minerals (CENTRUM SILVER 50+MEN OR) Take by mouth daily.      cholecalciferol (VITAMIN D3) 125 MCG (5000 UT) Oral Cap Take 1 capsule (5,000 Units total) by mouth daily.      cyanocobalamin 1000 MCG Oral Tab Take 1 tablet (1,000 mcg total) by mouth daily.      HYDROcodone-acetaminophen (NORCO) 7.5-325 MG Oral Tab Take 1 tablet by mouth every 6 (six) hours as needed for Pain. 28 tablet 0    atorvastatin 40 MG Oral Tab Take 1 tablet (40 mg total) by mouth nightly. FOR CHOLESTEROL. 90 tablet 9    metFORMIN  MG Oral Tablet 24 Hr Take 1 tablet (750 mg total) by mouth 2 (two) times daily with meals. FOR DIABETES. 180 tablet 9    telmisartan 40 MG Oral Tab Take 1 tablet (40 mg total) by mouth nightly. FOR BLOOD PRESSURE. 90 tablet 9    aspirin (KP ASPIRIN) 81 MG Oral Tab EC Take 1 tablet (81 mg total) by mouth daily. 90 tablet 9    Solifenacin Succinate (VESICARE) 10 MG Oral Tab Take 1 tablet (10 mg total) by mouth daily. 90 tablet 3    ascorbic acid 500 MG Oral Tab Take 1 tablet (500 mg total) by mouth daily. 30 tablet 0         Assessment & Plan    1. Type 2 diabetes mellitus with stage 3a chronic kidney disease, without long-term current use of insulin ([facility]) (Primary)  -     Ophthalmology Referral - External  -     Microalb/Creat Ratio, Random Urine; Future; Expected date: 05/25/2025  2. Neurogenic claudication due to lumbar spinal stenosis  -     Pregabalin; Take 1 capsule (50 mg total) by mouth at bedtime. FOR NERVE PAIN.  Dispense: 90 capsule; Refill: 1  3. S/P CABG (coronary artery bypass graft)  -     Metoprolol Tartrate; Take 37.5 mg by mouth 2x Daily(Beta Blocker).  Dispense: 60 tablet; Refill: 3      Plan  Patient with history of type 2 diabetes, hyperlipidemia associated  with type 2 diabetes, hypertension, coronary artery disease status post CABG.  Patient also has history of osteoarthritis and neurogenic claudication with lumbar spinal stenosis, here for establish care and for forms completion of healthcare forms, chronic condition release of information forms.  Forms will be sent out for completion, copies were made and patient has original.  Refill for metoprolol and pregabalin were sent to his pharmacy.  Urine for microalbumin/creatinine was ordered, and ophthalmology referral was placed for diabetic eye exam.  Patient will follow-up within the next month for his Medicare annual wellness visit with Dr. Domínguez.  Patient verbalized understanding and agrees with plan.    Follow Up: Return for FOLLOW UP FOR MEDICARE ANNUAL WELLNESS VISIT WITH DR DOMÍNGUEZ..         Objective: Results:   Physical Exam  Constitutional:       General: He is not in acute distress.     Appearance: Normal appearance.   HENT:      Head: Normocephalic and atraumatic.   Cardiovascular:      Rate and Rhythm: Normal rate and regular rhythm.      Heart sounds: Normal heart sounds.   Pulmonary:      Effort: Pulmonary effort is normal.      Breath sounds: Normal breath sounds.   Skin:     General: Skin is warm and dry.   Neurological:      Mental Status: He is alert and oriented to person, place, and time.        Reviewed:    Patient Active Problem List    Diagnosis    Iron deficiency anemia due to chronic blood loss    Thrombocytopenia    Asthma with COPD (chronic obstructive pulmonary disease) ([facility])    S/P CABG (coronary artery bypass graft)    Melena    Upper GI bleed    Primary osteoarthritis of both knees    Coronary artery disease involving native coronary artery of native heart without angina pectoris    Moderate persistent asthma without complication ([facility])    Age-related cataract of right eye    CKD (chronic kidney disease) stage 3, GFR 30-59 ml/min ([facility])    Gastroesophageal reflux disease without  esophagitis    Hyperlipidemia associated with type 2 diabetes mellitus (HCC)    Hypertension associated with type 2 diabetes mellitus (HCC)    Type 2 diabetes mellitus with stage 3a chronic kidney disease, without long-term current use of insulin (HCC)    Hyperthyroidism    Vocal cord paralysis      Allergies[1]     Social History     Socioeconomic History    Marital status:    Tobacco Use    Smoking status: Former     Current packs/day: 0.00     Average packs/day: 0.5 packs/day for 20.0 years (10.0 ttl pk-yrs)     Types: Cigarettes     Start date: 1964     Quit date: 1984     Years since quittin.7     Passive exposure: Past    Smokeless tobacco: Never   Vaping Use    Vaping status: Never Used   Substance and Sexual Activity    Alcohol use: Never    Drug use: Never   Other Topics Concern    Caffeine Concern Yes     Comment: 1 tea daily    Exercise Yes     Comment: daily gym   Social History Narrative    ** Merged History Encounter **             2 kids    Retired        Diet: vegetarian    Exercise: nothing formal    Sleep: ok    Stress: low     Social Drivers of Health     Food Insecurity: No Food Insecurity (2024)    Food Insecurity     Food Insecurity: Never true   Transportation Needs: No Transportation Needs (2024)    Transportation Needs     Lack of Transportation: No   Housing Stability: Low Risk  (2024)    Housing Stability     Housing Instability: No      Review of Systems   Constitutional: Negative.    HENT: Negative.     Respiratory: Negative.     Cardiovascular: Negative.    Neurological:  Positive for tremors.     All other systems negative unless otherwise stated in ROS or HPI above.       LEE ANN Noriega  Internal Medicine       Call office with any questions or seek emergency care if necessary.   Patient understands and agrees to follow directions and advice.      ----------------------------------------- PATIENT  INSTRUCTIONS-----------------------------------------   .    Patient Instructions   Forms will be sent to be completed, once completed this will be sent to your insurance company.  We have placed a referral for ophthalmology, please make an appointment with Dr. Long to have your diabetic eye exam completed.  Dr Faustin is no longer practicing with [facility]. Follow-up for your Medicare annual wellness visit with Dr. Case. Make appointment at your earliest convenience.              [1] No Known Allergies

## (undated) DIAGNOSIS — E05.90 HYPERTHYROIDISM: ICD-10-CM

## (undated) DIAGNOSIS — M17.0 PRIMARY OSTEOARTHRITIS OF BOTH KNEES: ICD-10-CM

## (undated) DIAGNOSIS — E11.65 TYPE 2 DIABETES MELLITUS WITH HYPERGLYCEMIA, WITHOUT LONG-TERM CURRENT USE OF INSULIN (HCC): ICD-10-CM

## (undated) DIAGNOSIS — E05.90 HYPERTHYROIDISM: Chronic | ICD-10-CM

## (undated) DEVICE — 1200CC GUARDIAN II: Brand: GUARDIAN

## (undated) DEVICE — GOWN,SIRUS,FAB REINF,RAGLAN,L,STERILE: Brand: MEDLINE

## (undated) DEVICE — DRESSING FOAM 4.25IN LEN 12.5IN W WND PD N

## (undated) DEVICE — SOLUTION IRRIG 1000ML 0.9% NACL USP BTL

## (undated) DEVICE — ESOPHAGEAL BALLOON DILATATION CATHETER: Brand: CRE FIXED WIRE

## (undated) DEVICE — SYRINGE, LUER SLIP, STERILE, 60ML: Brand: MEDLINE

## (undated) DEVICE — Device: Brand: DEFENDO AIR/WATER/SUCTION AND BIOPSY VALVE

## (undated) DEVICE — Device

## (undated) DEVICE — SLEEVE COMPR MD KNEE LEN SGL USE KENDALL SCD

## (undated) DEVICE — CONMED SCOPE SAVER BITE BLOCK, 20X27 MM: Brand: SCOPE SAVER

## (undated) DEVICE — KIT ENDO ORCAPOD 160/180/190

## (undated) DEVICE — SUT PROL 7-0 18IN BV-1 NABSRB BLU L9.3MM 3/8

## (undated) DEVICE — 12 FOOT DISPOSABLE EXTENSION CABLE WITH SAFE CONNECT / SCREW-DOWN

## (undated) DEVICE — MEDI-VAC NON-CONDUCTIVE SUCTION TUBING: Brand: CARDINAL HEALTH

## (undated) DEVICE — DRAPE DISC W112XL168CM DISP FOR HUSH SLUSH

## (undated) DEVICE — ENDOSCOPY PACK UPPER: Brand: MEDLINE INDUSTRIES, INC.

## (undated) DEVICE — SUT PERMA- 2-0 18IN SH NABSRB BLK CR 26MM

## (undated) DEVICE — DEVICE BLWR/MSTR ACCUMIST ATCH

## (undated) DEVICE — HANDPIECE SUR ROTARY NRECHRG BTTRY PWR ZDRIVE

## (undated) DEVICE — FILTERLINE NASAL ADULT O2/CO2

## (undated) DEVICE — CLIP INT USE SM TI LIG HORZ

## (undated) DEVICE — PERCUTANEOUS INSERTION KIT-ARTERIAL: Brand: PERCUTANEOUS INSERTION KIT-ARTERIAL

## (undated) DEVICE — ABSORBABLE HEMOSTAT (OXIDIZED REGENERATED CELLULOSE): Brand: SURGICEL

## (undated) DEVICE — PACK CUSTOM CYSTO

## (undated) DEVICE — PACK PERFUSION CUSTOM W BCARE5

## (undated) DEVICE — A STERILE, SEMI-RIGID TUBE USED IN OPEN HEART SURGERY TO FACILITATE THE TRANSFER OF PRIMING FLUIDS DURING THE PRIMING OF THE EXTRACORPOREAL CIRCUIT OF A CARDIOPULMONARY BYPASS SYSTEM. IT IS A NONINVASIVE PRODUCT IN THE FORM OF A TUBE OR TUBING USED TO CHANNEL THE FLUIDS (I.E., PRIMER/BLOOD) TO FACILITATE PRIMING OF THE EXTRACORPOREAL CIRCUIT AND CONNECTION OF THE CARDIOPULMONARY BYPASS SYSTEM (HEART/LUNG MACHINE) TO THE PATIENT. IT IS TYPICALLY A MOULDED PLASTIC TUBE WITH HARD PLASTIC SPIKE AT THE DISTAL END THAT CONNECTS TO THE BAG OR CONTAINER OF PRIMER SOLUTION, A CONNECTOR AT THE PROXIMAL END, AND A CENTRAL PINCH CLAMP. THIS IS A SINGLE-USE DEVICE.: Brand: QUICKIE PRIME - 1/4" X 1/16"

## (undated) DEVICE — SUT PERMA- 2-0 30IN NABSRB BLK TIE SILK

## (undated) DEVICE — UROLOGY DRAIN BAG

## (undated) DEVICE — KIT CLEAN ENDOKIT 1.1OZ GOWNX2

## (undated) DEVICE — CARTRIDGE HC HMS+ CRTDG SYR

## (undated) DEVICE — SYRINGE/GUAGE ASSEMBLY

## (undated) DEVICE — 3M™ RED DOT™ MONITORING ELECTRODE WITH FOAM TAPE AND STICKY GEL, 50/BAG, 20/CASE, 72/PLT 2570: Brand: RED DOT™

## (undated) DEVICE — SOLUTION IV 1000ML 0.9% NACL PRESERVATIVE

## (undated) DEVICE — SET IRRIG L96IN POST OP W/ NVENT 2ND PIERCING

## (undated) DEVICE — SOLUTION IRRIG 3000ML 0.9% NACL FLX CONT

## (undated) DEVICE — PROVE COVER: Brand: UNBRANDED

## (undated) DEVICE — SLIM BODY SKIN STAPLER: Brand: APPOSE ULC

## (undated) DEVICE — YANKAUER,BULB TIP,W/O VENT,RIGID,STERILE: Brand: MEDLINE

## (undated) DEVICE — CANNULA SUCT L15IN DIA32/40FR NVENT CONN

## (undated) DEVICE — CATHETER THOR 32FR L23IN R ANG CLR PVC HEP

## (undated) DEVICE — CO2 CANNULA,SSOFT,ADLT,7O2,4CO2,FEMALE: Brand: MEDLINE

## (undated) DEVICE — SPONGE LAP 18X18IN WHT COT 4 PLY FLD STRUNG

## (undated) DEVICE — SUT PROL 6-0 24IN C-1 NABSRB BLU 13MM 3/8 CIR

## (undated) DEVICE — OPTISITE ARTERIAL PERFUSION CANNULA: Brand: OPTISITE ARTERIAL PERFUSION CANNULA

## (undated) DEVICE — SUT PROL 5-0 24IN C-1 NABSRB BLU 13MM 3/8 CIR

## (undated) DEVICE — SHEET,DRAPE,53X77,STERILE: Brand: MEDLINE

## (undated) DEVICE — ENDOSCOPIC VALVE WITH ADAPTER.: Brand: SURSEAL® II

## (undated) DEVICE — SOLUTION IRRIG 1000ML ST H2O AQUALITE PLAS

## (undated) DEVICE — SUCTION CANISTER, 3000CC,SAFELINER: Brand: DEROYAL

## (undated) DEVICE — SUT PERMAHAND 1 L30IN N ABSRB BLK TIE SILK

## (undated) DEVICE — BNDG,ELSTC,MATRIX,STRL,4"X5YD,LF,HOOK&LP: Brand: MEDLINE

## (undated) DEVICE — GAMMEX® PI HYBRID SIZE 8, STERILE POWDER-FREE SURGICAL GLOVE, POLYISOPRENE AND NEOPRENE BLEND: Brand: GAMMEX

## (undated) DEVICE — 12 ML SYRINGE LUER-LOCK TIP: Brand: MONOJECT

## (undated) DEVICE — KIT HEMSTAT MTRX 8ML PORCINE GEL HUM THROM

## (undated) DEVICE — SUT VCRL 1-0 36IN CTX ABSRB UD L48MM 1/2 CIR

## (undated) DEVICE — MEDI-VAC NON-CONDUCTIVE SUCTION TUBING 6MM X 1.8M (6FT.) L: Brand: CARDINAL HEALTH

## (undated) DEVICE — ADAPTER CRDPLG ANTGRD RTRGD 3W

## (undated) DEVICE — FORCEP BIOPSY RJ4 LG CAP W/ND

## (undated) DEVICE — SUT PERMA- 2-0 30IN SH NABSRB BLK L26MM 1/

## (undated) DEVICE — BNDG,ELSTC,MATRIX,STRL,6"X5YD,LF,HOOK&LP: Brand: MEDLINE

## (undated) DEVICE — HEART A: Brand: MEDLINE INDUSTRIES, INC.

## (undated) DEVICE — SUT PROL 6-0 18IN C-1 NABSRB BLU 13MM 3/8 CIR

## (undated) DEVICE — SUT PROL 3-0 30IN NABSRB BLU 22MM SH-1 1/2

## (undated) DEVICE — HEART DRAPE AND SUPPLY: Brand: MEDLINE INDUSTRIES, INC.

## (undated) DEVICE — DRAIN CHST SGL COLL 1 PT TB FOR ATS BG CMPTBL

## (undated) DEVICE — UNDYED BRAIDED (POLYGLACTIN 910), SYNTHETIC ABSORBABLE SUTURE: Brand: COATED VICRYL

## (undated) DEVICE — SUT MCRYL 3-0 27IN ABSRB UD L24MM PS-1

## (undated) DEVICE — CATHETER THOR 32FR L23IN BLU RADPQ STRP THRM

## (undated) DEVICE — SUT PERMA- 4-0 18IN NABSRB BLK TIE SILK

## (undated) DEVICE — GOWN,SLEEVE,STERILE,W/CSR WRAP,1/P: Brand: MEDLINE

## (undated) DEVICE — PUNCH AORT 4MM SS THRMPLSTC SLF ALIGNING

## (undated) DEVICE — SUT VCRL 2-0 36IN CT-1 ABSRB UD L36MM 1/2 CIR

## (undated) DEVICE — [HIGH FLOW INSUFFLATOR,  DO NOT USE IF PACKAGE IS DAMAGED,  KEEP DRY,  KEEP AWAY FROM SUNLIGHT,  PROTECT FROM HEAT AND RADIOACTIVE SOURCES.]: Brand: PNEUMOSURE

## (undated) DEVICE — GUIDEWIRE ENDOSCP L150CM DIA0.035IN TIP 3CM

## (undated) DEVICE — SUT SS 7 18IN CCS NABSRB SIL L48MM 1/2 CIR

## (undated) DEVICE — SUT ETHBND XL 0 30IN CT-1 NABSRB GRN 36MM 1/2

## (undated) DEVICE — LEAD PACE 475MM CHN A OR V MYOCARDIAL STEROID

## (undated) DEVICE — PACK ASSRY CUSTOM TUBING

## (undated) DEVICE — CS5/5+ FASTPACK, 225ML 150U RES: Brand: HAEMONETICS CELL SAVER 5/5+ SYSTEMS

## (undated) DEVICE — BAG DRNGE 2000ML URIN INF CTRL ANTIREFLX

## (undated) DEVICE — SUT PDS II 2-0 27IN CT-1 ABSRB VLT L36MM 1/2

## (undated) DEVICE — Device: Brand: VIRTUOSAPH PLUS WITH RADIAL INDICATION

## (undated) DEVICE — SUT PROL 4-0 36IN RB-1 NABSRB BLU 17MM 1/2 CI

## (undated) DEVICE — 3M™ BAIR HUGGER® UNDERBODY BLANKET, FULL ACCESS, 10 PER CASE 63500: Brand: BAIR HUGGER™

## (undated) DEVICE — FORESIGHT LARGE SENSOR: Brand: FORESIGHT

## (undated) DEVICE — MEDI-VAC SUCTION HANDLE REGULAR CAPACITY: Brand: CARDINAL HEALTH

## (undated) NOTE — MR AVS SNAPSHOT
[place]  [address] [facility]  [address] [facility] [address] [phone]               Thank you for choosing us for your health care visit with Ellen Ansari MD.  We are glad to serve you and happy to provide you with this argueta Take 1 tablet by mouth daily. Vitamin B-12 1000 MCG Tabs   Take 1,000 mcg by mouth daily.    Commonly known as:  VITAMIN B12                   MyChart                  Visit [facility] online at  [email or web address]

## (undated) NOTE — LETTER
[facility]  Administration of Anesthesia  IGiuseppe agree to be cared for by a physician anesthesiologist alone and/or with a nurse anesthetist, who is specially trained to monitor me and give me medicine to put me to sleep or keep me comfortable during my procedure    I understand that my anesthesiologist and/or anesthetist is not an employee or agent of [facility] or [employer]. He or she works for [facility]    As the patient asking for anesthesia services, I agree to:  Allow the anesthesiologist (anesthesia doctor) to give me medicine and do additional procedures as necessary. Some examples are: Starting or using an “IV” to give me medicine, fluids or blood during my procedure, and having a breathing tube placed to help me breathe when I’m asleep (intubation). In the event that my heart stops working properly, I understand that my anesthesiologist will make every effort to sustain my life, unless otherwise directed by [facility] Do Not Resuscitate documents.  Tell my anesthesia doctor before my procedure:  If I am pregnant.  The last time that I ate or drank.  iii. All of the medicines I take (including prescriptions, herbal supplements, and pills I can buy without a prescription (including street drugs/illegal medications). Failure to inform my anesthesiologist about these medicines may increase my risk of anesthetic complications.  iv.If I am allergic to anything or have had a reaction to anesthesia before.  I understand how the anesthesia medicine will help me (benefits).  I understand that with any type of anesthesia medicine there are risks:  The most common risks are: nausea, vomiting, sore throat, muscle soreness, damage to my eyes, mouth, or teeth (from breathing tube placement).  Rare risks include: remembering what happened during my procedure, allergic reactions to medications, injury to my airway, heart, lungs, vision, nerves, or  muscles and in extremely rare instances death.  My doctor has explained to me other choices available to me for my care (alternatives).  Pregnant Patients (“epidural”):  I understand that the risks of having an epidural (medicine given into my back to help control pain during labor), include itching, low blood pressure, difficulty urinating, headache or slowing of the baby’s heart. Very rare risks include infection, bleeding, seizure, irregular heart rhythms and nerve injury.  Regional Anesthesia (“spinal”, “epidural”, & “nerve blocks”):  I understand that rare but potential complications include headache, bleeding, infection, seizure, irregular heart rhythms, and nerve injury.    _____________________________________________________________________________  Patient (or Representative) Signature/Relationship to Patient  Date   Time    _____________________________________________________________________________   Name (if used)    Language/Organization   Time    _____________________________________________________________________________  Nurse Anesthetist Signature     Date   Time  _____________________________________________________________________________  Anesthesiologist Signature     Date   Time  I have discussed the procedure and information above with the patient (or patient’s representative) and answered their questions. The patient or their representative has agreed to have anesthesia services.    _____________________________________________________________________________  Witness        Date   Time  I have verified that the signature is that of the patient or patient’s representative, and that it was signed before the procedure  Patient Name: Giuseppe Faustin     : 1945                 Printed: 2024 at 3:34 PM    Medical Record #: [id number]                                            Page 1 of 1  ----------ANESTHESIA CONSENT----------

## (undated) NOTE — LETTER
09/16/21        [address] [address]      Dear Andres Perez records indicate that you have outstanding lab work and or testing that was ordered for you and has not yet been completed:  Orders Placed This Encounter

## (undated) NOTE — LETTER
[facility]  [address], [place], [place]    Authorization for Surgical Operation and Procedure                               I hereby authorize Wen Bryan MD, my physician and his/her assistants (if applicable), which may include medical students, residents, and/or fellows, to perform the following surgical operation/ procedure and administer such anesthesia as may be determined necessary by my physician: Operation/Procedure name (s) ESOPHAGOGASTRODUODENOSCOPY (EGD) on Giuseppe Dianael   2.   I recognize that during the surgical operation/procedure, unforeseen conditions may necessitate additional or different procedures than those listed above.  I, therefore, further authorize and request that the above-named surgeon, assistants, or designees perform such procedures as are, in their judgment, necessary and desirable.    3.   My surgeon/physician has discussed prior to my surgery the potential benefits, risks and side effects of this procedure; the likelihood of achieving goals; and potential problems that might occur during recuperation.  They also discussed reasonable alternatives to the procedure, including risks, benefits, and side effects related to the alternatives and risks related to not receiving this procedure.  I have had all my questions answered and I acknowledge that no guarantee has been made as to the result that may be obtained.    4.   Should the need arise during my operation/procedure, which includes change of level of care prior to discharge, I also consent to the administration of blood and/or blood products.  Further, I understand that despite careful testing and screening of blood or blood products by collecting agencies, I may still be subject to ill effects as a result of receiving a blood transfusion and/or blood products.  The following are some, but not all, of the potential risks that can occur: fever and allergic reactions, hemolytic reactions, transmission  of diseases such as Hepatitis, AIDS and Cytomegalovirus (CMV) and fluid overload.  In the event that I wish to have an autologous transfusion of my own blood, or a directed donor transfusion, I will discuss this with my physician.  Check only if Refusing Blood or Blood Products  I understand refusal of blood or blood products as deemed necessary by my physician may have serious consequences to my condition to include possible death. I hereby assume responsibility for my refusal and release the hospital, its personnel, and my physicians from any responsibility for the consequences of my refusal.    o  Refuse   5.   I authorize the use of any specimen, organs, tissues, body parts or foreign objects that may be removed from my body during the operation/procedure for diagnosis, research or teaching purposes and their subsequent disposal by hospital authorities.  I also authorize the release of specimen test results and/or written reports to my treating physician on the hospital medical staff or other referring or consulting physicians involved in my care, at the discretion of the Pathologist or my treating physician.    6.   I consent to the photographing or videotaping of the operations or procedures to be performed, including appropriate portions of my body for medical, scientific, or educational purposes, provided my identity is not revealed by the pictures or by descriptive texts accompanying them.  If the procedure has been photographed/videotaped, the surgeon will obtain the original picture, image, videotape or CD.  The hospital will not be responsible for storage, release or maintenance of the picture, image, tape or CD.    7.   I consent to the presence of a  or observers in the operating room as deemed necessary by my physician or their designees.    8.   I recognize that in the event my procedure results in extended X-Ray/fluoroscopy time, I may develop a skin reaction.    9. If I have a Do  Not Attempt Resuscitation (DNAR) order in place, that status will be suspended while in the operating room, procedural suite, and during the recovery period unless otherwise explicitly stated by me (or a person authorized to consent on my behalf). The surgeon or my attending physician will determine when the applicable recovery period ends for purposes of reinstating the DNAR order.  10. Patients having a sterilization procedure: I understand that if the procedure is successful the results will be permanent and it will therefore be impossible for me to inseminate, conceive, or bear children.  I also understand that the procedure is intended to result in sterility, although the result has not been guaranteed.   11. I acknowledge that my physician has explained sedation/analgesia administration to me including the risk and benefits I consent to the administration of sedation/analgesia as may be necessary or desirable in the judgment of my physician.    I CERTIFY THAT I HAVE READ AND FULLY UNDERSTAND THE ABOVE CONSENT TO OPERATION and/or OTHER PROCEDURE.     ____________________________________  _________________________________        ______________________________  Signature of Patient    Signature of Responsible Person                Printed Name of Responsible Person                                      ____________________________________  _____________________________                ________________________________  Signature of Witness        Date  Time         Relationship to Patient    STATEMENT OF PHYSICIAN My signature below affirms that prior to the time of the procedure; I have explained to the patient and/or his/her legal representative, the risks and benefits involved in the proposed treatment and any reasonable alternative to the proposed treatment. I have also explained the risks and benefits involved in refusal of the proposed treatment and alternatives to the proposed treatment and have answered the  patient's questions. If I have a significant financial interest in a co-management agreement or a significant financial interest in any product or implant, or other significant relationship used in this procedure/surgery, I have disclosed this and had a discussion with my patient.     _____________________________________________________              _____________________________  (Signature of Physician)                                                                                         (Date)                                   (Time)  Patient Name: Giuseppe Faustin      : 1945      Printed: 2024     Medical Record #: [id number]                                      Page 1 of 1

## (undated) NOTE — LETTER
01/31/22        [address] [address]      Dear [place],    Our records indicate that you have outstanding lab work and or testing that was ordered for you and has not yet been completed:  Orders Placed This Encounter

## (undated) NOTE — MR AVS SNAPSHOT
[place]  [address] [facility]  [address] [facility] [address] [phone]               Thank you for choosing us for your health care visit with Kaden Kumar MD.  We are glad to serve you and happy to provide you with this argueta Losartan Potassium 25 MG Tabs   Take 1 tablet (25 mg total) by mouth daily. Commonly known as:  COZAAR           MetFORMIN HCl 500 MG Tabs   Take 1 tablet (500 mg total) by mouth 2 (two) times daily.    Commonly known as:  GLUCOPHAGE           predniSONE Don’t forget strength training with weights and resistance Set goals and track your progress   You don’t need to join a gym. Home exercises work great.  Put more priority on exercise in your life                    Visit [facility] online at

## (undated) NOTE — LETTER
Date: 2018  Patient Name:  Josesito Haywood             Address: [phone]    : 1945    Dear Josesito Haywood,      I hope this letter finds you doing well. I am writing to inform you of the following:      The biopsies o

## (undated) NOTE — Clinical Note
Laci, in re: to Giuseppe, any contraindication to GLP1/mounjaro? He is looking for weight loss, appears thyroid is not cancer? Let me know either way, thanks! -Joshua

## (undated) NOTE — LETTER
[facility]  Administration of Anesthesia  IGiuseppe agree to be cared for by a physician anesthesiologist alone and/or with a nurse anesthetist, who is specially trained to monitor me and give me medicine to put me to sleep or keep me comfortable during my procedure    I understand that my anesthesiologist and/or anesthetist is not an employee or agent of [facility] or [employer]. He or she works for [facility]    As the patient asking for anesthesia services, I agree to:  Allow the anesthesiologist (anesthesia doctor) to give me medicine and do additional procedures as necessary. Some examples are: Starting or using an “IV” to give me medicine, fluids or blood during my procedure, and having a breathing tube placed to help me breathe when I’m asleep (intubation). In the event that my heart stops working properly, I understand that my anesthesiologist will make every effort to sustain my life, unless otherwise directed by [facility] Do Not Resuscitate documents.  Tell my anesthesia doctor before my procedure:  If I am pregnant.  The last time that I ate or drank.  iii. All of the medicines I take (including prescriptions, herbal supplements, and pills I can buy without a prescription (including street drugs/illegal medications). Failure to inform my anesthesiologist about these medicines may increase my risk of anesthetic complications.  iv.If I am allergic to anything or have had a reaction to anesthesia before.  I understand how the anesthesia medicine will help me (benefits).  I understand that with any type of anesthesia medicine there are risks:  The most common risks are: nausea, vomiting, sore throat, muscle soreness, damage to my eyes, mouth, or teeth (from breathing tube placement).  Rare risks include: remembering what happened during my procedure, allergic reactions to medications, injury to my airway, heart, lungs, vision, nerves, or  muscles and in extremely rare instances death.  My doctor has explained to me other choices available to me for my care (alternatives).  Pregnant Patients (“epidural”):  I understand that the risks of having an epidural (medicine given into my back to help control pain during labor), include itching, low blood pressure, difficulty urinating, headache or slowing of the baby’s heart. Very rare risks include infection, bleeding, seizure, irregular heart rhythms and nerve injury.  Regional Anesthesia (“spinal”, “epidural”, & “nerve blocks”):  I understand that rare but potential complications include headache, bleeding, infection, seizure, irregular heart rhythms, and nerve injury.    _____________________________________________________________________________  Patient (or Representative) Signature/Relationship to Patient  Date   Time    _____________________________________________________________________________   Name (if used)    Language/Organization   Time    _____________________________________________________________________________  Nurse Anesthetist Signature     Date   Time  _____________________________________________________________________________  Anesthesiologist Signature     Date   Time  I have discussed the procedure and information above with the patient (or patient’s representative) and answered their questions. The patient or their representative has agreed to have anesthesia services.    _____________________________________________________________________________  Witness        Date   Time  I have verified that the signature is that of the patient or patient’s representative, and that it was signed before the procedure  Patient Name: Giuseppe Faustin     : 1945                 Printed: 2024 at 11:07 AM    Medical Record #: [id number]                                            Page 1 of 1  ----------ANESTHESIA CONSENT----------

## (undated) NOTE — LETTER
[address], [place], [place]    Authorization for Surgical Operation and Procedure                               I hereby authorize Dr. Brayan Castle, my physician and his/her assistants (if applicable), which may include medical students, residents, and/or fellows, to perform the following surgical operation/ procedure and administer such anesthesia as may be determined necessary by my physician: Coronary Artery Bypass Grafting: Possible Endoscopic Vein [place] on Giuseppe Faustin   2.   I recognize that during the surgical operation/procedure, unforeseen conditions may necessitate additional or different procedures than those listed above.  I, therefore, further authorize and request that the above-named surgeon, assistants, or designees perform such procedures as are, in their judgment, necessary and desirable.    3.   My surgeon/physician has discussed prior to my surgery the potential benefits, risks and side effects of this procedure; the likelihood of achieving goals; and potential problems that might occur during recuperation.  They also discussed reasonable alternatives to the procedure, including risks, benefits, and side effects related to the alternatives and risks related to not receiving this procedure.  I have had all my questions answered and I acknowledge that no guarantee has been made as to the result that may be obtained.    4.   Should the need arise during my operation/procedure, which includes change of level of care prior to discharge, I also consent to the administration of blood and/or blood products.  Further, I understand that despite careful testing and screening of blood or blood products by collecting agencies, I may still be subject to ill effects as a result of receiving a blood transfusion and/or blood products.  The following are some, but not all, of the potential risks that can occur: fever and allergic reactions, hemolytic reactions,  transmission of diseases such as Hepatitis, AIDS and Cytomegalovirus (CMV) and fluid overload.  In the event that I wish to have an autologous transfusion of my own blood, or a directed donor transfusion, I will discuss this with my physician.  Check only if Refusing Blood or Blood Products  I understand refusal of blood or blood products as deemed necessary by my physician may have serious consequences to my condition to include possible death. I hereby assume responsibility for my refusal and release the hospital, its personnel, and my physicians from any responsibility for the consequences of my refusal.    o  Refuse   5.   I authorize the use of any specimen, organs, tissues, body parts or foreign objects that may be removed from my body during the operation/procedure for diagnosis, research or teaching purposes and their subsequent disposal by hospital authorities.  I also authorize the release of specimen test results and/or written reports to my treating physician on the hospital medical staff or other referring or consulting physicians involved in my care, at the discretion of the Pathologist or my treating physician.    6.   I consent to the photographing or videotaping of the operations or procedures to be performed, including appropriate portions of my body for medical, scientific, or educational purposes, provided my identity is not revealed by the pictures or by descriptive texts accompanying them.  If the procedure has been photographed/videotaped, the surgeon will obtain the original picture, image, videotape or CD.  The hospital will not be responsible for storage, release or maintenance of the picture, image, tape or CD.    7.   I consent to the presence of a  or observers in the operating room as deemed necessary by my physician or their designees.    8.   I recognize that in the event my procedure results in extended X-Ray/fluoroscopy time, I may develop a skin reaction.    9. If  I have a Do Not Attempt Resuscitation (DNAR) order in place, that status will be suspended while in the operating room, procedural suite, and during the recovery period unless otherwise explicitly stated by me (or a person authorized to consent on my behalf). The surgeon or my attending physician will determine when the applicable recovery period ends for purposes of reinstating the DNAR order.  10. Patients having a sterilization procedure: I understand that if the procedure is successful the results will be permanent and it will therefore be impossible for me to inseminate, conceive, or bear children.  I also understand that the procedure is intended to result in sterility, although the result has not been guaranteed.   11. I acknowledge that my physician has explained sedation/analgesia administration to me including the risk and benefits I consent to the administration of sedation/analgesia as may be necessary or desirable in the judgment of my physician.    I CERTIFY THAT I HAVE READ AND FULLY UNDERSTAND THE ABOVE CONSENT TO OPERATION and/or OTHER PROCEDURE.     ____________________________________  _________________________________        ______________________________  Signature of Patient    Signature of Responsible Person                Printed Name of Responsible Person                                      ____________________________________  _____________________________                ________________________________  Signature of Witness        Date  Time         Relationship to Patient    STATEMENT OF PHYSICIAN My signature below affirms that prior to the time of the procedure; I have explained to the patient and/or his/her legal representative, the risks and benefits involved in the proposed treatment and any reasonable alternative to the proposed treatment. I have also explained the risks and benefits involved in refusal of the proposed treatment and alternatives to the proposed treatment and have  answered the patient's questions. If I have a significant financial interest in a co-management agreement or a significant financial interest in any product or implant, or other significant relationship used in this procedure/surgery, I have disclosed this and had a discussion with my patient.     _____________________________________________________              _____________________________  (Signature of Physician)                                                                                         (Date)                                   (Time)  Patient Name: Giuseppe Faustin      : 1945      Printed: 2024     Medical Record #: [id number]                                      Page 1 of 1

## (undated) NOTE — LETTER
01/31/19        [address]      Dear Kayla Sims,    Our records indicate that you have outstanding lab work and or testing that was ordered for you and has not yet been completed: Fasting lab work  To complete the lab

## (undated) NOTE — MR AVS SNAPSHOT
[place]  [address]  [address] [address] [phone]               Thank you for choosing us for your health care visit with Vanesa Field MD.  We are glad to serve you and happy to provide you with this argueta predniSONE 5 MG Tabs   10 mg twice daily for 4 days 5 mg twice daily for 4 days   Commonly known as:  DELTASONE           Propranolol HCl 10 MG Tabs   TAKE 1 TABLET BY MOUTH TWO TIMES A DAY   Commonly known as:  INDERAL           TAB-A-JUSTA MAXIMUM Tabs

## (undated) NOTE — LETTER
[facility]  Administration of Anesthesia  IGiuseppe agree to be cared for by a physician anesthesiologist alone and/or with a nurse anesthetist, who is specially trained to monitor me and give me medicine to put me to sleep or keep me comfortable during my procedure    I understand that my anesthesiologist and/or anesthetist is not an employee or agent of [facility] or [employer]. He or she works for [facility]    As the patient asking for anesthesia services, I agree to:  Allow the anesthesiologist (anesthesia doctor) to give me medicine and do additional procedures as necessary. Some examples are: Starting or using an “IV” to give me medicine, fluids or blood during my procedure, and having a breathing tube placed to help me breathe when I’m asleep (intubation). In the event that my heart stops working properly, I understand that my anesthesiologist will make every effort to sustain my life, unless otherwise directed by [facility] Do Not Resuscitate documents.  Tell my anesthesia doctor before my procedure:  If I am pregnant.  The last time that I ate or drank.  iii. All of the medicines I take (including prescriptions, herbal supplements, and pills I can buy without a prescription (including street drugs/illegal medications). Failure to inform my anesthesiologist about these medicines may increase my risk of anesthetic complications.  iv.If I am allergic to anything or have had a reaction to anesthesia before.  I understand how the anesthesia medicine will help me (benefits).  I understand that with any type of anesthesia medicine there are risks:  The most common risks are: nausea, vomiting, sore throat, muscle soreness, damage to my eyes, mouth, or teeth (from breathing tube placement).  Rare risks include: remembering what happened during my procedure, allergic reactions to medications, injury to my airway, heart, lungs, vision, nerves, or  muscles and in extremely rare instances death.  My doctor has explained to me other choices available to me for my care (alternatives).  Pregnant Patients (“epidural”):  I understand that the risks of having an epidural (medicine given into my back to help control pain during labor), include itching, low blood pressure, difficulty urinating, headache or slowing of the baby’s heart. Very rare risks include infection, bleeding, seizure, irregular heart rhythms and nerve injury.  Regional Anesthesia (“spinal”, “epidural”, & “nerve blocks”):  I understand that rare but potential complications include headache, bleeding, infection, seizure, irregular heart rhythms, and nerve injury.    _____________________________________________________________________________  Patient (or Representative) Signature/Relationship to Patient  Date   Time    _____________________________________________________________________________   Name (if used)    Language/Organization   Time    _____________________________________________________________________________  Nurse Anesthetist Signature     Date   Time  _____________________________________________________________________________  Anesthesiologist Signature     Date   Time  I have discussed the procedure and information above with the patient (or patient’s representative) and answered their questions. The patient or their representative has agreed to have anesthesia services.    _____________________________________________________________________________  Witness        Date   Time  I have verified that the signature is that of the patient or patient’s representative, and that it was signed before the procedure  Patient Name: Giuseppe Faustin     : 1945                 Printed: 2024 at 10:32 AM    Medical Record #: [id number]                                            Page 1 of 1  ----------ANESTHESIA CONSENT----------

## (undated) NOTE — LETTER
[place] [address]  Authorization for Invasive Procedures  Date: 7/6/2024           Time: 0948    I hereby authorize Dr. Bryan, my physician and his/her assistants (if applicable), which may include medical students, residents, and/or fellows, to perform the following surgical operation/ procedure and administer such anesthesia as may be determined necessary by my physician: Esophagogastroduodenoscopy (EGD) on Giuseppe Faustin  2.   I recognize that during the surgical operation/procedure, unforeseen conditions may necessitate additional or different procedures than those listed above.  I, therefore, further authorize and request that the above-named surgeon, assistants, or designees perform such procedures as are, in their judgment, necessary and desirable.    3.   My surgeon/physician has discussed prior to my surgery the potential benefits, risks and side effects of this procedure; the likelihood of achieving goals; and potential problems that might occur during recuperation.  They also discussed reasonable alternatives to the procedure, including risks, benefits, and side effects related to the alternatives and risks related to not receiving this procedure.  I have had all my questions answered and I acknowledge that no guarantee has been made as to the result that may be obtained.    4.   Should the need arise during my operation/procedure, which includes change of level of care prior to discharge, I also consent to the administration of blood and/or blood products.  Further, I understand that despite careful testing and screening of blood or blood products by collecting agencies, I may still be subject to ill effects as a result of receiving a blood transfusion and/or blood products.  The following are some, but not all, of the potential risks that can occur: fever and allergic reactions, hemolytic reactions, transmission of diseases such as Hepatitis, AIDS and Cytomegalovirus  (CMV) and fluid overload.  In the event that I wish to have an autologous transfusion of my own blood, or a directed donor transfusion, I will discuss this with my physician.   Check only if Refusing Blood or Blood Products  I understand refusal of blood or blood products as deemed necessary by my physician may have serious consequences to my condition to include possible death. I hereby assume responsibility for my refusal and release the hospital, its personnel, and my physicians from any responsibility for the consequences of my refusal.         o  Refuse         5.   I authorize the use of any specimen, organs, tissues, body parts or foreign objects that may be removed from my body during the operation/procedure for diagnosis, research or teaching purposes and their subsequent disposal by hospital authorities.  I also authorize the release of specimen test results and/or written reports to my treating physician on the hospital medical staff or other referring or consulting physicians involved in my care, at the discretion of the Pathologist or my treating physician.    6.   I consent to the photographing or videotaping of the operations or procedures to be performed, including appropriate portions of my body for medical, scientific, or educational purposes, provided my identity is not revealed by the pictures or by descriptive texts accompanying them.  If the procedure has been photographed/videotaped, the surgeon will obtain the original picture, image, videotape or CD.  The hospital will not be responsible for storage, release or maintenance of the picture, image, tape or CD.    7.   I consent to the presence of a  or observers in the operating room as deemed necessary by my physician or their designees.    8.   I recognize that in the event my procedure results in extended X-Ray/fluoroscopy time, I may develop a skin reaction.    9. If I have a Do Not Attempt Resuscitation (DNAR) order in  place, that status will be suspended while in the operating room, procedural suite, and during the recovery period unless otherwise explicitly stated by me (or a person authorized to consent on my behalf). The surgeon or my attending physician will determine when the applicable recovery period ends for purposes of reinstating the DNAR order.  10. Patients having a sterilization procedure: I understand that if the procedure is successful the results will be permanent and it will therefore be impossible for me to inseminate, conceive, or bear children.  I also understand that the procedure is intended to result in sterility, although the result has not been guaranteed.   11. I acknowledge that my physician has explained sedation/analgesia administration to me including the risk and benefits I consent to the administration of sedation/analgesia as may be necessary or desirable in the judgment of my physician.    I CERTIFY THAT I HAVE READ AND FULLY UNDERSTAND THE ABOVE CONSENT TO OPERATION and/or OTHER PROCEDURE.        ____________________________________       _________________________________      ______________________________  Signature of Patient         Signature of Responsible Person        Printed Name of Responsible Person        ____________________________________      _________________________________      ______________________________       Signature of Witness          Relationship to Patient                       Date                                       Time  Patient Name: Giuseppe Faustin  : 1945    Reviewed: 2024   Printed: 2024  Medical Record #: [id number] Page 1 of 2             STATEMENT OF PHYSICIAN My signature below affirms that prior to the time of the procedure; I have explained to the patient and/or his/her legal representative, the risks and benefits involved in the proposed treatment and any reasonable alternative to the proposed treatment. I have also explained  the risks and benefits involved in refusal of the proposed treatment and alternatives to the proposed treatment and have answered the patient's questions. If I have a significant financial interest in a co-management agreement or a significant financial interest in any product or implant, or other significant relationship used in this procedure/surgery, I have disclosed this and had a discussion with my patient.     _______________________________________________________________ _____________________________  (Signature of Physician)                                                                                         (Date)                                   (Time)  Patient Name: Giuseppe Faustin  : 1945    Reviewed: 2024   Printed: 2024  Medical Record #: [id number] Page 2 of 2

## (undated) NOTE — LETTER
Patient Name: Stefany Lemus  YOB: 1945          MRN :  [id number]  Date:  3/22/2021  Referring Physician:  Bryon Mccray    Discharge Summary  Pt has attended 3 visits in Physical Therapy.      Dx: Benign paroxysmal positional vertigo d transfers without issue. He has met all goals. Updated his HEP as listed, stressed safety with this. At this time, he has met all goals and feels ready to D/C. Will proceed per plan as listed below. Goals: (To be met in 6 visits)  1.  Patient will rep

## (undated) NOTE — LETTER
[facility] CV SURGERY  [address] [place]  [facility] [address]  [phone]    Blood Transfusion Consent    In the course of your treatment, it may become necessary to administer a transfusion of blood or blood components. This form provides basic information concerning this procedure and, if signed by you, authorizes its administration. By signing this form, you agree that all of your questions about the administration of blood or blood products have been answered by the ordering medical professional or designee.    Description of Procedure  Blood is introduced into one of your veins, commonly in the arm, using a sterilized disposable needle. The amount of blood transfused, and whether the transfusion will be of blood or blood components is a judgement the physician will make based on your particular needs.    Risks  The transfusion is a common procedure of low risk.  MINOR AND TEMPORARY REACTIONS ARE NOT UNCOMMON, including a slight bruise, swelling or local reaction in the area where the needle pierces your skin, or a nonserious reaction to the transfused material itself, including headache, fever or mild skin reaction, such as rash.  Serious reactions are possible, though very unlikely, and include severe allergic reaction (shock) and destruction (hemolysis) of transfused blood cells.  Infectious diseases which are known to be transmitted by blood transfusion include certain types of viral Hepatitis(liver infection from a virus), Human Immunodeficiency Virus (HIV-1,2) infection, a viral infection known to cause Acquired Immunodeficiency Syndrome (AIDS), as well as certain other bacterial, viral, and parasitic diseases. While a minimal risk of acquiring an infectious disease from transfused blood exists, in accordance with the Federal and State law, all due care has been taken in donor selection and testing to avoid transmission of disease.    Alternatives  If loss of blood poses serious threats during  your treatment, THERE IS NO EFFECTIVE ALTERNATIVE TO BLOOD TRANSFUSION. However, if you have any further questions on this matter, your provider will fully explain the alternatives to you if it has not already been done.    I, ______________________________, have read/had read to me the above. I understand the matters bearing on the decision whether or not to authorize a transfusion of blood or blood components. I have no questions which have not been answered to my full satisfaction. I hereby consent to such transfusion as my physician may deem necessary or advisable in the course of my treatment.    ______________________________________________                    ___________________________  (Signature of Patient or Responsible party in case of minor,                 (Printed Name of Patient or incompetent, or unconscious patient)              Responsible Party)    ___________________________               _____________________  (Relationship to Patient if not self)                                    (Date and Time)    __________________________                                                           ______________________              (Signature of Witness)               (Printed Name of Witness)     Language line ()    Telephone/Verbal/Video Consent    __________________________                     ____________________  (Signature of 2nd Witness           (Printed Name of 2nd  Telephone/Verbal/Video Consent)           Witness)    Patient Name: Giuseppe Faustin     : 1945                 Printed: 2024     Medical Record #: [id number]      Rev: 2023

## (undated) NOTE — LETTER
Date: 2020  Patient Name:  Magalis Mireles             Address: [phone] [id number]    : 1945    Dear Magalis Mireles,    I hope this letter finds you doing well. I am writing to inform you of the following:      The biopsies obt